# Patient Record
Sex: FEMALE | Race: WHITE | NOT HISPANIC OR LATINO | Employment: OTHER | URBAN - METROPOLITAN AREA
[De-identification: names, ages, dates, MRNs, and addresses within clinical notes are randomized per-mention and may not be internally consistent; named-entity substitution may affect disease eponyms.]

---

## 2017-01-04 ENCOUNTER — OFFICE VISIT (OUTPATIENT)
Dept: FAMILY MEDICINE | Facility: CLINIC | Age: 77
End: 2017-01-04
Payer: MEDICARE

## 2017-01-04 VITALS
BODY MASS INDEX: 36.71 KG/M2 | OXYGEN SATURATION: 97 % | DIASTOLIC BLOOD PRESSURE: 78 MMHG | HEART RATE: 87 BPM | SYSTOLIC BLOOD PRESSURE: 132 MMHG | TEMPERATURE: 98 F | WEIGHT: 199.5 LBS | HEIGHT: 62 IN

## 2017-01-04 DIAGNOSIS — Z00.00 WELLNESS EXAMINATION: Primary | ICD-10-CM

## 2017-01-04 DIAGNOSIS — E78.5 HYPERLIPIDEMIA, UNSPECIFIED HYPERLIPIDEMIA TYPE: ICD-10-CM

## 2017-01-04 DIAGNOSIS — M81.0 OSTEOPOROSIS: ICD-10-CM

## 2017-01-04 DIAGNOSIS — Z86.79 HISTORY OF HYPERTENSION: ICD-10-CM

## 2017-01-04 PROCEDURE — G0009 ADMIN PNEUMOCOCCAL VACCINE: HCPCS | Mod: S$GLB,,, | Performed by: FAMILY MEDICINE

## 2017-01-04 PROCEDURE — 99499 UNLISTED E&M SERVICE: CPT | Mod: S$GLB,,, | Performed by: FAMILY MEDICINE

## 2017-01-04 PROCEDURE — 99387 INIT PM E/M NEW PAT 65+ YRS: CPT | Mod: 25,S$GLB,, | Performed by: FAMILY MEDICINE

## 2017-01-04 PROCEDURE — 90670 PCV13 VACCINE IM: CPT | Mod: S$GLB,,, | Performed by: FAMILY MEDICINE

## 2017-01-04 RX ORDER — ROPINIROLE 2 MG/1
2 TABLET, FILM COATED ORAL NIGHTLY
COMMUNITY
End: 2017-01-04 | Stop reason: SDUPTHER

## 2017-01-04 RX ORDER — TIZANIDINE 4 MG/1
4 TABLET ORAL NIGHTLY
COMMUNITY
End: 2017-01-04

## 2017-01-04 RX ORDER — ROPINIROLE 2 MG/1
2 TABLET, FILM COATED ORAL NIGHTLY
Qty: 90 TABLET | Refills: 3 | Status: SHIPPED | OUTPATIENT
Start: 2017-01-04 | End: 2017-01-26 | Stop reason: SDUPTHER

## 2017-01-04 NOTE — MR AVS SNAPSHOT
"    [facility]  [address] [place] [address]  Phone: [phone]  Fax: [phone]                  Angelique Mckeonjolene   2017 8:40 AM   Office Visit    Description:  Female : 1940   Provider:  Marc Sands MD   Department:  [facility]           Reason for Visit     Establish Care           Diagnoses this Visit        Comments    Wellness examination    -  Primary     Hyperlipidemia, unspecified hyperlipidemia type         Osteoporosis         History of hypertension                To Do List           Goals (5 Years of Data)     None      [facility]     [facility] Care [facility] -  Assistance  Registered nurses in the [facility] On Call Center provide clinical advisement, health education, appointment booking, and other advisory services.  Call for this free service at [phone].             Medications           STOP taking these medications     hydrochlorothiazide (HYDRODIURIL) 25 MG tablet Take 25 mg by mouth once daily.     tizanidine (ZANAFLEX) 4 MG tablet Take 4 mg by mouth every evening.           Verify that the below list of medications is an accurate representation of the medications you are currently taking.  If none reported, the list may be blank. If incorrect, please contact your healthcare provider. Carry this list with you in case of emergency.           Current Medications     ropinirole (REQUIP) 2 MG tablet Take 2 mg by mouth nightly.    alendronate (FOSAMAX) 70 MG tablet Take 70 mg by mouth every 7 days.     atorvastatin (LIPITOR) 20 MG tablet Take 20 mg by mouth once daily.            Clinical Reference Information           Vital Signs - Last Recorded  Most recent update: 2017  9:01 AM by Reinaldo Gonsalez LPN    BP Pulse Temp Ht Wt SpO2    132/78 87 98.4 °F (36.9 °C) (Oral) 5' 2" (1.575 m) 90.5 kg (199 lb 8.3 oz) 97%    BMI                36.49 kg/m2          Blood Pressure          Most Recent Value    BP  132/78 "      Allergies as of 1/4/2017     No Known Allergies      Immunizations Administered on Date of Encounter - 1/4/2017     Name Date Dose VIS Date Route    Pneumococcal Conjugate - 13 Valent  Incomplete 0.5 mL 11/5/2015 Intramuscular      Orders Placed During Today's Visit      Normal Orders This Visit    Pneumococcal Conjugate Vaccine (13 Valent) (IM)     Future Labs/Procedures Expected by Expires    CBC auto differential  1/4/2017 3/5/2018    Comprehensive metabolic panel  1/4/2017 3/5/2018    Lipid panel  As directed 1/4/2018    Vitamin D  As directed 1/4/2018      [facility] Sign-Up     Activating your [facility] account is as easy as 1-2-3!     1) Visit [email or web address], select Sign Up Now, enter this activation code and your date of birth, then select Next.  [email or web address]  Expires: 2/18/2017  9:39 AM      2) Create a username and password to use when you visit [facility] in the future and select a security question in case you lose your password and select Next.    3) Enter your e-mail address and click Sign Up!    Additional Information  If you have questions, please e-mail [email or web address] or call [phone] to talk to our [facility] staff. Remember, [facility] is NOT to be used for urgent needs. For medical emergencies, dial 911.

## 2017-01-04 NOTE — PROGRESS NOTES
Subjective:      Patient ID: Shirley F Gilford is a 76 y.o. female.    Chief Complaint: Establish Care    HPI Comments: New pt; changing MD; treateed for chol and bp and meds increase dose made her nauseated and constipated; stopped everything except for restless leg rx ropinirole 2 mg works; off tizanidine, atorvastatin, hydrochlorothiazide and alendronate; had restless legs for 20 years; taking fosamax; not taking cholesterol Rx; used to work at [email or web address]; pt from [place]; moved to . At 1 years old;   aneurysm 20 years ago; he was NOPD.    Review of Systems   Constitutional: Negative.    HENT: Negative.    Respiratory: Negative.    Cardiovascular: Negative.    Gastrointestinal: Negative.    Endocrine: Negative.    Genitourinary: Negative.    Musculoskeletal: Negative.    Psychiatric/Behavioral: Negative.    All other systems reviewed and are negative.    Objective:     Physical Exam   Constitutional: She is oriented to person, place, and time. She appears well-developed and well-nourished.   HENT:   Head: Normocephalic.   Eyes: Conjunctivae and EOM are normal. Pupils are equal, round, and reactive to light.   Neck: Normal range of motion. Neck supple.   Cardiovascular: Normal rate, regular rhythm and normal heart sounds.    Pulmonary/Chest: Effort normal and breath sounds normal.   Musculoskeletal: Normal range of motion.   Neurological: She is alert and oriented to person, place, and time. She has normal reflexes.   Skin: Skin is warm and dry.   Psychiatric: She has a normal mood and affect. Her behavior is normal. Judgment and thought content normal.   Nursing note and vitals reviewed.    Assessment:     1. Wellness examination    2. Hyperlipidemia, unspecified hyperlipidemia type    3. Osteoporosis    4. History of hypertension      Plan:     New Prescriptions    No medications on file     Discontinued Medications    HYDROCHLOROTHIAZIDE (HYDRODIURIL) 25 MG TABLET    Take 25 mg by mouth  once daily.     TIZANIDINE (ZANAFLEX) 4 MG TABLET    Take 4 mg by mouth every evening.     Modified Medications    Modified Medication Previous Medication    ROPINIROLE (REQUIP) 2 MG TABLET ropinirole (REQUIP) 2 MG tablet       Take 1 tablet (2 mg total) by mouth nightly.    Take 2 mg by mouth nightly.       Wellness examination  -     Cancel: CBC auto differential; Future; Expected date: 1/4/17  -     Cancel: Comprehensive metabolic panel; Future; Expected date: 1/4/17  -     Cancel: Lipid panel; Future  -     Cancel: Vitamin D; Future  -     Vitamin D; Future; Expected date: 1/4/17  -     Lipid panel; Future; Expected date: 1/4/17  -     Comprehensive metabolic panel; Future; Expected date: 1/4/17  -     CBC auto differential; Future; Expected date: 1/4/17    Hyperlipidemia, unspecified hyperlipidemia type  -     Cancel: CBC auto differential; Future; Expected date: 1/4/17  -     Cancel: Comprehensive metabolic panel; Future; Expected date: 1/4/17  -     Cancel: Lipid panel; Future  -     Cancel: Vitamin D; Future  -     Vitamin D; Future; Expected date: 1/4/17  -     Lipid panel; Future; Expected date: 1/4/17  -     Comprehensive metabolic panel; Future; Expected date: 1/4/17  -     CBC auto differential; Future; Expected date: 1/4/17    Osteoporosis  -     Cancel: CBC auto differential; Future; Expected date: 1/4/17  -     Cancel: Comprehensive metabolic panel; Future; Expected date: 1/4/17  -     Cancel: Lipid panel; Future  -     Cancel: Vitamin D; Future  -     Vitamin D; Future; Expected date: 1/4/17  -     Lipid panel; Future; Expected date: 1/4/17  -     Comprehensive metabolic panel; Future; Expected date: 1/4/17  -     CBC auto differential; Future; Expected date: 1/4/17    History of hypertension  -     Vitamin D; Future; Expected date: 1/4/17  -     Lipid panel; Future; Expected date: 1/4/17  -     Comprehensive metabolic panel; Future; Expected date: 1/4/17  -     CBC auto differential; Future;  Expected date: 1/4/17    Other orders  -     Pneumococcal Conjugate Vaccine (13 Valent) (IM)  -     Cancel: Tdap Vaccine (Adult)  -     Cancel: Zoster Vaccine - Live

## 2017-01-07 LAB
1,25(OH)2D SERPL-MCNC: 62 PG/ML (ref 18–72)
1,25(OH)2D2 SERPL-MCNC: <8 PG/ML
1,25(OH)2D3 SERPL-MCNC: 62 PG/ML
ALBUMIN SERPL-MCNC: 3.9 G/DL (ref 3.6–5.1)
ALBUMIN/GLOB SERPL: 1.4 (CALC) (ref 1–2.5)
ALP SERPL-CCNC: 102 U/L (ref 33–130)
ALT SERPL-CCNC: 38 U/L (ref 6–29)
AST SERPL-CCNC: 52 U/L (ref 10–35)
BASOPHILS # BLD AUTO: 46 CELLS/UL (ref 0–200)
BASOPHILS NFR BLD AUTO: 0.5 %
BILIRUB SERPL-MCNC: 0.3 MG/DL (ref 0.2–1.2)
BUN SERPL-MCNC: 13 MG/DL (ref 7–25)
BUN/CREAT SERPL: 13 (CALC) (ref 6–22)
CALCIUM SERPL-MCNC: 9.5 MG/DL (ref 8.6–10.4)
CHLORIDE SERPL-SCNC: 104 MMOL/L (ref 98–110)
CHOLEST SERPL-MCNC: 201 MG/DL (ref 125–200)
CHOLEST/HDLC SERPL: 5.3 (CALC)
CO2 SERPL-SCNC: 27 MMOL/L (ref 20–31)
CREAT SERPL-MCNC: 0.98 MG/DL (ref 0.6–0.93)
EOSINOPHIL # BLD AUTO: 175 CELLS/UL (ref 15–500)
EOSINOPHIL NFR BLD AUTO: 1.9 %
ERYTHROCYTE [DISTWIDTH] IN BLOOD BY AUTOMATED COUNT: 15.8 % (ref 11–15)
GFR SERPL CREATININE-BSD FRML MDRD: 56 ML/MIN/1.73M2
GLOBULIN SER CALC-MCNC: 2.7 G/DL (CALC) (ref 1.9–3.7)
GLUCOSE SERPL-MCNC: 102 MG/DL (ref 65–99)
HCT VFR BLD AUTO: 35.5 % (ref 35–45)
HDLC SERPL-MCNC: 38 MG/DL
HGB BLD-MCNC: 11.3 G/DL (ref 11.7–15.5)
LDLC SERPL CALC-MCNC: 136 MG/DL (CALC)
LYMPHOCYTES # BLD AUTO: 1573 CELLS/UL (ref 850–3900)
LYMPHOCYTES NFR BLD AUTO: 17.1 %
MCH RBC QN AUTO: 25.9 PG (ref 27–33)
MCHC RBC AUTO-ENTMCNC: 31.9 G/DL (ref 32–36)
MCV RBC AUTO: 81.1 FL (ref 80–100)
MONOCYTES # BLD AUTO: 791 CELLS/UL (ref 200–950)
MONOCYTES NFR BLD AUTO: 8.6 %
NEUTROPHILS # BLD AUTO: 6615 CELLS/UL (ref 1500–7800)
NEUTROPHILS NFR BLD AUTO: 71.9 %
NONHDLC SERPL-MCNC: 163 MG/DL (CALC)
PLATELET # BLD AUTO: 337 THOUSAND/UL (ref 140–400)
PMV BLD REES-ECKER: 8.6 FL (ref 7.5–11.5)
POTASSIUM SERPL-SCNC: 4.4 MMOL/L (ref 3.5–5.3)
PROT SERPL-MCNC: 6.6 G/DL (ref 6.1–8.1)
RBC # BLD AUTO: 4.37 MILLION/UL (ref 3.8–5.1)
SODIUM SERPL-SCNC: 140 MMOL/L (ref 135–146)
TRIGL SERPL-MCNC: 135 MG/DL
WBC # BLD AUTO: 9.2 THOUSAND/UL (ref 3.8–10.8)

## 2017-01-09 ENCOUNTER — TELEPHONE (OUTPATIENT)
Dept: FAMILY MEDICINE | Facility: CLINIC | Age: 77
End: 2017-01-09

## 2017-01-09 DIAGNOSIS — R79.89 ELEVATED LFTS: Primary | ICD-10-CM

## 2017-01-10 ENCOUNTER — TELEPHONE (OUTPATIENT)
Dept: FAMILY MEDICINE | Facility: CLINIC | Age: 77
End: 2017-01-10

## 2017-01-10 NOTE — TELEPHONE ENCOUNTER
Pt notified, going to [facility] for labs and will self schedule ultrasound. Pt will call clinic to schedule f/u appt with Dr. Sands once completed.

## 2017-01-10 NOTE — TELEPHONE ENCOUNTER
----- Message from Marc Sands MD sent at 1/9/2017  8:46 AM CST -----  High liver tests; needs Hep C and abd ultrasound; ordered; RTC when completed

## 2017-01-12 ENCOUNTER — HOSPITAL ENCOUNTER (OUTPATIENT)
Dept: RADIOLOGY | Facility: HOSPITAL | Age: 77
Discharge: HOME | End: 2017-01-12
Attending: FAMILY MEDICINE
Payer: MEDICARE

## 2017-01-12 DIAGNOSIS — R79.89 ELEVATED LFTS: ICD-10-CM

## 2017-01-12 PROCEDURE — 76700 US EXAM ABDOM COMPLETE: CPT | Mod: TC,PO

## 2017-01-13 ENCOUNTER — TELEPHONE (OUTPATIENT)
Dept: FAMILY MEDICINE | Facility: CLINIC | Age: 77
End: 2017-01-13

## 2017-01-13 LAB
HCV AB S/CO SERPL IA: 0.02
HCV AB SERPL QL IA: NORMAL

## 2017-01-13 NOTE — TELEPHONE ENCOUNTER
----- Message from Marc Sands MD sent at 1/13/2017  6:26 AM CST -----  Fatty liver on ultrasound. Lose weight

## 2017-01-26 ENCOUNTER — OFFICE VISIT (OUTPATIENT)
Dept: FAMILY MEDICINE | Facility: CLINIC | Age: 77
End: 2017-01-26
Payer: MEDICARE

## 2017-01-26 ENCOUNTER — TELEPHONE (OUTPATIENT)
Dept: FAMILY MEDICINE | Facility: CLINIC | Age: 77
End: 2017-01-26

## 2017-01-26 VITALS
HEIGHT: 62 IN | SYSTOLIC BLOOD PRESSURE: 138 MMHG | BODY MASS INDEX: 37.16 KG/M2 | DIASTOLIC BLOOD PRESSURE: 82 MMHG | OXYGEN SATURATION: 95 % | WEIGHT: 201.94 LBS | HEART RATE: 88 BPM | TEMPERATURE: 98 F

## 2017-01-26 DIAGNOSIS — Z87.891 EX-SMOKER: ICD-10-CM

## 2017-01-26 DIAGNOSIS — Z86.79 HISTORY OF HYPERTENSION: ICD-10-CM

## 2017-01-26 DIAGNOSIS — R79.89 ELEVATED LFTS: Primary | ICD-10-CM

## 2017-01-26 DIAGNOSIS — R73.9 HYPERGLYCEMIA: ICD-10-CM

## 2017-01-26 DIAGNOSIS — Z86.39 HISTORY OF HYPERLIPIDEMIA: ICD-10-CM

## 2017-01-26 DIAGNOSIS — M81.0 OSTEOPOROSIS: ICD-10-CM

## 2017-01-26 DIAGNOSIS — G25.81 RESTLESS LEG SYNDROME: ICD-10-CM

## 2017-01-26 DIAGNOSIS — J44.9 CHRONIC OBSTRUCTIVE PULMONARY DISEASE, UNSPECIFIED COPD TYPE: ICD-10-CM

## 2017-01-26 PROCEDURE — 99213 OFFICE O/P EST LOW 20 MIN: CPT | Mod: S$GLB,,, | Performed by: FAMILY MEDICINE

## 2017-01-26 PROCEDURE — 99499 UNLISTED E&M SERVICE: CPT | Mod: S$GLB,,, | Performed by: FAMILY MEDICINE

## 2017-01-26 PROCEDURE — 1159F MED LIST DOCD IN RCRD: CPT | Mod: S$GLB,,, | Performed by: FAMILY MEDICINE

## 2017-01-26 PROCEDURE — 1160F RVW MEDS BY RX/DR IN RCRD: CPT | Mod: S$GLB,,, | Performed by: FAMILY MEDICINE

## 2017-01-26 PROCEDURE — 1157F ADVNC CARE PLAN IN RCRD: CPT | Mod: S$GLB,,, | Performed by: FAMILY MEDICINE

## 2017-01-26 PROCEDURE — 1126F AMNT PAIN NOTED NONE PRSNT: CPT | Mod: S$GLB,,, | Performed by: FAMILY MEDICINE

## 2017-01-26 RX ORDER — ROPINIROLE 3 MG/1
3 TABLET, FILM COATED ORAL NIGHTLY
Qty: 90 TABLET | Refills: 3 | Status: SHIPPED | OUTPATIENT
Start: 2017-01-26 | End: 2017-03-21 | Stop reason: SDUPTHER

## 2017-01-26 RX ORDER — ALENDRONATE SODIUM 70 MG/1
70 TABLET ORAL
Qty: 13 TABLET | Refills: 3 | Status: SHIPPED | OUTPATIENT
Start: 2017-01-26 | End: 2017-03-21 | Stop reason: SDUPTHER

## 2017-01-26 RX ORDER — ASPIRIN 81 MG/1
81 TABLET ORAL DAILY
Qty: 100 TABLET | Refills: 12 | COMMUNITY
Start: 2017-01-26 | End: 2017-05-30

## 2017-01-26 NOTE — MR AVS SNAPSHOT
[address] [address]  Phone: [phone]  Fax: [phone]                  Shirley F Gilford   2017 8:40 AM   Office Visit    Description:  Female : 1940   Provider:  Marc Sands MD   [facility]           Reason for Visit     Follow-up           Diagnoses this Visit        Comments    Elevated LFTs    -  Primary     Restless leg syndrome         BMI 36.0-36.9,adult         Hyperglycemia         Osteoporosis         History of hypertension         History of hyperlipidemia         Ex-smoker         Chronic obstructive pulmonary disease, unspecified COPD type                To Do List           Goals (5 Years of Data)     None      Follow-Up and Disposition     Return in about 1 month (around 2017).       These Medications        Disp Refills Start End    alendronate (FOSAMAX) 70 MG tablet 13 tablet 3 2017     Take 1 tablet (70 mg total) by mouth every 7 days. - Oral    Pharmacy: [id number] [address] Ph #: [phone]       ropinirole (REQUIP) 3 MG tablet 90 tablet 3 2017     Take 1 tablet (3 mg total) by mouth nightly. - Oral    Pharmacy: [facility] [address] Ph #: [phone]       umeclidinium-vilanterol 62.5-25 mcg/actuation DsDv 1 each 11 2017     Inhale 1 puff into the lungs once daily. For COPD - Inhalation    Pharmacy: [facility] [address] Ph #: [phone]         PURCHASE these Medications (No prescription required)        Start End    aspirin (ECOTRIN) 81 MG EC tablet 2017    Sig - Route: Take 1 tablet (81 mg total) by mouth once daily. - Oral    Class: OTC      [facility]     [facility] Care [facility] -  Assistance  Registered nurses in the [facility] On Call Center provide clinical advisement, health education, appointment booking, and  "other advisory services.  Call for this free service at [phone].             Medications           START taking these NEW medications        Refills    aspirin (ECOTRIN) 81 MG EC tablet 12    Sig: Take 1 tablet (81 mg total) by mouth once daily.    Class: OTC    Route: Oral    umeclidinium-vilanterol 62.5-25 mcg/actuation DsDv 11    Sig: Inhale 1 puff into the lungs once daily. For COPD    Class: Normal    Route: Inhalation      CHANGE how you are taking these medications     Start Taking Instead of    alendronate (FOSAMAX) 70 MG tablet alendronate (FOSAMAX) 70 MG tablet    Dosage:  Take 1 tablet (70 mg total) by mouth every 7 days. Dosage:  Take 70 mg by mouth every 7 days.     Reason for Change:  Reorder     ropinirole (REQUIP) 3 MG tablet ropinirole (REQUIP) 2 MG tablet    Dosage:  Take 1 tablet (3 mg total) by mouth nightly. Dosage:  Take 1 tablet (2 mg total) by mouth nightly.    Reason for Change:  Reorder       STOP taking these medications     atorvastatin (LIPITOR) 20 MG tablet Take 20 mg by mouth once daily.            Verify that the below list of medications is an accurate representation of the medications you are currently taking.  If none reported, the list may be blank. If incorrect, please contact your healthcare provider. Carry this list with you in case of emergency.           Current Medications     alendronate (FOSAMAX) 70 MG tablet Take 1 tablet (70 mg total) by mouth every 7 days.    aspirin (ECOTRIN) 81 MG EC tablet Take 1 tablet (81 mg total) by mouth once daily.    ropinirole (REQUIP) 3 MG tablet Take 1 tablet (3 mg total) by mouth nightly.    umeclidinium-vilanterol 62.5-25 mcg/actuation DsDv Inhale 1 puff into the lungs once daily. For COPD           Clinical Reference Information           Vital Signs - Last Recorded  Most recent update: 1/26/2017  9:19 AM by Reinaldo Gonsalez LPN    BP Pulse Temp Ht Wt SpO2    138/82 88 98.2 °F (36.8 °C) (Oral) 5' 2" (1.575 m) 91.6 kg (201 lb " 15.1 oz) 95%    BMI                36.94 kg/m2          Blood Pressure          Most Recent Value    BP  138/82      Allergies as of 1/26/2017     No Known Allergies      Immunizations Administered on Date of Encounter - 1/26/2017     None      Orders Placed During Today's Visit     Future Labs/Procedures Expected by Expires    Hepatic function panel  4/26/2017 3/27/2018    Lipid panel  4/26/2017 1/26/2018    Complete PFT without bronchodilator  As directed 1/27/2018    Lipid panel  As directed 1/26/2018      [facility] Sign-Up     Activating your [facility] account is as easy as 1-2-3!     1) Visit [email or web address], select Sign Up Now, enter this activation code and your date of birth, then select Next.  [email or web address]  Expires: 2/18/2017  9:39 AM      2) Create a username and password to use when you visit [facility] in the future and select a security question in case you lose your password and select Next.    3) Enter your e-mail address and click Sign Up!    Additional Information  If you have questions, please e-mail [email or web address] or call [phone] to talk to our [facility] staff. Remember, [facility] is NOT to be used for urgent needs. For medical emergencies, dial 911.

## 2017-01-26 NOTE — PROGRESS NOTES
Subjective:      Patient ID: Shirley F Gilford is a 76 y.o. female.    Chief Complaint: Follow-up    HPI Comments: Follow up agapito LFT, RLS, fatty liver on ultrasound, ex smoker; gets SOB    Review of Systems   Constitutional: Negative.    HENT: Negative.    Respiratory: Positive for shortness of breath.    Cardiovascular: Negative.    Gastrointestinal: Negative.    Endocrine: Negative.    Genitourinary: Negative.    Musculoskeletal: Negative.    Neurological:        Restless legs back   Psychiatric/Behavioral: Negative.    All other systems reviewed and are negative.    Objective:     Physical Exam   Constitutional: She is oriented to person, place, and time. She appears well-developed and well-nourished.   HENT:   Head: Normocephalic.   Eyes: Conjunctivae and EOM are normal. Pupils are equal, round, and reactive to light.   Neck: Normal range of motion. Neck supple.   Cardiovascular: Normal rate, regular rhythm and normal heart sounds.    Pulmonary/Chest: Effort normal and breath sounds normal.   Musculoskeletal: Normal range of motion.   Neurological: She is alert and oriented to person, place, and time. She has normal reflexes.   Skin: Skin is warm and dry.   Psychiatric: She has a normal mood and affect. Her behavior is normal. Judgment and thought content normal.   Nursing note and vitals reviewed.    Assessment:     1. Elevated LFTs    2. Restless leg syndrome    3. BMI 36.0-36.9,adult    4. Hyperglycemia    5. Osteoporosis    6. History of hypertension    7. History of hyperlipidemia    8. Ex-smoker    9. Chronic obstructive pulmonary disease, unspecified COPD type      Plan:     New Prescriptions    ASPIRIN (ECOTRIN) 81 MG EC TABLET    Take 1 tablet (81 mg total) by mouth once daily.    UMECLIDINIUM-VILANTEROL 62.5-25 MCG/ACTUATION DSDV    Inhale 1 puff into the lungs once daily. For COPD     Discontinued Medications    ATORVASTATIN (LIPITOR) 20 MG TABLET    Take 20 mg by mouth once daily.      Modified  Medications    Modified Medication Previous Medication    ALENDRONATE (FOSAMAX) 70 MG TABLET alendronate (FOSAMAX) 70 MG tablet       Take 1 tablet (70 mg total) by mouth every 7 days.    Take 70 mg by mouth every 7 days.     ROPINIROLE (REQUIP) 3 MG TABLET ropinirole (REQUIP) 2 MG tablet       Take 1 tablet (3 mg total) by mouth nightly.    Take 1 tablet (2 mg total) by mouth nightly.       Elevated LFTs  -     Hepatic function panel; Future; Expected date: 4/26/17    Restless leg syndrome    BMI 36.0-36.9,adult  -     Cancel: Hemoglobin A1c; Future; Expected date: 4/26/17  -     Hepatic function panel; Future; Expected date: 4/26/17    Hyperglycemia  -     Cancel: Hemoglobin A1c; Future; Expected date: 4/26/17    Osteoporosis    History of hypertension    History of hyperlipidemia  -     Lipid panel; Future  -     Lipid panel; Future; Expected date: 4/26/17    Ex-smoker  -     Complete PFT without bronchodilator; Future    Chronic obstructive pulmonary disease, unspecified COPD type    Other orders  -     alendronate (FOSAMAX) 70 MG tablet; Take 1 tablet (70 mg total) by mouth every 7 days.  Dispense: 13 tablet; Refill: 3  -     ropinirole (REQUIP) 3 MG tablet; Take 1 tablet (3 mg total) by mouth nightly.  Dispense: 90 tablet; Refill: 3  -     aspirin (ECOTRIN) 81 MG EC tablet; Take 1 tablet (81 mg total) by mouth once daily.  Dispense: 100 tablet; Refill: 12  -     umeclidinium-vilanterol 62.5-25 mcg/actuation DsDv; Inhale 1 puff into the lungs once daily. For COPD  Dispense: 1 each; Refill: 11

## 2017-01-31 ENCOUNTER — TELEPHONE (OUTPATIENT)
Dept: FAMILY MEDICINE | Facility: CLINIC | Age: 77
End: 2017-01-31

## 2017-01-31 RX ORDER — SULFAMETHOXAZOLE AND TRIMETHOPRIM 800; 160 MG/1; MG/1
1 TABLET ORAL 2 TIMES DAILY
Qty: 14 TABLET | Refills: 0 | Status: SHIPPED | OUTPATIENT
Start: 2017-01-31 | End: 2017-02-07

## 2017-01-31 NOTE — TELEPHONE ENCOUNTER
Spoke with patient states that's he has a boil in between her leg near her vagina states that it is getting better then it was before she just thought it would get better faster then this. States that it is draining. ABX sent to pharmacy continue warm compresses

## 2017-03-21 ENCOUNTER — OFFICE VISIT (OUTPATIENT)
Dept: FAMILY MEDICINE | Facility: CLINIC | Age: 77
End: 2017-03-21
Payer: MEDICARE

## 2017-03-21 VITALS
TEMPERATURE: 98 F | HEIGHT: 62 IN | DIASTOLIC BLOOD PRESSURE: 86 MMHG | RESPIRATION RATE: 18 BRPM | WEIGHT: 199.06 LBS | OXYGEN SATURATION: 95 % | SYSTOLIC BLOOD PRESSURE: 132 MMHG | BODY MASS INDEX: 36.63 KG/M2 | HEART RATE: 78 BPM

## 2017-03-21 DIAGNOSIS — K59.00 CONSTIPATION, UNSPECIFIED CONSTIPATION TYPE: ICD-10-CM

## 2017-03-21 DIAGNOSIS — Z86.79 HISTORY OF HYPERTENSION: ICD-10-CM

## 2017-03-21 DIAGNOSIS — Z87.891 EX-SMOKER: ICD-10-CM

## 2017-03-21 DIAGNOSIS — G25.81 RESTLESS LEG SYNDROME: ICD-10-CM

## 2017-03-21 DIAGNOSIS — M81.0 OSTEOPOROSIS: ICD-10-CM

## 2017-03-21 DIAGNOSIS — K76.0 FATTY LIVER: ICD-10-CM

## 2017-03-21 DIAGNOSIS — R73.9 HYPERGLYCEMIA: ICD-10-CM

## 2017-03-21 DIAGNOSIS — Z86.39 HISTORY OF HYPERLIPIDEMIA: ICD-10-CM

## 2017-03-21 DIAGNOSIS — R79.89 ELEVATED LFTS: Primary | ICD-10-CM

## 2017-03-21 PROCEDURE — 1159F MED LIST DOCD IN RCRD: CPT | Mod: S$GLB,,, | Performed by: FAMILY MEDICINE

## 2017-03-21 PROCEDURE — 1157F ADVNC CARE PLAN IN RCRD: CPT | Mod: S$GLB,,, | Performed by: FAMILY MEDICINE

## 2017-03-21 PROCEDURE — 99213 OFFICE O/P EST LOW 20 MIN: CPT | Mod: S$GLB,,, | Performed by: FAMILY MEDICINE

## 2017-03-21 PROCEDURE — 1160F RVW MEDS BY RX/DR IN RCRD: CPT | Mod: S$GLB,,, | Performed by: FAMILY MEDICINE

## 2017-03-21 PROCEDURE — 99499 UNLISTED E&M SERVICE: CPT | Mod: S$GLB,,, | Performed by: FAMILY MEDICINE

## 2017-03-21 PROCEDURE — 1126F AMNT PAIN NOTED NONE PRSNT: CPT | Mod: S$GLB,,, | Performed by: FAMILY MEDICINE

## 2017-03-21 RX ORDER — ALENDRONATE SODIUM 70 MG/1
70 TABLET ORAL
Qty: 13 TABLET | Refills: 3 | Status: SHIPPED | OUTPATIENT
Start: 2017-03-21 | End: 2017-11-02 | Stop reason: SDUPTHER

## 2017-03-21 RX ORDER — ROPINIROLE 3 MG/1
3 TABLET, FILM COATED ORAL NIGHTLY
Qty: 90 TABLET | Refills: 3 | Status: SHIPPED | OUTPATIENT
Start: 2017-03-21 | End: 2018-08-22

## 2017-03-21 NOTE — MR AVS SNAPSHOT
[facility]  [address] [place] [address]  Phone: [phone]  Fax: [phone]                  Shirley F Gilford   3/21/2017 3:20 PM   Office Visit    Description:  Female : 1940   Provider:  Marc Sands MD   Department:  [facility]           Reason for Visit     Medication Refill     Follow-up           Diagnoses this Visit        Comments    Elevated LFTs    -  Primary     Ex-smoker         Restless leg syndrome         History of hyperlipidemia         History of hypertension         Osteoporosis         Hyperglycemia         Fatty liver         Constipation, unspecified constipation type                To Do List           Goals (5 Years of Data)     None      Follow-Up and Disposition     Return in about 6 months (around 2017).       These Medications        Disp Refills Start End    alendronate (FOSAMAX) 70 MG tablet 13 tablet 3 3/21/2017     Take 1 tablet (70 mg total) by mouth every 7 days. - Oral    Pharmacy: [id number] [address] Ph #: [phone]       ropinirole (REQUIP) 3 MG tablet 90 tablet 3 3/21/2017     Take 1 tablet (3 mg total) by mouth nightly. - Oral    Pharmacy: [id number] Ph #: [phone]         [facility] On Call     [facility] On Call Nurse Care Line -  Assistance  Registered nurses in the [facility] Center provide clinical advisement, health education, appointment booking, and other advisory services.  Call for this free service at [phone].             Medications                Verify that the below list of medications is an accurate representation of the medications you are currently taking.  If none reported, the list may be blank. If incorrect, please contact your healthcare provider. Carry this list with you in case of emergency.           Current Medications     alendronate (FOSAMAX) 70 MG tablet Take 1 tablet  "(70 mg total) by mouth every 7 days.    aspirin (ECOTRIN) 81 MG EC tablet Take 1 tablet (81 mg total) by mouth once daily.    ropinirole (REQUIP) 3 MG tablet Take 1 tablet (3 mg total) by mouth nightly.    umeclidinium-vilanterol 62.5-25 mcg/actuation DsDv Inhale 1 puff into the lungs once daily. For COPD           Clinical Reference Information           Your Vitals Were     BP Pulse Temp Resp Height Weight    132/86 (BP Location: Left arm, Patient Position: Sitting, BP Method: Manual) 78 98.3 °F (36.8 °C) (Oral) 18 5' 2" (1.575 m) 90.3 kg (199 lb 1.2 oz)    SpO2 BMI             95% 36.41 kg/m2         Blood Pressure          Most Recent Value    BP  132/86      Allergies as of 3/21/2017     No Known Allergies      Immunizations Administered on Date of Encounter - 3/21/2017     None      Orders Placed During Today's Visit     Future Labs/Procedures Expected by Expires    Hemoglobin A1c  As directed 3/21/2018    Lipid panel  As directed 3/21/2018      [facility] Sign-Up     Activating your [facility] account is as easy as 1-2-3!     1) Visit [email or web address], select Sign Up Now, enter this activation code and your date of birth, then select Next.  [email or web address]  Expires: 5/5/2017  4:30 PM      2) Create a username and password to use when you visit [facility] in the future and select a security question in case you lose your password and select Next.    3) Enter your e-mail address and click Sign Up!    Additional Information  If you have questions, please e-mail [email or web address] or call [phone] to talk to our [facility] staff. Remember, [facility] is NOT to be used for urgent needs. For medical emergencies, dial 911.         Language Assistance Services     ATTENTION: Language assistance services are available, free of charge. Please call [phone].      ATENCIÓN: Si habla español, tiene a pinon disposición servicios gratuitos de asistencia lingüística. Llame al [phone].     BILLY Ý: N?u b?n nói Ti?ng " Vi?t, có các d?ch v? h? tr? ngôn ng? mi?n phí jolantah cho b?n. G?i s? [phone].         [facility] complies with applicable Federal civil rights laws and does not discriminate on the basis of race, color, national origin, age, disability, or sex.

## 2017-03-21 NOTE — PROGRESS NOTES
Subjective:      Patient ID: Shirley F Gilford is a 76 y.o. female.    Chief Complaint: Medication Refill and Follow-up    HPI Comments: Check up; has fatty liver, copd, RLS, and elevated liver tests    Medication Refill       Review of Systems   Constitutional: Negative.    HENT: Negative.    Respiratory: Negative.    Cardiovascular: Negative.    Gastrointestinal: Negative.    Endocrine: Negative.    Genitourinary: Negative.    Musculoskeletal: Negative.    Psychiatric/Behavioral: Negative.    All other systems reviewed and are negative.    Objective:     Physical Exam   Constitutional: She is oriented to person, place, and time. She appears well-developed and well-nourished.   HENT:   Head: Normocephalic.   Eyes: Conjunctivae and EOM are normal. Pupils are equal, round, and reactive to light.   Neck: Normal range of motion. Neck supple.   Cardiovascular: Normal rate, regular rhythm and normal heart sounds.    Pulmonary/Chest: Effort normal and breath sounds normal.   Musculoskeletal: Normal range of motion.   Neurological: She is alert and oriented to person, place, and time. She has normal reflexes.   Skin: Skin is warm and dry.   Psychiatric: She has a normal mood and affect. Her behavior is normal. Judgment and thought content normal.   Nursing note and vitals reviewed.    Assessment:     1. Elevated LFTs    2. Ex-smoker    3. Restless leg syndrome    4. History of hyperlipidemia    5. History of hypertension    6. Osteoporosis    7. Hyperglycemia    8. Fatty liver      Plan:     New Prescriptions    No medications on file     Discontinued Medications    No medications on file     Modified Medications    Modified Medication Previous Medication    ALENDRONATE (FOSAMAX) 70 MG TABLET alendronate (FOSAMAX) 70 MG tablet       Take 1 tablet (70 mg total) by mouth every 7 days.    Take 1 tablet (70 mg total) by mouth every 7 days.    ROPINIROLE (REQUIP) 3 MG TABLET ropinirole (REQUIP) 3 MG tablet       Take 1 tablet  (3 mg total) by mouth nightly.    Take 1 tablet (3 mg total) by mouth nightly.       Elevated LFTs  -     Lipid panel; Future  -     Hemoglobin A1c; Future    Ex-smoker  -     Lipid panel; Future  -     Hemoglobin A1c; Future    Restless leg syndrome  -     Lipid panel; Future  -     Hemoglobin A1c; Future    History of hyperlipidemia  -     Lipid panel; Future  -     Hemoglobin A1c; Future    History of hypertension  -     Lipid panel; Future  -     Hemoglobin A1c; Future    Osteoporosis  -     Lipid panel; Future  -     Hemoglobin A1c; Future    Hyperglycemia  -     Lipid panel; Future  -     Hemoglobin A1c; Future    Fatty liver  -     Lipid panel; Future  -     Hemoglobin A1c; Future    Other orders  -     alendronate (FOSAMAX) 70 MG tablet; Take 1 tablet (70 mg total) by mouth every 7 days.  Dispense: 13 tablet; Refill: 3  -     ropinirole (REQUIP) 3 MG tablet; Take 1 tablet (3 mg total) by mouth nightly.  Dispense: 90 tablet; Refill: 3

## 2017-03-28 ENCOUNTER — TELEPHONE (OUTPATIENT)
Dept: FAMILY MEDICINE | Facility: CLINIC | Age: 77
End: 2017-03-28

## 2017-03-28 LAB
CHOLEST SERPL-MCNC: 202 MG/DL (ref 125–200)
CHOLEST/HDLC SERPL: 6.1 (CALC)
HBA1C MFR BLD: 5.9 % OF TOTAL HGB
HDLC SERPL-MCNC: 33 MG/DL
LDLC SERPL CALC-MCNC: 140 MG/DL (CALC)
NONHDLC SERPL-MCNC: 169 MG/DL (CALC)
TRIGL SERPL-MCNC: 144 MG/DL

## 2017-03-28 NOTE — TELEPHONE ENCOUNTER
Left message that lab work results were normal and to call the office with any questions or concerns.

## 2017-05-29 ENCOUNTER — HOSPITAL ENCOUNTER (OUTPATIENT)
Dept: RADIOLOGY | Facility: HOSPITAL | Age: 77
Discharge: HOME | End: 2017-05-29
Attending: FAMILY MEDICINE
Payer: MEDICARE

## 2017-05-29 ENCOUNTER — OFFICE VISIT (OUTPATIENT)
Dept: FAMILY MEDICINE | Facility: CLINIC | Age: 77
End: 2017-05-29
Payer: MEDICARE

## 2017-05-29 VITALS
BODY MASS INDEX: 35.33 KG/M2 | OXYGEN SATURATION: 95 % | SYSTOLIC BLOOD PRESSURE: 134 MMHG | WEIGHT: 192 LBS | DIASTOLIC BLOOD PRESSURE: 78 MMHG | HEIGHT: 62 IN | TEMPERATURE: 98 F | HEART RATE: 93 BPM

## 2017-05-29 DIAGNOSIS — G25.81 RESTLESS LEG SYNDROME: ICD-10-CM

## 2017-05-29 DIAGNOSIS — Z86.39 HISTORY OF HYPERLIPIDEMIA: ICD-10-CM

## 2017-05-29 DIAGNOSIS — Z87.891 EX-SMOKER: ICD-10-CM

## 2017-05-29 DIAGNOSIS — Z86.79 HISTORY OF HYPERTENSION: ICD-10-CM

## 2017-05-29 DIAGNOSIS — R06.02 SOB (SHORTNESS OF BREATH): ICD-10-CM

## 2017-05-29 DIAGNOSIS — M81.0 OSTEOPOROSIS, UNSPECIFIED OSTEOPOROSIS TYPE, UNSPECIFIED PATHOLOGICAL FRACTURE PRESENCE: ICD-10-CM

## 2017-05-29 DIAGNOSIS — R79.89 ELEVATED LFTS: ICD-10-CM

## 2017-05-29 DIAGNOSIS — R73.9 HYPERGLYCEMIA: ICD-10-CM

## 2017-05-29 DIAGNOSIS — J44.9 CHRONIC OBSTRUCTIVE PULMONARY DISEASE, UNSPECIFIED COPD TYPE: ICD-10-CM

## 2017-05-29 DIAGNOSIS — R73.9 HYPERGLYCEMIA: Primary | ICD-10-CM

## 2017-05-29 PROCEDURE — 1126F AMNT PAIN NOTED NONE PRSNT: CPT | Mod: S$GLB,,, | Performed by: FAMILY MEDICINE

## 2017-05-29 PROCEDURE — 99499 UNLISTED E&M SERVICE: CPT | Mod: S$GLB,,, | Performed by: FAMILY MEDICINE

## 2017-05-29 PROCEDURE — 99213 OFFICE O/P EST LOW 20 MIN: CPT | Mod: S$GLB,,, | Performed by: FAMILY MEDICINE

## 2017-05-29 PROCEDURE — 1159F MED LIST DOCD IN RCRD: CPT | Mod: S$GLB,,, | Performed by: FAMILY MEDICINE

## 2017-05-29 PROCEDURE — 93010 ELECTROCARDIOGRAM REPORT: CPT | Mod: ,,, | Performed by: INTERNAL MEDICINE

## 2017-05-29 PROCEDURE — 93005 ELECTROCARDIOGRAM TRACING: CPT | Mod: S$GLB,,, | Performed by: FAMILY MEDICINE

## 2017-05-29 PROCEDURE — 71020 XR CHEST PA AND LATERAL: CPT | Mod: TC,PO

## 2017-05-29 RX ORDER — IPRATROPIUM BROMIDE AND ALBUTEROL SULFATE 2.5; .5 MG/3ML; MG/3ML
3 SOLUTION RESPIRATORY (INHALATION) EVERY 6 HOURS PRN
Status: DISCONTINUED | OUTPATIENT
Start: 2017-05-29 | End: 2017-09-18

## 2017-05-29 NOTE — PATIENT INSTRUCTIONS
What is COPD?  COPD stands for chronic obstructive pulmonary disease. It means the airways in your lungs are blocked (obstructed). Because of this, it is hard to breathe. You may have trouble with daily activities because of shortness of breath. Over time the shortness of breath usually worsens making it more and more difficult to take care of yourself and take part in activities. Chronic bronchitis and emphysema are two common types of COPD.  What happens in chronic bronchitis?    The cells in the airways make more mucus than normal. The mucus builds up, narrowing the airways. This means less air travels into and out of the lungs. The lining of the airways may also become inflamed (swollen) and causes the airways to narrow even more.        What happens in emphysema?    The small airways are damaged and lose their stretchiness. The airways collapse when you exhale, causing air to get trapped in the air sacs. This means that less oxygen enters the blood vessels and less oxygen is delivered to all of the cells of your body. This makes it hard to breathe.     Damage to cilia    Cilia are small hairs that line and protect the airways. Smoking damages the cilia. Damaged cilia cant sweep mucus and particles away. Some of the cilia are destroyed. This damage worsens COPD.  How did I get COPD?  Most people get COPD from smoking. Cigarette smoke damages lungs, which can develop into COPD over many years.  How COPD affects you  COPD makes you work harder to breathe. Air may get trapped in the lungs, which prevents your lungs from filling completely with fresh oxygen-filled air when you inhale (breathe in). It's harder to take deep breaths especially when you are active and start breathing faster. Over time, your lungs may become enlarged filling the lung with air that does not transfer oxygen into the blood. These problems cause you to have shortness of breath (also called dyspnea). Wheezing (hoarse, whistling breathing),  chronic cough, and fatigue (feeling tired and worn out) are also common.   Date Last Reviewed: 5/1/2016  © 8061-9018 [employer], [employer] â€“ [employer]. [address] [place] [address], [place] [address]. All rights reserved. This information is not intended as a substitute for professional medical care. Always follow your healthcare professional's instructions.

## 2017-05-29 NOTE — PROGRESS NOTES
Subjective:      Patient ID: Shirley F Gilford is a 76 y.o. female.    Chief Complaint: Shortness of Breath; Dizziness; and Fatigue    Sob suddenly last week helping person get on elevateor at place [employer]; heart pounding; no chest pain; using inhaler daily; picking sensations allover in addition to restless legs;       Shortness of Breath     Fatigue   Associated symptoms include fatigue.     Review of Systems   Constitutional: Positive for fatigue.   Respiratory: Positive for shortness of breath.    Neurological: Positive for dizziness.   All other systems reviewed and are negative.    Objective:     Physical Exam   Constitutional: She is oriented to person, place, and time. She appears well-developed and well-nourished.   HENT:   Head: Normocephalic.   Eyes: Conjunctivae and EOM are normal. Pupils are equal, round, and reactive to light.   Neck: Normal range of motion. Neck supple.   Cardiovascular: Normal rate and regular rhythm.    Murmur heard.  Pulmonary/Chest: Effort normal. She has wheezes.   Musculoskeletal: Normal range of motion.   Neurological: She is alert and oriented to person, place, and time. She has normal reflexes.   Skin: Skin is warm and dry.   Psychiatric: She has a normal mood and affect. Her behavior is normal. Judgment and thought content normal.   Nursing note and vitals reviewed.    Assessment:     1. Hyperglycemia    2. BMI 36.0-36.9,adult    3. Osteoporosis, unspecified osteoporosis type, unspecified pathological fracture presence    4. History of hypertension    5. History of hyperlipidemia    6. Restless leg syndrome    7. Elevated LFTs    8. Ex-smoker    9. Chronic obstructive pulmonary disease, unspecified COPD type    10. SOB (shortness of breath)      Plan:     New Prescriptions    No medications on file     Discontinued Medications    ASPIRIN (ECOTRIN) 81 MG EC TABLET    Take 1 tablet (81 mg total) by mouth once daily.     Modified Medications    Modified Medication Previous  Medication    UMECLIDINIUM-VILANTEROL 62.5-25 MCG/ACTUATION DSDV umeclidinium-vilanterol 62.5-25 mcg/actuation DsDv       Inhale 1 puff into the lungs once daily. For COPD    Inhale 1 puff into the lungs once daily. For COPD       Hyperglycemia  -     X-Ray Chest PA And Lateral; Future; Expected date: 05/29/2017  -     EKG 12-lead  -     Complete PFT without bronchodilator; Future  -     TSH; Future  -     Sedimentation rate, manual; Future  -     CBC auto differential; Future; Expected date: 05/29/2017  -     TSH; Future  -     Sedimentation rate, manual; Future  -     NEBULIZER FOR HOME USE    BMI 36.0-36.9,adult  -     X-Ray Chest PA And Lateral; Future; Expected date: 05/29/2017  -     EKG 12-lead  -     Complete PFT without bronchodilator; Future  -     TSH; Future  -     Sedimentation rate, manual; Future  -     CBC auto differential; Future; Expected date: 05/29/2017  -     TSH; Future  -     Sedimentation rate, manual; Future  -     NEBULIZER FOR HOME USE    Osteoporosis, unspecified osteoporosis type, unspecified pathological fracture presence  -     X-Ray Chest PA And Lateral; Future; Expected date: 05/29/2017  -     EKG 12-lead  -     Complete PFT without bronchodilator; Future  -     TSH; Future  -     Sedimentation rate, manual; Future  -     CBC auto differential; Future; Expected date: 05/29/2017 -     TSH; Future  -     Sedimentation rate, manual; Future  -     NEBULIZER FOR HOME USE    History of hypertension  -     X-Ray Chest PA And Lateral; Future; Expected date: 05/29/2017  -     EKG 12-lead  -     Complete PFT without bronchodilator; Future  -     TSH; Future  -     Sedimentation rate, manual; Future  -     CBC auto differential; Future; Expected date: 05/29/2017 -     TSH; Future  -     Sedimentation rate, manual; Future  -     NEBULIZER FOR HOME USE    History of hyperlipidemia  -     X-Ray Chest PA And Lateral; Future; Expected date: 05/29/2017  -     EKG 12-lead  -     Complete PFT  without bronchodilator; Future  -     TSH; Future  -     Sedimentation rate, manual; Future  -     CBC auto differential; Future; Expected date: 05/29/2017 -     TSH; Future  -     Sedimentation rate, manual; Future  -     NEBULIZER FOR HOME USE    Restless leg syndrome  -     X-Ray Chest PA And Lateral; Future; Expected date: 05/29/2017  -     EKG 12-lead  -     Complete PFT without bronchodilator; Future  -     TSH; Future  -     Sedimentation rate, manual; Future  -     CBC auto differential; Future; Expected date: 05/29/2017 -     TSH; Future  -     Sedimentation rate, manual; Future  -     NEBULIZER FOR HOME USE    Elevated LFTs  -     X-Ray Chest PA And Lateral; Future; Expected date: 05/29/2017  -     EKG 12-lead  -     Complete PFT without bronchodilator; Future  -     TSH; Future  -     Sedimentation rate, manual; Future  -     CBC auto differential; Future; Expected date: 05/29/2017 -     TSH; Future  -     Sedimentation rate, manual; Future  -     NEBULIZER FOR HOME USE    Ex-smoker  -     X-Ray Chest PA And Lateral; Future; Expected date: 05/29/2017  -     EKG 12-lead  -     Complete PFT without bronchodilator; Future  -     TSH; Future  -     Sedimentation rate, manual; Future  -     CBC auto differential; Future; Expected date: 05/29/2017 -     TSH; Future  -     Sedimentation rate, manual; Future  -     NEBULIZER FOR HOME USE    Chronic obstructive pulmonary disease, unspecified COPD type  -     X-Ray Chest PA And Lateral; Future; Expected date: 05/29/2017  -     EKG 12-lead  -     Complete PFT without bronchodilator; Future  -     TSH; Future  -     Sedimentation rate, manual; Future  -     CBC auto differential; Future; Expected date: 05/29/2017 -     TSH; Future  -     Sedimentation rate, manual; Future  -     NEBULIZER FOR HOME USE    SOB (shortness of breath)  -     X-Ray Chest PA And Lateral; Future; Expected date: 05/29/2017  -     EKG 12-lead  -     Complete PFT without bronchodilator;  Future  -     TSH; Future  -     Sedimentation rate, manual; Future  -     CBC auto differential; Future; Expected date: 05/29/2017  -     TSH; Future  -     Sedimentation rate, manual; Future  -     NEBULIZER FOR HOME USE    Other orders  -     umeclidinium-vilanterol 62.5-25 mcg/actuation DsDv; Inhale 1 puff into the lungs once daily. For COPD  Dispense: 1 each; Refill: 11  -     albuterol-ipratropium 2.5mg-0.5mg/3mL nebulizer solution 3 mL; Take 3 mLs by nebulization every 6 (six) hours as needed for Wheezing or Shortness of Breath (or cough).  -     Cancel: albuterol-ipratropium 2.5mg-0.5mg/3mL (DUO-NEB) 0.5 mg-3 mg(2.5 mg base)/3 mL nebulizer solution; Take 3 mLs by nebulization every 6 (six) hours as needed for Wheezing. Rescue  Dispense: 360 vial; Refill: 3

## 2017-05-30 DIAGNOSIS — D50.9 MICROCYTIC ANEMIA: Primary | ICD-10-CM

## 2017-05-30 RX ORDER — IPRATROPIUM BROMIDE AND ALBUTEROL SULFATE 2.5; .5 MG/3ML; MG/3ML
3 SOLUTION RESPIRATORY (INHALATION) EVERY 6 HOURS PRN
Qty: 360 VIAL | Refills: 3 | Status: CANCELLED | OUTPATIENT
Start: 2017-05-30 | End: 2018-05-30

## 2017-05-30 NOTE — TELEPHONE ENCOUNTER
----- Message from Veronica Davis sent at 5/30/2017  5:02 PM CDT -----  Contact: Pt [phone]  Please fax orders for nebulizer and albuterol solution to [facility].

## 2017-05-31 ENCOUNTER — TELEPHONE (OUTPATIENT)
Dept: FAMILY MEDICINE | Facility: CLINIC | Age: 77
End: 2017-05-31

## 2017-05-31 NOTE — TELEPHONE ENCOUNTER
----- Message from Marc Sands MD sent at 5/30/2017  6:45 AM CDT -----  Very anemic; iron, ferritin, hemoccult stool test ordered; stop aspirin; referral to GI placed for possible loss of blood in GI tract; call pt

## 2017-06-05 ENCOUNTER — LAB VISIT (OUTPATIENT)
Dept: LAB | Facility: HOSPITAL | Age: 77
End: 2017-06-05
Attending: FAMILY MEDICINE
Payer: MEDICARE

## 2017-06-05 DIAGNOSIS — R79.89 ELEVATED LFTS: ICD-10-CM

## 2017-06-05 DIAGNOSIS — D50.9 MICROCYTIC ANEMIA: ICD-10-CM

## 2017-06-05 DIAGNOSIS — Z86.39 HISTORY OF HYPERLIPIDEMIA: ICD-10-CM

## 2017-06-05 LAB
ALBUMIN SERPL BCP-MCNC: 4 G/DL
ALP SERPL-CCNC: 103 IU/L
ALT SERPL W/O P-5'-P-CCNC: 53 IU/L
AST SERPL-CCNC: 56 IU/L
BILIRUB SERPL-MCNC: 0.4 MG/DL
CHOLEST/HDLC SERPL: 5.4 {RATIO}
FERRITIN SERPL-MCNC: 14 NG/ML
HDL/CHOLESTEROL RATIO: 18.4 %
HDLC SERPL-MCNC: 190 MG/DL
HDLC SERPL-MCNC: 35 MG/DL
IRON SERPL-MCNC: 14 UG/DL
LDLC SERPL CALC-MCNC: 130.6 MG/DL
NONHDLC SERPL-MCNC: 155 MG/DL
PROT SERPL-MCNC: 7.1 G/DL
TRIGL SERPL-MCNC: 122 MG/DL

## 2017-06-05 PROCEDURE — 83540 ASSAY OF IRON: CPT | Mod: PO

## 2017-06-05 PROCEDURE — 82728 ASSAY OF FERRITIN: CPT

## 2017-06-05 PROCEDURE — 80076 HEPATIC FUNCTION PANEL: CPT | Mod: PO

## 2017-06-05 PROCEDURE — 36415 COLL VENOUS BLD VENIPUNCTURE: CPT | Mod: PO

## 2017-06-05 PROCEDURE — 80061 LIPID PANEL: CPT

## 2017-06-05 RX ORDER — ALBUTEROL SULFATE 0.83 MG/ML
2.5 SOLUTION RESPIRATORY (INHALATION) EVERY 6 HOURS PRN
Qty: 360 EACH | Refills: 1 | Status: SHIPPED | OUTPATIENT
Start: 2017-06-05 | End: 2017-09-18 | Stop reason: SDUPTHER

## 2017-06-08 ENCOUNTER — CLINICAL SUPPORT (OUTPATIENT)
Dept: FAMILY MEDICINE | Facility: CLINIC | Age: 77
End: 2017-06-08
Payer: MEDICARE

## 2017-06-08 DIAGNOSIS — Z12.11 SCREEN FOR COLON CANCER: Primary | ICD-10-CM

## 2017-06-08 PROCEDURE — 82272 OCCULT BLD FECES 1-3 TESTS: CPT | Mod: 91

## 2017-06-09 ENCOUNTER — TELEPHONE (OUTPATIENT)
Dept: FAMILY MEDICINE | Facility: CLINIC | Age: 77
End: 2017-06-09

## 2017-06-09 LAB
OB PNL STL: NEGATIVE

## 2017-06-29 ENCOUNTER — OFFICE VISIT (OUTPATIENT)
Dept: FAMILY MEDICINE | Facility: CLINIC | Age: 77
End: 2017-06-29
Payer: MEDICARE

## 2017-06-29 VITALS
BODY MASS INDEX: 36.23 KG/M2 | RESPIRATION RATE: 18 BRPM | DIASTOLIC BLOOD PRESSURE: 82 MMHG | TEMPERATURE: 98 F | OXYGEN SATURATION: 98 % | WEIGHT: 196.88 LBS | SYSTOLIC BLOOD PRESSURE: 134 MMHG | HEART RATE: 85 BPM | HEIGHT: 62 IN

## 2017-06-29 DIAGNOSIS — E61.1 IRON DEFICIENCY: ICD-10-CM

## 2017-06-29 DIAGNOSIS — G25.81 RESTLESS LEG SYNDROME: Primary | ICD-10-CM

## 2017-06-29 PROCEDURE — 1126F AMNT PAIN NOTED NONE PRSNT: CPT | Mod: S$GLB,,, | Performed by: FAMILY MEDICINE

## 2017-06-29 PROCEDURE — 99213 OFFICE O/P EST LOW 20 MIN: CPT | Mod: S$GLB,,, | Performed by: FAMILY MEDICINE

## 2017-06-29 PROCEDURE — 1159F MED LIST DOCD IN RCRD: CPT | Mod: S$GLB,,, | Performed by: FAMILY MEDICINE

## 2017-06-29 RX ORDER — FERROUS SULFATE 324(65)MG
325 TABLET, DELAYED RELEASE (ENTERIC COATED) ORAL DAILY
Refills: 0 | COMMUNITY
Start: 2017-06-29 | End: 2020-10-23 | Stop reason: SDUPTHER

## 2017-06-29 RX ORDER — OMEPRAZOLE 20 MG/1
20 TABLET, DELAYED RELEASE ORAL DAILY
Qty: 30 TABLET | Refills: 11 | COMMUNITY
Start: 2017-06-29 | End: 2017-09-18

## 2017-06-29 RX ORDER — GABAPENTIN 300 MG/1
300 CAPSULE ORAL 2 TIMES DAILY
Qty: 60 CAPSULE | Refills: 11 | Status: SHIPPED | OUTPATIENT
Start: 2017-06-29 | End: 2017-07-01 | Stop reason: SINTOL

## 2017-06-29 NOTE — PROGRESS NOTES
Subjective:      Patient ID: Shirley F Gilford is a 76 y.o. female.    Chief Complaint: Follow-up (1 month f/u)    Restless legs worse despite tx; severe iron def anemia; occult neg; has appt July 13 with JOVANNI summers; has been on advil and no iron; on asa      Review of Systems   Constitutional: Negative.    HENT: Negative.    Respiratory: Negative.    Cardiovascular: Negative.    Gastrointestinal: Negative.    Endocrine: Negative.    Genitourinary: Negative.    Musculoskeletal: Negative.    Psychiatric/Behavioral: Negative.    All other systems reviewed and are negative.    Objective:     Physical Exam   Constitutional: She is oriented to person, place, and time. She appears well-developed and well-nourished.   HENT:   Head: Normocephalic.   Eyes: Conjunctivae and EOM are normal. Pupils are equal, round, and reactive to light.   Neck: Normal range of motion. Neck supple.   Cardiovascular: Normal rate, regular rhythm and normal heart sounds.    Pulmonary/Chest: Effort normal and breath sounds normal.   Musculoskeletal: Normal range of motion.   Neurological: She is alert and oriented to person, place, and time. She has normal reflexes.   Skin: Skin is warm and dry.   Psychiatric: She has a normal mood and affect. Her behavior is normal. Judgment and thought content normal.   Nursing note and vitals reviewed.    Assessment:     1. Restless leg syndrome    2. Iron deficiency      Plan:     New Prescriptions    CLONAZEPAM (KLONOPIN) 0.5 MG TABLET    Take 1 tablet (0.5 mg total) by mouth every evening. For legs    FERROUS SULFATE 324 MG (65 MG IRON) TBEC    Take 1 tablet (325 mg total) by mouth once daily.    OMEPRAZOLE (PRILOSEC OTC) 20 MG TABLET    Take 1 tablet (20 mg total) by mouth once daily.     Discontinued Medications    GABAPENTIN (NEURONTIN) 300 MG CAPSULE    Take 1 capsule (300 mg total) by mouth 2 (two) times daily.     Modified Medications    No medications on file       Restless leg syndrome  -     CBC  auto differential; Future; Expected date: 06/29/2017  -     Comprehensive metabolic panel; Future; Expected date: 06/29/2017    Iron deficiency  -     CBC auto differential; Future; Expected date: 06/29/2017  -     Comprehensive metabolic panel; Future; Expected date: 06/29/2017    Other orders  -     Discontinue: gabapentin (NEURONTIN) 300 MG capsule; Take 1 capsule (300 mg total) by mouth 2 (two) times daily.  Dispense: 60 capsule; Refill: 11  -     ferrous sulfate 324 mg (65 mg iron) TbEC; Take 1 tablet (325 mg total) by mouth once daily.; Refill: 0  -     omeprazole (PRILOSEC OTC) 20 MG tablet; Take 1 tablet (20 mg total) by mouth once daily.  Dispense: 30 tablet; Refill: 11    stop advil and asa; get CBC, CMP, has appt with Gi for upper//lower scopes hopefully; starting irontoday bid and gabapentin bid

## 2017-06-30 ENCOUNTER — TELEPHONE (OUTPATIENT)
Dept: FAMILY MEDICINE | Facility: CLINIC | Age: 77
End: 2017-06-30

## 2017-06-30 NOTE — TELEPHONE ENCOUNTER
Patient called. States she is vomiting and experiencing nausea while taking Gabapentin. Helps with restless legs but doesn't like the side effects. Patient would like a call from you. What else could she do?    I advised her to not take the medication until she hears from you.

## 2017-07-01 LAB
ALBUMIN SERPL-MCNC: 4.2 G/DL (ref 3.6–5.1)
ALBUMIN/GLOB SERPL: 1.7 (CALC) (ref 1–2.5)
ALP SERPL-CCNC: 103 U/L (ref 33–130)
ALT SERPL-CCNC: 53 U/L (ref 6–29)
AST SERPL-CCNC: 65 U/L (ref 10–35)
BASOPHILS # BLD AUTO: 0 CELLS/UL (ref 0–200)
BASOPHILS NFR BLD AUTO: 0 %
BILIRUB SERPL-MCNC: 0.3 MG/DL (ref 0.2–1.2)
BUN SERPL-MCNC: 16 MG/DL (ref 7–25)
BUN/CREAT SERPL: ABNORMAL (CALC) (ref 6–22)
CALCIUM SERPL-MCNC: 9.1 MG/DL (ref 8.6–10.4)
CHLORIDE SERPL-SCNC: 108 MMOL/L (ref 98–110)
CO2 SERPL-SCNC: 23 MMOL/L (ref 20–31)
CREAT SERPL-MCNC: 0.87 MG/DL (ref 0.6–0.93)
EOSINOPHIL # BLD AUTO: 97 CELLS/UL (ref 15–500)
EOSINOPHIL NFR BLD AUTO: 0.7 %
ERYTHROCYTE [DISTWIDTH] IN BLOOD BY AUTOMATED COUNT: 18.5 % (ref 11–15)
GFR SERPL CREATININE-BSD FRML MDRD: 65 ML/MIN/1.73M2
GLOBULIN SER CALC-MCNC: 2.5 G/DL (CALC) (ref 1.9–3.7)
GLUCOSE SERPL-MCNC: 82 MG/DL (ref 65–99)
HCT VFR BLD AUTO: 30 % (ref 35–45)
HGB BLD-MCNC: 8.8 G/DL (ref 11.7–15.5)
LYMPHOCYTES # BLD AUTO: 2318 CELLS/UL (ref 850–3900)
LYMPHOCYTES NFR BLD AUTO: 16.8 %
MCH RBC QN AUTO: 19.6 PG (ref 27–33)
MCHC RBC AUTO-ENTMCNC: 29.3 G/DL (ref 32–36)
MCV RBC AUTO: 67 FL (ref 80–100)
MONOCYTES # BLD AUTO: 911 CELLS/UL (ref 200–950)
MONOCYTES NFR BLD AUTO: 6.6 %
NEUTROPHILS # BLD AUTO: ABNORMAL CELLS/UL (ref 1500–7800)
NEUTROPHILS NFR BLD AUTO: 75.9 %
PLATELET # BLD AUTO: 312 THOUSAND/UL (ref 140–400)
PMV BLD REES-ECKER: 8.6 FL (ref 7.5–12.5)
POTASSIUM SERPL-SCNC: 4.1 MMOL/L (ref 3.5–5.3)
PROT SERPL-MCNC: 6.7 G/DL (ref 6.1–8.1)
RBC # BLD AUTO: 4.47 MILLION/UL (ref 3.8–5.1)
SERVICE CMNT-IMP: ABNORMAL
SODIUM SERPL-SCNC: 143 MMOL/L (ref 135–146)
WBC # BLD AUTO: 13.8 THOUSAND/UL (ref 3.8–10.8)

## 2017-07-01 RX ORDER — CLONAZEPAM 0.5 MG/1
0.5 TABLET ORAL NIGHTLY
Qty: 30 TABLET | Refills: 0 | Status: SHIPPED | OUTPATIENT
Start: 2017-07-01 | End: 2017-09-18

## 2017-07-01 NOTE — TELEPHONE ENCOUNTER
Gabapentin made her sick; helped legs; wants somehting else when rls comes back; adding clonazepam 0.5 hs    I spoke to pt

## 2017-07-05 ENCOUNTER — TELEPHONE (OUTPATIENT)
Dept: FAMILY MEDICINE | Facility: CLINIC | Age: 77
End: 2017-07-05

## 2017-07-05 DIAGNOSIS — D64.9 ANEMIA, UNSPECIFIED TYPE: Primary | ICD-10-CM

## 2017-07-05 DIAGNOSIS — Z85.51 HISTORY OF BLADDER CANCER: ICD-10-CM

## 2017-07-05 NOTE — TELEPHONE ENCOUNTER
patient called jennifer with [facility] - pt has been seeing you for anemia recently  The pt is not sure if you are aware  That she had bladder cancer  She has not seen a GYN/urologist in 9-15 years  She wants to know if you feel she should see them again?  She used to see Dr bueno.  Dr David is on her plan.  She does have an appt with dr summers.  Please advise

## 2017-07-05 NOTE — TELEPHONE ENCOUNTER
I left message with details for the pt   I advised that Dr Sands suggested she see Dr Kidd.  I faxed the referral to Dr Kidd ( and recent office notes and lab work ) and advised that she   Return my call with any questions or if she needs assistance with scheduling an appt with him

## 2017-07-10 ENCOUNTER — APPOINTMENT (OUTPATIENT)
Dept: LAB | Facility: HOSPITAL | Age: 77
End: 2017-07-10
Attending: UROLOGY
Payer: MEDICARE

## 2017-07-10 PROCEDURE — 88112 CYTOPATH CELL ENHANCE TECH: CPT | Performed by: PATHOLOGY

## 2017-07-13 ENCOUNTER — OFFICE VISIT (OUTPATIENT)
Dept: GASTROENTEROLOGY | Facility: CLINIC | Age: 77
End: 2017-07-13
Payer: MEDICARE

## 2017-07-13 VITALS
SYSTOLIC BLOOD PRESSURE: 164 MMHG | BODY MASS INDEX: 36.62 KG/M2 | WEIGHT: 199 LBS | DIASTOLIC BLOOD PRESSURE: 74 MMHG | HEIGHT: 62 IN | HEART RATE: 85 BPM

## 2017-07-13 DIAGNOSIS — R19.4 CHANGE IN BOWEL HABITS: ICD-10-CM

## 2017-07-13 DIAGNOSIS — D50.9 IRON DEFICIENCY ANEMIA, UNSPECIFIED IRON DEFICIENCY ANEMIA TYPE: Primary | ICD-10-CM

## 2017-07-13 DIAGNOSIS — Z86.010 HISTORY OF ADENOMATOUS POLYP OF COLON: ICD-10-CM

## 2017-07-13 DIAGNOSIS — Z80.0 FAMILY HISTORY OF COLON CANCER: ICD-10-CM

## 2017-07-13 PROCEDURE — 1159F MED LIST DOCD IN RCRD: CPT | Mod: S$GLB,,, | Performed by: INTERNAL MEDICINE

## 2017-07-13 PROCEDURE — 99999 PR PBB SHADOW E&M-EST. PATIENT-LVL III: CPT | Mod: PBBFAC,,, | Performed by: INTERNAL MEDICINE

## 2017-07-13 PROCEDURE — 1126F AMNT PAIN NOTED NONE PRSNT: CPT | Mod: S$GLB,,, | Performed by: INTERNAL MEDICINE

## 2017-07-13 PROCEDURE — 99214 OFFICE O/P EST MOD 30 MIN: CPT | Mod: S$GLB,,, | Performed by: INTERNAL MEDICINE

## 2017-07-13 NOTE — PATIENT INSTRUCTIONS

## 2017-07-13 NOTE — PROGRESS NOTES
Subjective:      Patient ID: Shirley F Gilford is a 76 y.o. female.    Chief Complaint: Anemia    HPI:   Patient is a 76-year-old female presented for GI evaluation   In recent months she's had progressive iron deficiency anemia.  Hematocrit is down to 30 with an MCV of 67.  Ferritin is 14.  She gives no history of overt GI bleeding.  Stool tested negative for occult blood ×3  Was taking Advil on a regular basis.  Denies epigastric pain, indigestion, heartburn.  This was discontinued several weeks ago and she was placed on a PPI.  Also placed on oral iron 2 weeks ago.  Follow-up colonoscopy 2016 was negative for polyps.  Diverticulosis was noted.    Patient has a history of colon polyps 5 years ago.   her daughter  of colon cancer at age 46..  Patient's past medical history is relatively unremarkable.  On Fosamax for osteoporosis.  Former smoker.  Nondrinker.      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    Hyperlipidemia     Hypertension     Osteoporosis     RLS (restless legs syndrome)      Past Surgical History:   Procedure Laterality Date    BLADDER SURGERY      cancer Mahamed Kidd    BREAST SURGERY      left benign tumor     SECTION      COLONOSCOPY      EYE SURGERY      cataracts    TONSILLECTOMY       Family History   Problem Relation Age of Onset    Cancer Daughter 40     colon    No Known Problems Daughter     Diabetes Mother     Stroke Mother     Heart disease Father      Social History     Social History    Marital status:      Spouse name: N/A    Number of children: N/A    Years of education: N/A     Occupational History    Not on file.     Social History Main Topics    Smoking status: Former Smoker    Smokeless tobacco: Never Used    Alcohol use No    Drug use: Unknown    Sexual activity: Not on file     Other Topics  "Concern    Not on file     Social History Narrative    No narrative on file       Review of Systems:  Constitutional: Negative for appetite change.  Fatigue HENT: Negative for trouble swallowing.   Eyes: Negative for photophobia.   Respiratory: Negative for cough .  Some wheezing and shortness of breath.   Cardiovascular: Negative for palpitations.   Gastrointestinal: See HPI for details.  Genitourinary: Negative for frequency and hematuria.   Skin: Negative for rash.   Neurological: Negative for weakness and headaches.   Musculoskeletal: Joint pains  Hematological: Negative.   Psychiatric/Behavioral: Negative for suicidal ideas and behavioral problems.     Objective:     BP (!) 164/74 (BP Location: Right arm, Patient Position: Sitting)   Pulse 85   Ht 5' 2" (1.575 m)   Wt 90.3 kg (199 lb)   BMI 36.40 kg/m²     Physical Exam:  Eyes: Pupils are equal, round, and reactive to light.   Neck: Supple. No mass  Cardiovascular: Regular rhythm . No murmur   Pulmonary/Chest: Lungs clear   Abdominal: Soft. No mass palpated. Nontender, no guarding. Positive bowel sounds   Musculoskeletal: No deformity. No edema.   Psychiatric: Alert and oriented    Assessment:     1. Iron deficiency anemia, unspecified iron deficiency anemia type    2. History of adenomatous polyp of colon    3. Family history of colon cancer    4. Change in bowel habits      Plan:     Angelique was seen today for anemia.    Diagnoses and all orders for this visit:    Iron deficiency anemia, unspecified iron deficiency anemia type    History of adenomatous polyp of colon    Family history of colon cancer    Change in bowel habits      Plan:  Continue empiric PPI  Avoid NSAIDs  Gastroscopy.  Small bowel biopsy if no ulcers are seen      "

## 2017-07-17 ENCOUNTER — TELEPHONE (OUTPATIENT)
Dept: GASTROENTEROLOGY | Facility: CLINIC | Age: 77
End: 2017-07-17

## 2017-07-17 ENCOUNTER — OFFICE VISIT (OUTPATIENT)
Dept: OBSTETRICS AND GYNECOLOGY | Facility: CLINIC | Age: 77
End: 2017-07-17
Payer: MEDICARE

## 2017-07-17 VITALS
SYSTOLIC BLOOD PRESSURE: 124 MMHG | DIASTOLIC BLOOD PRESSURE: 72 MMHG | HEIGHT: 62 IN | WEIGHT: 201 LBS | BODY MASS INDEX: 36.99 KG/M2

## 2017-07-17 DIAGNOSIS — Z01.419 WELL WOMAN EXAM WITH ROUTINE GYNECOLOGICAL EXAM: ICD-10-CM

## 2017-07-17 DIAGNOSIS — N81.11 MIDLINE CYSTOCELE: Primary | ICD-10-CM

## 2017-07-17 PROCEDURE — G0101 CA SCREEN;PELVIC/BREAST EXAM: HCPCS | Mod: S$GLB,,, | Performed by: OBSTETRICS & GYNECOLOGY

## 2017-07-17 PROCEDURE — 99999 PR PBB SHADOW E&M-EST. PATIENT-LVL II: CPT | Mod: PBBFAC,,, | Performed by: OBSTETRICS & GYNECOLOGY

## 2017-07-17 NOTE — PROGRESS NOTES
CC: Annual check-up    SUBJECTIVE:   76 y.o. female No obstetric history on file.  for annual routine Pap and checkup. No LMP recorded. Patient is postmenopausal..  She has no unusual complaints. Last GYN was Cartina Humphrey who gave her a donut cerclage for prolapse. 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     Social History Main Topics    Smoking status: Former Smoker    Smokeless tobacco: Never Used    Alcohol use No    Drug use: Unknown    Sexual activity: Not on file     Other Topics Concern    Not on file     Social History Narrative    No narrative on file     Family History   Problem Relation Age of Onset    Cancer Daughter 40     colon    No Known Problems Daughter     Diabetes Mother     Stroke Mother     Heart disease Father      OB History   No data available         Current Outpatient Prescriptions   Medication Sig Dispense Refill    albuterol (PROVENTIL) 2.5 mg /3 mL (0.083 %) nebulizer solution Take 3 mLs (2.5 mg total) by nebulization every 6 (six) hours as needed for Wheezing. Rescue 360 each 1    alendronate (FOSAMAX) 70 MG tablet Take 1 tablet (70 mg total) by mouth every 7 days. 13 tablet 3    clonazePAM (KLONOPIN) 0.5 MG tablet Take 1 tablet (0.5 mg total) by mouth every evening. For legs 30 tablet 0    ferrous sulfate 324 mg (65 mg iron) TbEC Take 1 tablet (325 mg total) by mouth once  "daily.  0    omeprazole (PRILOSEC OTC) 20 MG tablet Take 1 tablet (20 mg total) by mouth once daily. 30 tablet 11    ropinirole (REQUIP) 3 MG tablet Take 1 tablet (3 mg total) by mouth nightly. 90 tablet 3    umeclidinium-vilanterol 62.5-25 mcg/actuation DsDv Inhale 1 puff into the lungs once daily. For COPD 1 each 11     Current Facility-Administered Medications   Medication Dose Route Frequency Provider Last Rate Last Dose    albuterol-ipratropium 2.5mg-0.5mg/3mL nebulizer solution 3 mL  3 mL Nebulization Q6H PRN Marc Sands MD         Allergies: Gabapentin     ROS:  Constitutional: no weight loss, weight gain, fever, fatigue  Eyes:  No vision changes, glasses/contacts  ENT/Mouth: No ulcers, sinus problems, ears ringing, headache  Cardiovascular: No inability to lie flat, chest pain, exercise intolerance, swelling, heart palpitations  Respiratory: No wheezing, coughing blood, shortness of breath, or cough  Gastrointestinal: No diarrhea, bloody stool, nausea/vomiting, constipation, gas, hemorrhoids  Genitourinary: No blood in urine, painful urination, urgency of urination, frequency of urination, incomplete emptying, incontinence, abnormal bleeding, painful periods, heavy periods, vaginal discharge, vaginal odor, painful intercourse, sexual problems, bleeding after intercourse.  Musculoskeletal: No muscle weakness  Skin/Breast: No painful breasts, nipple discharge, masses, rash, ulcers  Neurological: No passing out, seizures, numbness, headache  Endocrine: No diabetes, hypothyroid, hyperthyroid, hot flashes, hair loss, abnormal hair growth, ance  Psychiatric: No depression, crying  Hematologic: No bruises, bleeding, swollen lymph nodes, anemia.      OBJECTIVE:   The patient appears well, alert, oriented x 3, in no distress.  /72   Ht 5' 2" (1.575 m)   Wt 91.2 kg (201 lb)   BMI 36.76 kg/m²   NECK: no thyromegaly, trachea midline  SKIN: no acne, striae, hirsutism  BREAST EXAM: breasts appear normal, " no suspicious masses, no skin or nipple changes or axillary nodes  ABDOMEN: obese and no hernias, masses, or hepatosplenomegaly  GENITALIA: normal external genitalia, no erythema, no discharge  URETHRA: normal urethra, normal urethral meatus  VAGINA: cystocele  CERVIX: no lesions or cervical motion tenderness  UTERUS: normal  ADNEXA: no mass, fullness, tenderness      ASSESSMENT:   well woman  No diagnosis found.    PLAN:   mammogram  return annually or prn

## 2017-07-17 NOTE — TELEPHONE ENCOUNTER
Patient is scheduled for EGD at [facility] on 7/27/17, pt is aware of NPO status and verbalize understanding.

## 2017-07-20 ENCOUNTER — HOSPITAL ENCOUNTER (OUTPATIENT)
Dept: RADIOLOGY | Facility: HOSPITAL | Age: 77
Discharge: HOME | End: 2017-07-20
Attending: NURSE PRACTITIONER
Payer: MEDICARE

## 2017-07-20 ENCOUNTER — TELEPHONE (OUTPATIENT)
Dept: FAMILY MEDICINE | Facility: CLINIC | Age: 77
End: 2017-07-20

## 2017-07-20 ENCOUNTER — OFFICE VISIT (OUTPATIENT)
Dept: FAMILY MEDICINE | Facility: CLINIC | Age: 77
End: 2017-07-20
Payer: MEDICARE

## 2017-07-20 VITALS
WEIGHT: 200.19 LBS | HEART RATE: 86 BPM | OXYGEN SATURATION: 95 % | TEMPERATURE: 98 F | HEIGHT: 62 IN | BODY MASS INDEX: 36.84 KG/M2 | SYSTOLIC BLOOD PRESSURE: 152 MMHG | DIASTOLIC BLOOD PRESSURE: 82 MMHG

## 2017-07-20 DIAGNOSIS — M79.89 RIGHT LEG SWELLING: ICD-10-CM

## 2017-07-20 DIAGNOSIS — M79.89 LEFT LEG SWELLING: Primary | ICD-10-CM

## 2017-07-20 DIAGNOSIS — M79.89 LEFT LEG SWELLING: ICD-10-CM

## 2017-07-20 PROCEDURE — 1125F AMNT PAIN NOTED PAIN PRSNT: CPT | Mod: S$GLB,,, | Performed by: NURSE PRACTITIONER

## 2017-07-20 PROCEDURE — 93970 EXTREMITY STUDY: CPT | Mod: TC,PO

## 2017-07-20 PROCEDURE — 93925 LOWER EXTREMITY STUDY: CPT | Mod: TC,PO

## 2017-07-20 PROCEDURE — 99213 OFFICE O/P EST LOW 20 MIN: CPT | Mod: S$GLB,,, | Performed by: NURSE PRACTITIONER

## 2017-07-20 PROCEDURE — 1159F MED LIST DOCD IN RCRD: CPT | Mod: S$GLB,,, | Performed by: NURSE PRACTITIONER

## 2017-07-20 RX ORDER — HYDROCHLOROTHIAZIDE 25 MG/1
25 TABLET ORAL DAILY
Qty: 30 TABLET | Refills: 0 | Status: SHIPPED | OUTPATIENT
Start: 2017-07-20 | End: 2017-09-18

## 2017-07-20 NOTE — PROGRESS NOTES
Subjective:       Patient ID: Shirley F Gilford is a 76 y.o. female.    Chief Complaint: Leg Swelling (both legs)    Edema   This is a new problem. The current episode started yesterday. The problem occurs constantly. The problem has been unchanged. Associated symptoms include joint swelling. Pertinent negatives include no abdominal pain, anorexia, arthralgias, change in bowel habit, chest pain, chills, congestion, coughing, diaphoresis, fatigue, fever, headaches, myalgias, nausea, neck pain, numbness, rash, sore throat, swollen glands, urinary symptoms, vertigo, visual change, vomiting or weakness. Exacerbated by: walking, bending. Treatments tried: elevating. The treatment provided no relief.     Review of Systems   Constitutional: Negative for chills, diaphoresis, fatigue and fever.   HENT: Negative for congestion and sore throat.    Respiratory: Negative for cough.    Cardiovascular: Positive for leg swelling. Negative for chest pain and palpitations.        Bilateral leg swelling, tenderness and warmth     Gastrointestinal: Negative for abdominal pain, anorexia, change in bowel habit, nausea and vomiting.   Musculoskeletal: Positive for joint swelling. Negative for arthralgias, myalgias and neck pain.   Skin: Negative for color change, pallor, rash and wound.   Neurological: Negative for vertigo, weakness, numbness and headaches.       Objective:      Physical Exam   Constitutional: She is oriented to person, place, and time. She appears well-developed and well-nourished. No distress.   HENT:   Right Ear: External ear normal.   Left Ear: External ear normal.   Cardiovascular: Normal rate, regular rhythm and normal heart sounds.    Pulses:       Dorsalis pedis pulses are 1+ on the right side, and 1+ on the left side.   Pulmonary/Chest: Effort normal and breath sounds normal.   Musculoskeletal: She exhibits edema and tenderness. She exhibits no deformity.        Right ankle: She exhibits decreased range of motion  and swelling. Tenderness.        Left ankle: She exhibits decreased range of motion and swelling. Tenderness.        Right lower leg: She exhibits tenderness, swelling and edema.        Left lower leg: She exhibits tenderness, swelling and edema.        Right foot: There is swelling.        Left foot: There is swelling.   + Homans sign bilateral   Neurological: She is alert and oriented to person, place, and time.   Skin: Skin is warm and dry. No rash noted. She is not diaphoretic. There is erythema. No pallor.   Psychiatric: She has a normal mood and affect. Her behavior is normal. Judgment and thought content normal.   Vitals reviewed.      Assessment:       1. Left leg swelling    2. Right leg swelling        Plan:       Left leg swelling  -     US Lower Extremity Veins Bilateral; Future  -     US Lower Extremity Arteries Bilateral; Future    Right leg swelling  -     US Lower Extremity Veins Bilateral; Future  -     US Lower Extremity Arteries Bilateral; Future      collaborated care with Dr Sands agrees with plan of care

## 2017-07-20 NOTE — TELEPHONE ENCOUNTER
Called patient no answer left message. US negative for DVT I am going to send some fluid medicine for her to start also needs to elevate legs.

## 2017-07-27 DIAGNOSIS — D50.9 IRON DEFICIENCY ANEMIA, UNSPECIFIED IRON DEFICIENCY ANEMIA TYPE: Primary | ICD-10-CM

## 2017-07-28 ENCOUNTER — TELEPHONE (OUTPATIENT)
Dept: GASTROENTEROLOGY | Facility: CLINIC | Age: 77
End: 2017-07-28

## 2017-08-04 ENCOUNTER — LAB VISIT (OUTPATIENT)
Dept: LAB | Facility: HOSPITAL | Age: 77
End: 2017-08-04
Attending: INTERNAL MEDICINE
Payer: MEDICARE

## 2017-08-04 DIAGNOSIS — D50.9 IRON DEFICIENCY ANEMIA, UNSPECIFIED IRON DEFICIENCY ANEMIA TYPE: ICD-10-CM

## 2017-08-04 LAB
BASOPHILS # BLD AUTO: 0.06 K/UL
BASOPHILS NFR BLD: 0.5 %
DIFFERENTIAL METHOD: ABNORMAL
EOSINOPHIL # BLD AUTO: 0.2 K/UL
EOSINOPHIL NFR BLD: 1.9 %
ERYTHROCYTE [DISTWIDTH] IN BLOOD BY AUTOMATED COUNT: ABNORMAL %
HCT VFR BLD AUTO: 39.7 %
HGB BLD-MCNC: 11.1 G/DL
HYPOCHROMIA BLD QL SMEAR: ABNORMAL
LYMPHOCYTES # BLD AUTO: 1.9 K/UL
LYMPHOCYTES NFR BLD: 15.5 %
MCH RBC QN AUTO: 22.8 PG
MCHC RBC AUTO-ENTMCNC: 28 G/DL
MCV RBC AUTO: 82 FL
MONOCYTES # BLD AUTO: 0.9 K/UL
MONOCYTES NFR BLD: 7.2 %
NEUTROPHILS # BLD AUTO: 9.3 K/UL
NEUTROPHILS NFR BLD: 75.1 %
PLATELET # BLD AUTO: 318 K/UL
PMV BLD AUTO: 11.5 FL
RBC # BLD AUTO: 4.87 M/UL
WBC # BLD AUTO: 12.34 K/UL

## 2017-08-04 PROCEDURE — 85025 COMPLETE CBC W/AUTO DIFF WBC: CPT

## 2017-08-04 PROCEDURE — 36415 COLL VENOUS BLD VENIPUNCTURE: CPT | Mod: PO

## 2017-08-07 ENCOUNTER — TELEPHONE (OUTPATIENT)
Dept: GASTROENTEROLOGY | Facility: CLINIC | Age: 77
End: 2017-08-07

## 2017-08-07 NOTE — TELEPHONE ENCOUNTER
----- Message from Alessandro Titus Jr., MD sent at 8/6/2017 11:05 PM CDT -----  Lab work normal. Anemia has resolved on oral iron  Rec continue PPI and one iron pill daily  See me in 3 to 6 months   Notify patient.

## 2017-08-08 ENCOUNTER — TELEPHONE (OUTPATIENT)
Dept: GASTROENTEROLOGY | Facility: CLINIC | Age: 77
End: 2017-08-08

## 2017-08-08 NOTE — TELEPHONE ENCOUNTER
Spoke with patient in regards to lab results, patient is also scheduled for an OV on 08/29/2017 due to Helicobacter on her gastric biopsy.

## 2017-08-08 NOTE — TELEPHONE ENCOUNTER
----- Message from Alessandro Titus Jr., MD sent at 8/7/2017  7:11 PM CDT -----    Patient has Helicobacter on her gastric biopsy.  Please offer her an office visit to see me in [place] within the next 2 weeks.    Thanks  ( Giardia also).

## 2017-08-14 ENCOUNTER — TELEPHONE (OUTPATIENT)
Dept: GASTROENTEROLOGY | Facility: CLINIC | Age: 77
End: 2017-08-14

## 2017-09-18 ENCOUNTER — OFFICE VISIT (OUTPATIENT)
Dept: FAMILY MEDICINE | Facility: CLINIC | Age: 77
End: 2017-09-18
Payer: MEDICARE

## 2017-09-18 VITALS
WEIGHT: 198.63 LBS | HEIGHT: 61 IN | HEART RATE: 66 BPM | DIASTOLIC BLOOD PRESSURE: 88 MMHG | SYSTOLIC BLOOD PRESSURE: 144 MMHG | TEMPERATURE: 98 F | OXYGEN SATURATION: 96 % | BODY MASS INDEX: 37.5 KG/M2

## 2017-09-18 DIAGNOSIS — J44.9 CHRONIC OBSTRUCTIVE PULMONARY DISEASE, UNSPECIFIED COPD TYPE: ICD-10-CM

## 2017-09-18 DIAGNOSIS — R79.89 ELEVATED LFTS: ICD-10-CM

## 2017-09-18 DIAGNOSIS — R06.02 SOB (SHORTNESS OF BREATH): ICD-10-CM

## 2017-09-18 DIAGNOSIS — R73.9 HYPERGLYCEMIA: ICD-10-CM

## 2017-09-18 DIAGNOSIS — Z86.39 HISTORY OF HYPERLIPIDEMIA: ICD-10-CM

## 2017-09-18 DIAGNOSIS — G25.81 RESTLESS LEG SYNDROME: Primary | ICD-10-CM

## 2017-09-18 DIAGNOSIS — M81.0 OSTEOPOROSIS, UNSPECIFIED OSTEOPOROSIS TYPE, UNSPECIFIED PATHOLOGICAL FRACTURE PRESENCE: ICD-10-CM

## 2017-09-18 DIAGNOSIS — Z87.891 EX-SMOKER: ICD-10-CM

## 2017-09-18 DIAGNOSIS — R60.1 GENERALIZED EDEMA: ICD-10-CM

## 2017-09-18 DIAGNOSIS — K76.0 FATTY LIVER: ICD-10-CM

## 2017-09-18 DIAGNOSIS — Z86.79 HISTORY OF HYPERTENSION: ICD-10-CM

## 2017-09-18 DIAGNOSIS — E61.1 IRON DEFICIENCY: ICD-10-CM

## 2017-09-18 PROCEDURE — 99213 OFFICE O/P EST LOW 20 MIN: CPT | Mod: S$GLB,,, | Performed by: FAMILY MEDICINE

## 2017-09-18 PROCEDURE — 1126F AMNT PAIN NOTED NONE PRSNT: CPT | Mod: S$GLB,,, | Performed by: FAMILY MEDICINE

## 2017-09-18 PROCEDURE — 3008F BODY MASS INDEX DOCD: CPT | Mod: S$GLB,,, | Performed by: FAMILY MEDICINE

## 2017-09-18 PROCEDURE — 99499 UNLISTED E&M SERVICE: CPT | Mod: S$GLB,,, | Performed by: FAMILY MEDICINE

## 2017-09-18 PROCEDURE — 1159F MED LIST DOCD IN RCRD: CPT | Mod: S$GLB,,, | Performed by: FAMILY MEDICINE

## 2017-09-18 RX ORDER — UBIDECARENONE 75 MG
500 CAPSULE ORAL DAILY
COMMUNITY

## 2017-09-18 RX ORDER — ALBUTEROL SULFATE 0.83 MG/ML
2.5 SOLUTION RESPIRATORY (INHALATION) EVERY 6 HOURS PRN
Qty: 360 EACH | Refills: 1 | Status: SHIPPED | OUTPATIENT
Start: 2017-09-18 | End: 2018-11-05

## 2017-09-18 RX ORDER — FUROSEMIDE 40 MG/1
40 TABLET ORAL DAILY
Qty: 30 TABLET | Refills: 11 | Status: SHIPPED | OUTPATIENT
Start: 2017-09-18 | End: 2018-05-03

## 2017-09-18 RX ORDER — FERROUS SULFATE, DRIED 160(50) MG
1 TABLET, EXTENDED RELEASE ORAL DAILY
COMMUNITY

## 2017-09-18 NOTE — PROGRESS NOTES
Subjective:      Patient ID: Shirley F Gilford is a 77 y.o. female.    Chief Complaint: Follow-up and Edema (BLE x few months.  Was seen by Shayla a few months ago - took HCTZ for a a while but didn't help. )    Legs still swollen since last visit with NP in July; hctz no help; not restless anymore; needs albuterol refilled, also; hx fatty liver, restless legs, copd, agapito LFT,       Review of Systems   Respiratory: Positive for shortness of breath.    Cardiovascular: Positive for leg swelling.   All other systems reviewed and are negative.    Objective:     Physical Exam   Constitutional: She is oriented to person, place, and time. She appears well-developed and well-nourished.   HENT:   Head: Normocephalic.   Eyes: Conjunctivae and EOM are normal. Pupils are equal, round, and reactive to light.   Neck: Normal range of motion. Neck supple.   Cardiovascular: Normal rate, regular rhythm and normal heart sounds.    Pulmonary/Chest: Effort normal and breath sounds normal.   Musculoskeletal: Normal range of motion. She exhibits edema.   Neurological: She is alert and oriented to person, place, and time. She has normal reflexes.   Skin: Skin is warm and dry.   Psychiatric: She has a normal mood and affect. Her behavior is normal. Judgment and thought content normal.   Nursing note and vitals reviewed.    Assessment:     1. Restless leg syndrome    2. Chronic obstructive pulmonary disease, unspecified COPD type    3. History of hyperlipidemia    4. History of hypertension    5. Hyperglycemia    6. Iron deficiency    7. Elevated LFTs    8. Fatty liver    9. Osteoporosis, unspecified osteoporosis type, unspecified pathological fracture presence    10. Ex-smoker    11. SOB (shortness of breath)    12. Generalized edema      Plan:     New Prescriptions    FUROSEMIDE (LASIX) 40 MG TABLET    Take 1 tablet (40 mg total) by mouth once daily.     Discontinued Medications    CLONAZEPAM (KLONOPIN) 0.5 MG TABLET    Take 1 tablet (0.5 mg  total) by mouth every evening. For legs    HYDROCHLOROTHIAZIDE (HYDRODIURIL) 25 MG TABLET    Take 1 tablet (25 mg total) by mouth once daily.    OMEPRAZOLE (PRILOSEC OTC) 20 MG TABLET    Take 1 tablet (20 mg total) by mouth once daily.     Modified Medications    Modified Medication Previous Medication    ALBUTEROL (PROVENTIL) 2.5 MG /3 ML (0.083 %) NEBULIZER SOLUTION albuterol (PROVENTIL) 2.5 mg /3 mL (0.083 %) nebulizer solution       Take 3 mLs (2.5 mg total) by nebulization every 6 (six) hours as needed for Wheezing. Rescue    Take 3 mLs (2.5 mg total) by nebulization every 6 (six) hours as needed for Wheezing. Rescue       Restless leg syndrome  -     Brain natriuretic peptide; Future; Expected date: 09/18/2017    Chronic obstructive pulmonary disease, unspecified COPD type  -     Brain natriuretic peptide; Future; Expected date: 09/18/2017    History of hyperlipidemia  -     Brain natriuretic peptide; Future; Expected date: 09/18/2017    History of hypertension  -     Brain natriuretic peptide; Future; Expected date: 09/18/2017    Hyperglycemia  -     Brain natriuretic peptide; Future; Expected date: 09/18/2017    Iron deficiency  -     Brain natriuretic peptide; Future; Expected date: 09/18/2017    Elevated LFTs  -     Brain natriuretic peptide; Future; Expected date: 09/18/2017    Fatty liver  -     Brain natriuretic peptide; Future; Expected date: 09/18/2017    Osteoporosis, unspecified osteoporosis type, unspecified pathological fracture presence  -     Brain natriuretic peptide; Future; Expected date: 09/18/2017    Ex-smoker  -     Brain natriuretic peptide; Future; Expected date: 09/18/2017    SOB (shortness of breath)  -     Brain natriuretic peptide; Future; Expected date: 09/18/2017    Generalized edema  -     Brain natriuretic peptide; Future; Expected date: 09/18/2017    Other orders  -     albuterol (PROVENTIL) 2.5 mg /3 mL (0.083 %) nebulizer solution; Take 3 mLs (2.5 mg total) by nebulization  every 6 (six) hours as needed for Wheezing. Rescue  Dispense: 360 each; Refill: 1  -     furosemide (LASIX) 40 MG tablet; Take 1 tablet (40 mg total) by mouth once daily.  Dispense: 30 tablet; Refill: 11

## 2017-09-21 LAB
ERYTHROCYTE [SEDIMENTATION RATE] IN BLOOD BY WESTERGREN METHOD: 9 MM/H
FERRITIN SERPL-MCNC: 30 NG/ML (ref 20–288)
IRON SERPL-MCNC: 109 MCG/DL (ref 45–160)
TSH SERPL-ACNC: 3.44 MIU/L (ref 0.4–4.5)

## 2017-09-25 ENCOUNTER — TELEPHONE (OUTPATIENT)
Dept: FAMILY MEDICINE | Facility: CLINIC | Age: 77
End: 2017-09-25

## 2017-10-23 ENCOUNTER — TELEPHONE (OUTPATIENT)
Dept: FAMILY MEDICINE | Facility: CLINIC | Age: 77
End: 2017-10-23

## 2017-10-23 DIAGNOSIS — Z12.39 SCREENING BREAST EXAMINATION: Primary | ICD-10-CM

## 2017-10-23 DIAGNOSIS — Z12.31 ENCOUNTER FOR SCREENING MAMMOGRAM FOR BREAST CANCER: ICD-10-CM

## 2017-10-23 NOTE — TELEPHONE ENCOUNTER
----- Message from Mielna Collins sent at 10/23/2017 12:06 PM CDT -----  Patient requesting orders for yearly mammography.     Date schedule:  Will self schedule

## 2017-10-24 ENCOUNTER — TELEPHONE (OUTPATIENT)
Dept: FAMILY MEDICINE | Facility: CLINIC | Age: 77
End: 2017-10-24

## 2017-10-24 NOTE — TELEPHONE ENCOUNTER
----- Message from Alexsandra Chapman sent at 10/24/2017 12:37 PM CDT -----  Contact: Self/ [phone]  Patient called in returning your call.     Please call and advise.

## 2017-11-02 ENCOUNTER — TELEPHONE (OUTPATIENT)
Dept: FAMILY MEDICINE | Facility: CLINIC | Age: 77
End: 2017-11-02

## 2017-11-02 ENCOUNTER — OFFICE VISIT (OUTPATIENT)
Dept: FAMILY MEDICINE | Facility: CLINIC | Age: 77
End: 2017-11-02
Payer: MEDICARE

## 2017-11-02 VITALS
WEIGHT: 201.19 LBS | DIASTOLIC BLOOD PRESSURE: 88 MMHG | HEIGHT: 61 IN | SYSTOLIC BLOOD PRESSURE: 130 MMHG | TEMPERATURE: 98 F | HEART RATE: 83 BPM | BODY MASS INDEX: 37.99 KG/M2 | OXYGEN SATURATION: 96 %

## 2017-11-02 DIAGNOSIS — L03.116 CELLULITIS OF LEFT LOWER EXTREMITY: Primary | ICD-10-CM

## 2017-11-02 PROCEDURE — 99213 OFFICE O/P EST LOW 20 MIN: CPT | Mod: S$GLB,,, | Performed by: FAMILY MEDICINE

## 2017-11-02 RX ORDER — ALENDRONATE SODIUM 70 MG/1
70 TABLET ORAL
Qty: 13 TABLET | Refills: 3 | Status: SHIPPED | OUTPATIENT
Start: 2017-11-02 | End: 2018-08-22 | Stop reason: SDUPTHER

## 2017-11-02 RX ORDER — CEPHALEXIN 500 MG/1
500 CAPSULE ORAL 2 TIMES DAILY
Qty: 14 CAPSULE | Refills: 0 | Status: SHIPPED | OUTPATIENT
Start: 2017-11-02 | End: 2018-01-11

## 2017-11-02 NOTE — PROGRESS NOTES
Subjective:      Patient ID: Shirley F Gilford is a 77 y.o. female.    Chief Complaint: Leg Swelling    Left lower leg above ankle started swelling worse last night and discolored with a lump anterior ankle; right ok; no injuries; no fever, chills; 2 nights ago had sudden severe left groin pain down to left knee when tried to move it; couldn't get out of bed; went to sleep and woke uop and was resolved;       Review of Systems   Constitutional: Negative.    HENT: Negative.    Respiratory: Negative.    Cardiovascular: Negative.    Gastrointestinal: Negative.    Endocrine: Negative.    Genitourinary: Negative.    Musculoskeletal: Negative.    Skin: Positive for color change and rash.   Psychiatric/Behavioral: Negative.    All other systems reviewed and are negative.    Objective:     Physical Exam   Constitutional: She is oriented to person, place, and time. She appears well-developed and well-nourished.   HENT:   Head: Normocephalic.   Eyes: Conjunctivae and EOM are normal. Pupils are equal, round, and reactive to light.   Neck: Normal range of motion. Neck supple.   Cardiovascular: Normal rate, regular rhythm and normal heart sounds.    Pulmonary/Chest: Effort normal and breath sounds normal.   Musculoskeletal: Normal range of motion.   Neurological: She is alert and oriented to person, place, and time. She has normal reflexes.   Skin: Skin is warm and dry. There is erythema.   Mild early cellulitis left lower leg anteriorl; good pulses;    Psychiatric: She has a normal mood and affect. Her behavior is normal. Judgment and thought content normal.   Nursing note and vitals reviewed.    Assessment:     1. Cellulitis of left lower extremity      Plan:     New Prescriptions    CEPHALEXIN (KEFLEX) 500 MG CAPSULE    Take 1 capsule (500 mg total) by mouth 2 (two) times daily.     Discontinued Medications    No medications on file     Modified Medications    Modified Medication Previous Medication    ALENDRONATE (FOSAMAX)  70 MG TABLET alendronate (FOSAMAX) 70 MG tablet       Take 1 tablet (70 mg total) by mouth every 7 days.    Take 1 tablet (70 mg total) by mouth every 7 days.       Cellulitis of left lower extremity    Other orders  -     cephALEXin (KEFLEX) 500 MG capsule; Take 1 capsule (500 mg total) by mouth 2 (two) times daily.  Dispense: 14 capsule; Refill: 0  -     alendronate (FOSAMAX) 70 MG tablet; Take 1 tablet (70 mg total) by mouth every 7 days.  Dispense: 13 tablet; Refill: 3

## 2017-11-02 NOTE — TELEPHONE ENCOUNTER
Pt told to come in now; she called and left a message for swollen legs; I called back, no answer, left message

## 2017-11-05 LAB — BNP SERPL-MCNC: 85 PG/ML

## 2017-11-07 ENCOUNTER — HOSPITAL ENCOUNTER (OUTPATIENT)
Dept: RADIOLOGY | Facility: HOSPITAL | Age: 77
Discharge: HOME | End: 2017-11-07
Attending: FAMILY MEDICINE
Payer: MEDICARE

## 2017-11-07 VITALS — WEIGHT: 201 LBS | BODY MASS INDEX: 37.95 KG/M2 | HEIGHT: 61 IN

## 2017-11-07 DIAGNOSIS — Z12.31 ENCOUNTER FOR SCREENING MAMMOGRAM FOR BREAST CANCER: ICD-10-CM

## 2017-11-07 PROCEDURE — 77067 SCR MAMMO BI INCL CAD: CPT | Mod: TC

## 2018-01-11 ENCOUNTER — TELEPHONE (OUTPATIENT)
Dept: FAMILY MEDICINE | Facility: CLINIC | Age: 78
End: 2018-01-11

## 2018-01-11 ENCOUNTER — OFFICE VISIT (OUTPATIENT)
Dept: FAMILY MEDICINE | Facility: CLINIC | Age: 78
End: 2018-01-11
Payer: MEDICARE

## 2018-01-11 VITALS
WEIGHT: 206.38 LBS | TEMPERATURE: 99 F | OXYGEN SATURATION: 97 % | DIASTOLIC BLOOD PRESSURE: 70 MMHG | BODY MASS INDEX: 38.96 KG/M2 | SYSTOLIC BLOOD PRESSURE: 136 MMHG | HEART RATE: 95 BPM | HEIGHT: 61 IN

## 2018-01-11 DIAGNOSIS — R06.02 SOB (SHORTNESS OF BREATH): ICD-10-CM

## 2018-01-11 DIAGNOSIS — Z87.891 EX-SMOKER: ICD-10-CM

## 2018-01-11 DIAGNOSIS — R10.9 FLANK PAIN: Primary | ICD-10-CM

## 2018-01-11 DIAGNOSIS — J44.9 CHRONIC OBSTRUCTIVE PULMONARY DISEASE, UNSPECIFIED COPD TYPE: ICD-10-CM

## 2018-01-11 PROCEDURE — 96372 THER/PROPH/DIAG INJ SC/IM: CPT | Mod: S$GLB,,, | Performed by: FAMILY MEDICINE

## 2018-01-11 PROCEDURE — 99499 UNLISTED E&M SERVICE: CPT | Mod: S$GLB,,, | Performed by: FAMILY MEDICINE

## 2018-01-11 PROCEDURE — 99213 OFFICE O/P EST LOW 20 MIN: CPT | Mod: 25,S$GLB,, | Performed by: FAMILY MEDICINE

## 2018-01-11 RX ORDER — CODEINE PHOSPHATE AND GUAIFENESIN 10; 100 MG/5ML; MG/5ML
10 SOLUTION ORAL EVERY 4 HOURS PRN
Qty: 240 ML | Refills: 0 | Status: SHIPPED | OUTPATIENT
Start: 2018-01-11 | End: 2018-01-21

## 2018-01-11 RX ORDER — TRIAMCINOLONE ACETONIDE 40 MG/ML
80 INJECTION, SUSPENSION INTRA-ARTICULAR; INTRAMUSCULAR ONCE
Status: COMPLETED | OUTPATIENT
Start: 2018-01-11 | End: 2018-01-11

## 2018-01-11 RX ORDER — LEVOFLOXACIN 500 MG/1
500 TABLET, FILM COATED ORAL DAILY
Qty: 10 TABLET | Refills: 0 | Status: SHIPPED | OUTPATIENT
Start: 2018-01-11 | End: 2018-05-03 | Stop reason: ALTCHOICE

## 2018-01-11 RX ORDER — CEFTRIAXONE 1 G/1
1 INJECTION, POWDER, FOR SOLUTION INTRAMUSCULAR; INTRAVENOUS
Status: COMPLETED | OUTPATIENT
Start: 2018-01-11 | End: 2018-01-11

## 2018-01-11 RX ADMIN — CEFTRIAXONE 1 G: 1 INJECTION, POWDER, FOR SOLUTION INTRAMUSCULAR; INTRAVENOUS at 03:01

## 2018-01-11 RX ADMIN — TRIAMCINOLONE ACETONIDE 80 MG: 40 INJECTION, SUSPENSION INTRA-ARTICULAR; INTRAMUSCULAR at 03:01

## 2018-01-11 NOTE — PROGRESS NOTES
Subjective:      Patient ID: Shirley F Gilford is a 77 y.o. female.    Chief Complaint: Back Pain (lower back pain for two days now ); Fever; Sore Throat; and Cough    Left flank pain for the 3 rd time recently; moving, brandin decorating; no falls; took advil; no xrays; voids OK; BM OK; used heating pad; hurts to move; trouble getting up and down;    Also, cough lst night from upper chest to throat; cough hurts; no phlegm; more SOB, using anoro and nebulizer      Back Pain   Associated symptoms include a fever.   Fever    Associated symptoms include coughing and a sore throat.   Sore Throat    Associated symptoms include coughing.   Cough   Associated symptoms include a fever and a sore throat.     Review of Systems   Constitutional: Positive for fever.   HENT: Positive for sore throat.    Respiratory: Positive for cough.    Genitourinary: Positive for flank pain.        Left   Musculoskeletal: Positive for back pain.   All other systems reviewed and are negative.    Objective:     Physical Exam   Constitutional: She is oriented to person, place, and time. She appears well-developed and well-nourished.   HENT:   Head: Normocephalic.   Eyes: Conjunctivae and EOM are normal. Pupils are equal, round, and reactive to light.   Neck: Normal range of motion. Neck supple.   Cardiovascular: Normal rate, regular rhythm and normal heart sounds.    Pulmonary/Chest: Breath sounds normal. She is in respiratory distress.   Harsh, loud gurgling cough here in office, can hear the rattle   Musculoskeletal: Normal range of motion.   Neurological: She is alert and oriented to person, place, and time. She has normal reflexes.   Skin: Skin is warm and dry.   Psychiatric: She has a normal mood and affect. Her behavior is normal. Judgment and thought content normal.   Nursing note and vitals reviewed.    Assessment:     1. Flank pain    2. Ex-smoker    3. SOB (shortness of breath)    4. Chronic obstructive pulmonary disease, unspecified  COPD type      Plan:     New Prescriptions    GUAIFENESIN-CODEINE 100-10 MG/5 ML (TUSSI-ORGANIDIN NR)  MG/5 ML SYRUP    Take 10 mLs by mouth every 4 (four) hours as needed for Cough.    LEVOFLOXACIN (LEVAQUIN) 500 MG TABLET    Take 1 tablet (500 mg total) by mouth once daily.     Discontinued Medications    CEPHALEXIN (KEFLEX) 500 MG CAPSULE    Take 1 capsule (500 mg total) by mouth 2 (two) times daily.     Modified Medications    No medications on file       Flank pain  -     X-Ray Lumbar Spine Complete 5 View; Future; Expected date: 01/11/2018    Ex-smoker  -     X-Ray Lumbar Spine Complete 5 View; Future; Expected date: 01/11/2018    SOB (shortness of breath)  -     X-Ray Lumbar Spine Complete 5 View; Future; Expected date: 01/11/2018    Chronic obstructive pulmonary disease, unspecified COPD type  -     X-Ray Lumbar Spine Complete 5 View; Future; Expected date: 01/11/2018    Other orders  -     guaifenesin-codeine 100-10 mg/5 ml (TUSSI-ORGANIDIN NR)  mg/5 mL syrup; Take 10 mLs by mouth every 4 (four) hours as needed for Cough.  Dispense: 240 mL; Refill: 0  -     cefTRIAXone injection 1 g; Inject 1 g into the muscle one time.  -     triamcinolone acetonide injection 80 mg; Inject 2 mLs (80 mg total) into the muscle once.  -     levoFLOXacin (LEVAQUIN) 500 MG tablet; Take 1 tablet (500 mg total) by mouth once daily.  Dispense: 10 tablet; Refill: 0      Get back xray when able; rx sent;

## 2018-01-11 NOTE — TELEPHONE ENCOUNTER
----- Message from Dennise Nolan sent at 1/11/2018  8:06 AM CST -----  Contact: The pt called  Patient would like to be seen today.     Symptoms:  Back pain and trouble breathing      Please call her at [phone]

## 2018-01-23 ENCOUNTER — TELEPHONE (OUTPATIENT)
Dept: FAMILY MEDICINE | Facility: CLINIC | Age: 78
End: 2018-01-23

## 2018-01-23 ENCOUNTER — HOSPITAL ENCOUNTER (OUTPATIENT)
Dept: RADIOLOGY | Facility: HOSPITAL | Age: 78
Discharge: HOME | End: 2018-01-23
Attending: FAMILY MEDICINE
Payer: MEDICARE

## 2018-01-23 DIAGNOSIS — R10.9 FLANK PAIN: ICD-10-CM

## 2018-01-23 DIAGNOSIS — Z87.891 EX-SMOKER: ICD-10-CM

## 2018-01-23 DIAGNOSIS — J44.9 CHRONIC OBSTRUCTIVE PULMONARY DISEASE, UNSPECIFIED COPD TYPE: ICD-10-CM

## 2018-01-23 DIAGNOSIS — R06.02 SOB (SHORTNESS OF BREATH): ICD-10-CM

## 2018-01-23 PROCEDURE — 72110 X-RAY EXAM L-2 SPINE 4/>VWS: CPT | Mod: TC,FY,PO

## 2018-01-23 NOTE — TELEPHONE ENCOUNTER
----- Message from Marc Sands MD sent at 1/23/2018  9:38 AM CST -----  2 fractures and one sllippage of vertebrae in back

## 2018-03-07 RX ORDER — ROPINIROLE 2 MG/1
TABLET, FILM COATED ORAL
Qty: 90 TABLET | Refills: 3 | Status: SHIPPED | OUTPATIENT
Start: 2018-03-07 | End: 2018-11-05

## 2018-05-03 ENCOUNTER — OFFICE VISIT (OUTPATIENT)
Dept: FAMILY MEDICINE | Facility: CLINIC | Age: 78
End: 2018-05-03
Payer: MEDICARE

## 2018-05-03 VITALS
TEMPERATURE: 98 F | BODY MASS INDEX: 38.62 KG/M2 | DIASTOLIC BLOOD PRESSURE: 80 MMHG | OXYGEN SATURATION: 96 % | WEIGHT: 204.56 LBS | SYSTOLIC BLOOD PRESSURE: 160 MMHG | HEART RATE: 88 BPM | HEIGHT: 61 IN

## 2018-05-03 DIAGNOSIS — E61.1 IRON DEFICIENCY: ICD-10-CM

## 2018-05-03 DIAGNOSIS — R73.9 HYPERGLYCEMIA: ICD-10-CM

## 2018-05-03 DIAGNOSIS — R06.02 SOB (SHORTNESS OF BREATH): ICD-10-CM

## 2018-05-03 DIAGNOSIS — J44.9 CHRONIC OBSTRUCTIVE PULMONARY DISEASE, UNSPECIFIED COPD TYPE: ICD-10-CM

## 2018-05-03 DIAGNOSIS — R03.0 ELEVATED BLOOD PRESSURE READING: Primary | ICD-10-CM

## 2018-05-03 DIAGNOSIS — Z86.79 HISTORY OF HYPERTENSION: ICD-10-CM

## 2018-05-03 DIAGNOSIS — G25.81 RESTLESS LEG SYNDROME: ICD-10-CM

## 2018-05-03 DIAGNOSIS — Z86.39 HISTORY OF HYPERLIPIDEMIA: ICD-10-CM

## 2018-05-03 PROBLEM — L03.116 CELLULITIS OF LEFT LOWER EXTREMITY: Status: RESOLVED | Noted: 2017-11-02 | Resolved: 2018-05-03

## 2018-05-03 PROCEDURE — 99499 UNLISTED E&M SERVICE: CPT | Mod: S$GLB,,, | Performed by: FAMILY MEDICINE

## 2018-05-03 PROCEDURE — 99213 OFFICE O/P EST LOW 20 MIN: CPT | Mod: S$GLB,,, | Performed by: FAMILY MEDICINE

## 2018-05-03 NOTE — PROGRESS NOTES
Subjective:      Patient ID: Shirley F Gilford is a 77 y.o. female.    Chief Complaint: Mass (on the top of her head)      HPI   2 spots in scalp needs treatment with bobby Monday; HEATH, and wart, liq n2  Blood pressure up today; woried about friend in hospital that is now home;      Review of Systems   Constitutional: Negative.    HENT: Negative.    Respiratory: Negative.    Cardiovascular: Negative.    Gastrointestinal: Negative.    Endocrine: Negative.    Genitourinary: Negative.    Musculoskeletal: Negative.    Skin: Positive for rash.   Psychiatric/Behavioral: The patient is nervous/anxious.    All other systems reviewed and are negative.    Objective:     Physical Exam   Constitutional: She is oriented to person, place, and time. She appears well-developed and well-nourished.   HENT:   Head: Normocephalic.   Eyes: Conjunctivae and EOM are normal. Pupils are equal, round, and reactive to light.   Neck: Normal range of motion. Neck supple.   Cardiovascular: Normal rate, regular rhythm and normal heart sounds.    Pulmonary/Chest: Effort normal and breath sounds normal.   Musculoskeletal: Normal range of motion.   Neurological: She is alert and oriented to person, place, and time. She has normal reflexes.   Skin: Skin is warm and dry.   Psychiatric: She has a normal mood and affect. Her behavior is normal. Judgment and thought content normal.   Nursing note and vitals reviewed.    Assessment:     1. Elevated blood pressure reading    2. Restless leg syndrome    3. Chronic obstructive pulmonary disease, unspecified COPD type    4. History of hypertension    5. History of hyperlipidemia    6. Iron deficiency    7. Hyperglycemia    8. SOB (shortness of breath)      Plan:     New Prescriptions    No medications on file     Discontinued Medications    FUROSEMIDE (LASIX) 40 MG TABLET    Take 1 tablet (40 mg total) by mouth once daily.    LEVOFLOXACIN (LEVAQUIN) 500 MG TABLET    Take 1 tablet (500 mg total) by mouth once  daily.     Modified Medications    No medications on file       Elevated blood pressure reading    Restless leg syndrome    Chronic obstructive pulmonary disease, unspecified COPD type    History of hypertension    History of hyperlipidemia    Iron deficiency    Hyperglycemia    SOB (shortness of breath)    bp check 3 weeks  Benign scalp lesion, to avni Temple

## 2018-05-22 ENCOUNTER — TELEPHONE (OUTPATIENT)
Dept: FAMILY MEDICINE | Facility: CLINIC | Age: 78
End: 2018-05-22

## 2018-05-22 ENCOUNTER — CLINICAL SUPPORT (OUTPATIENT)
Dept: FAMILY MEDICINE | Facility: CLINIC | Age: 78
End: 2018-05-22
Payer: MEDICARE

## 2018-05-22 VITALS — DIASTOLIC BLOOD PRESSURE: 68 MMHG | SYSTOLIC BLOOD PRESSURE: 138 MMHG | HEART RATE: 68 BPM

## 2018-05-22 DIAGNOSIS — R03.0 ELEVATED BLOOD PRESSURE READING: Primary | ICD-10-CM

## 2018-05-22 NOTE — PROGRESS NOTES
Shirley F Gilford 77 y.o. female is here today for Blood Pressure check.   History of HTN no.    Review of patient's allergies indicates:   Allergen Reactions    Gabapentin Nausea And Vomiting     Creatinine   Date Value Ref Range Status   06/29/2017 0.87 0.60 - 0.93 mg/dL Final     Comment:     For patients >49 years of age, the reference limit  for Creatinine is approximately 13% higher for people  identified as -American.          Sodium   Date Value Ref Range Status   06/29/2017 143 135 - 146 mmol/L Final     Potassium   Date Value Ref Range Status   06/29/2017 4.1 3.5 - 5.3 mmol/L Final   ]  Patient does not take blood pressure medications.    Current Outpatient Prescriptions:     albuterol (PROVENTIL) 2.5 mg /3 mL (0.083 %) nebulizer solution, Take 3 mLs (2.5 mg total) by nebulization every 6 (six) hours as needed for Wheezing. Rescue, Disp: 360 each, Rfl: 1    alendronate (FOSAMAX) 70 MG tablet, Take 1 tablet (70 mg total) by mouth every 7 days., Disp: 13 tablet, Rfl: 3    calcium-vitamin D3 (CALCIUM 500 + D) 500 mg(1,250mg) -200 unit per tablet, Take 1 tablet by mouth once daily., Disp: , Rfl:     cyanocobalamin (VITAMIN B-12) 500 MCG tablet, Take 500 mcg by mouth once daily., Disp: , Rfl:     ferrous sulfate 324 mg (65 mg iron) TbEC, Take 1 tablet (325 mg total) by mouth once daily., Disp: , Rfl: 0    MULTIVIT-IRON-MIN-FOLIC ACID 3,500-18-0.4 UNIT-MG-MG ORAL CHEW, Take by mouth., Disp: , Rfl:     rOPINIRole (REQUIP) 2 MG tablet, TAKE 1 TABLET BY MOUTH AT BEDTIME, Disp: 90 tablet, Rfl: 3    ropinirole (REQUIP) 3 MG tablet, Take 1 tablet (3 mg total) by mouth nightly., Disp: 90 tablet, Rfl: 3    umeclidinium-vilanterol 62.5-25 mcg/actuation DsDv, Inhale 1 puff into the lungs once daily. For COPD, Disp: 1 each, Rfl: 11     Does patient have record of home blood pressure readings no.       Patient is asymptomatic.   Patient BP was elevated at her last visit due to stress and worry concerning  her friend.        Blood pressure reading after 15 minutes was 138/86, Pulse 68.  Dr. Sands notified.

## 2018-08-22 ENCOUNTER — OFFICE VISIT (OUTPATIENT)
Dept: FAMILY MEDICINE | Facility: CLINIC | Age: 78
End: 2018-08-22
Payer: MEDICARE

## 2018-08-22 VITALS
OXYGEN SATURATION: 95 % | HEART RATE: 79 BPM | TEMPERATURE: 98 F | SYSTOLIC BLOOD PRESSURE: 132 MMHG | WEIGHT: 215.63 LBS | BODY MASS INDEX: 39.68 KG/M2 | HEIGHT: 62 IN | DIASTOLIC BLOOD PRESSURE: 84 MMHG

## 2018-08-22 DIAGNOSIS — R73.9 HYPERGLYCEMIA: ICD-10-CM

## 2018-08-22 DIAGNOSIS — K76.0 FATTY LIVER: ICD-10-CM

## 2018-08-22 DIAGNOSIS — R06.02 SOB (SHORTNESS OF BREATH): ICD-10-CM

## 2018-08-22 DIAGNOSIS — E61.1 IRON DEFICIENCY: ICD-10-CM

## 2018-08-22 DIAGNOSIS — E66.9 OBESITY, UNSPECIFIED CLASSIFICATION, UNSPECIFIED OBESITY TYPE, UNSPECIFIED WHETHER SERIOUS COMORBIDITY PRESENT: ICD-10-CM

## 2018-08-22 DIAGNOSIS — Z87.891 EX-SMOKER: ICD-10-CM

## 2018-08-22 DIAGNOSIS — Z86.79 HISTORY OF HYPERTENSION: ICD-10-CM

## 2018-08-22 DIAGNOSIS — M67.432 GANGLION CYST OF DORSUM OF LEFT WRIST: Primary | ICD-10-CM

## 2018-08-22 DIAGNOSIS — Z86.39 HISTORY OF HYPERLIPIDEMIA: ICD-10-CM

## 2018-08-22 DIAGNOSIS — R79.89 ELEVATED LFTS: ICD-10-CM

## 2018-08-22 DIAGNOSIS — G25.81 RESTLESS LEG SYNDROME: ICD-10-CM

## 2018-08-22 DIAGNOSIS — G47.33 OSA (OBSTRUCTIVE SLEEP APNEA): ICD-10-CM

## 2018-08-22 DIAGNOSIS — M81.0 OSTEOPOROSIS, UNSPECIFIED OSTEOPOROSIS TYPE, UNSPECIFIED PATHOLOGICAL FRACTURE PRESENCE: ICD-10-CM

## 2018-08-22 DIAGNOSIS — J44.9 CHRONIC OBSTRUCTIVE PULMONARY DISEASE, UNSPECIFIED COPD TYPE: ICD-10-CM

## 2018-08-22 PROCEDURE — 99214 OFFICE O/P EST MOD 30 MIN: CPT | Mod: S$GLB,,, | Performed by: FAMILY MEDICINE

## 2018-08-22 PROCEDURE — 99499 UNLISTED E&M SERVICE: CPT | Mod: S$GLB,,, | Performed by: FAMILY MEDICINE

## 2018-08-22 RX ORDER — TRAZODONE HYDROCHLORIDE 50 MG/1
50 TABLET ORAL NIGHTLY
Qty: 30 TABLET | Refills: 5 | Status: SHIPPED | OUTPATIENT
Start: 2018-08-22 | End: 2019-04-30

## 2018-08-22 RX ORDER — ALENDRONATE SODIUM 70 MG/1
70 TABLET ORAL
Qty: 13 TABLET | Refills: 3 | Status: SHIPPED | OUTPATIENT
Start: 2018-08-22 | End: 2019-09-17 | Stop reason: SDUPTHER

## 2018-08-22 NOTE — PROGRESS NOTES
Subjective:      Patient ID: Shirley F Gilford is a 78 y.o. female.    Chief Complaint: Mass (left wrist); side pain that radiates; and Chest Pain      HPI   Sick last month and got no help; got better; now with bump on left wristradial aspect for one month;   Gaining weight, can't sleep due to legs restless; never had sleep test; has been anemic with low iron; transportation is a problem to go to [place]; thoracic neuralgia with twisting driving to check blind spots  Review of Systems   Constitutional: Positive for unexpected weight change.   HENT: Negative.    Respiratory: Negative.    Cardiovascular: Negative.    Gastrointestinal: Negative.    Endocrine: Negative.    Genitourinary: Negative.    Musculoskeletal: Negative.    Psychiatric/Behavioral: Positive for sleep disturbance.   All other systems reviewed and are negative.    Objective:     Physical Exam   Constitutional: She is oriented to person, place, and time. She appears well-developed and well-nourished.   obese   HENT:   Head: Normocephalic.   Eyes: Conjunctivae and EOM are normal. Pupils are equal, round, and reactive to light.   Neck: Normal range of motion. Neck supple.   Cardiovascular: Normal rate, regular rhythm and normal heart sounds.   Pulmonary/Chest: Effort normal and breath sounds normal.   Musculoskeletal: Normal range of motion.   Neurological: She is alert and oriented to person, place, and time. She has normal reflexes.   Skin: Skin is warm and dry.   Psychiatric: She has a normal mood and affect. Her behavior is normal. Judgment and thought content normal.   Nursing note and vitals reviewed.    Assessment:     1. Ganglion cyst of dorsum of left wrist    2. Restless leg syndrome    3. Ex-smoker    4. Obesity, unspecified classification, unspecified obesity type, unspecified whether serious comorbidity present    5. KWAME (obstructive sleep apnea)    6. Chronic obstructive pulmonary disease, unspecified COPD type    7. History of  hypertension    8. History of hyperlipidemia    9. Hyperglycemia    10. Iron deficiency    11. Elevated LFTs    12. Fatty liver    13. Osteoporosis, unspecified osteoporosis type, unspecified pathological fracture presence    14. SOB (shortness of breath)      Plan:     New Prescriptions    TRAZODONE (DESYREL) 50 MG TABLET    Take 1 tablet (50 mg total) by mouth every evening.     Discontinued Medications    ROPINIROLE (REQUIP) 3 MG TABLET    Take 1 tablet (3 mg total) by mouth nightly.     Modified Medications    Modified Medication Previous Medication    ALENDRONATE (FOSAMAX) 70 MG TABLET alendronate (FOSAMAX) 70 MG tablet       Take 1 tablet (70 mg total) by mouth every 7 days.    Take 1 tablet (70 mg total) by mouth every 7 days.       Ganglion cyst of dorsum of left wrist  Comments:  no treatment at this time;   Orders:  -     CBC auto differential; Future; Expected date: 08/22/2018  -     Comprehensive metabolic panel; Future; Expected date: 08/22/2018  -     TSH; Future  -     Lipid panel; Future  -     Hemoglobin A1c; Future  -     Vitamin D; Future  -     Urinalysis; Future  -     Iron; Future; Expected date: 08/22/2018  -     Ferritin; Future; Expected date: 08/22/2018    Restless leg syndrome  -     Cancel: Ambulatory consult to Sleep Disorders  -     CBC auto differential; Future; Expected date: 08/22/2018  -     Comprehensive metabolic panel; Future; Expected date: 08/22/2018  -     TSH; Future  -     Lipid panel; Future  -     Hemoglobin A1c; Future  -     Vitamin D; Future  -     Urinalysis; Future  -     Iron; Future; Expected date: 08/22/2018  -     Ferritin; Future; Expected date: 08/22/2018    Ex-smoker  -     CBC auto differential; Future; Expected date: 08/22/2018  -     Comprehensive metabolic panel; Future; Expected date: 08/22/2018  -     TSH; Future  -     Lipid panel; Future  -     Hemoglobin A1c; Future  -     Vitamin D; Future  -     Urinalysis; Future  -     Iron; Future; Expected date:  08/22/2018  -     Ferritin; Future; Expected date: 08/22/2018    Obesity, unspecified classification, unspecified obesity type, unspecified whether serious comorbidity present  -     CBC auto differential; Future; Expected date: 08/22/2018  -     Comprehensive metabolic panel; Future; Expected date: 08/22/2018  -     TSH; Future  -     Lipid panel; Future  -     Hemoglobin A1c; Future  -     Vitamin D; Future  -     Urinalysis; Future  -     Iron; Future; Expected date: 08/22/2018  -     Ferritin; Future; Expected date: 08/22/2018    KWAME (obstructive sleep apnea)  -     Cancel: Ambulatory consult to Sleep Disorders  -     CBC auto differential; Future; Expected date: 08/22/2018  -     Comprehensive metabolic panel; Future; Expected date: 08/22/2018  -     TSH; Future  -     Lipid panel; Future  -     Hemoglobin A1c; Future  -     Vitamin D; Future  -     Urinalysis; Future  -     Iron; Future; Expected date: 08/22/2018  -     Ferritin; Future; Expected date: 08/22/2018    Chronic obstructive pulmonary disease, unspecified COPD type  -     CBC auto differential; Future; Expected date: 08/22/2018  -     Comprehensive metabolic panel; Future; Expected date: 08/22/2018  -     TSH; Future  -     Lipid panel; Future  -     Hemoglobin A1c; Future  -     Vitamin D; Future  -     Urinalysis; Future  -     Iron; Future; Expected date: 08/22/2018  -     Ferritin; Future; Expected date: 08/22/2018    History of hypertension  -     CBC auto differential; Future; Expected date: 08/22/2018  -     Comprehensive metabolic panel; Future; Expected date: 08/22/2018  -     TSH; Future  -     Lipid panel; Future  -     Hemoglobin A1c; Future  -     Vitamin D; Future  -     Urinalysis; Future  -     Iron; Future; Expected date: 08/22/2018  -     Ferritin; Future; Expected date: 08/22/2018    History of hyperlipidemia  -     CBC auto differential; Future; Expected date: 08/22/2018  -     Comprehensive metabolic panel; Future; Expected  date: 08/22/2018  -     TSH; Future  -     Lipid panel; Future  -     Hemoglobin A1c; Future  -     Vitamin D; Future  -     Urinalysis; Future  -     Iron; Future; Expected date: 08/22/2018  -     Ferritin; Future; Expected date: 08/22/2018    Hyperglycemia  -     CBC auto differential; Future; Expected date: 08/22/2018  -     Comprehensive metabolic panel; Future; Expected date: 08/22/2018  -     TSH; Future  -     Lipid panel; Future  -     Hemoglobin A1c; Future  -     Vitamin D; Future  -     Urinalysis; Future  -     Iron; Future; Expected date: 08/22/2018  -     Ferritin; Future; Expected date: 08/22/2018    Iron deficiency  -     CBC auto differential; Future; Expected date: 08/22/2018  -     Comprehensive metabolic panel; Future; Expected date: 08/22/2018  -     TSH; Future  -     Lipid panel; Future  -     Hemoglobin A1c; Future  -     Vitamin D; Future  -     Urinalysis; Future  -     Iron; Future; Expected date: 08/22/2018  -     Ferritin; Future; Expected date: 08/22/2018    Elevated LFTs  -     CBC auto differential; Future; Expected date: 08/22/2018  -     Comprehensive metabolic panel; Future; Expected date: 08/22/2018  -     TSH; Future  -     Lipid panel; Future  -     Hemoglobin A1c; Future  -     Vitamin D; Future  -     Urinalysis; Future  -     Iron; Future; Expected date: 08/22/2018  -     Ferritin; Future; Expected date: 08/22/2018    Fatty liver  -     CBC auto differential; Future; Expected date: 08/22/2018  -     Comprehensive metabolic panel; Future; Expected date: 08/22/2018  -     TSH; Future  -     Lipid panel; Future  -     Hemoglobin A1c; Future  -     Vitamin D; Future  -     Urinalysis; Future  -     Iron; Future; Expected date: 08/22/2018  -     Ferritin; Future; Expected date: 08/22/2018    Osteoporosis, unspecified osteoporosis type, unspecified pathological fracture presence  -     CBC auto differential; Future; Expected date: 08/22/2018  -     Comprehensive metabolic panel;  Future; Expected date: 2018  -     TSH; Future  -     Lipid panel; Future  -     Hemoglobin A1c; Future  -     Vitamin D; Future  -     Urinalysis; Future  -     Iron; Future; Expected date: 2018  -     Ferritin; Future; Expected date: 2018    SOB (shortness of breath)  -     CBC auto differential; Future; Expected date: 2018  -     Comprehensive metabolic panel; Future; Expected date: 2018  -     TSH; Future  -     Lipid panel; Future  -     Hemoglobin A1c; Future  -     Vitamin D; Future  -     Urinalysis; Future  -     Iron; Future; Expected date: 2018  -     Ferritin; Future; Expected date: 2018    Other orders  -     traZODone (DESYREL) 50 MG tablet; Take 1 tablet (50 mg total) by mouth every evening.  Dispense: 30 tablet; Refill: 5  -     alendronate (FOSAMAX) 70 MG tablet; Take 1 tablet (70 mg total) by mouth every 7 days.  Dispense: 13 tablet; Refill: 3      One daughter ; other lives here and hasn't talked to her for 20 years.

## 2018-08-23 NOTE — PROGRESS NOTES
Pt is requesting to have her sleep study at [facility] in [place] due to transportation issues.

## 2018-08-28 NOTE — PROGRESS NOTES
Patient, Shirley F Gilford (MRN #[id number]), presented with a recorded BMI of 39.43 kg/m^2 and a documented comorbidity(s):  - Obstructive Sleep Apnea  to which the severe obesity is a contributing factor. This is consistent with the definition of severe obesity (BMI 35.0-35.9) with comorbidity (ICD-10 E66.01, Z68.35). The patient's severe obesity was monitored, evaluated, addressed and/or treated. This addendum to the medical record is made on 08/28/2018.

## 2018-10-18 RX ORDER — ROPINIROLE 3 MG/1
TABLET, FILM COATED ORAL
Qty: 90 TABLET | Refills: 1 | Status: SHIPPED | OUTPATIENT
Start: 2018-10-18 | End: 2018-11-05

## 2018-11-02 ENCOUNTER — TELEPHONE (OUTPATIENT)
Dept: FAMILY MEDICINE | Facility: CLINIC | Age: 78
End: 2018-11-02

## 2018-11-02 NOTE — TELEPHONE ENCOUNTER
Called pt in regards to below message. Pt stated that she has a mildly productive cough that she's been having x 1 week. Pt stated that she has congestion in her chest. Denies fever. Pt has been taking Delsym and Mucinex for relief. Do you have any additional suggestions?         Pharmacy- [facility]

## 2018-11-02 NOTE — TELEPHONE ENCOUNTER
----- Message from Jannet Inman sent at 11/2/2018 12:35 PM CDT -----  No. [phone]     Patient has been having a chronic cough for over a week.  Please call in a cough medicine.   Over the counter medicine is not helping.   [facility]

## 2018-11-04 RX ORDER — CODEINE PHOSPHATE AND GUAIFENESIN 10; 100 MG/5ML; MG/5ML
10 SOLUTION ORAL EVERY 4 HOURS PRN
Qty: 240 ML | Refills: 0 | Status: SHIPPED | OUTPATIENT
Start: 2018-11-04 | End: 2018-11-14

## 2018-11-04 NOTE — TELEPHONE ENCOUNTER
Use her nebulizer; if she is more SOB or fever, come to the office  Rx cough medicine sent to [facility]

## 2018-11-05 ENCOUNTER — HOSPITAL ENCOUNTER (OUTPATIENT)
Dept: RADIOLOGY | Facility: HOSPITAL | Age: 78
Discharge: HOME | End: 2018-11-05
Attending: FAMILY MEDICINE
Payer: MEDICARE

## 2018-11-05 ENCOUNTER — OFFICE VISIT (OUTPATIENT)
Dept: FAMILY MEDICINE | Facility: CLINIC | Age: 78
End: 2018-11-05
Payer: MEDICARE

## 2018-11-05 VITALS
DIASTOLIC BLOOD PRESSURE: 90 MMHG | HEART RATE: 86 BPM | TEMPERATURE: 98 F | BODY MASS INDEX: 38.52 KG/M2 | HEIGHT: 62 IN | OXYGEN SATURATION: 93 % | SYSTOLIC BLOOD PRESSURE: 194 MMHG | WEIGHT: 209.31 LBS

## 2018-11-05 DIAGNOSIS — R03.0 SINGLE EPISODE OF ELEVATED BLOOD PRESSURE: ICD-10-CM

## 2018-11-05 DIAGNOSIS — J44.9 CHRONIC OBSTRUCTIVE PULMONARY DISEASE, UNSPECIFIED COPD TYPE: Primary | ICD-10-CM

## 2018-11-05 DIAGNOSIS — J44.9 CHRONIC OBSTRUCTIVE PULMONARY DISEASE, UNSPECIFIED COPD TYPE: ICD-10-CM

## 2018-11-05 PROCEDURE — 99214 OFFICE O/P EST MOD 30 MIN: CPT | Mod: S$GLB,,, | Performed by: FAMILY MEDICINE

## 2018-11-05 PROCEDURE — 1101F PT FALLS ASSESS-DOCD LE1/YR: CPT | Mod: CPTII,S$GLB,, | Performed by: FAMILY MEDICINE

## 2018-11-05 PROCEDURE — 71046 X-RAY EXAM CHEST 2 VIEWS: CPT | Mod: TC,FY,PO

## 2018-11-05 RX ORDER — METHYLPREDNISOLONE 4 MG/1
TABLET ORAL
Qty: 1 PACKAGE | Refills: 0 | Status: SHIPPED | OUTPATIENT
Start: 2018-11-05 | End: 2018-12-04

## 2018-11-05 RX ORDER — AMOXICILLIN AND CLAVULANATE POTASSIUM 875; 125 MG/1; MG/1
1 TABLET, FILM COATED ORAL 2 TIMES DAILY
Qty: 20 TABLET | Refills: 0 | Status: SHIPPED | OUTPATIENT
Start: 2018-11-05 | End: 2018-11-15

## 2018-11-05 RX ORDER — ALBUTEROL SULFATE 0.83 MG/ML
SOLUTION RESPIRATORY (INHALATION)
COMMUNITY
End: 2019-04-30

## 2018-11-05 RX ORDER — PRAMIPEXOLE DIHYDROCHLORIDE 0.25 MG/1
0.25 TABLET ORAL NIGHTLY
Qty: 30 TABLET | Refills: 5 | Status: SHIPPED | OUTPATIENT
Start: 2018-11-05 | End: 2019-04-30

## 2018-11-05 RX ORDER — LOSARTAN POTASSIUM 50 MG/1
50 TABLET ORAL DAILY
Qty: 30 TABLET | Refills: 5 | Status: SHIPPED | OUTPATIENT
Start: 2018-11-05 | End: 2018-12-14 | Stop reason: SDUPTHER

## 2018-11-05 RX ORDER — CODEINE PHOSPHATE AND GUAIFENESIN 10; 100 MG/5ML; MG/5ML
10 SOLUTION ORAL EVERY 4 HOURS PRN
Qty: 240 ML | Refills: 0 | Status: SHIPPED | OUTPATIENT
Start: 2018-11-05 | End: 2018-11-15

## 2018-11-05 NOTE — PROGRESS NOTES
Subjective:      Patient ID: Shirley F Gilford is a 78 y.o. female.    Chief Complaint: Cough (sob x's 6 days)      HPI   Sick since Wednesday, coughing, fever, sob, weak; no N,V; voids; no phlegm; stuck in chest; cutting off breath; delsym,   Quit smoking 4 years ago; has a nebulizer and MDI at home; used her nebulizer, no help; thinks it made it worse  Feels better today; still weak; not as sick as Friday  Review of Systems   Constitutional: Positive for fever. Negative for chills.   Respiratory: Positive for cough, choking, chest tightness, shortness of breath, wheezing and stridor.    Cardiovascular: Negative.    Gastrointestinal: Negative.    Endocrine: Negative.    Genitourinary: Negative.    Musculoskeletal: Negative.    Psychiatric/Behavioral: Negative.    All other systems reviewed and are negative.    Objective:     Physical Exam   Constitutional: She is oriented to person, place, and time. She appears well-developed and well-nourished.   HENT:   Head: Normocephalic.   Eyes: Conjunctivae and EOM are normal. Pupils are equal, round, and reactive to light.   Neck: Normal range of motion. Neck supple.   Cardiovascular: Normal rate, regular rhythm and normal heart sounds.   Pulmonary/Chest: Breath sounds normal. She is in respiratory distress.   Musculoskeletal: Normal range of motion.   Neurological: She is alert and oriented to person, place, and time. She has normal reflexes.   Skin: Skin is warm and dry.   Psychiatric: She has a normal mood and affect. Her behavior is normal. Judgment and thought content normal.   Nursing note and vitals reviewed.    Assessment:     1. Chronic obstructive pulmonary disease, unspecified COPD type    2. Single episode of elevated blood pressure      Plan:        Medication List           Accurate as of 11/5/18 11:59 PM. If you have any questions, ask your nurse or doctor.               START taking these medications    amoxicillin-clavulanate 875-125mg 875-125 mg per  tablet  Commonly known as:  AUGMENTIN  Take 1 tablet by mouth 2 (two) times daily. for 10 days  Started by:  Marc Sands MD     losartan 50 MG tablet  Commonly known as:  COZAAR  Take 1 tablet (50 mg total) by mouth once daily. For bp  Started by:  Marc Sands MD     methylPREDNISolone 4 mg tablet  Commonly known as:  MEDROL DOSEPACK  use as directed  Started by:  Marc Sands MD     pramipexole 0.25 MG tablet  Commonly known as:  MIRAPEX  Take 1 tablet (0.25 mg total) by mouth every evening. For legs  Started by:  Marc Sands MD        CHANGE how you take these medications    albuterol 2.5 mg /3 mL (0.083 %) nebulizer solution  Commonly known as:  PROVENTIL  What changed:  Another medication with the same name was removed. Continue taking this medication, and follow the directions you see here.  Changed by:  Marc Sands MD     * guaifenesin-codeine 100-10 mg/5 ml  mg/5 mL syrup  Commonly known as:  TUSSI-ORGANIDIN NR  Take 10 mLs by mouth every 4 (four) hours as needed for Cough.  What changed:  Another medication with the same name was added. Make sure you understand how and when to take each.  Changed by:  Marc Sands MD     * guaifenesin-codeine 100-10 mg/5 ml  mg/5 mL syrup  Commonly known as:  TUSSI-ORGANIDIN NR  Take 10 mLs by mouth every 4 (four) hours as needed for Cough.  What changed:  You were already taking a medication with the same name, and this prescription was added. Make sure you understand how and when to take each.  Changed by:  Marc Sands MD         * This list has 2 medication(s) that are the same as other medications prescribed for you. Read the directions carefully, and ask your doctor or other care provider to review them with you.            CONTINUE taking these medications    alendronate 70 MG tablet  Commonly known as:  FOSAMAX  Take 1 tablet (70 mg total) by mouth every 7 days.     CALCIUM 500 + D 500 mg(1,250mg) -200 unit per tablet  Generic  drug:  calcium-vitamin D3     CENTRUM 3,500-18-0.4 unit-mg-mg Chew  Generic drug:  multivit-iron-min-folic acid     ferrous sulfate 324 mg (65 mg iron) Tbec  Take 1 tablet (325 mg total) by mouth once daily.     traZODone 50 MG tablet  Commonly known as:  DESYREL  Take 1 tablet (50 mg total) by mouth every evening.     umeclidinium-vilanterol 62.5-25 mcg/actuation Dsdv  Commonly known as:  ANORO ELLIPTA  Inhale 1 puff into the lungs once daily. For COPD     VITAMIN B-12 500 MCG tablet  Generic drug:  cyanocobalamin        STOP taking these medications    rOPINIRole 2 MG tablet  Commonly known as:  REQUIP  Stopped by:  Marc Sands MD     rOPINIRole 3 MG tablet  Commonly known as:  REQUIP  Stopped by:  Marc Sands MD           Where to Get Your Medications      These medications were sent to [id number] - [place], [place]  [address] [address]    Phone:  [phone]   · amoxicillin-clavulanate 875-125mg 875-125 mg per tablet  · guaifenesin-codeine 100-10 mg/5 ml  mg/5 mL syrup  · losartan 50 MG tablet  · methylPREDNISolone 4 mg tablet  · pramipexole 0.25 MG tablet       Chronic obstructive pulmonary disease, unspecified COPD type  -     X-Ray Chest PA And Lateral; Future; Expected date: 11/05/2018    Single episode of elevated blood pressure    Other orders  -     methylPREDNISolone (MEDROL DOSEPACK) 4 mg tablet; use as directed  Dispense: 1 Package; Refill: 0  -     amoxicillin-clavulanate 875-125mg (AUGMENTIN) 875-125 mg per tablet; Take 1 tablet by mouth 2 (two) times daily. for 10 days  Dispense: 20 tablet; Refill: 0  -     guaifenesin-codeine 100-10 mg/5 ml (TUSSI-ORGANIDIN NR)  mg/5 mL syrup; Take 10 mLs by mouth every 4 (four) hours as needed for Cough.  Dispense: 240 mL; Refill: 0  -     pramipexole (MIRAPEX) 0.25 MG tablet; Take 1 tablet (0.25 mg total) by mouth every evening. For legs  Dispense: 30 tablet; Refill: 5  -     losartan  (COZAAR) 50 MG tablet; Take 1 tablet (50 mg total) by mouth once daily. For bp  Dispense: 30 tablet; Refill: 5    requip no more help for restless legs; has been tiaking 3 mg;    Will swithc 2 mirapex 0.25 hs    Get labs done ordered in August;    Blood opressure up    Losartan 50 mg one adya

## 2018-11-06 NOTE — TELEPHONE ENCOUNTER
I left a detailed message on pt's VM to return our call if she did not improve so that we can get her an apt.

## 2018-11-07 ENCOUNTER — CLINICAL SUPPORT (OUTPATIENT)
Dept: FAMILY MEDICINE | Facility: CLINIC | Age: 78
End: 2018-11-07
Payer: MEDICARE

## 2018-11-07 ENCOUNTER — TELEPHONE (OUTPATIENT)
Dept: FAMILY MEDICINE | Facility: CLINIC | Age: 78
End: 2018-11-07

## 2018-11-07 VITALS — HEART RATE: 93 BPM | DIASTOLIC BLOOD PRESSURE: 60 MMHG | SYSTOLIC BLOOD PRESSURE: 136 MMHG

## 2018-11-07 DIAGNOSIS — Z86.79 HISTORY OF HYPERTENSION: Primary | ICD-10-CM

## 2018-11-07 NOTE — PROGRESS NOTES
Shirley F Gilford 78 y.o. female is here today for Blood Pressure check.   History of HTN yes.    Review of patient's allergies indicates:   Allergen Reactions    Gabapentin Nausea And Vomiting     Creatinine   Date Value Ref Range Status   11/05/2018 0.72 0.50 - 1.40 mg/dL Final     Sodium   Date Value Ref Range Status   11/05/2018 143 136 - 145 mmol/L Final     Potassium   Date Value Ref Range Status   11/05/2018 3.5 3.5 - 5.1 mmol/L Final   ]  Patient verifies taking blood pressure medications on a regular basis at the same time of the day.     Current Outpatient Medications:     albuterol (PROVENTIL) 2.5 mg /3 mL (0.083 %) nebulizer solution, albuterol sulfate 2.5 mg/3 mL (0.083 %) solution for nebulization, Disp: , Rfl:     alendronate (FOSAMAX) 70 MG tablet, Take 1 tablet (70 mg total) by mouth every 7 days., Disp: 13 tablet, Rfl: 3    amoxicillin-clavulanate 875-125mg (AUGMENTIN) 875-125 mg per tablet, Take 1 tablet by mouth 2 (two) times daily. for 10 days, Disp: 20 tablet, Rfl: 0    calcium-vitamin D3 (CALCIUM 500 + D) 500 mg(1,250mg) -200 unit per tablet, Take 1 tablet by mouth once daily., Disp: , Rfl:     cyanocobalamin (VITAMIN B-12) 500 MCG tablet, Take 500 mcg by mouth once daily., Disp: , Rfl:     ferrous sulfate 324 mg (65 mg iron) TbEC, Take 1 tablet (325 mg total) by mouth once daily., Disp: , Rfl: 0    guaifenesin-codeine 100-10 mg/5 ml (TUSSI-ORGANIDIN NR)  mg/5 mL syrup, Take 10 mLs by mouth every 4 (four) hours as needed for Cough., Disp: 240 mL, Rfl: 0    guaifenesin-codeine 100-10 mg/5 ml (TUSSI-ORGANIDIN NR)  mg/5 mL syrup, Take 10 mLs by mouth every 4 (four) hours as needed for Cough., Disp: 240 mL, Rfl: 0    losartan (COZAAR) 50 MG tablet, Take 1 tablet (50 mg total) by mouth once daily. For bp, Disp: 30 tablet, Rfl: 5    methylPREDNISolone (MEDROL DOSEPACK) 4 mg tablet, use as directed, Disp: 1 Package, Rfl: 0    MULTIVIT-IRON-MIN-FOLIC ACID 3,500-18-0.4  UNIT-MG-MG ORAL CHEW, Take by mouth., Disp: , Rfl:     pramipexole (MIRAPEX) 0.25 MG tablet, Take 1 tablet (0.25 mg total) by mouth every evening. For legs, Disp: 30 tablet, Rfl: 5    traZODone (DESYREL) 50 MG tablet, Take 1 tablet (50 mg total) by mouth every evening., Disp: 30 tablet, Rfl: 5    umeclidinium-vilanterol 62.5-25 mcg/actuation DsDv, Inhale 1 puff into the lungs once daily. For COPD, Disp: 1 each, Rfl: 11  Does patient have record of home blood pressure readings no.   Last dose of blood pressure medication was taken at 6am.  Patient is asymptomatic.   Complains of nothing at this time.    BP: 136/60 , Pulse: 93 .      Dr. Sands notified.

## 2018-11-07 NOTE — TELEPHONE ENCOUNTER
Pt came in for BP check pts BP on today was 136/60 P 93 last BP medication taken at 6am she would like her lab results and chest x-ray reviewed

## 2018-11-10 ENCOUNTER — TELEPHONE (OUTPATIENT)
Dept: FAMILY MEDICINE | Facility: CLINIC | Age: 78
End: 2018-11-10

## 2018-11-10 DIAGNOSIS — E61.1 IRON DEFICIENCY: Primary | ICD-10-CM

## 2018-11-10 DIAGNOSIS — R73.9 HYPERGLYCEMIA: ICD-10-CM

## 2018-11-10 DIAGNOSIS — E78.5 HYPERLIPIDEMIA, UNSPECIFIED HYPERLIPIDEMIA TYPE: ICD-10-CM

## 2018-11-10 DIAGNOSIS — E55.9 VITAMIN D DEFICIENCY: ICD-10-CM

## 2018-11-10 RX ORDER — ATORVASTATIN CALCIUM 10 MG/1
10 TABLET, FILM COATED ORAL DAILY
Qty: 90 TABLET | Refills: 1 | Status: SHIPPED | OUTPATIENT
Start: 2018-11-10 | End: 2018-11-26

## 2018-11-10 RX ORDER — METFORMIN HYDROCHLORIDE 500 MG/1
500 TABLET ORAL
Qty: 90 TABLET | Refills: 1 | Status: SHIPPED | OUTPATIENT
Start: 2018-11-10 | End: 2018-11-26

## 2018-11-10 RX ORDER — ACETAMINOPHEN 500 MG
5000 TABLET ORAL DAILY
Qty: 90 TABLET | Refills: 3 | Status: SHIPPED | OUTPATIENT
Start: 2018-11-10 | End: 2023-07-17

## 2018-11-10 RX ORDER — ACETAMINOPHEN 500 MG
5000 TABLET ORAL DAILY
Qty: 90 TABLET | Refills: 3 | COMMUNITY
Start: 2018-11-10 | End: 2018-11-10

## 2018-11-12 ENCOUNTER — TELEPHONE (OUTPATIENT)
Dept: FAMILY MEDICINE | Facility: CLINIC | Age: 78
End: 2018-11-12

## 2018-11-12 NOTE — TELEPHONE ENCOUNTER
Left message advising patient to contact office to discuss lab results and recommendations           ----- Message from Marc Sands MD sent at 11/10/2018  6:43 AM CST -----  Lots going on.  You have a mild case of diabetes.  Need to avoid sugar and start metformin 500 mg every AM.  Repeat A1C 3 months.  Vit D is low; start 5,000 unit Vit D daily; cholesterol is high, starting atorvastatin 10 mg nightly; iron is low again still; was low in the past, then normal, now low again.  Any evidence of bleeding?    Medicines have been sent; RTC next month.

## 2018-11-13 NOTE — TELEPHONE ENCOUNTER
Pt has been notified and verbalized understanding of lab results.     Pt would like to know if she will/should be able to control her diabetes with diet.    Pt stated that she has noticed very dark stools.     Follow up appt has been scheduled.    She would also like to know if it's normal that as the day progresses, she feels more SOB (per her visit on 11/5/18)

## 2018-11-16 ENCOUNTER — OFFICE VISIT (OUTPATIENT)
Dept: FAMILY MEDICINE | Facility: CLINIC | Age: 78
End: 2018-11-16
Payer: MEDICARE

## 2018-11-16 ENCOUNTER — LAB VISIT (OUTPATIENT)
Dept: LAB | Facility: HOSPITAL | Age: 78
End: 2018-11-16
Attending: FAMILY MEDICINE
Payer: MEDICARE

## 2018-11-16 VITALS
SYSTOLIC BLOOD PRESSURE: 130 MMHG | BODY MASS INDEX: 38.64 KG/M2 | WEIGHT: 210 LBS | HEIGHT: 62 IN | HEART RATE: 86 BPM | TEMPERATURE: 98 F | DIASTOLIC BLOOD PRESSURE: 72 MMHG | OXYGEN SATURATION: 93 %

## 2018-11-16 DIAGNOSIS — J44.9 CHRONIC OBSTRUCTIVE PULMONARY DISEASE, UNSPECIFIED COPD TYPE: ICD-10-CM

## 2018-11-16 DIAGNOSIS — R06.02 SOB (SHORTNESS OF BREATH): ICD-10-CM

## 2018-11-16 DIAGNOSIS — R06.02 SOB (SHORTNESS OF BREATH): Primary | ICD-10-CM

## 2018-11-16 DIAGNOSIS — Z87.891 EX-SMOKER: ICD-10-CM

## 2018-11-16 LAB
D DIMER PPP FEU-MCNC: 235 NG/ML
NT-PROBNP: 168 PG/ML

## 2018-11-16 PROCEDURE — 1101F PT FALLS ASSESS-DOCD LE1/YR: CPT | Mod: CPTII,S$GLB,, | Performed by: FAMILY MEDICINE

## 2018-11-16 PROCEDURE — 83880 ASSAY OF NATRIURETIC PEPTIDE: CPT | Mod: PO

## 2018-11-16 PROCEDURE — 99214 OFFICE O/P EST MOD 30 MIN: CPT | Mod: S$GLB,,, | Performed by: FAMILY MEDICINE

## 2018-11-16 PROCEDURE — 36415 COLL VENOUS BLD VENIPUNCTURE: CPT | Mod: PO

## 2018-11-16 PROCEDURE — 85379 FIBRIN DEGRADATION QUANT: CPT | Mod: PO

## 2018-11-16 RX ORDER — ASPIRIN 81 MG/1
81 TABLET ORAL DAILY
Qty: 100 TABLET | Refills: 12 | Status: SHIPPED | OUTPATIENT
Start: 2018-11-16 | End: 2020-10-23

## 2018-11-16 RX ORDER — ISOSORBIDE MONONITRATE 30 MG/1
30 TABLET, EXTENDED RELEASE ORAL DAILY
Qty: 30 TABLET | Refills: 11 | Status: SHIPPED | OUTPATIENT
Start: 2018-11-16 | End: 2018-11-26

## 2018-11-16 NOTE — PROGRESS NOTES
Subjective:      Patient ID: Shirley F Gilford is a 78 y.o. female.    Chief Complaint: Shortness of Breath and Fatigue      HPI   Progressive sob with minimal exertion for about one year; pt thinks it is not part of the sickness the other day, except made it worse.  Also, weak and listless. Heart pounded hard and fast last week; happened once; no more fever; still coughing; looser, more productive;     DAD had CHF and Mom with DM;    No feet swelling    Still can't lay on back to sleep; lays on sideor stomach;      Review of Systems   Constitutional: Negative.    HENT: Negative.    Respiratory: Positive for shortness of breath. Negative for chest tightness.    Cardiovascular: Positive for palpitations. Negative for chest pain and leg swelling.   Gastrointestinal: Negative.    Endocrine: Negative.    Genitourinary: Negative.    Musculoskeletal: Negative.    Psychiatric/Behavioral: Negative.    All other systems reviewed and are negative.    Objective:     Physical Exam   Constitutional: She is oriented to person, place, and time. She appears well-developed and well-nourished.   HENT:   Head: Normocephalic.   Eyes: Conjunctivae and EOM are normal. Pupils are equal, round, and reactive to light.   Neck: Normal range of motion. Neck supple.   Cardiovascular: Normal rate, regular rhythm and normal heart sounds.   Pulmonary/Chest: Effort normal and breath sounds normal.   Musculoskeletal: Normal range of motion.   Neurological: She is alert and oriented to person, place, and time. She has normal reflexes.   Skin: Skin is warm and dry.   Psychiatric: She has a normal mood and affect. Her behavior is normal. Judgment and thought content normal.   Nursing note and vitals reviewed.    Assessment:     1. SOB (shortness of breath)    2. Ex-smoker    3. Chronic obstructive pulmonary disease, unspecified COPD type      Plan:        Medication List           Accurate as of 11/16/18  3:46 PM. If you have any questions, ask your  nurse or doctor.               START taking these medications    aspirin 81 MG EC tablet  Commonly known as:  ECOTRIN  Take 1 tablet (81 mg total) by mouth once daily.  Started by:  Marc Sands MD     isosorbide mononitrate 30 MG 24 hr tablet  Commonly known as:  IMDUR  Take 1 tablet (30 mg total) by mouth once daily. For heart  Started by:  Marc Sands MD        CONTINUE taking these medications    albuterol 2.5 mg /3 mL (0.083 %) nebulizer solution  Commonly known as:  PROVENTIL     alendronate 70 MG tablet  Commonly known as:  FOSAMAX  Take 1 tablet (70 mg total) by mouth every 7 days.     atorvastatin 10 MG tablet  Commonly known as:  LIPITOR  Take 1 tablet (10 mg total) by mouth once daily.     CALCIUM 500 + D 500 mg(1,250mg) -200 unit per tablet  Generic drug:  calcium-vitamin D3     CENTRUM 3,500-18-0.4 unit-mg-mg Chew  Generic drug:  multivit-iron-min-folic acid     cholecalciferol (vitamin D3) 5,000 unit Tab  Commonly known as:  VITAMIN D3  Take 5,000 Units by mouth once daily.     ferrous sulfate 324 mg (65 mg iron) Tbec  Take 1 tablet (325 mg total) by mouth once daily.     losartan 50 MG tablet  Commonly known as:  COZAAR  Take 1 tablet (50 mg total) by mouth once daily. For bp     metFORMIN 500 MG tablet  Commonly known as:  GLUCOPHAGE  Take 1 tablet (500 mg total) by mouth daily with breakfast.     methylPREDNISolone 4 mg tablet  Commonly known as:  MEDROL DOSEPACK  use as directed     pramipexole 0.25 MG tablet  Commonly known as:  MIRAPEX  Take 1 tablet (0.25 mg total) by mouth every evening. For legs     traZODone 50 MG tablet  Commonly known as:  DESYREL  Take 1 tablet (50 mg total) by mouth every evening.     umeclidinium-vilanterol 62.5-25 mcg/actuation Dsdv  Commonly known as:  ANORO ELLIPTA  Inhale 1 puff into the lungs once daily. For COPD     VITAMIN B-12 500 MCG tablet  Generic drug:  cyanocobalamin           Where to Get Your Medications      These medications were sent to [id number] [place] - [address] [facility]  [address] [place], [place] [address] [address]    Phone:  [phone]   · aspirin 81 MG EC tablet  · isosorbide mononitrate 30 MG 24 hr tablet       SOB (shortness of breath)  -     Brain natriuretic peptide; Future; Expected date: 11/16/2018  -     Complete PFT without bronchodilator; Future  -     2D echo with color flow doppler; Future  -     Ambulatory referral to Cardiology  -     D dimer, quantitative; Future; Expected date: 11/16/2018    Ex-smoker  -     Brain natriuretic peptide; Future; Expected date: 11/16/2018  -     Complete PFT without bronchodilator; Future  -     2D echo with color flow doppler; Future  -     Ambulatory referral to Cardiology  -     D dimer, quantitative; Future; Expected date: 11/16/2018    Chronic obstructive pulmonary disease, unspecified COPD type  -     Brain natriuretic peptide; Future; Expected date: 11/16/2018  -     Complete PFT without bronchodilator; Future  -     2D echo with color flow doppler; Future  -     Ambulatory referral to Cardiology  -     D dimer, quantitative; Future; Expected date: 11/16/2018    Other orders  -     aspirin (ECOTRIN) 81 MG EC tablet; Take 1 tablet (81 mg total) by mouth once daily.  Dispense: 100 tablet; Refill: 12  -     isosorbide mononitrate (IMDUR) 30 MG 24 hr tablet; Take 1 tablet (30 mg total) by mouth once daily. For heart  Dispense: 30 tablet; Refill: 11    r/o PE, COPD is known, r/o chf, CAD

## 2018-11-16 NOTE — PATIENT INSTRUCTIONS
Dr. Sands's Diet Instructions:    NO!!!  Rice  Potatoes  Corn Grits  Bread   Pasta  Juice  Milk  Sugar   Sweets   Cold Drinks   alcohol      YES!!!  Meat-Beef, Pork  Seafood-Shrimp  Sausage, [place]lellan   Green/Yellow Vegetables  Nuts  Cheese  Eggs  beans

## 2018-11-18 ENCOUNTER — TELEPHONE (OUTPATIENT)
Dept: FAMILY MEDICINE | Facility: CLINIC | Age: 78
End: 2018-11-18

## 2018-11-18 DIAGNOSIS — R06.00 DYSPNEA, UNSPECIFIED TYPE: ICD-10-CM

## 2018-11-18 DIAGNOSIS — R06.02 SOB (SHORTNESS OF BREATH): Primary | ICD-10-CM

## 2018-11-20 ENCOUNTER — OFFICE VISIT (OUTPATIENT)
Dept: CARDIOLOGY | Facility: CLINIC | Age: 78
End: 2018-11-20
Payer: MEDICARE

## 2018-11-20 ENCOUNTER — TELEPHONE (OUTPATIENT)
Dept: FAMILY MEDICINE | Facility: CLINIC | Age: 78
End: 2018-11-20

## 2018-11-20 VITALS
OXYGEN SATURATION: 91 % | HEART RATE: 78 BPM | BODY MASS INDEX: 40.44 KG/M2 | SYSTOLIC BLOOD PRESSURE: 139 MMHG | HEIGHT: 60 IN | DIASTOLIC BLOOD PRESSURE: 79 MMHG | WEIGHT: 206 LBS

## 2018-11-20 DIAGNOSIS — J44.9 CHRONIC OBSTRUCTIVE PULMONARY DISEASE, UNSPECIFIED COPD TYPE: ICD-10-CM

## 2018-11-20 DIAGNOSIS — R06.02 SOB (SHORTNESS OF BREATH): Primary | ICD-10-CM

## 2018-11-20 DIAGNOSIS — Z86.79 HISTORY OF HYPERTENSION: ICD-10-CM

## 2018-11-20 DIAGNOSIS — Z87.891 EX-SMOKER: ICD-10-CM

## 2018-11-20 PROCEDURE — 99999 PR PBB SHADOW E&M-EST. PATIENT-LVL III: CPT | Mod: PBBFAC,,, | Performed by: STUDENT IN AN ORGANIZED HEALTH CARE EDUCATION/TRAINING PROGRAM

## 2018-11-20 PROCEDURE — 1101F PT FALLS ASSESS-DOCD LE1/YR: CPT | Mod: CPTII,S$GLB,, | Performed by: STUDENT IN AN ORGANIZED HEALTH CARE EDUCATION/TRAINING PROGRAM

## 2018-11-20 PROCEDURE — 99204 OFFICE O/P NEW MOD 45 MIN: CPT | Mod: S$GLB,,, | Performed by: STUDENT IN AN ORGANIZED HEALTH CARE EDUCATION/TRAINING PROGRAM

## 2018-11-20 NOTE — TELEPHONE ENCOUNTER
FYI: Pt has been notified and verbalized understanding of lab results. Pt stated that she's unable to drive to [place] or any other establishment to have CTA done (CTAs can not be done in [facility]). Pt stated that she's scheduled to see cardiology today, and will discuss lab with cardiologist.

## 2018-11-20 NOTE — TELEPHONE ENCOUNTER
----- Message from Marc Sands MD sent at 11/18/2018  8:35 PM CST -----  D dimer slightly high; needs CTA to rule out PE; ordered; call pt

## 2018-11-20 NOTE — LETTER
November 20, 2018      Marc Sands MD  [address] [facility] [address]           [facility] - Cardiology  [address], [address]  [facility] [address]  Phone: [phone]  Fax: [phone]          Patient: Shirley F Gilford   MR Number: [id number]   YOB: 1940   Date of Visit: 11/20/2018       Dear Dr. Marc Sands:    Thank you for referring Shirley Gilford to me for evaluation. Attached you will find relevant portions of my assessment and plan of care.    If you have questions, please do not hesitate to call me. I look forward to following Shirley Gilford along with you.    Sincerely,    Kenton Chester MD    Enclosure  CC:  No Recipients    If you would like to receive this communication electronically, please contact [email or web address] or [phone] to request more information on [employer] Link access.    For providers and/or their staff who would like to refer a patient to [facility], please contact us through our one-stop-shop provider referral line, [facility], at [phone].    If you feel you have received this communication in error or would no longer like to receive these types of communications, please e-mail [email or web address]

## 2018-11-20 NOTE — PROGRESS NOTES
Cardiology Clinic note    Subjective:   Patient ID:  Shirley F Gilford is a 78 y.o. female who presents for evaluation of shortness of breath.    HPI:   Shirley F Gilford  has a past medical history of Breast cyst, Eye disorder, Hyperlipidemia, Hypertension, Osteoporosis, and RLS (restless legs syndrome).  She also has COPD by history and notes she had PFTs done in the past.  She is referred by Dr. Sands for evaluation of shortness of breath at rest. Pt notes she has has progressive SOB over the past month that is worse from her baseline. She notes some ALMONTE with normal ADOL. She is compliant with home medications.  Pt was a 1-2PPD smoker from age [age]yo. Quit for the past 4 years. No prior cardiac hx. No past hx of stress testing.    Vitals  Vitals:    11/20/18 1336   BP: 139/79   Pulse: 78   SpO2: (!) 91%   Weight: 93.4 kg (206 lb)   Height: 5' (1.524 m)       Patient Active Problem List    Diagnosis Date Noted    Hyperlipidemia 11/10/2018    Vitamin D deficiency 11/10/2018    Iron deficiency 06/29/2017    SOB (shortness of breath) 05/29/2017    Fatty liver 03/21/2017    History of hyperlipidemia 01/26/2017    Hyperglycemia 01/26/2017    RLS (restless legs syndrome) 01/26/2017    Elevated LFTs 01/26/2017    Chronic obstructive pulmonary disease 01/26/2017    Ex-smoker 01/26/2017    Osteoporosis 01/04/2017    History of hypertension 01/04/2017          Medication List           Accurate as of 11/20/18  3:33 PM. If you have any questions, ask your nurse or doctor.               CONTINUE taking these medications    albuterol 2.5 mg /3 mL (0.083 %) nebulizer solution  Commonly known as:  PROVENTIL     alendronate 70 MG tablet  Commonly known as:  FOSAMAX  Take 1 tablet (70 mg total) by mouth every 7 days.     aspirin 81 MG EC tablet  Commonly known as:  ECOTRIN  Take 1 tablet (81 mg total) by mouth once daily.     atorvastatin 10 MG tablet  Commonly known as:  LIPITOR  Take 1 tablet (10 mg total) by  mouth once daily.     CALCIUM 500 + D 500 mg(1,250mg) -200 unit per tablet  Generic drug:  calcium-vitamin D3     CENTRUM 3,500-18-0.4 unit-mg-mg Chew  Generic drug:  multivit-iron-min-folic acid     cholecalciferol (vitamin D3) 5,000 unit Tab  Commonly known as:  VITAMIN D3  Take 5,000 Units by mouth once daily.     ferrous sulfate 324 mg (65 mg iron) Tbec  Take 1 tablet (325 mg total) by mouth once daily.     isosorbide mononitrate 30 MG 24 hr tablet  Commonly known as:  IMDUR  Take 1 tablet (30 mg total) by mouth once daily. For heart     losartan 50 MG tablet  Commonly known as:  COZAAR  Take 1 tablet (50 mg total) by mouth once daily. For bp     metFORMIN 500 MG tablet  Commonly known as:  GLUCOPHAGE  Take 1 tablet (500 mg total) by mouth daily with breakfast.     methylPREDNISolone 4 mg tablet  Commonly known as:  MEDROL DOSEPACK  use as directed     pramipexole 0.25 MG tablet  Commonly known as:  MIRAPEX  Take 1 tablet (0.25 mg total) by mouth every evening. For legs     traZODone 50 MG tablet  Commonly known as:  DESYREL  Take 1 tablet (50 mg total) by mouth every evening.     umeclidinium-vilanterol 62.5-25 mcg/actuation Dsdv  Commonly known as:  ANORO ELLIPTA  Inhale 1 puff into the lungs once daily. For COPD     VITAMIN B-12 500 MCG tablet  Generic drug:  cyanocobalamin              Review of Systems   Constitution: Positive for malaise/fatigue. Negative for chills, decreased appetite, weakness and weight gain.   HENT: Negative for congestion and ear discharge.    Eyes: Negative for blurred vision and double vision.   Cardiovascular: Positive for dyspnea on exertion. Negative for chest pain, cyanosis, irregular heartbeat, leg swelling, near-syncope, orthopnea, palpitations and syncope.   Respiratory: Positive for shortness of breath. Negative for cough and sleep disturbances due to breathing.    Skin: Negative for color change and dry skin.   Musculoskeletal: Negative for joint pain, joint swelling and  muscle cramps.   Gastrointestinal: Negative for bloating, heartburn, hematemesis and hematochezia.   Genitourinary: Negative for bladder incontinence and dysuria.   Neurological: Negative for aphonia, excessive daytime sleepiness, dizziness, focal weakness, headaches, light-headedness and loss of balance.   Psychiatric/Behavioral: Negative for altered mental status, depression and memory loss. The patient does not have insomnia and is not nervous/anxious.          Objective:   Physical Exam   Constitutional: She is oriented to person, place, and time. She appears well-developed and well-nourished.   HENT:   Head: Normocephalic and atraumatic.   Eyes: Conjunctivae and EOM are normal.   Neck: Normal range of motion. Neck supple. No JVD present.   Cardiovascular: Normal rate, regular rhythm and normal heart sounds.   No murmur heard.  Pulmonary/Chest: Effort normal and breath sounds normal. No respiratory distress. She has no wheezes. She has no rales.   Abdominal: Soft. Bowel sounds are normal. She exhibits no distension.   Musculoskeletal: Normal range of motion. She exhibits no edema.   Neurological: She is alert and oriented to person, place, and time.   Skin: Skin is warm and dry. No erythema.   Psychiatric: She has a normal mood and affect. Her behavior is normal. Judgment and thought content normal.   Nursing note and vitals reviewed.      Lab Results    Lab Results   Component Value Date     11/05/2018    K 3.5 11/05/2018     11/05/2018    CO2 27 11/05/2018    BUN 13 11/05/2018    CREATININE 0.72 11/05/2018     (H) 11/05/2018    HGBA1C 6.0 (H) 11/05/2018    AST 42 11/05/2018    ALT 37 11/05/2018    ALBUMIN 4.5 11/05/2018    PROT 7.8 11/05/2018    BILITOT 0.3 11/05/2018    WBC 13.11 (H) 11/05/2018    HGB 12.0 11/05/2018    HCT 41.1 11/05/2018    MCV 82 11/05/2018     11/05/2018    TSH 2.660 11/05/2018    CHOL 228 (H) 11/05/2018    HDL 44 11/05/2018    LDLCALC 148.4 11/05/2018    TRIG  178 (H) 11/05/2018    BNP 85 11/03/2017       Lipid panel  Lab Results   Component Value Date    CHOL 228 (H) 11/05/2018     Lab Results   Component Value Date    HDL 44 11/05/2018     Lab Results   Component Value Date    LDLCALC 148.4 11/05/2018     Lab Results   Component Value Date    TRIG 178 (H) 11/05/2018       Cardiac Studies  Significant Imaging: EKG: NSR. reviewed myself      Assessment:     1. SOB (shortness of breath)    2. Chronic obstructive pulmonary disease, unspecified COPD type    3. History of hypertension    4. Ex-smoker        Plan:     1, SOB  - multifactorial at this time. Hx of COPD, sub-acute bronchitis, low iron level and unknown cardiac function  - reviewed labs: not anemic, BNP is WNL  - based on risk factors of age and heavy smoking hx, SOB and sudden change in function capacity could be angina equivalent   - - will wait till echo is done this Friday to assess. If echo is WNL, will order cardiac  stress nuclear testing. If echo is abn for pulm HTN or LVEF depression with treat those process first before ischemic eval.    2. HTN  - controlled  - c/w home medications    3. Ex-smoker  - - spent 3-10 minutes discussing the risk and benefits of smoking cessation. Patient has quit at this time.  - Ready to quit: yes  Counseling given: Yes      Continue with current medical plan and lifestyle changes.  Return sooner for concerns or questions. If symptoms persist go to the ED    No orders of the defined types were placed in this encounter.      Follow up as scheduled. Return to clinic in 2 weeks to review echo and consider stress or not.   She expressed verbal understanding and agreed with the plan    Thank you for the opportunity to care for this patient. Will be available for questions if needed.     Kenton Chester MD FAC  Interventional Cardiology  [facility]  Pager: [phone]

## 2018-11-23 ENCOUNTER — HOSPITAL ENCOUNTER (OUTPATIENT)
Dept: CARDIOLOGY | Facility: HOSPITAL | Age: 78
Discharge: HOME | End: 2018-11-23
Attending: FAMILY MEDICINE
Payer: MEDICARE

## 2018-11-23 ENCOUNTER — HOSPITAL ENCOUNTER (OUTPATIENT)
Dept: RADIOLOGY | Facility: HOSPITAL | Age: 78
Discharge: HOME | End: 2018-11-23
Attending: FAMILY MEDICINE
Payer: MEDICARE

## 2018-11-23 DIAGNOSIS — R06.02 SOB (SHORTNESS OF BREATH): ICD-10-CM

## 2018-11-23 DIAGNOSIS — Z87.891 EX-SMOKER: ICD-10-CM

## 2018-11-23 DIAGNOSIS — J44.9 CHRONIC OBSTRUCTIVE PULMONARY DISEASE, UNSPECIFIED COPD TYPE: ICD-10-CM

## 2018-11-23 DIAGNOSIS — R06.00 DYSPNEA, UNSPECIFIED TYPE: ICD-10-CM

## 2018-11-23 LAB
AORTIC ROOT ANNULUS: 2.91 CM
AORTIC VALVE CUSP SEPERATION: 1.77 CM
AV INDEX (PROSTH): 0.82
AV MEAN GRADIENT: 7.54 MMHG
AV PEAK GRADIENT: 13.25 MMHG
AV VALVE AREA: 3.18 CM2
CV ECHO LV RWT: 0.48 CM
DOP CALC AO PEAK VEL: 1.82 M/S
DOP CALC AO VTI: 34.55 CM
DOP CALC LVOT AREA: 3.87 CM2
DOP CALC LVOT DIAMETER: 2.22 CM
DOP CALC LVOT STROKE VOLUME: 109.84 CM3
DOP CALCLVOT PEAK VEL VTI: 28.39 CM
E WAVE DECELERATION TIME: 209.59 MSEC
E/A RATIO: 0.93
ECHO LV POSTERIOR WALL: 0.95 CM (ref 0.6–1.1)
FRACTIONAL SHORTENING: 47 % (ref 28–44)
INTERVENTRICULAR SEPTUM: 1.19 CM (ref 0.6–1.1)
IVRT: 0.07 MSEC
LA MAJOR: 6.06 CM
LA MINOR: 5.64 CM
LEFT ATRIUM SIZE: 4.54 CM
LEFT INTERNAL DIMENSION IN SYSTOLE: 2.11 CM (ref 2.1–4)
LEFT VENTRICLE DIASTOLIC VOLUME: 68.88 ML
LEFT VENTRICLE SYSTOLIC VOLUME: 14.55 ML
LEFT VENTRICULAR INTERNAL DIMENSION IN DIASTOLE: 3.97 CM (ref 3.5–6)
LEFT VENTRICULAR MASS: 138.32 G
LV LATERAL E/E' RATIO: 20.14
MV PEAK A VEL: 1.51 M/S
MV PEAK E VEL: 1.41 M/S
PISA TR MAX VEL: 3.52 M/S
PULM VEIN S/D RATIO: 1.98
PV PEAK D VEL: 0.46 M/S
PV PEAK S VEL: 0.91 M/S
PV PEAK VELOCITY: 1.36 CM/S
RA MAJOR: 5.09 CM
RA PRESSURE: 3 MMHG
RIGHT VENTRICULAR END-DIASTOLIC DIMENSION: 3.33 CM
TDI LATERAL: 0.07
TR MAX PG: 49.56 MMHG
TV REST PULMONARY ARTERY PRESSURE: 52.56 MMHG

## 2018-11-23 PROCEDURE — 25500020 PHARM REV CODE 255: Mod: PO | Performed by: FAMILY MEDICINE

## 2018-11-23 PROCEDURE — 93306 TTE W/DOPPLER COMPLETE: CPT | Mod: PO

## 2018-11-23 PROCEDURE — 93306 TTE W/DOPPLER COMPLETE: CPT | Mod: 26,,, | Performed by: INTERNAL MEDICINE

## 2018-11-23 PROCEDURE — 71275 CT ANGIOGRAPHY CHEST: CPT | Mod: TC,PO

## 2018-11-23 RX ADMIN — IOHEXOL 100 ML: 350 INJECTION, SOLUTION INTRAVENOUS at 08:11

## 2018-11-25 ENCOUNTER — TELEPHONE (OUTPATIENT)
Dept: FAMILY MEDICINE | Facility: CLINIC | Age: 78
End: 2018-11-25

## 2018-11-25 DIAGNOSIS — R91.8 OTHER NONSPECIFIC ABNORMAL FINDING OF LUNG FIELD: ICD-10-CM

## 2018-11-25 DIAGNOSIS — I27.20 PULMONARY HYPERTENSION: Primary | ICD-10-CM

## 2018-11-25 DIAGNOSIS — R93.89 ABNORMAL CT SCAN, CHEST: Primary | ICD-10-CM

## 2018-11-26 ENCOUNTER — TELEPHONE (OUTPATIENT)
Dept: FAMILY MEDICINE | Facility: CLINIC | Age: 78
End: 2018-11-26

## 2018-11-26 NOTE — PROGRESS NOTES
Patient, Shirley F Gilford (MRN #[id number]), presented with a recorded BMI of 40.23 kg/m^2 consistent with the definition of morbid obesity (ICD-10 E66.01). The patient's morbid obesity was monitored, evaluated, addressed and/or treated. This addendum to the medical record is made on 11/26/2018.

## 2018-11-26 NOTE — TELEPHONE ENCOUNTER
Allergic to something; stopped everything; better;  Atorvastatin, metformin and imdur    Will try metfomrin when swelling resolved and then has appt wioth me    I spoke to pt

## 2018-11-26 NOTE — TELEPHONE ENCOUNTER
Patient states she had to cancel appointment with eye doctor today because her eyes were so swollen. She thinks that it's a allergic reaction from the dye from the CT scan or the new medications Metformin or Atorvastatin           ----- Message from Dennise Nolan sent at 11/26/2018  8:28 AM CST -----  Contact: Self / [phone]  Pt is having a problem with her eyes swollen.  Feels it is allergic reaction to the new medication that she started recently.  She stopped taking the meds.  Eyes swollen, no pain, feels gritty, itchy.  She has an appt this morning at  [facility]'s office but still wanted to speak with someone here in this office.  Please call her at [phone]

## 2018-11-27 ENCOUNTER — TELEPHONE (OUTPATIENT)
Dept: FAMILY MEDICINE | Facility: CLINIC | Age: 78
End: 2018-11-27

## 2018-11-27 NOTE — TELEPHONE ENCOUNTER
----- Message from Juana Hoffman sent at 11/27/2018 10:55 AM CST -----  Contact: [phone]  Patient called in returning your call. Please advise

## 2018-11-27 NOTE — TELEPHONE ENCOUNTER
----- Message from Marc Sands MD sent at 11/25/2018  4:29 PM CST -----  Pulmonary hypertension, or pressure in lungs high; needs to cardiologist; referral placed; call pt

## 2018-11-27 NOTE — TELEPHONE ENCOUNTER
Pt is currently under the care of Dr. Chester. She is scheduled for a follow up visit on 12/4/18. Does pt need to be seen sooner than that?

## 2018-11-27 NOTE — TELEPHONE ENCOUNTER
----- Message from Genesis Johnson sent at 11/27/2018  3:20 PM CST -----  Contact: self [phone]  Patient is returning Taylor's call. Please call and advise.

## 2018-11-29 ENCOUNTER — TELEPHONE (OUTPATIENT)
Dept: FAMILY MEDICINE | Facility: CLINIC | Age: 78
End: 2018-11-29

## 2018-11-29 DIAGNOSIS — Z12.39 BREAST CANCER SCREENING: Primary | ICD-10-CM

## 2018-11-29 NOTE — TELEPHONE ENCOUNTER
Left a message for pt to return our call.     ----- Message from Marc Sands MD sent at 11/25/2018  4:25 PM CST -----  Right middle lung lobe not inflated; needs a CT PET scan; ordered; call pt

## 2018-11-29 NOTE — TELEPHONE ENCOUNTER
----- Message from Dennise Nolan sent at 11/29/2018  4:36 PM CST -----  Contact: Self / [phone]  The pt is requesting orders for a mammo.  She would like this done at the [facility] here in [place].  Please call her when done at [phone]

## 2018-12-04 ENCOUNTER — OFFICE VISIT (OUTPATIENT)
Dept: CARDIOLOGY | Facility: CLINIC | Age: 78
End: 2018-12-04
Payer: MEDICARE

## 2018-12-04 VITALS
HEART RATE: 82 BPM | WEIGHT: 204 LBS | SYSTOLIC BLOOD PRESSURE: 164 MMHG | BODY MASS INDEX: 40.05 KG/M2 | DIASTOLIC BLOOD PRESSURE: 83 MMHG | HEIGHT: 60 IN | OXYGEN SATURATION: 96 %

## 2018-12-04 DIAGNOSIS — Z87.891 EX-SMOKER: ICD-10-CM

## 2018-12-04 DIAGNOSIS — Z86.79 HISTORY OF HYPERTENSION: ICD-10-CM

## 2018-12-04 DIAGNOSIS — J44.9 CHRONIC OBSTRUCTIVE PULMONARY DISEASE, UNSPECIFIED COPD TYPE: Primary | ICD-10-CM

## 2018-12-04 DIAGNOSIS — R06.02 SOB (SHORTNESS OF BREATH): ICD-10-CM

## 2018-12-04 PROCEDURE — 1101F PT FALLS ASSESS-DOCD LE1/YR: CPT | Mod: CPTII,S$GLB,, | Performed by: STUDENT IN AN ORGANIZED HEALTH CARE EDUCATION/TRAINING PROGRAM

## 2018-12-04 PROCEDURE — 99999 PR PBB SHADOW E&M-EST. PATIENT-LVL III: CPT | Mod: PBBFAC,,, | Performed by: STUDENT IN AN ORGANIZED HEALTH CARE EDUCATION/TRAINING PROGRAM

## 2018-12-04 PROCEDURE — 99214 OFFICE O/P EST MOD 30 MIN: CPT | Mod: S$GLB,,, | Performed by: STUDENT IN AN ORGANIZED HEALTH CARE EDUCATION/TRAINING PROGRAM

## 2018-12-04 NOTE — LETTER
December 4, 2018      Marc Sands MD  [address] [facility] [address]           [facility] - Cardiology  [address], [address]  [facility] [address]  Phone: [phone]  Fax: [phone]          Patient: Shirley F Gilford   MR Number: [id number]   YOB: 1940   Date of Visit: 12/4/2018       Dear Dr. Marc Sands:    Thank you for referring Shirley Gilford to me for evaluation. Attached you will find relevant portions of my assessment and plan of care.    If you have questions, please do not hesitate to call me. I look forward to following Shirley Gilford along with you.    Sincerely,    Kenton Chester MD    Enclosure  CC:  No Recipients    If you would like to receive this communication electronically, please contact [email or web address] or [phone] to request more information on [employer] Link access.    For providers and/or their staff who would like to refer a patient to [facility], please contact us through our one-stop-shop provider referral line, [facility], at [phone].    If you feel you have received this communication in error or would no longer like to receive these types of communications, please e-mail [email or web address]

## 2018-12-04 NOTE — PROGRESS NOTES
Cardiology Clinic note    Subjective:   Patient ID:  Shirley F Gilford is a 78 y.o. female who presents for evaluation of shortness of breath.    HPI:   Shirley F Gilford  has a past medical history of Breast cyst, Eye disorder, Hyperlipidemia, Hypertension, Osteoporosis, and RLS (restless legs syndrome).  She also has COPD by history and notes she had PFTs done in the past.  She is referred by Dr. Sands for evaluation of shortness of breath at rest. Pt notes she has has progressive SOB over the past month that is worse from her baseline. She notes some ALMONTE with normal ADOL. She is compliant with home medications.   Pt was a 1-2PPD smoker from age [age]yo. Quit for the past 4 years. No prior cardiac hx. No past hx of stress testing.    At last visit Nov 2018, TTE was ordered. Reviewed the results with the patient. Normal LVEF, mild-mod Pulm Htn PA ~50's  After the CTA on 11/18, pt notes increase SOB, and angioedema of her lips. She then decided to stop all home medications thinking that they were contributing to her symptoms. Pt has been 'feeling great since off medications' More energy. She is willing to resume her home medications one by one.    Vitals  Vitals:    12/04/18 1348   BP: (!) 164/83   Pulse: 82   SpO2: 96%   Weight: 92.5 kg (204 lb)   Height: 5' (1.524 m)       Patient Active Problem List    Diagnosis Date Noted    Hyperlipidemia 11/10/2018    Vitamin D deficiency 11/10/2018    Iron deficiency 06/29/2017    SOB (shortness of breath) 05/29/2017    Fatty liver 03/21/2017    History of hyperlipidemia 01/26/2017    Hyperglycemia 01/26/2017    RLS (restless legs syndrome) 01/26/2017    Elevated LFTs 01/26/2017    Chronic obstructive pulmonary disease 01/26/2017    Ex-smoker 01/26/2017    Osteoporosis 01/04/2017    History of hypertension 01/04/2017          Medication List           Accurate as of 12/4/18  2:45 PM. If you have any questions, ask your nurse or doctor.               CONTINUE  taking these medications    albuterol 2.5 mg /3 mL (0.083 %) nebulizer solution  Commonly known as:  PROVENTIL     alendronate 70 MG tablet  Commonly known as:  FOSAMAX  Take 1 tablet (70 mg total) by mouth every 7 days.     aspirin 81 MG EC tablet  Commonly known as:  ECOTRIN  Take 1 tablet (81 mg total) by mouth once daily.     CALCIUM 500 + D 500 mg(1,250mg) -200 unit per tablet  Generic drug:  calcium-vitamin D3     CENTRUM 3,500-18-0.4 unit-mg-mg Chew  Generic drug:  multivit-iron-min-folic acid     cholecalciferol (vitamin D3) 5,000 unit Tab  Commonly known as:  VITAMIN D3  Take 5,000 Units by mouth once daily.     ferrous sulfate 324 mg (65 mg iron) Tbec  Take 1 tablet (325 mg total) by mouth once daily.     losartan 50 MG tablet  Commonly known as:  COZAAR  Take 1 tablet (50 mg total) by mouth once daily. For bp     pramipexole 0.25 MG tablet  Commonly known as:  MIRAPEX  Take 1 tablet (0.25 mg total) by mouth every evening. For legs     traZODone 50 MG tablet  Commonly known as:  DESYREL  Take 1 tablet (50 mg total) by mouth every evening.     umeclidinium-vilanterol 62.5-25 mcg/actuation Dsdv  Commonly known as:  ANORO ELLIPTA  Inhale 1 puff into the lungs once daily. For COPD     VITAMIN B-12 500 MCG tablet  Generic drug:  cyanocobalamin        STOP taking these medications    methylPREDNISolone 4 mg tablet  Commonly known as:  MEDROL DOSEPACK  Stopped by:  Kenton Chester MD              Review of Systems   Constitution: Positive for malaise/fatigue. Negative for chills, decreased appetite, weakness and weight gain.   HENT: Negative for congestion and ear discharge.    Eyes: Negative for blurred vision and double vision.   Cardiovascular: Positive for dyspnea on exertion. Negative for chest pain, cyanosis, irregular heartbeat, leg swelling, near-syncope, orthopnea, palpitations and syncope.   Respiratory: Positive for shortness of breath. Negative for cough and sleep disturbances due to breathing.     Skin: Negative for color change and dry skin.   Musculoskeletal: Negative for joint pain, joint swelling and muscle cramps.   Gastrointestinal: Negative for bloating, heartburn, hematemesis and hematochezia.   Genitourinary: Negative for bladder incontinence and dysuria.   Neurological: Negative for aphonia, excessive daytime sleepiness, dizziness, focal weakness, headaches, light-headedness and loss of balance.   Psychiatric/Behavioral: Negative for altered mental status, depression and memory loss. The patient does not have insomnia and is not nervous/anxious.          Objective:   Physical Exam   Constitutional: She is oriented to person, place, and time. She appears well-developed and well-nourished.   HENT:   Head: Normocephalic and atraumatic.   Eyes: Conjunctivae and EOM are normal.   Neck: Normal range of motion. Neck supple. No JVD present.   Cardiovascular: Normal rate, regular rhythm and normal heart sounds.   No murmur heard.  Pulmonary/Chest: Effort normal and breath sounds normal. No respiratory distress. She has no wheezes. She has no rales.   Prolong expiration   Abdominal: Soft. Bowel sounds are normal. She exhibits no distension.   Musculoskeletal: Normal range of motion. She exhibits no edema.   Neurological: She is alert and oriented to person, place, and time.   Skin: Skin is warm and dry. No erythema.   Psychiatric: She has a normal mood and affect. Her behavior is normal. Judgment and thought content normal.   Nursing note and vitals reviewed.    Lab Results    Lab Results   Component Value Date     11/05/2018    K 3.5 11/05/2018     11/05/2018    CO2 27 11/05/2018    BUN 13 11/05/2018    CREATININE 0.72 11/05/2018     (H) 11/05/2018    HGBA1C 6.0 (H) 11/05/2018    AST 42 11/05/2018    ALT 37 11/05/2018    ALBUMIN 4.5 11/05/2018    PROT 7.8 11/05/2018    BILITOT 0.3 11/05/2018    WBC 13.11 (H) 11/05/2018    HGB 12.0 11/05/2018    HCT 41.1 11/05/2018    MCV 82 11/05/2018      11/05/2018    TSH 2.660 11/05/2018    CHOL 228 (H) 11/05/2018    HDL 44 11/05/2018    LDLCALC 148.4 11/05/2018    TRIG 178 (H) 11/05/2018    BNP 85 11/03/2017       Lipid panel  Lab Results   Component Value Date    CHOL 228 (H) 11/05/2018     Lab Results   Component Value Date    HDL 44 11/05/2018     Lab Results   Component Value Date    LDLCALC 148.4 11/05/2018     Lab Results   Component Value Date    TRIG 178 (H) 11/05/2018       Cardiac Studies  Significant Imaging: EKG: NSR. reviewed myself    CTA:  1. There is no pulmonary embolus.  2. There is partial collapse of the right middle lobe. There is an ill-defined area of increased density in the inferior medial aspect of the lingula.     Assessment:     1. Chronic obstructive pulmonary disease, unspecified COPD type    2. History of hypertension    3. SOB (shortness of breath)    4. Ex-smoker        Plan:     1, SOB: -illdefine density and pt collapse of middle lobe  - defer to pulm work up  - multifactorial at this time. Hx of COPD, sub-acute bronchitis, low iron level and ? Ill-defined lung density  -  Echo is notable for normal LVEF, nearly normal diastology and elevated PA pressures  - after reviewing the CTA results, would look into primary lung pathology first  - no critical CAD is likely due to normal LVEF, if needed will order lexiscan nuc study but will defer to pulmonology if needed.    2. HTN  - previously controlled  - advised to resume ARB home medications. Pt is in agreement    3. Ex-smoker  - - spent 3-10 minutes discussing the risk and benefits of smoking cessation. Patient has quit at this time.  - Ready to quit: yes  Counseling given: Yes    Continue with current medical plan and lifestyle changes.  Return sooner for concerns or questions. If symptoms persist go to the ED    No orders of the defined types were placed in this encounter.      Follow up as scheduled. Return to clinic in 2 weeks to review echo and consider stress or not.    She expressed verbal understanding and agreed with the plan    Thank you for the opportunity to care for this patient. Will be available for questions if needed.     Kenton Chester MD [facility]  Interventional Cardiology  [facility]  Pager: [phone]

## 2018-12-07 ENCOUNTER — HOSPITAL ENCOUNTER (OUTPATIENT)
Dept: RADIOLOGY | Facility: HOSPITAL | Age: 78
Discharge: HOME | End: 2018-12-07
Attending: FAMILY MEDICINE
Payer: MEDICARE

## 2018-12-07 DIAGNOSIS — R93.89 ABNORMAL CT SCAN, CHEST: ICD-10-CM

## 2018-12-07 DIAGNOSIS — R91.8 OTHER NONSPECIFIC ABNORMAL FINDING OF LUNG FIELD: ICD-10-CM

## 2018-12-07 PROCEDURE — A9552 F18 FDG: HCPCS

## 2018-12-07 PROCEDURE — 78815 PET IMAGE W/CT SKULL-THIGH: CPT | Mod: 26,PI,, | Performed by: RADIOLOGY

## 2018-12-10 ENCOUNTER — HOSPITAL ENCOUNTER (OUTPATIENT)
Dept: RADIOLOGY | Facility: HOSPITAL | Age: 78
Discharge: HOME | End: 2018-12-10
Attending: FAMILY MEDICINE
Payer: MEDICARE

## 2018-12-10 DIAGNOSIS — Z12.39 BREAST CANCER SCREENING: ICD-10-CM

## 2018-12-10 PROCEDURE — 77067 SCR MAMMO BI INCL CAD: CPT | Mod: TC,PO

## 2018-12-10 PROCEDURE — 77063 BREAST TOMOSYNTHESIS BI: CPT | Mod: TC,PO

## 2018-12-11 ENCOUNTER — TELEPHONE (OUTPATIENT)
Dept: FAMILY MEDICINE | Facility: CLINIC | Age: 78
End: 2018-12-11

## 2018-12-11 NOTE — TELEPHONE ENCOUNTER
----- Message from Marc Sands MD sent at 12/9/2018  8:16 AM CST -----  CALL PT TESTS ARE NORMAL; no cancer found

## 2018-12-11 NOTE — TELEPHONE ENCOUNTER
Pt has been notified and verbalized understanding that PET scan was normal, and that no cancer was found.

## 2018-12-14 ENCOUNTER — OFFICE VISIT (OUTPATIENT)
Dept: FAMILY MEDICINE | Facility: CLINIC | Age: 78
End: 2018-12-14
Payer: MEDICARE

## 2018-12-14 VITALS
HEIGHT: 60 IN | SYSTOLIC BLOOD PRESSURE: 136 MMHG | OXYGEN SATURATION: 96 % | DIASTOLIC BLOOD PRESSURE: 84 MMHG | HEART RATE: 78 BPM | TEMPERATURE: 98 F | BODY MASS INDEX: 40.78 KG/M2 | WEIGHT: 207.69 LBS

## 2018-12-14 DIAGNOSIS — Z87.891 EX-SMOKER: ICD-10-CM

## 2018-12-14 DIAGNOSIS — J44.9 CHRONIC OBSTRUCTIVE PULMONARY DISEASE, UNSPECIFIED COPD TYPE: Primary | ICD-10-CM

## 2018-12-14 DIAGNOSIS — Z86.79 HISTORY OF HYPERTENSION: ICD-10-CM

## 2018-12-14 DIAGNOSIS — R06.02 SOB (SHORTNESS OF BREATH): ICD-10-CM

## 2018-12-14 PROCEDURE — 99213 OFFICE O/P EST LOW 20 MIN: CPT | Mod: S$GLB,,, | Performed by: FAMILY MEDICINE

## 2018-12-14 PROCEDURE — 1101F PT FALLS ASSESS-DOCD LE1/YR: CPT | Mod: CPTII,S$GLB,, | Performed by: FAMILY MEDICINE

## 2018-12-14 RX ORDER — ISOSORBIDE MONONITRATE 30 MG/1
30 TABLET, EXTENDED RELEASE ORAL DAILY
Qty: 90 TABLET | Refills: 3 | Status: SHIPPED | OUTPATIENT
Start: 2018-12-14 | End: 2019-05-28 | Stop reason: SDUPTHER

## 2018-12-14 RX ORDER — LOSARTAN POTASSIUM 50 MG/1
50 TABLET ORAL DAILY
Qty: 90 TABLET | Refills: 3 | Status: SHIPPED | OUTPATIENT
Start: 2018-12-14 | End: 2020-01-21

## 2018-12-14 NOTE — PROGRESS NOTES
Subjective:      Patient ID: Shirley F Gilford is a 78 y.o. female.    Chief Complaint: Follow-up      HPI   Follow up; needs rx; feels much better; CT right lung parital collapse, CT  PET normal; saw cardiologist; bp good today; on losartan and imdur      Review of Systems   Constitutional: Negative.    HENT: Negative.    Respiratory: Negative.    Cardiovascular: Negative.    Gastrointestinal: Negative.    Endocrine: Negative.    Genitourinary: Negative.    Musculoskeletal: Negative.    Psychiatric/Behavioral: Negative.    All other systems reviewed and are negative.    Objective:     Physical Exam   Constitutional: She is oriented to person, place, and time. She appears well-developed and well-nourished.   HENT:   Head: Normocephalic.   Eyes: Conjunctivae and EOM are normal. Pupils are equal, round, and reactive to light.   Neck: Normal range of motion. Neck supple.   Cardiovascular: Normal rate, regular rhythm and normal heart sounds.   Pulmonary/Chest: Effort normal and breath sounds normal.   Musculoskeletal: Normal range of motion.   Neurological: She is alert and oriented to person, place, and time. She has normal reflexes.   Skin: Skin is warm and dry.   Psychiatric: She has a normal mood and affect. Her behavior is normal. Judgment and thought content normal.   Nursing note and vitals reviewed.    Assessment:     1. Chronic obstructive pulmonary disease, unspecified COPD type    2. History of hypertension    3. Ex-smoker    4. SOB (shortness of breath)      Plan:        Medication List           Accurate as of 12/14/18 11:59 PM. If you have any questions, ask your nurse or doctor.               START taking these medications    isosorbide mononitrate 30 MG 24 hr tablet  Commonly known as:  IMDUR  Take 1 tablet (30 mg total) by mouth once daily. For heart and blood pressure  Started by:  Marc Sands MD        CONTINUE taking these medications    albuterol 2.5 mg /3 mL (0.083 %) nebulizer  solution  Commonly known as:  PROVENTIL     alendronate 70 MG tablet  Commonly known as:  FOSAMAX  Take 1 tablet (70 mg total) by mouth every 7 days.     aspirin 81 MG EC tablet  Commonly known as:  ECOTRIN  Take 1 tablet (81 mg total) by mouth once daily.     CALCIUM 500 + D 500 mg(1,250mg) -200 unit per tablet  Generic drug:  calcium-vitamin D3     CENTRUM 3,500-18-0.4 unit-mg-mg Chew  Generic drug:  multivit-iron-min-folic acid     cholecalciferol (vitamin D3) 5,000 unit Tab  Commonly known as:  VITAMIN D3  Take 5,000 Units by mouth once daily.     ferrous sulfate 324 mg (65 mg iron) Tbec  Take 1 tablet (325 mg total) by mouth once daily.     losartan 50 MG tablet  Commonly known as:  COZAAR  Take 1 tablet (50 mg total) by mouth once daily. For bp     pramipexole 0.25 MG tablet  Commonly known as:  MIRAPEX  Take 1 tablet (0.25 mg total) by mouth every evening. For legs     traZODone 50 MG tablet  Commonly known as:  DESYREL  Take 1 tablet (50 mg total) by mouth every evening.     umeclidinium-vilanterol 62.5-25 mcg/actuation Dsdv  Commonly known as:  ANORO ELLIPTA  Inhale 1 puff into the lungs once daily. For COPD     VITAMIN B-12 500 MCG tablet  Generic drug:  cyanocobalamin           Where to Get Your Medications      These medications were sent to [id number] - [place], [place]  [address] [address]    Phone:  [phone]   · isosorbide mononitrate 30 MG 24 hr tablet  · losartan 50 MG tablet  · umeclidinium-vilanterol 62.5-25 mcg/actuation Dsdv       Chronic obstructive pulmonary disease, unspecified COPD type    History of hypertension    Ex-smoker    SOB (shortness of breath)    Other orders  -     isosorbide mononitrate (IMDUR) 30 MG 24 hr tablet; Take 1 tablet (30 mg total) by mouth once daily. For heart and blood pressure  Dispense: 90 tablet; Refill: 3  -     losartan (COZAAR) 50 MG tablet; Take 1 tablet (50 mg total) by mouth once daily. For bp   Dispense: 90 tablet; Refill: 3  -     umeclidinium-vilanterol (ANORO ELLIPTA) 62.5-25 mcg/actuation DsDv; Inhale 1 puff into the lungs once daily. For COPD  Dispense: 1 each; Refill: 11    RTC 2 months

## 2019-02-13 ENCOUNTER — LAB VISIT (OUTPATIENT)
Dept: LAB | Facility: HOSPITAL | Age: 79
End: 2019-02-13
Attending: FAMILY MEDICINE
Payer: MEDICARE

## 2019-02-13 DIAGNOSIS — E78.5 HYPERLIPIDEMIA, UNSPECIFIED HYPERLIPIDEMIA TYPE: ICD-10-CM

## 2019-02-13 DIAGNOSIS — E61.1 IRON DEFICIENCY: ICD-10-CM

## 2019-02-13 DIAGNOSIS — E55.9 VITAMIN D DEFICIENCY: ICD-10-CM

## 2019-02-13 DIAGNOSIS — R73.9 HYPERGLYCEMIA: ICD-10-CM

## 2019-02-13 LAB
ESTIMATED AVG GLUCOSE: 111 MG/DL
FERRITIN SERPL-MCNC: 23 NG/ML
HBA1C MFR BLD HPLC: 5.5 %
IRON SERPL-MCNC: 28 UG/DL

## 2019-02-13 PROCEDURE — 83540 ASSAY OF IRON: CPT | Mod: PO

## 2019-02-13 PROCEDURE — 36415 COLL VENOUS BLD VENIPUNCTURE: CPT | Mod: PO

## 2019-02-13 PROCEDURE — 83036 HEMOGLOBIN GLYCOSYLATED A1C: CPT

## 2019-02-13 PROCEDURE — 82728 ASSAY OF FERRITIN: CPT

## 2019-02-14 ENCOUNTER — OFFICE VISIT (OUTPATIENT)
Dept: FAMILY MEDICINE | Facility: CLINIC | Age: 79
End: 2019-02-14
Payer: MEDICARE

## 2019-02-14 ENCOUNTER — TELEPHONE (OUTPATIENT)
Dept: FAMILY MEDICINE | Facility: CLINIC | Age: 79
End: 2019-02-14

## 2019-02-14 VITALS
HEIGHT: 60 IN | TEMPERATURE: 98 F | OXYGEN SATURATION: 95 % | HEART RATE: 79 BPM | WEIGHT: 206.69 LBS | DIASTOLIC BLOOD PRESSURE: 78 MMHG | SYSTOLIC BLOOD PRESSURE: 122 MMHG | BODY MASS INDEX: 40.58 KG/M2

## 2019-02-14 DIAGNOSIS — R73.9 HYPERGLYCEMIA: ICD-10-CM

## 2019-02-14 DIAGNOSIS — Z87.891 EX-SMOKER: ICD-10-CM

## 2019-02-14 DIAGNOSIS — E55.9 VITAMIN D DEFICIENCY: ICD-10-CM

## 2019-02-14 DIAGNOSIS — Z86.79 HISTORY OF HYPERTENSION: ICD-10-CM

## 2019-02-14 DIAGNOSIS — E61.1 IRON DEFICIENCY: ICD-10-CM

## 2019-02-14 DIAGNOSIS — Z86.39 HISTORY OF HYPERLIPIDEMIA: ICD-10-CM

## 2019-02-14 DIAGNOSIS — Z00.00 WELLNESS EXAMINATION: Primary | ICD-10-CM

## 2019-02-14 DIAGNOSIS — R06.02 SOB (SHORTNESS OF BREATH): ICD-10-CM

## 2019-02-14 DIAGNOSIS — J44.9 CHRONIC OBSTRUCTIVE PULMONARY DISEASE, UNSPECIFIED COPD TYPE: ICD-10-CM

## 2019-02-14 PROCEDURE — 99499 UNLISTED E&M SERVICE: CPT | Mod: S$GLB,,, | Performed by: FAMILY MEDICINE

## 2019-02-14 PROCEDURE — 99397 PER PM REEVAL EST PAT 65+ YR: CPT | Mod: S$GLB,,, | Performed by: FAMILY MEDICINE

## 2019-02-14 PROCEDURE — 99499 RISK ADDL DX/OHS AUDIT: ICD-10-PCS | Mod: S$GLB,,, | Performed by: FAMILY MEDICINE

## 2019-02-14 PROCEDURE — 99397 PR PREVENTIVE VISIT,EST,65 & OVER: ICD-10-PCS | Mod: S$GLB,,, | Performed by: FAMILY MEDICINE

## 2019-02-14 RX ORDER — ROPINIROLE 2 MG/1
TABLET, FILM COATED ORAL
COMMUNITY
End: 2019-04-30

## 2019-02-14 RX ORDER — ROPINIROLE 3 MG/1
TABLET, FILM COATED ORAL
COMMUNITY
End: 2019-12-04

## 2019-02-14 NOTE — TELEPHONE ENCOUNTER
Spoke to pt and she was notified of to continue her iron pills and her diabetes test was normal. Pt understood and was pleased.

## 2019-02-14 NOTE — TELEPHONE ENCOUNTER
----- Message from Marc Sands MD sent at 2/14/2019  6:21 AM CST -----  Iron just a little low; continue one iron tab a day; idabetes test normal

## 2019-02-14 NOTE — TELEPHONE ENCOUNTER
----- Message from Uyen Humphrey sent at 2/14/2019  4:34 PM CST -----  Contact: [phone]/ self   Patient called in returning your call. Please advise.

## 2019-02-14 NOTE — PROGRESS NOTES
Subjective:      Patient ID: Shirley F Gilford is a 78 y.o. female.    Chief Complaint: No chief complaint on file.      HPI   Wellness iron sl low; normal a1c; has been on one metformin a dya; stop it and will check 6 months    Review of Systems   Constitutional: Negative.    HENT: Negative.    Respiratory: Negative.    Cardiovascular: Negative.    Gastrointestinal: Negative.    Endocrine: Negative.    Genitourinary: Negative.    Musculoskeletal: Negative.    Psychiatric/Behavioral: Negative.    All other systems reviewed and are negative.    Objective:     Physical Exam   Constitutional: She is oriented to person, place, and time. She appears well-developed and well-nourished.   HENT:   Head: Normocephalic.   Eyes: Conjunctivae and EOM are normal. Pupils are equal, round, and reactive to light.   Neck: Normal range of motion. Neck supple.   Cardiovascular: Normal rate, regular rhythm and normal heart sounds.   Pulmonary/Chest: Effort normal and breath sounds normal.   Musculoskeletal: Normal range of motion.   Neurological: She is alert and oriented to person, place, and time. She has normal reflexes.   Skin: Skin is warm and dry.   Psychiatric: She has a normal mood and affect. Her behavior is normal. Judgment and thought content normal.   Nursing note and vitals reviewed.    Assessment:     1. Wellness examination    2. Chronic obstructive pulmonary disease, unspecified COPD type    3. History of hypertension    4. History of hyperlipidemia    5. Hyperglycemia    6. Iron deficiency    7. Vitamin D deficiency    8. Ex-smoker    9. SOB (shortness of breath)      Plan:        Medication List           Accurate as of 2/14/19 11:59 PM. If you have any questions, ask your nurse or doctor.               CONTINUE taking these medications    albuterol 2.5 mg /3 mL (0.083 %) nebulizer solution  Commonly known as:  PROVENTIL     alendronate 70 MG tablet  Commonly known as:  FOSAMAX  Take 1 tablet (70 mg total) by mouth  every 7 days.     aspirin 81 MG EC tablet  Commonly known as:  ECOTRIN  Take 1 tablet (81 mg total) by mouth once daily.     CALCIUM 500 + D 500 mg(1,250mg) -200 unit per tablet  Generic drug:  calcium-vitamin D3     CENTRUM 3,500-18-0.4 unit-mg-mg Chew  Generic drug:  multivit-iron-min-folic acid     cholecalciferol (vitamin D3) 5,000 unit Tab  Commonly known as:  VITAMIN D3  Take 5,000 Units by mouth once daily.     ferrous sulfate 324 mg (65 mg iron) Tbec  Take 1 tablet (325 mg total) by mouth once daily.     isosorbide mononitrate 30 MG 24 hr tablet  Commonly known as:  IMDUR  Take 1 tablet (30 mg total) by mouth once daily. For heart and blood pressure     losartan 50 MG tablet  Commonly known as:  COZAAR  Take 1 tablet (50 mg total) by mouth once daily. For bp     pramipexole 0.25 MG tablet  Commonly known as:  MIRAPEX  Take 1 tablet (0.25 mg total) by mouth every evening. For legs     * rOPINIRole 3 MG tablet  Commonly known as:  REQUIP     * rOPINIRole 2 MG tablet  Commonly known as:  REQUIP     traZODone 50 MG tablet  Commonly known as:  DESYREL  Take 1 tablet (50 mg total) by mouth every evening.     umeclidinium-vilanterol 62.5-25 mcg/actuation Dsdv  Commonly known as:  ANORO ELLIPTA  Inhale 1 puff into the lungs once daily. For COPD     VITAMIN B-12 500 MCG tablet  Generic drug:  cyanocobalamin         * This list has 2 medication(s) that are the same as other medications prescribed for you. Read the directions carefully, and ask your doctor or other care provider to review them with you.              Wellness examination    Chronic obstructive pulmonary disease, unspecified COPD type  -     CBC auto differential; Future; Expected date: 08/12/2019  -     Comprehensive metabolic panel; Future; Expected date: 08/12/2019  -     Hemoglobin A1c; Future; Expected date: 08/12/2019  -     Iron; Future; Expected date: 08/12/2019    History of hypertension  -     CBC auto differential; Future; Expected date:  08/12/2019  -     Comprehensive metabolic panel; Future; Expected date: 08/12/2019  -     Hemoglobin A1c; Future; Expected date: 08/12/2019  -     Iron; Future; Expected date: 08/12/2019    History of hyperlipidemia  -     CBC auto differential; Future; Expected date: 08/12/2019  -     Comprehensive metabolic panel; Future; Expected date: 08/12/2019  -     Hemoglobin A1c; Future; Expected date: 08/12/2019  -     Iron; Future; Expected date: 08/12/2019    Hyperglycemia  -     CBC auto differential; Future; Expected date: 08/12/2019  -     Comprehensive metabolic panel; Future; Expected date: 08/12/2019  -     Hemoglobin A1c; Future; Expected date: 08/12/2019  -     Iron; Future; Expected date: 08/12/2019    Iron deficiency  -     CBC auto differential; Future; Expected date: 08/12/2019  -     Comprehensive metabolic panel; Future; Expected date: 08/12/2019  -     Hemoglobin A1c; Future; Expected date: 08/12/2019  -     Iron; Future; Expected date: 08/12/2019    Vitamin D deficiency  -     CBC auto differential; Future; Expected date: 08/12/2019  -     Comprehensive metabolic panel; Future; Expected date: 08/12/2019  -     Hemoglobin A1c; Future; Expected date: 08/12/2019  -     Iron; Future; Expected date: 08/12/2019    Ex-smoker  -     CBC auto differential; Future; Expected date: 08/12/2019  -     Comprehensive metabolic panel; Future; Expected date: 08/12/2019  -     Hemoglobin A1c; Future; Expected date: 08/12/2019  -     Iron; Future; Expected date: 08/12/2019    SOB (shortness of breath)  -     CBC auto differential; Future; Expected date: 08/12/2019  -     Comprehensive metabolic panel; Future; Expected date: 08/12/2019  -     Hemoglobin A1c; Future; Expected date: 08/12/2019  -     Iron; Future; Expected date: 08/12/2019

## 2019-02-18 NOTE — PROGRESS NOTES
Patient, Shirley F Gilford (MRN #[id number]), presented with a recorded BMI of 40.36 kg/m^2 consistent with the definition of morbid obesity (ICD-10 E66.01). The patient's morbid obesity was monitored, evaluated, addressed and/or treated. This addendum to the medical record is made on 02/17/2019.

## 2019-03-11 RX ORDER — ROPINIROLE 2 MG/1
TABLET, FILM COATED ORAL
Qty: 90 TABLET | Refills: 3 | Status: SHIPPED | OUTPATIENT
Start: 2019-03-11 | End: 2019-12-04

## 2019-04-29 ENCOUNTER — TELEPHONE (OUTPATIENT)
Dept: FAMILY MEDICINE | Facility: CLINIC | Age: 79
End: 2019-04-29

## 2019-04-29 NOTE — TELEPHONE ENCOUNTER
Apt made.      ----- Message from Katja Moore sent at 4/29/2019  8:56 AM CDT -----  Contact: [phone]  Type:  Sooner Apoointment Request    Caller is requesting a sooner appointment.  Caller declined first available appointment listed below.  Caller will not accept being placed on the waitlist and is requesting a message be sent to doctor.  Name of Caller: pt  When is the first available appointment? 5/20/19  Symptoms: not feeling well, can't be specific, fatigue  Would the patient rather a call back or a response via [email or web address]? Call back  Best Call Back Number: [phone]  Additional Information:

## 2019-04-30 ENCOUNTER — OFFICE VISIT (OUTPATIENT)
Dept: FAMILY MEDICINE | Facility: CLINIC | Age: 79
End: 2019-04-30
Payer: MEDICARE

## 2019-04-30 VITALS
WEIGHT: 207.69 LBS | SYSTOLIC BLOOD PRESSURE: 118 MMHG | BODY MASS INDEX: 38.22 KG/M2 | HEART RATE: 83 BPM | OXYGEN SATURATION: 91 % | DIASTOLIC BLOOD PRESSURE: 82 MMHG | TEMPERATURE: 98 F | HEIGHT: 62 IN

## 2019-04-30 DIAGNOSIS — E61.1 IRON DEFICIENCY: ICD-10-CM

## 2019-04-30 DIAGNOSIS — M81.0 OSTEOPOROSIS, UNSPECIFIED OSTEOPOROSIS TYPE, UNSPECIFIED PATHOLOGICAL FRACTURE PRESENCE: ICD-10-CM

## 2019-04-30 DIAGNOSIS — Z86.39 HISTORY OF HYPERLIPIDEMIA: ICD-10-CM

## 2019-04-30 DIAGNOSIS — R73.9 HYPERGLYCEMIA: ICD-10-CM

## 2019-04-30 DIAGNOSIS — G25.81 RESTLESS LEG SYNDROME: ICD-10-CM

## 2019-04-30 DIAGNOSIS — E78.5 HYPERLIPIDEMIA, UNSPECIFIED HYPERLIPIDEMIA TYPE: ICD-10-CM

## 2019-04-30 DIAGNOSIS — Z86.79 HISTORY OF HYPERTENSION: ICD-10-CM

## 2019-04-30 DIAGNOSIS — K76.0 FATTY LIVER: ICD-10-CM

## 2019-04-30 DIAGNOSIS — I27.20 PULMONARY HYPERTENSION: ICD-10-CM

## 2019-04-30 DIAGNOSIS — J44.9 CHRONIC OBSTRUCTIVE PULMONARY DISEASE, UNSPECIFIED COPD TYPE: ICD-10-CM

## 2019-04-30 DIAGNOSIS — R06.02 SOB (SHORTNESS OF BREATH): ICD-10-CM

## 2019-04-30 DIAGNOSIS — R53.83 OTHER FATIGUE: Primary | ICD-10-CM

## 2019-04-30 PROCEDURE — 99213 OFFICE O/P EST LOW 20 MIN: CPT | Mod: 25,S$GLB,, | Performed by: FAMILY MEDICINE

## 2019-04-30 PROCEDURE — 96372 PR INJECTION,THERAP/PROPH/DIAG2ST, IM OR SUBCUT: ICD-10-PCS | Mod: S$GLB,,, | Performed by: FAMILY MEDICINE

## 2019-04-30 PROCEDURE — 1101F PT FALLS ASSESS-DOCD LE1/YR: CPT | Mod: CPTII,S$GLB,, | Performed by: FAMILY MEDICINE

## 2019-04-30 PROCEDURE — 96372 THER/PROPH/DIAG INJ SC/IM: CPT | Mod: S$GLB,,, | Performed by: FAMILY MEDICINE

## 2019-04-30 PROCEDURE — 1101F PR PT FALLS ASSESS DOC 0-1 FALLS W/OUT INJ PAST YR: ICD-10-PCS | Mod: CPTII,S$GLB,, | Performed by: FAMILY MEDICINE

## 2019-04-30 PROCEDURE — 99213 PR OFFICE/OUTPT VISIT, EST, LEVL III, 20-29 MIN: ICD-10-PCS | Mod: 25,S$GLB,, | Performed by: FAMILY MEDICINE

## 2019-04-30 RX ORDER — TRIAMCINOLONE ACETONIDE 40 MG/ML
80 INJECTION, SUSPENSION INTRA-ARTICULAR; INTRAMUSCULAR ONCE
Status: COMPLETED | OUTPATIENT
Start: 2019-04-30 | End: 2019-04-30

## 2019-04-30 RX ORDER — MONTELUKAST SODIUM 10 MG/1
10 TABLET ORAL NIGHTLY
Qty: 30 TABLET | Refills: 0 | Status: SHIPPED | OUTPATIENT
Start: 2019-04-30 | End: 2019-05-29 | Stop reason: SDUPTHER

## 2019-04-30 RX ORDER — FLUTICASONE PROPIONATE 50 MCG
2 SPRAY, SUSPENSION (ML) NASAL DAILY
Qty: 1 BOTTLE | Refills: 12 | Status: SHIPPED | OUTPATIENT
Start: 2019-04-30

## 2019-04-30 RX ORDER — ATORVASTATIN CALCIUM 10 MG/1
TABLET, FILM COATED ORAL
COMMUNITY
Start: 2019-03-06 | End: 2019-12-04

## 2019-04-30 RX ORDER — LORATADINE 10 MG/1
10 TABLET ORAL DAILY
Qty: 90 TABLET | Refills: 3 | COMMUNITY
Start: 2019-04-30

## 2019-04-30 RX ADMIN — TRIAMCINOLONE ACETONIDE 80 MG: 40 INJECTION, SUSPENSION INTRA-ARTICULAR; INTRAMUSCULAR at 05:04

## 2019-04-30 NOTE — PROGRESS NOTES
Subjective:      Patient ID: Shirley F Gilford is a 78 y.o. female.    Chief Complaint: Shortness of Breath; Follow-up; and Fatigue      HPI   Tired and feels sick since bronchitis near [place], never recovered; doesn't feel like doing anything and can't quit coughing; overweight and not losing weight, very inactive    Review of Systems   Constitutional: Positive for appetite change, fatigue and unexpected weight change. Negative for fever.   HENT: Positive for congestion, rhinorrhea, sinus pressure, sneezing and voice change.    Eyes: Negative.    Respiratory: Positive for cough, shortness of breath and wheezing. Negative for chest tightness.    Cardiovascular: Positive for palpitations. Negative for chest pain and leg swelling.   Gastrointestinal: Negative.  Negative for abdominal distention, abdominal pain, anal bleeding, blood in stool, constipation, diarrhea, rectal pain and vomiting.   Endocrine: Positive for heat intolerance.   Genitourinary: Negative.    Musculoskeletal: Negative.         Stiff muscles and joints   Skin: Negative.    Allergic/Immunologic: Negative.    Neurological: Negative.    Hematological: Negative.  Negative for adenopathy. Does not bruise/bleed easily.   Psychiatric/Behavioral: Negative.  Negative for agitation, hallucinations and suicidal ideas.   All other systems reviewed and are negative.    Objective:     Physical Exam   Constitutional: She is oriented to person, place, and time. She appears well-developed and well-nourished.   obese   HENT:   Head: Normocephalic.   Eyes: Pupils are equal, round, and reactive to light. Conjunctivae and EOM are normal.   Neck: Normal range of motion. Neck supple.   Cardiovascular: Normal rate, regular rhythm and normal heart sounds.   Pulmonary/Chest: Effort normal and breath sounds normal.   Musculoskeletal: Normal range of motion.   Neurological: She is alert and oriented to person, place, and time. She has normal reflexes.   Skin: Skin is warm  and dry.   Psychiatric: She has a normal mood and affect. Her behavior is normal. Judgment and thought content normal.   Nursing note and vitals reviewed.    Assessment:     1. Other fatigue    2. SOB (shortness of breath)    3. Osteoporosis, unspecified osteoporosis type, unspecified pathological fracture presence    4. Fatty liver    5. Hyperglycemia    6. Iron deficiency    7. Chronic obstructive pulmonary disease, unspecified COPD type    8. Hyperlipidemia, unspecified hyperlipidemia type    9. History of hypertension    10. History of hyperlipidemia    11. Restless leg syndrome    12. Pulmonary hypertension      Plan:        Medication List           Accurate as of 4/30/19 11:59 PM. If you have any questions, ask your nurse or doctor.               START taking these medications    fluticasone propionate 50 mcg/actuation nasal spray  Commonly known as:  FLONASE  2 sprays (100 mcg total) by Each Nare route once daily.  Started by:  Marc Sands MD     loratadine 10 mg tablet  Commonly known as:  CLARITIN  Take 1 tablet (10 mg total) by mouth once daily.  Started by:  Marc Sands MD     montelukast 10 mg tablet  Commonly known as:  SINGULAIR  Take 1 tablet (10 mg total) by mouth every evening.  Started by:  Marc Sands MD        CHANGE how you take these medications    * rOPINIRole 3 MG tablet  Commonly known as:  REQUIP  What changed:  Another medication with the same name was removed. Continue taking this medication, and follow the directions you see here.  Changed by:  Marc Sands MD     * rOPINIRole 2 MG tablet  Commonly known as:  REQUIP  TAKE 1 TABLET BY MOUTH AT BEDTIME  What changed:  Another medication with the same name was removed. Continue taking this medication, and follow the directions you see here.  Changed by:  Marc Sands MD         * This list has 2 medication(s) that are the same as other medications prescribed for you. Read the directions carefully, and ask your doctor or  other care provider to review them with you.            CONTINUE taking these medications    alendronate 70 MG tablet  Commonly known as:  FOSAMAX  Take 1 tablet (70 mg total) by mouth every 7 days.     aspirin 81 MG EC tablet  Commonly known as:  ECOTRIN  Take 1 tablet (81 mg total) by mouth once daily.     atorvastatin 10 MG tablet  Commonly known as:  LIPITOR     CALCIUM 500 + D 500 mg(1,250mg) -200 unit per tablet  Generic drug:  calcium-vitamin D3     CENTRUM 3,500-18-0.4 unit-mg-mg Chew  Generic drug:  multivit-iron-min-folic acid     cholecalciferol (vitamin D3) 5,000 unit Tab  Commonly known as:  VITAMIN D3  Take 5,000 Units by mouth once daily.     ferrous sulfate 324 mg (65 mg iron) Tbec  Take 1 tablet (325 mg total) by mouth once daily.     isosorbide mononitrate 30 MG 24 hr tablet  Commonly known as:  IMDUR  Take 1 tablet (30 mg total) by mouth once daily. For heart and blood pressure     losartan 50 MG tablet  Commonly known as:  COZAAR  Take 1 tablet (50 mg total) by mouth once daily. For bp     umeclidinium-vilanterol 62.5-25 mcg/actuation Dsdv  Commonly known as:  ANORO ELLIPTA  Inhale 1 puff into the lungs once daily. For COPD     VITAMIN B-12 500 MCG tablet  Generic drug:  cyanocobalamin        STOP taking these medications    albuterol 2.5 mg /3 mL (0.083 %) nebulizer solution  Commonly known as:  PROVENTIL  Stopped by:  Marc Sands MD     pramipexole 0.25 MG tablet  Commonly known as:  MIRAPEX  Stopped by:  Marc Sands MD     traZODone 50 MG tablet  Commonly known as:  DESYREL  Stopped by:  Marc Sands MD           Where to Get Your Medications      [id number] [place]  [address] [address]    Phone:  [phone]   · fluticasone propionate 50 mcg/actuation nasal spray  · montelukast 10 mg tablet     You can get these medications from any pharmacy    You don't need a prescription for these  medications  · loratadine 10 mg tablet       Other fatigue  -     CBC auto differential; Future; Expected date: 04/30/2019  -     Cancel: Brain natriuretic peptide; Future; Expected date: 04/30/2019  -     Comprehensive metabolic panel; Future; Expected date: 04/30/2019  -     Hemoglobin A1c; Future  -     Sedimentation rate; Future  -     TSH; Future  -     Urinalysis; Future  -     Vitamin D; Future    SOB (shortness of breath)  -     CBC auto differential; Future; Expected date: 04/30/2019  -     Cancel: Brain natriuretic peptide; Future; Expected date: 04/30/2019  -     Comprehensive metabolic panel; Future; Expected date: 04/30/2019  -     Hemoglobin A1c; Future  -     Sedimentation rate; Future  -     TSH; Future  -     Urinalysis; Future  -     Vitamin D; Future  -     Ambulatory referral to Pulmonology  -     Ambulatory referral to Cardiology    Osteoporosis, unspecified osteoporosis type, unspecified pathological fracture presence  -     CBC auto differential; Future; Expected date: 04/30/2019  -     Cancel: Brain natriuretic peptide; Future; Expected date: 04/30/2019  -     Comprehensive metabolic panel; Future; Expected date: 04/30/2019  -     Hemoglobin A1c; Future  -     Sedimentation rate; Future  -     TSH; Future  -     Urinalysis; Future  -     Vitamin D; Future    Fatty liver  -     CBC auto differential; Future; Expected date: 04/30/2019  -     Cancel: Brain natriuretic peptide; Future; Expected date: 04/30/2019  -     Comprehensive metabolic panel; Future; Expected date: 04/30/2019  -     Hemoglobin A1c; Future  -     Sedimentation rate; Future  -     TSH; Future  -     Urinalysis; Future  -     Vitamin D; Future    Hyperglycemia  -     CBC auto differential; Future; Expected date: 04/30/2019  -     Cancel: Brain natriuretic peptide; Future; Expected date: 04/30/2019  -     Comprehensive metabolic panel; Future; Expected date: 04/30/2019  -     Hemoglobin A1c; Future  -     Sedimentation rate;  Future  -     TSH; Future  -     Urinalysis; Future  -     Vitamin D; Future    Iron deficiency  -     CBC auto differential; Future; Expected date: 04/30/2019  -     Cancel: Brain natriuretic peptide; Future; Expected date: 04/30/2019  -     Comprehensive metabolic panel; Future; Expected date: 04/30/2019  -     Hemoglobin A1c; Future  -     Sedimentation rate; Future  -     TSH; Future  -     Urinalysis; Future  -     Vitamin D; Future  -     Iron; Future; Expected date: 04/30/2019  -     Ferritin; Future; Expected date: 04/30/2019    Chronic obstructive pulmonary disease, unspecified COPD type  -     CBC auto differential; Future; Expected date: 04/30/2019  -     Cancel: Brain natriuretic peptide; Future; Expected date: 04/30/2019  -     Comprehensive metabolic panel; Future; Expected date: 04/30/2019  -     Hemoglobin A1c; Future  -     Sedimentation rate; Future  -     TSH; Future  -     Urinalysis; Future  -     Vitamin D; Future  -     Ambulatory referral to Pulmonology    Hyperlipidemia, unspecified hyperlipidemia type  -     CBC auto differential; Future; Expected date: 04/30/2019  -     Cancel: Brain natriuretic peptide; Future; Expected date: 04/30/2019  -     Comprehensive metabolic panel; Future; Expected date: 04/30/2019  -     Hemoglobin A1c; Future  -     Sedimentation rate; Future  -     TSH; Future  -     Urinalysis; Future  -     Vitamin D; Future    History of hypertension  -     CBC auto differential; Future; Expected date: 04/30/2019  -     Cancel: Brain natriuretic peptide; Future; Expected date: 04/30/2019  -     Comprehensive metabolic panel; Future; Expected date: 04/30/2019  -     Hemoglobin A1c; Future  -     Sedimentation rate; Future  -     TSH; Future  -     Urinalysis; Future  -     Vitamin D; Future    History of hyperlipidemia  -     CBC auto differential; Future; Expected date: 04/30/2019  -     Cancel: Brain natriuretic peptide; Future; Expected date: 04/30/2019  -      Comprehensive metabolic panel; Future; Expected date: 04/30/2019  -     Hemoglobin A1c; Future  -     Sedimentation rate; Future  -     TSH; Future  -     Urinalysis; Future  -     Vitamin D; Future    Restless leg syndrome  -     CBC auto differential; Future; Expected date: 04/30/2019  -     Cancel: Brain natriuretic peptide; Future; Expected date: 04/30/2019  -     Comprehensive metabolic panel; Future; Expected date: 04/30/2019  -     Hemoglobin A1c; Future  -     Sedimentation rate; Future  -     TSH; Future  -     Urinalysis; Future  -     Vitamin D; Future    Pulmonary hypertension    Other orders  -     triamcinolone acetonide injection 80 mg  -     loratadine (CLARITIN) 10 mg tablet; Take 1 tablet (10 mg total) by mouth once daily.  Dispense: 90 tablet; Refill: 3  -     fluticasone propionate (FLONASE) 50 mcg/actuation nasal spray; 2 sprays (100 mcg total) by Each Nare route once daily.  Dispense: 1 Bottle; Refill: 12  -     montelukast (SINGULAIR) 10 mg tablet; Take 1 tablet (10 mg total) by mouth every evening.  Dispense: 30 tablet; Refill: 0

## 2019-05-01 ENCOUNTER — LAB VISIT (OUTPATIENT)
Dept: LAB | Facility: HOSPITAL | Age: 79
End: 2019-05-01
Attending: FAMILY MEDICINE
Payer: MEDICARE

## 2019-05-01 ENCOUNTER — TELEPHONE (OUTPATIENT)
Dept: FAMILY MEDICINE | Facility: CLINIC | Age: 79
End: 2019-05-01

## 2019-05-01 DIAGNOSIS — K76.0 FATTY LIVER: ICD-10-CM

## 2019-05-01 DIAGNOSIS — J44.9 CHRONIC OBSTRUCTIVE PULMONARY DISEASE, UNSPECIFIED COPD TYPE: ICD-10-CM

## 2019-05-01 DIAGNOSIS — R73.9 HYPERGLYCEMIA: ICD-10-CM

## 2019-05-01 DIAGNOSIS — E61.1 IRON DEFICIENCY: ICD-10-CM

## 2019-05-01 DIAGNOSIS — Z86.79 HISTORY OF HYPERTENSION: ICD-10-CM

## 2019-05-01 DIAGNOSIS — R53.83 OTHER FATIGUE: ICD-10-CM

## 2019-05-01 DIAGNOSIS — E78.5 HYPERLIPIDEMIA, UNSPECIFIED HYPERLIPIDEMIA TYPE: ICD-10-CM

## 2019-05-01 DIAGNOSIS — R06.02 SOB (SHORTNESS OF BREATH): ICD-10-CM

## 2019-05-01 DIAGNOSIS — M81.0 OSTEOPOROSIS, UNSPECIFIED OSTEOPOROSIS TYPE, UNSPECIFIED PATHOLOGICAL FRACTURE PRESENCE: ICD-10-CM

## 2019-05-01 DIAGNOSIS — Z86.39 HISTORY OF HYPERLIPIDEMIA: ICD-10-CM

## 2019-05-01 DIAGNOSIS — G25.81 RESTLESS LEG SYNDROME: ICD-10-CM

## 2019-05-01 LAB
BILIRUB UR QL STRIP: NEGATIVE
CLARITY UR REFRACT.AUTO: ABNORMAL
COLOR UR AUTO: YELLOW
GLUCOSE UR QL STRIP: NEGATIVE
HGB UR QL STRIP: ABNORMAL
KETONES UR QL STRIP: NEGATIVE
LEUKOCYTE ESTERASE UR QL STRIP: ABNORMAL
MICROSCOPIC COMMENT: ABNORMAL
NITRITE UR QL STRIP: NEGATIVE
PH UR STRIP: 6 [PH] (ref 5–8)
PROT UR QL STRIP: ABNORMAL
RBC #/AREA URNS AUTO: 6 /HPF (ref 0–4)
SP GR UR STRIP: 1.01 (ref 1–1.03)
URN SPEC COLLECT METH UR: ABNORMAL
UROBILINOGEN UR STRIP-ACNC: NEGATIVE EU/DL
WBC #/AREA URNS AUTO: 12 /HPF (ref 0–5)

## 2019-05-01 PROCEDURE — 81000 URINALYSIS NONAUTO W/SCOPE: CPT | Mod: PO

## 2019-05-01 NOTE — TELEPHONE ENCOUNTER
----- Message from Arcenio Bryant sent at 5/1/2019  1:32 PM CDT -----  Contact: self/[phone]  She needs medical records sent to Dr. Isabel Griffin; the phone number is [phone].

## 2019-05-01 NOTE — TELEPHONE ENCOUNTER
Spoke with Ms. Gilford in regards to message. She informed me that she is scheduled to see Dr. Isabel Griffin on 5/13/19. I have faxed her labs and scans to Dr. Griffin's office per pt's request.

## 2019-05-02 ENCOUNTER — TELEPHONE (OUTPATIENT)
Dept: FAMILY MEDICINE | Facility: CLINIC | Age: 79
End: 2019-05-02

## 2019-05-02 DIAGNOSIS — R82.81 PYURIA: Primary | ICD-10-CM

## 2019-05-02 NOTE — TELEPHONE ENCOUNTER
----- Message from Marc Sands MD sent at 5/2/2019  6:28 AM CDT -----  All your blood tests are great; no cause of fatigue seen on blood tests.  Proceed with cardiology appt to rule out heart disease as cause of fatigue

## 2019-05-02 NOTE — TELEPHONE ENCOUNTER
----- Message from Marc Sands MD sent at 5/2/2019  6:29 AM CDT -----  Urine shows infection; need collect urine for culture

## 2019-05-02 NOTE — TELEPHONE ENCOUNTER
Pt has been notified and verbalized understanding of lab results. Pt will have culture done on 5/3/19

## 2019-05-03 ENCOUNTER — LAB VISIT (OUTPATIENT)
Dept: LAB | Facility: HOSPITAL | Age: 79
End: 2019-05-03
Attending: FAMILY MEDICINE
Payer: MEDICARE

## 2019-05-03 DIAGNOSIS — R82.81 PYURIA: ICD-10-CM

## 2019-05-03 PROCEDURE — 87086 URINE CULTURE/COLONY COUNT: CPT | Mod: PO

## 2019-05-05 LAB
BACTERIA UR CULT: NORMAL
BACTERIA UR CULT: NORMAL

## 2019-05-06 ENCOUNTER — TELEPHONE (OUTPATIENT)
Dept: FAMILY MEDICINE | Facility: CLINIC | Age: 79
End: 2019-05-06

## 2019-05-06 NOTE — TELEPHONE ENCOUNTER
L/M advising pt of her urine culture results.     ----- Message from Marc Sands MD sent at 5/5/2019  8:15 PM CDT -----  CALL PT TESTS ARE NORMAL

## 2019-05-07 ENCOUNTER — OFFICE VISIT (OUTPATIENT)
Dept: CARDIOLOGY | Facility: CLINIC | Age: 79
End: 2019-05-07
Payer: MEDICARE

## 2019-05-07 VITALS
HEART RATE: 82 BPM | DIASTOLIC BLOOD PRESSURE: 78 MMHG | WEIGHT: 205.13 LBS | BODY MASS INDEX: 37.75 KG/M2 | SYSTOLIC BLOOD PRESSURE: 151 MMHG | OXYGEN SATURATION: 91 % | HEIGHT: 62 IN

## 2019-05-07 DIAGNOSIS — Z91.89 AT RISK FOR CORONARY ARTERY DISEASE: ICD-10-CM

## 2019-05-07 DIAGNOSIS — I27.20 PULMONARY HYPERTENSION: ICD-10-CM

## 2019-05-07 DIAGNOSIS — Z86.79 HISTORY OF HYPERTENSION: Primary | ICD-10-CM

## 2019-05-07 DIAGNOSIS — R06.02 SHORTNESS OF BREATH: ICD-10-CM

## 2019-05-07 DIAGNOSIS — R06.02 SOB (SHORTNESS OF BREATH): ICD-10-CM

## 2019-05-07 DIAGNOSIS — Z87.891 EX-SMOKER: ICD-10-CM

## 2019-05-07 PROCEDURE — 99499 RISK ADDL DX/OHS AUDIT: ICD-10-PCS | Mod: S$GLB,,, | Performed by: STUDENT IN AN ORGANIZED HEALTH CARE EDUCATION/TRAINING PROGRAM

## 2019-05-07 PROCEDURE — 99499 UNLISTED E&M SERVICE: CPT | Mod: S$GLB,,, | Performed by: STUDENT IN AN ORGANIZED HEALTH CARE EDUCATION/TRAINING PROGRAM

## 2019-05-07 PROCEDURE — 99214 OFFICE O/P EST MOD 30 MIN: CPT | Mod: S$GLB,,, | Performed by: STUDENT IN AN ORGANIZED HEALTH CARE EDUCATION/TRAINING PROGRAM

## 2019-05-07 PROCEDURE — 1101F PT FALLS ASSESS-DOCD LE1/YR: CPT | Mod: CPTII,S$GLB,, | Performed by: STUDENT IN AN ORGANIZED HEALTH CARE EDUCATION/TRAINING PROGRAM

## 2019-05-07 PROCEDURE — 99999 PR PBB SHADOW E&M-EST. PATIENT-LVL III: ICD-10-PCS | Mod: PBBFAC,,, | Performed by: STUDENT IN AN ORGANIZED HEALTH CARE EDUCATION/TRAINING PROGRAM

## 2019-05-07 PROCEDURE — 99999 PR PBB SHADOW E&M-EST. PATIENT-LVL III: CPT | Mod: PBBFAC,,, | Performed by: STUDENT IN AN ORGANIZED HEALTH CARE EDUCATION/TRAINING PROGRAM

## 2019-05-07 PROCEDURE — 99214 PR OFFICE/OUTPT VISIT, EST, LEVL IV, 30-39 MIN: ICD-10-PCS | Mod: S$GLB,,, | Performed by: STUDENT IN AN ORGANIZED HEALTH CARE EDUCATION/TRAINING PROGRAM

## 2019-05-07 PROCEDURE — 1101F PR PT FALLS ASSESS DOC 0-1 FALLS W/OUT INJ PAST YR: ICD-10-PCS | Mod: CPTII,S$GLB,, | Performed by: STUDENT IN AN ORGANIZED HEALTH CARE EDUCATION/TRAINING PROGRAM

## 2019-05-07 NOTE — PROGRESS NOTES
"   Cardiology Clinic note    Subjective:   Patient ID:  Shirley F Gilford is a 78 y.o. female who presents for evaluation of shortness of breath.    HPI:   Shirley F Gilford  has a past medical history of Breast cyst, Eye disorder, Hyperlipidemia, Hypertension, Osteoporosis, and RLS (restless legs syndrome).  She also has COPD by history and notes she had PFTs done in the past.  She is referred by Dr. Sands for evaluation of shortness of breath at rest. Pt notes she has has progressive SOB over the past month that is worse from her baseline. She notes some ALMONTE with normal ADOL. She is compliant with home medications.   Pt was a 1-2PPD smoker from age [age]yo. Quit for the past 4 years. No prior cardiac hx. No past hx of stress testing.    At last visit Nov 2018, TTE was ordered. Reviewed the results with the patient. Normal LVEF, mild-mod Pulm Htn PA ~50's  After the CTA on 11/18, pt notes increase SOB, and angioedema of her lips. She then decided to stop all home medications thinking that they were contributing to her symptoms. Pt has been 'feeling great since off medications' More energy. She is willing to resume her home medications one by one.    5/7/19. Pt was seen in Dec, slowly adding back home medications. Now taking losartan and imdur. No medications ADR. PET stress testing did not show malignancy. She has a follow up appt with pulmonary next week.  She still notes vague ALMONTE. Stopped smoking. SBP at home is usually well controlled. No medications ADR. Did not take am mediations today.    Vitals  Vitals:    05/07/19 1351   BP: (!) 151/78   Pulse: 82   SpO2: (!) 91%   Weight: 93 kg (205 lb 1.6 oz)   Height: 5' 2" (1.575 m)       Patient Active Problem List    Diagnosis Date Noted    Pulmonary hypertension 04/30/2019    Hyperlipidemia 11/10/2018    Vitamin D deficiency 11/10/2018    Iron deficiency 06/29/2017    SOB (shortness of breath) 05/29/2017    Fatty liver 03/21/2017    History of hyperlipidemia " 01/26/2017    Hyperglycemia 01/26/2017    Restless leg syndrome 01/26/2017    Elevated LFTs 01/26/2017    Chronic obstructive pulmonary disease 01/26/2017    Ex-smoker 01/26/2017    Osteoporosis 01/04/2017    History of hypertension 01/04/2017       Patient's Medications   New Prescriptions    No medications on file   Previous Medications    ALENDRONATE (FOSAMAX) 70 MG TABLET    Take 1 tablet (70 mg total) by mouth every 7 days.    ASPIRIN (ECOTRIN) 81 MG EC TABLET    Take 1 tablet (81 mg total) by mouth once daily.    ATORVASTATIN (LIPITOR) 10 MG TABLET        CALCIUM-VITAMIN D3 (CALCIUM 500 + D) 500 MG(1,250MG) -200 UNIT PER TABLET    Take 1 tablet by mouth once daily.    CHOLECALCIFEROL, VITAMIN D3, (VITAMIN D3) 5,000 UNIT TAB    Take 5,000 Units by mouth once daily.    CYANOCOBALAMIN (VITAMIN B-12) 500 MCG TABLET    Take 500 mcg by mouth once daily.    FERROUS SULFATE 324 MG (65 MG IRON) TBEC    Take 1 tablet (325 mg total) by mouth once daily.    FLUTICASONE PROPIONATE (FLONASE) 50 MCG/ACTUATION NASAL SPRAY    2 sprays (100 mcg total) by Each Nare route once daily.    ISOSORBIDE MONONITRATE (IMDUR) 30 MG 24 HR TABLET    Take 1 tablet (30 mg total) by mouth once daily. For heart and blood pressure    LORATADINE (CLARITIN) 10 MG TABLET    Take 1 tablet (10 mg total) by mouth once daily.    LOSARTAN (COZAAR) 50 MG TABLET    Take 1 tablet (50 mg total) by mouth once daily. For bp    MONTELUKAST (SINGULAIR) 10 MG TABLET    Take 1 tablet (10 mg total) by mouth every evening.    MULTIVIT-IRON-MIN-FOLIC ACID 3,500-18-0.4 UNIT-MG-MG ORAL CHEW    Take by mouth.    ROPINIROLE (REQUIP) 2 MG TABLET    TAKE 1 TABLET BY MOUTH AT BEDTIME    ROPINIROLE (REQUIP) 3 MG TABLET    ropinirole 3 mg tablet    UMECLIDINIUM-VILANTEROL (ANORO ELLIPTA) 62.5-25 MCG/ACTUATION DSDV    Inhale 1 puff into the lungs once daily. For COPD   Modified Medications    No medications on file   Discontinued Medications    No medications on file          Review of Systems   Constitution: Negative for chills, decreased appetite, malaise/fatigue and weight gain.   HENT: Negative for congestion and ear discharge.    Eyes: Negative for blurred vision and double vision.   Cardiovascular: Positive for dyspnea on exertion. Negative for chest pain, cyanosis, irregular heartbeat, leg swelling, near-syncope, orthopnea, palpitations and syncope.   Respiratory: Positive for shortness of breath. Negative for cough and sleep disturbances due to breathing.    Skin: Negative for color change and dry skin.   Musculoskeletal: Negative for joint pain, joint swelling and muscle cramps.   Gastrointestinal: Negative for bloating, heartburn, hematemesis and hematochezia.   Genitourinary: Negative for bladder incontinence and dysuria.   Neurological: Negative for aphonia, excessive daytime sleepiness, dizziness, focal weakness, headaches, light-headedness, loss of balance and weakness.   Psychiatric/Behavioral: Negative for altered mental status, depression and memory loss. The patient does not have insomnia and is not nervous/anxious.        Objective:   Physical Exam   Constitutional: She is oriented to person, place, and time. She appears well-developed and well-nourished.   HENT:   Head: Normocephalic and atraumatic.   Eyes: Conjunctivae and EOM are normal.   Neck: Normal range of motion. Neck supple. No JVD present.   Cardiovascular: Normal rate, regular rhythm and normal heart sounds.   No murmur heard.  Pulmonary/Chest: Effort normal and breath sounds normal. No respiratory distress. She has no wheezes. She has no rales.   Prolong expiration   Abdominal: Soft. Bowel sounds are normal. She exhibits no distension.   Musculoskeletal: Normal range of motion. She exhibits no edema.   Neurological: She is alert and oriented to person, place, and time.   Skin: Skin is warm and dry. No erythema.   Psychiatric: She has a normal mood and affect. Her behavior is normal. Judgment and  thought content normal.   Nursing note and vitals reviewed.    Lab Results    Lab Results   Component Value Date     05/01/2019    K 4.4 05/01/2019     05/01/2019    CO2 31 (H) 05/01/2019    BUN 15 05/01/2019    CREATININE 0.74 05/01/2019     (H) 05/01/2019    HGBA1C 6.1 (H) 05/01/2019    AST 38 05/01/2019    ALT 29 05/01/2019    ALBUMIN 4.2 05/01/2019    PROT 7.3 05/01/2019    BILITOT 0.5 05/01/2019    WBC 10.81 05/01/2019    HGB 13.4 05/01/2019    HCT 45.0 05/01/2019    MCV 91 05/01/2019     05/01/2019    TSH 3.030 05/01/2019    CHOL 228 (H) 11/05/2018    HDL 44 11/05/2018    LDLCALC 148.4 11/05/2018    TRIG 178 (H) 11/05/2018    BNP 85 11/03/2017       Lipid panel  Lab Results   Component Value Date    CHOL 228 (H) 11/05/2018     Lab Results   Component Value Date    HDL 44 11/05/2018     Lab Results   Component Value Date    LDLCALC 148.4 11/05/2018     Lab Results   Component Value Date    TRIG 178 (H) 11/05/2018       Cardiac Studies  Significant Imaging: EKG: NSR. reviewed myself    Echo Nov 2018  · Normal left ventricular systolic function. The estimated ejection fraction is 65%  · Grade I (mild) left ventricular diastolic dysfunction consistent with impaired relaxation.  · Severe left atrial enlargement.  · The estimated PA systolic pressure is 52.56 mm Hg  · Pulmonary hypertension present.  ·   CTA:  1. There is no pulmonary embolus.  2. There is partial collapse of the right middle lobe. There is an ill-defined area of increased density in the inferior medial aspect of the lingula.     Assessment:     1. History of hypertension    2. At risk for coronary artery disease    3. Shortness of breath     4. Pulmonary hypertension    5. Ex-smoker    6. SOB (shortness of breath)        Plan:     1, SOB with ALMONTE  - PET scan: negative for mal in the setting of ill defined lung nodule  -  Echo is notable for normal LVEF, nearly normal diastology and elevated PA pressures  - pt still has  ALMONTE,   - will order stress nuclear testing to r/o obstructive CAD.  - no s/s of chest pain. ALMONTE is progressive. Normal ecg, normal LVEf, mild DD  - ok to cancel appt in 6 weeks if nuclear scan is normal.    2. HTN  - on losartan 50mg daily and imdur 30mg     3. Ex-smoker  - - spent 3-10 minutes discussing the risk and benefits of smoking cessation. Patient has quit at this time.  - Ready to quit: yes  Counseling given: Yes    Continue with current medical plan and lifestyle changes.  Return sooner for concerns or questions. If symptoms persist go to the ED    Orders Placed This Encounter   Procedures    NM Myocardial Perfusion Spect Multi Pharmacologic     Standing Status:   Future     Standing Expiration Date:   5/7/2020     Order Specific Question:   May the Radiologist modify the order per protocol to meet the clinical needs of the patient?     Answer:   Yes     Order Specific Question:   Stress Medication to use:     Answer:   Regadenoson     Order Specific Question:   Diabetes?     Answer:   No     Order Specific Question:   Will a Cardiologist read this study?     Answer:   No    Treadmill Stress Test (CUPID ONLY)     Standing Status:   Future     Standing Expiration Date:   5/7/2020     Order Specific Question:   Which stress agent will be used     Answer:   Pharm     Order Specific Question:   Which medicaton for the stress procedure?     Answer:   Regadenoson       Follow up as scheduled. Return to clinic in 4-6 weeks. Ok to cancel appt if stress testing is WNL.  She expressed verbal understanding and agreed with the plan    Thank you for the opportunity to care for this patient. Will be available for questions if needed.     Kenton Chester MD [facility]  Interventional Cardiology  [facility]  Pager: [phone]

## 2019-05-07 NOTE — LETTER
May 7, 2019      Marc Sands MD  [address] [facility] [address]           [facility] - Cardiology  [address], [address]  [facility] [address]  Phone: [phone]  Fax: [phone]          Patient: Shirley F Gilford   MR Number: [id number]   YOB: 1940   Date of Visit: 5/7/2019       Dear Dr. Marc Sands:    Thank you for referring Shirley Gilford to me for evaluation. Attached you will find relevant portions of my assessment and plan of care.    If you have questions, please do not hesitate to call me. I look forward to following Shirley Gilford along with you.    Sincerely,    Kenton Chester MD    Enclosure  CC:  No Recipients    If you would like to receive this communication electronically, please contact [email or web address] or [phone] to request more information on [employer] Link access.    For providers and/or their staff who would like to refer a patient to [facility], please contact us through our one-stop-shop provider referral line, [facility], at [phone].    If you feel you have received this communication in error or would no longer like to receive these types of communications, please e-mail [email or web address]

## 2019-05-14 DIAGNOSIS — R06.02 SHORTNESS OF BREATH ON EXERTION: ICD-10-CM

## 2019-05-14 DIAGNOSIS — R06.00 ACUTE DYSPNEA: Primary | ICD-10-CM

## 2019-05-15 RX ORDER — ROPINIROLE 3 MG/1
TABLET, FILM COATED ORAL
Qty: 90 TABLET | Refills: 1 | Status: SHIPPED | OUTPATIENT
Start: 2019-05-15 | End: 2019-11-18 | Stop reason: SDUPTHER

## 2019-05-20 ENCOUNTER — HOSPITAL ENCOUNTER (OUTPATIENT)
Dept: RADIOLOGY | Facility: HOSPITAL | Age: 79
Discharge: HOME | End: 2019-05-20
Attending: STUDENT IN AN ORGANIZED HEALTH CARE EDUCATION/TRAINING PROGRAM
Payer: MEDICARE

## 2019-05-20 ENCOUNTER — HOSPITAL ENCOUNTER (OUTPATIENT)
Dept: CARDIOLOGY | Facility: HOSPITAL | Age: 79
Discharge: HOME | End: 2019-05-20
Attending: STUDENT IN AN ORGANIZED HEALTH CARE EDUCATION/TRAINING PROGRAM
Payer: MEDICARE

## 2019-05-20 DIAGNOSIS — Z91.89 AT RISK FOR CORONARY ARTERY DISEASE: ICD-10-CM

## 2019-05-20 DIAGNOSIS — R06.02 SHORTNESS OF BREATH: ICD-10-CM

## 2019-05-20 LAB
CV PHARM DOSE: 0.4 MG
CV STRESS BASE HR: 74 BPM
DIASTOLIC BLOOD PRESSURE: 73 MMHG
OHS CV CPX 1 MINUTE RECOVERY HEART RATE: 80 BPM
OHS CV CPX 85 PERCENT MAX PREDICTED HEART RATE MALE: 117
OHS CV CPX ESTIMATED METS: 1
OHS CV CPX MAX PREDICTED HEART RATE: 137
OHS CV CPX PATIENT IS FEMALE: 1
OHS CV CPX PATIENT IS MALE: 0
OHS CV CPX PEAK DIASTOLIC BLOOD PRESSURE: 68 MMHG
OHS CV CPX PEAK HEAR RATE: 86 BPM
OHS CV CPX PEAK RATE PRESSURE PRODUCT: NORMAL
OHS CV CPX PEAK SYSTOLIC BLOOD PRESSURE: 171 MMHG
OHS CV CPX PERCENT MAX PREDICTED HEART RATE ACHIEVED: 63
OHS CV CPX PERCENT TARGET HEART RATE ACHIEVED: 60.56
OHS CV CPX RATE PRESSURE PRODUCT PRESENTING: NORMAL
OHS CV CPX TARGET HEART RATE: 142
STRESS ECHO POST ESTIMATED WORKLOAD: 1 METS
STRESS ECHO POST EXERCISE DUR MIN: 2 MIN
STRESS ECHO POST EXERCISE DUR SEC: 46
STRESS ECHO TARGET HR: 120.7 BPM
SYSTOLIC BLOOD PRESSURE: 161 MMHG

## 2019-05-20 PROCEDURE — 93017 CV STRESS TEST TRACING ONLY: CPT | Mod: PO

## 2019-05-20 PROCEDURE — 93016 TREADMILL STRESS TEST (CUPID ONLY): ICD-10-PCS | Mod: ,,, | Performed by: INTERNAL MEDICINE

## 2019-05-20 PROCEDURE — 93016 CV STRESS TEST SUPVJ ONLY: CPT | Mod: ,,, | Performed by: INTERNAL MEDICINE

## 2019-05-20 PROCEDURE — 93018 CV STRESS TEST I&R ONLY: CPT | Mod: ,,, | Performed by: INTERNAL MEDICINE

## 2019-05-20 PROCEDURE — 63600175 PHARM REV CODE 636 W HCPCS: Mod: PO | Performed by: STUDENT IN AN ORGANIZED HEALTH CARE EDUCATION/TRAINING PROGRAM

## 2019-05-20 PROCEDURE — A9502 TC99M TETROFOSMIN: HCPCS | Mod: PO

## 2019-05-20 PROCEDURE — 93018 TREADMILL STRESS TEST (CUPID ONLY): ICD-10-PCS | Mod: ,,, | Performed by: INTERNAL MEDICINE

## 2019-05-20 RX ORDER — REGADENOSON 0.08 MG/ML
0.4 INJECTION, SOLUTION INTRAVENOUS ONCE
Status: COMPLETED | OUTPATIENT
Start: 2019-05-20 | End: 2019-05-20

## 2019-05-20 RX ADMIN — REGADENOSON 0.4 MG: 0.08 INJECTION, SOLUTION INTRAVENOUS at 12:05

## 2019-05-21 ENCOUNTER — TELEPHONE (OUTPATIENT)
Dept: CARDIOLOGY | Facility: CLINIC | Age: 79
End: 2019-05-21

## 2019-05-21 NOTE — TELEPHONE ENCOUNTER
----- Message from Kenton Chester MD sent at 5/21/2019 11:52 AM CDT -----  Attempted to call pt and inform of abn test results.   - appt already set for 6/18, will try to get pt scheduled earlier.

## 2019-05-21 NOTE — TELEPHONE ENCOUNTER
----- Message from Jannet Inman sent at 5/21/2019  3:09 PM CDT -----  No. [phone]    Patient is returning Dr. Chester's call.

## 2019-05-21 NOTE — TELEPHONE ENCOUNTER
Spoke with pt and rescheduled her appt on 05-28-19. Pt is also calling wanting her echo results. Please advise.

## 2019-05-22 NOTE — TELEPHONE ENCOUNTER
Spoke with pt and informed that Dr. Chester will discuss echo results at her appt on 05-28-19. Pt stated that she understood and will see him then

## 2019-05-22 NOTE — TELEPHONE ENCOUNTER
----- Message from Sherrill Mclaughlin sent at 5/22/2019  1:16 PM CDT -----  Contact: Self [phone]  Patient Returning Your Phone Call

## 2019-05-28 ENCOUNTER — OFFICE VISIT (OUTPATIENT)
Dept: CARDIOLOGY | Facility: CLINIC | Age: 79
End: 2019-05-28
Payer: MEDICARE

## 2019-05-28 VITALS
HEART RATE: 75 BPM | BODY MASS INDEX: 36.8 KG/M2 | OXYGEN SATURATION: 96 % | DIASTOLIC BLOOD PRESSURE: 75 MMHG | SYSTOLIC BLOOD PRESSURE: 151 MMHG | HEIGHT: 62 IN | WEIGHT: 200 LBS

## 2019-05-28 DIAGNOSIS — R06.02 SOB (SHORTNESS OF BREATH): ICD-10-CM

## 2019-05-28 DIAGNOSIS — Z86.79 HISTORY OF HYPERTENSION: Primary | ICD-10-CM

## 2019-05-28 DIAGNOSIS — Z87.891 EX-SMOKER: ICD-10-CM

## 2019-05-28 PROCEDURE — 99999 PR PBB SHADOW E&M-EST. PATIENT-LVL III: CPT | Mod: PBBFAC,,, | Performed by: STUDENT IN AN ORGANIZED HEALTH CARE EDUCATION/TRAINING PROGRAM

## 2019-05-28 PROCEDURE — 99214 PR OFFICE/OUTPT VISIT, EST, LEVL IV, 30-39 MIN: ICD-10-PCS | Mod: S$GLB,,, | Performed by: STUDENT IN AN ORGANIZED HEALTH CARE EDUCATION/TRAINING PROGRAM

## 2019-05-28 PROCEDURE — 1101F PR PT FALLS ASSESS DOC 0-1 FALLS W/OUT INJ PAST YR: ICD-10-PCS | Mod: CPTII,S$GLB,, | Performed by: STUDENT IN AN ORGANIZED HEALTH CARE EDUCATION/TRAINING PROGRAM

## 2019-05-28 PROCEDURE — 99999 PR PBB SHADOW E&M-EST. PATIENT-LVL III: ICD-10-PCS | Mod: PBBFAC,,, | Performed by: STUDENT IN AN ORGANIZED HEALTH CARE EDUCATION/TRAINING PROGRAM

## 2019-05-28 PROCEDURE — 1101F PT FALLS ASSESS-DOCD LE1/YR: CPT | Mod: CPTII,S$GLB,, | Performed by: STUDENT IN AN ORGANIZED HEALTH CARE EDUCATION/TRAINING PROGRAM

## 2019-05-28 PROCEDURE — 99214 OFFICE O/P EST MOD 30 MIN: CPT | Mod: S$GLB,,, | Performed by: STUDENT IN AN ORGANIZED HEALTH CARE EDUCATION/TRAINING PROGRAM

## 2019-05-28 RX ORDER — ISOSORBIDE MONONITRATE 60 MG/1
60 TABLET, EXTENDED RELEASE ORAL DAILY
Qty: 90 TABLET | Refills: 3 | Status: SHIPPED | OUTPATIENT
Start: 2019-05-28 | End: 2020-06-09 | Stop reason: SDUPTHER

## 2019-05-28 NOTE — PROGRESS NOTES
"   Cardiology Clinic note    Subjective:   Patient ID:  Shirley F Gilford is a 78 y.o. female who presents for evaluation of shortness of breath.    HPI:   Shirley F Gilford  has a past medical history of Breast cyst, Eye disorder, Hyperlipidemia, Hypertension, Osteoporosis, and RLS (restless legs syndrome).  She also has COPD by history and notes she had PFTs done in the past.  She is referred by Dr. Sands for evaluation of shortness of breath at rest. Pt notes she has has progressive SOB over the past month that is worse from her baseline. She notes some ALMONTE with normal ADOL. She is compliant with home medications.   Pt was a 1-2PPD smoker from age [age]yo. Quit for the past 4 years. No prior cardiac hx. No past hx of stress testing.    At last visit Nov 2018, TTE was ordered. Reviewed the results with the patient. Normal LVEF, mild-mod Pulm Htn PA ~50's  After the CTA on 11/18, pt notes increase SOB, and angioedema of her lips. She then decided to stop all home medications thinking that they were contributing to her symptoms. Pt has been 'feeling great since off medications' More energy. She is willing to resume her home medications one by one.    5/7/19. Pt was seen in Dec, slowly adding back home medications. Now taking losartan and imdur. No medications ADR. PET stress testing did not show malignancy. She has a follow up appt with pulmonary next week.  She still notes vague ALMONTE. Stopped smoking. SBP at home is usually well controlled. No medications ADR. Did not take am mediations today.    5/28/19: Pt seen and examined. Now notes that she is feeling fine. No more ALMONTE. Asymptomatic. Here today to discuss stress test results that were postitive.  Compliant with home medications.    Vitals  Vitals:    05/28/19 1127   BP: (!) 151/75   Pulse: 75   SpO2: 96%   Weight: 90.7 kg (200 lb)   Height: 5' 2" (1.575 m)       Patient Active Problem List    Diagnosis Date Noted    Pulmonary hypertension 04/30/2019    " Hyperlipidemia 11/10/2018    Vitamin D deficiency 11/10/2018    Iron deficiency 06/29/2017    SOB (shortness of breath) 05/29/2017    Fatty liver 03/21/2017    History of hyperlipidemia 01/26/2017    Hyperglycemia 01/26/2017    Restless leg syndrome 01/26/2017    Elevated LFTs 01/26/2017    Chronic obstructive pulmonary disease 01/26/2017    Ex-smoker 01/26/2017    Osteoporosis 01/04/2017    History of hypertension 01/04/2017       Patient's Medications   New Prescriptions    No medications on file   Previous Medications    ALENDRONATE (FOSAMAX) 70 MG TABLET    Take 1 tablet (70 mg total) by mouth every 7 days.    ASPIRIN (ECOTRIN) 81 MG EC TABLET    Take 1 tablet (81 mg total) by mouth once daily.    ATORVASTATIN (LIPITOR) 10 MG TABLET        CALCIUM-VITAMIN D3 (CALCIUM 500 + D) 500 MG(1,250MG) -200 UNIT PER TABLET    Take 1 tablet by mouth once daily.    CHOLECALCIFEROL, VITAMIN D3, (VITAMIN D3) 5,000 UNIT TAB    Take 5,000 Units by mouth once daily.    CYANOCOBALAMIN (VITAMIN B-12) 500 MCG TABLET    Take 500 mcg by mouth once daily.    FERROUS SULFATE 324 MG (65 MG IRON) TBEC    Take 1 tablet (325 mg total) by mouth once daily.    FLUTICASONE PROPIONATE (FLONASE) 50 MCG/ACTUATION NASAL SPRAY    2 sprays (100 mcg total) by Each Nare route once daily.    LORATADINE (CLARITIN) 10 MG TABLET    Take 1 tablet (10 mg total) by mouth once daily.    LOSARTAN (COZAAR) 50 MG TABLET    Take 1 tablet (50 mg total) by mouth once daily. For bp    MONTELUKAST (SINGULAIR) 10 MG TABLET    Take 1 tablet (10 mg total) by mouth every evening.    MULTIVIT-IRON-MIN-FOLIC ACID 3,500-18-0.4 UNIT-MG-MG ORAL CHEW    Take by mouth.    ROPINIROLE (REQUIP) 2 MG TABLET    TAKE 1 TABLET BY MOUTH AT BEDTIME    ROPINIROLE (REQUIP) 3 MG TABLET    ropinirole 3 mg tablet    ROPINIROLE (REQUIP) 3 MG TABLET    TAKE 1 TABLET BY MOUTH NIGHTLY    UMECLIDINIUM-VILANTEROL (ANORO ELLIPTA) 62.5-25 MCG/ACTUATION DSDV    Inhale 1 puff into the  lungs once daily. For COPD   Modified Medications    Modified Medication Previous Medication    ISOSORBIDE MONONITRATE (IMDUR) 60 MG 24 HR TABLET isosorbide mononitrate (IMDUR) 30 MG 24 hr tablet       Take 1 tablet (60 mg total) by mouth once daily. For heart and blood pressure    Take 1 tablet (30 mg total) by mouth once daily. For heart and blood pressure   Discontinued Medications    No medications on file         Review of Systems   Constitution: Negative for chills, decreased appetite, malaise/fatigue and weight gain.   HENT: Negative for congestion and ear discharge.    Eyes: Negative for blurred vision and double vision.   Cardiovascular: Negative for chest pain, cyanosis, dyspnea on exertion, irregular heartbeat, leg swelling, near-syncope, orthopnea, palpitations and syncope.   Respiratory: Negative for cough, shortness of breath and sleep disturbances due to breathing.    Skin: Negative for color change and dry skin.   Musculoskeletal: Negative for joint pain, joint swelling and muscle cramps.   Gastrointestinal: Negative for bloating, heartburn, hematemesis and hematochezia.   Genitourinary: Negative for bladder incontinence and dysuria.   Neurological: Negative for aphonia, excessive daytime sleepiness, dizziness, focal weakness, headaches, light-headedness, loss of balance and weakness.   Psychiatric/Behavioral: Negative for altered mental status, depression and memory loss. The patient does not have insomnia and is not nervous/anxious.        Objective:   Physical Exam   Constitutional: She is oriented to person, place, and time. She appears well-developed and well-nourished.   HENT:   Head: Normocephalic and atraumatic.   Eyes: Conjunctivae and EOM are normal.   Neck: Normal range of motion. Neck supple. No JVD present.   Cardiovascular: Normal rate, regular rhythm and normal heart sounds.   No murmur heard.  Pulmonary/Chest: Effort normal and breath sounds normal. No respiratory distress. She has no  wheezes. She has no rales.   Prolong expiration   Abdominal: Soft. Bowel sounds are normal. She exhibits no distension.   Musculoskeletal: Normal range of motion. She exhibits no edema.   Neurological: She is alert and oriented to person, place, and time.   Skin: Skin is warm and dry. No erythema.   Psychiatric: She has a normal mood and affect. Her behavior is normal. Judgment and thought content normal.   Nursing note and vitals reviewed.    Lab Results    Lab Results   Component Value Date     05/01/2019    K 4.4 05/01/2019     05/01/2019    CO2 31 (H) 05/01/2019    BUN 15 05/01/2019    CREATININE 0.74 05/01/2019     (H) 05/01/2019    HGBA1C 6.1 (H) 05/01/2019    AST 38 05/01/2019    ALT 29 05/01/2019    ALBUMIN 4.2 05/01/2019    PROT 7.3 05/01/2019    BILITOT 0.5 05/01/2019    WBC 10.81 05/01/2019    HGB 13.4 05/01/2019    HCT 45.0 05/01/2019    MCV 91 05/01/2019     05/01/2019    TSH 3.030 05/01/2019    CHOL 228 (H) 11/05/2018    HDL 44 11/05/2018    LDLCALC 148.4 11/05/2018    TRIG 178 (H) 11/05/2018    BNP 85 11/03/2017       Lipid panel  Lab Results   Component Value Date    CHOL 228 (H) 11/05/2018     Lab Results   Component Value Date    HDL 44 11/05/2018     Lab Results   Component Value Date    LDLCALC 148.4 11/05/2018     Lab Results   Component Value Date    TRIG 178 (H) 11/05/2018       Cardiac Studies  Significant Imaging: EKG: NSR. reviewed myself    Echo Nov 2018  · Normal left ventricular systolic function. The estimated ejection fraction is 65%  · Grade I (mild) left ventricular diastolic dysfunction consistent with impaired relaxation.  · Severe left atrial enlargement.  · The estimated PA systolic pressure is 52.56 mm Hg  · Pulmonary hypertension present.  ·   CTA:  1. There is no pulmonary embolus.  2. There is partial collapse of the right middle lobe. There is an ill-defined area of increased density in the inferior medial aspect of the lingula.     5/2019 1.   There is a small reversible perfusion defect in the apicolateral wall of the left ventricle.  This is characteristic of ischemia.    2.  Normal motion study    3.  The left ventricular ejection fraction was calculated to be 81%.  The normal is greater than 54%.    Assessment:     1. History of hypertension    2. Ex-smoker    3. SOB (shortness of breath)        Plan:     1, SOB with ALMONTE  -  Echo is notable for normal LVEF, nearly normal diastology and elevated PA pressures  - pt still had ALMONTE, and with risk factors a stress nuclear test was done.  - small reversible perfusion defect in apical-lateral wall (Diag art), low level stress  - pt is now feeling totally fine with no ALMONTE>  - will work on medical mx, and if fail medical mx, then will consider angiogram  - will increase imdur to 60mg now, (elevated SBP ~ 150;'s)   (may consider BB if ALMONTE progresses. Pt is uneasy about BB ADR.   - if fail medications and symptoms progress, will then consider cath  Normal ecg, normal LVEf, mild DD    2. HTN  - mildly controlled  - on losartan 50mg daily and imdur 60mg     3. Ex-smoker  - - spent 3-10 minutes discussing the risk and benefits of smoking cessation. Patient has quit at this time.  - Ready to quit: yes  Counseling given: Yes    Continue with current medical plan and lifestyle changes.  Return sooner for concerns or questions. If symptoms persist go to the ED    No orders of the defined types were placed in this encounter.      Follow up as scheduled. Return to clinic in 3 months - routine check  She expressed verbal understanding and agreed with the plan    Thank you for the opportunity to care for this patient. Will be available for questions if needed.     Kenton Chester MD [facility]  Interventional Cardiology  [facility]  Pager: [phone]

## 2019-05-29 ENCOUNTER — OFFICE VISIT (OUTPATIENT)
Dept: FAMILY MEDICINE | Facility: CLINIC | Age: 79
End: 2019-05-29
Payer: MEDICARE

## 2019-05-29 VITALS
WEIGHT: 204.81 LBS | SYSTOLIC BLOOD PRESSURE: 136 MMHG | TEMPERATURE: 98 F | OXYGEN SATURATION: 94 % | DIASTOLIC BLOOD PRESSURE: 70 MMHG | HEIGHT: 62 IN | BODY MASS INDEX: 37.69 KG/M2 | HEART RATE: 69 BPM

## 2019-05-29 DIAGNOSIS — M81.0 OSTEOPOROSIS, UNSPECIFIED OSTEOPOROSIS TYPE, UNSPECIFIED PATHOLOGICAL FRACTURE PRESENCE: ICD-10-CM

## 2019-05-29 DIAGNOSIS — M94.9 DISORDER OF BONE AND ARTICULAR CARTILAGE: ICD-10-CM

## 2019-05-29 DIAGNOSIS — R06.02 SOB (SHORTNESS OF BREATH): Primary | ICD-10-CM

## 2019-05-29 DIAGNOSIS — M89.9 DISORDER OF BONE AND ARTICULAR CARTILAGE: ICD-10-CM

## 2019-05-29 DIAGNOSIS — Z86.79 HISTORY OF HYPERTENSION: ICD-10-CM

## 2019-05-29 PROCEDURE — 1101F PT FALLS ASSESS-DOCD LE1/YR: CPT | Mod: CPTII,S$GLB,, | Performed by: FAMILY MEDICINE

## 2019-05-29 PROCEDURE — 1101F PR PT FALLS ASSESS DOC 0-1 FALLS W/OUT INJ PAST YR: ICD-10-PCS | Mod: CPTII,S$GLB,, | Performed by: FAMILY MEDICINE

## 2019-05-29 PROCEDURE — 99213 OFFICE O/P EST LOW 20 MIN: CPT | Mod: S$GLB,,, | Performed by: FAMILY MEDICINE

## 2019-05-29 PROCEDURE — 99213 PR OFFICE/OUTPT VISIT, EST, LEVL III, 20-29 MIN: ICD-10-PCS | Mod: S$GLB,,, | Performed by: FAMILY MEDICINE

## 2019-05-29 NOTE — PROGRESS NOTES
Subjective:      Patient ID: Shirley F Gilford is a 78 y.o. female.    Chief Complaint: Follow-up (1 month f/up )      HPI   Here for follow up; saw cardiology, note read; imdur increased to 60 mg    Review of Systems   Constitutional: Negative.    HENT: Negative.    Respiratory: Negative.    Cardiovascular: Negative.    Gastrointestinal: Negative.    Endocrine: Negative.    Genitourinary: Negative.    Musculoskeletal: Negative.    Psychiatric/Behavioral: Negative.    All other systems reviewed and are negative.    Objective:     Physical Exam   Constitutional: She is oriented to person, place, and time. She appears well-developed and well-nourished.   HENT:   Head: Normocephalic.   Eyes: Pupils are equal, round, and reactive to light. Conjunctivae and EOM are normal.   Neck: Normal range of motion. Neck supple.   Cardiovascular: Normal rate, regular rhythm and normal heart sounds.   Pulmonary/Chest: Effort normal and breath sounds normal.   Musculoskeletal: Normal range of motion.   Neurological: She is alert and oriented to person, place, and time. She has normal reflexes.   Skin: Skin is warm and dry.   Psychiatric: She has a normal mood and affect. Her behavior is normal. Judgment and thought content normal.   Nursing note and vitals reviewed.    Assessment:     1. SOB (shortness of breath)    2. Disorder of bone and articular cartilage    3. History of hypertension    4. Osteoporosis, unspecified osteoporosis type, unspecified pathological fracture presence      Plan:        Medication List           Accurate as of 5/29/19 11:59 PM. If you have any questions, ask your nurse or doctor.               CONTINUE taking these medications    alendronate 70 MG tablet  Commonly known as:  FOSAMAX  Take 1 tablet (70 mg total) by mouth every 7 days.     aspirin 81 MG EC tablet  Commonly known as:  ECOTRIN  Take 1 tablet (81 mg total) by mouth once daily.     atorvastatin 10 MG tablet  Commonly known as:  LIPITOR      CALCIUM 500 + D 500 mg(1,250mg) -200 unit per tablet  Generic drug:  calcium-vitamin D3     CENTRUM 3,500-18-0.4 unit-mg-mg Chew  Generic drug:  multivit-iron-min-folic acid     cholecalciferol (vitamin D3) 5,000 unit Tab  Commonly known as:  VITAMIN D3  Take 5,000 Units by mouth once daily.     ferrous sulfate 324 mg (65 mg iron) Tbec  Take 1 tablet (325 mg total) by mouth once daily.     fluticasone propionate 50 mcg/actuation nasal spray  Commonly known as:  FLONASE  2 sprays (100 mcg total) by Each Nare route once daily.     isosorbide mononitrate 60 MG 24 hr tablet  Commonly known as:  IMDUR  Take 1 tablet (60 mg total) by mouth once daily. For heart and blood pressure     loratadine 10 mg tablet  Commonly known as:  CLARITIN  Take 1 tablet (10 mg total) by mouth once daily.     losartan 50 MG tablet  Commonly known as:  COZAAR  Take 1 tablet (50 mg total) by mouth once daily. For bp     montelukast 10 mg tablet  Commonly known as:  SINGULAIR  TAKE 1 TABLET BY MOUTH EVERY EVENING     * rOPINIRole 3 MG tablet  Commonly known as:  REQUIP     * rOPINIRole 2 MG tablet  Commonly known as:  REQUIP  TAKE 1 TABLET BY MOUTH AT BEDTIME     * rOPINIRole 3 MG tablet  Commonly known as:  REQUIP  TAKE 1 TABLET BY MOUTH NIGHTLY     umeclidinium-vilanterol 62.5-25 mcg/actuation Dsdv  Commonly known as:  ANORO ELLIPTA  Inhale 1 puff into the lungs once daily. For COPD     VITAMIN B-12 500 MCG tablet  Generic drug:  cyanocobalamin         * This list has 3 medication(s) that are the same as other medications prescribed for you. Read the directions carefully, and ask your doctor or other care provider to review them with you.               Where to Get Your Medications      These medications were sent to [id number] [place] [address] [address]    Phone:  [phone]   · montelukast 10 mg tablet       SOB (shortness of breath)    Disorder of bone and articular  cartilage  -     DXA Bone Density Spine And Hip; Future; Expected date: 05/29/2019    History of hypertension    Osteoporosis, unspecified osteoporosis type, unspecified pathological fracture presence

## 2019-06-01 RX ORDER — MONTELUKAST SODIUM 10 MG/1
TABLET ORAL
Qty: 90 TABLET | Refills: 1 | Status: SHIPPED | OUTPATIENT
Start: 2019-06-01 | End: 2019-12-02 | Stop reason: SDUPTHER

## 2019-06-04 ENCOUNTER — HOSPITAL ENCOUNTER (OUTPATIENT)
Dept: PULMONOLOGY | Facility: HOSPITAL | Age: 79
Discharge: HOME | End: 2019-06-04
Attending: FAMILY MEDICINE
Payer: MEDICARE

## 2019-06-04 DIAGNOSIS — R06.02 SOB (SHORTNESS OF BREATH): Primary | ICD-10-CM

## 2019-06-04 DIAGNOSIS — R06.00 ACUTE DYSPNEA: ICD-10-CM

## 2019-06-04 DIAGNOSIS — Z87.891 EX-SMOKER: ICD-10-CM

## 2019-06-04 DIAGNOSIS — R06.02 SHORTNESS OF BREATH ON EXERTION: ICD-10-CM

## 2019-06-04 DIAGNOSIS — J44.9 CHRONIC OBSTRUCTIVE PULMONARY DISEASE, UNSPECIFIED COPD TYPE: ICD-10-CM

## 2019-06-04 PROCEDURE — 94618 PULMONARY STRESS TESTING: CPT

## 2019-06-11 DIAGNOSIS — E61.1 IRON DEFICIENCY: ICD-10-CM

## 2019-06-11 DIAGNOSIS — E55.9 VITAMIN D DEFICIENCY: ICD-10-CM

## 2019-06-11 DIAGNOSIS — E78.5 HYPERLIPIDEMIA, UNSPECIFIED HYPERLIPIDEMIA TYPE: ICD-10-CM

## 2019-06-11 DIAGNOSIS — R73.9 HYPERGLYCEMIA: ICD-10-CM

## 2019-06-12 RX ORDER — ATORVASTATIN CALCIUM 10 MG/1
TABLET, FILM COATED ORAL
Qty: 90 TABLET | Refills: 1 | Status: SHIPPED | OUTPATIENT
Start: 2019-06-12 | End: 2019-12-05 | Stop reason: SDUPTHER

## 2019-07-23 ENCOUNTER — TELEPHONE (OUTPATIENT)
Dept: CARDIOLOGY | Facility: CLINIC | Age: 79
End: 2019-07-23

## 2019-09-18 RX ORDER — ALENDRONATE SODIUM 70 MG/1
TABLET ORAL
Qty: 12 TABLET | Refills: 3 | Status: SHIPPED | OUTPATIENT
Start: 2019-09-18 | End: 2020-09-07

## 2019-09-24 ENCOUNTER — TELEPHONE (OUTPATIENT)
Dept: FAMILY MEDICINE | Facility: CLINIC | Age: 79
End: 2019-09-24

## 2019-09-24 DIAGNOSIS — R73.9 HYPERGLYCEMIA: Primary | ICD-10-CM

## 2019-09-24 NOTE — TELEPHONE ENCOUNTER
Patient advised A1c orders sent to [facility]     ----- Message from My Mercado sent at 9/24/2019 11:37 AM CDT -----  Contact: Pt    Pt is requesting a callback from office at [phone] says she need to get blood work done before she come in    Pt is scheduled on 10/30/2019 and wants her lab done

## 2019-10-03 ENCOUNTER — LAB VISIT (OUTPATIENT)
Dept: LAB | Facility: HOSPITAL | Age: 79
End: 2019-10-03
Attending: FAMILY MEDICINE
Payer: MEDICARE

## 2019-10-03 DIAGNOSIS — R73.9 HYPERGLYCEMIA: ICD-10-CM

## 2019-10-03 LAB
ESTIMATED AVG GLUCOSE: 126 MG/DL (ref 68–131)
HBA1C MFR BLD HPLC: 6 % (ref 4–5.6)

## 2019-10-03 PROCEDURE — 36415 COLL VENOUS BLD VENIPUNCTURE: CPT | Mod: PO

## 2019-10-03 PROCEDURE — 83036 HEMOGLOBIN GLYCOSYLATED A1C: CPT

## 2019-11-19 RX ORDER — ROPINIROLE 3 MG/1
TABLET, FILM COATED ORAL
Qty: 90 TABLET | Refills: 3 | Status: SHIPPED | OUTPATIENT
Start: 2019-11-19 | End: 2020-11-22

## 2019-12-02 RX ORDER — MONTELUKAST SODIUM 10 MG/1
TABLET ORAL
Qty: 90 TABLET | Refills: 0 | Status: SHIPPED | OUTPATIENT
Start: 2019-12-02 | End: 2020-03-04

## 2019-12-03 ENCOUNTER — TELEPHONE (OUTPATIENT)
Dept: FAMILY MEDICINE | Facility: CLINIC | Age: 79
End: 2019-12-03

## 2019-12-03 NOTE — TELEPHONE ENCOUNTER
----- Message from Sushila Martínez sent at 12/3/2019  3:55 PM CST -----  Contact: self  [phone]  Pt is calling to schedule an appt for muscle back pain. Pt is sorry she missed appt oversight. Pt is seeking an appt tomorrow please      Thank you!

## 2019-12-04 ENCOUNTER — OFFICE VISIT (OUTPATIENT)
Dept: FAMILY MEDICINE | Facility: CLINIC | Age: 79
End: 2019-12-04
Payer: MEDICARE

## 2019-12-04 VITALS
HEART RATE: 74 BPM | HEIGHT: 62 IN | TEMPERATURE: 98 F | BODY MASS INDEX: 39.88 KG/M2 | SYSTOLIC BLOOD PRESSURE: 132 MMHG | DIASTOLIC BLOOD PRESSURE: 74 MMHG | OXYGEN SATURATION: 95 % | WEIGHT: 216.69 LBS

## 2019-12-04 DIAGNOSIS — M54.9 MUSCULOSKELETAL BACK PAIN: Primary | ICD-10-CM

## 2019-12-04 PROCEDURE — 1101F PT FALLS ASSESS-DOCD LE1/YR: CPT | Mod: CPTII,S$GLB,, | Performed by: FAMILY MEDICINE

## 2019-12-04 PROCEDURE — 1125F AMNT PAIN NOTED PAIN PRSNT: CPT | Mod: S$GLB,,, | Performed by: FAMILY MEDICINE

## 2019-12-04 PROCEDURE — 1101F PR PT FALLS ASSESS DOC 0-1 FALLS W/OUT INJ PAST YR: ICD-10-PCS | Mod: CPTII,S$GLB,, | Performed by: FAMILY MEDICINE

## 2019-12-04 PROCEDURE — 1125F PR PAIN SEVERITY QUANTIFIED, PAIN PRESENT: ICD-10-PCS | Mod: S$GLB,,, | Performed by: FAMILY MEDICINE

## 2019-12-04 PROCEDURE — 1159F MED LIST DOCD IN RCRD: CPT | Mod: S$GLB,,, | Performed by: FAMILY MEDICINE

## 2019-12-04 PROCEDURE — 1159F PR MEDICATION LIST DOCUMENTED IN MEDICAL RECORD: ICD-10-PCS | Mod: S$GLB,,, | Performed by: FAMILY MEDICINE

## 2019-12-04 PROCEDURE — 99213 PR OFFICE/OUTPT VISIT, EST, LEVL III, 20-29 MIN: ICD-10-PCS | Mod: S$GLB,,, | Performed by: FAMILY MEDICINE

## 2019-12-04 PROCEDURE — 99213 OFFICE O/P EST LOW 20 MIN: CPT | Mod: S$GLB,,, | Performed by: FAMILY MEDICINE

## 2019-12-04 RX ORDER — ACETAMINOPHEN AND CODEINE PHOSPHATE 300; 30 MG/1; MG/1
TABLET ORAL
Qty: 20 TABLET | Refills: 0 | Status: SHIPPED | OUTPATIENT
Start: 2019-12-04 | End: 2019-12-12

## 2019-12-04 RX ORDER — NAPROXEN 500 MG/1
500 TABLET ORAL 2 TIMES DAILY WITH MEALS
Qty: 30 TABLET | Refills: 2 | Status: SHIPPED | OUTPATIENT
Start: 2019-12-04 | End: 2020-10-23

## 2019-12-04 RX ORDER — ALBUTEROL SULFATE 0.83 MG/ML
SOLUTION RESPIRATORY (INHALATION)
COMMUNITY
End: 2020-05-13 | Stop reason: SDUPTHER

## 2019-12-04 NOTE — PATIENT INSTRUCTIONS
Reducing Risk of Musculoskeletal Disorders (MSDs): Posture  Standing, sitting, and moving incorrectly all increase your risk of musculoskeletal disorders (MSDs). Why? Because posture problems overwork your body. They strain your muscles and tendons and stress your joints. With a little adjustment, however, you can correct most posture problems. Whatever you do, try to stay in a near neutral position and to work within easy reach. Tasks take less force when you work from a stable base. Take your knowledge of ergonomic principles home with you.    Stay near neutral  Whether you're standing or sitting, neutral posture places the least amount of stress on your body. To find neutral, line up your ears, shoulders, and hips. Keep your head upright and relax while you do this. If you're holding your breath or your shoulders are creeping toward your ears, try again. Your shoulders should be level, with your arms relaxed at your sides. You can rest your body by returning to neutral as often as possible. Other helpful positions include:  · Keep your hands, wrists, and forearms straight and parallel to the floor.  · Keep your head level, facing forward, and in line with your torso.  · Your feet should be supported by the floor, and your thighs and hips supported by a padded seat.  · If you are sitting, it is important to have your lower back supported.  Work within reach  Keep your work within 14 to 18 inches of your body, depending on your size. Reaching too far can be awkward. It also reduces your muscle power, so you need to use more force. Never lock a joint by extending it until it can't go any farther. Also, avoid reaching overhead or behind your back, if you can. If you can't, return to neutral as soon as possible.  Support your body off the job  Have you thought about your posture while you clean house or watch TV? Anytime you're not using a neutral posture, you might be straining muscles or joints. Just sitting on a  sagging sofa every evening may be enough to strain your back. Remember these tips:  · When relaxing, support your body so you're comfortable and not twisted. On a couch or chair, put a rolled-up pillow behind your back to support it.  · No matter what you're doing (cooking, cleaning, carpentry), work within reach.  · An old, sagging, lumpy mattress can be doing your body harm. A comfortable mattress that's firm but has enough cushion to support your body's natural curves provides better rest and opportunity for your body to recover the demands of the day.  Date Last Reviewed: 12/31/2015  © 7831-9136 [employer]. [address] [place] [address], [place] [address]. All rights reserved. This information is not intended as a substitute for professional medical care. Always follow your healthcare professional's instructions.

## 2019-12-04 NOTE — PROGRESS NOTES
Subjective:       Patient ID: Shirley F Gilford is a 79 y.o. female.    Chief Complaint: Back Pain    78 y/o female with hx of COPD with c/o right sided back pain down to waist after doing some decoration at home a week ago, pain is no paresthesias, some relief with OTCS  No previous hx of back pain  No rash    Review of Systems    Objective:      Physical Exam   Constitutional: She is oriented to person, place, and time. She appears well-developed and well-nourished. She is cooperative.  Non-toxic appearance. She does not appear ill. No distress.   HENT:   Head: Normocephalic and atraumatic.   Right Ear: Hearing, tympanic membrane, external ear and ear canal normal.   Left Ear: Hearing, tympanic membrane, external ear and ear canal normal.   Nose: Nose normal. No mucosal edema, rhinorrhea or nasal deformity. No epistaxis. Right sinus exhibits no maxillary sinus tenderness and no frontal sinus tenderness. Left sinus exhibits no maxillary sinus tenderness and no frontal sinus tenderness.   Mouth/Throat: Uvula is midline, oropharynx is clear and moist and mucous membranes are normal. No trismus in the jaw. Normal dentition. No uvula swelling. No posterior oropharyngeal erythema.   Eyes: Conjunctivae and lids are normal. Right eye exhibits no discharge. Left eye exhibits no discharge. No scleral icterus.   Sclera clear bilat   Neck: Trachea normal, normal range of motion, full passive range of motion without pain and phonation normal. Neck supple.   Cardiovascular: Normal rate, regular rhythm, normal heart sounds, intact distal pulses and normal pulses.   Pulmonary/Chest: Effort normal and breath sounds normal. No respiratory distress.   Abdominal: Soft. Normal appearance and bowel sounds are normal. She exhibits no distension, no pulsatile midline mass and no mass. There is no tenderness.   Musculoskeletal: Normal range of motion. She exhibits no edema or deformity.   UE: FROM  BACK  Midl TTP at right infra scapular  area  Flexion and right lateral lumbar movement with increase tenderness  SLRE negative     Neurological: She is alert and oriented to person, place, and time. She exhibits normal muscle tone. Coordination normal.   Skin: Skin is warm, dry and intact. She is not diaphoretic. No pallor.   Psychiatric: She has a normal mood and affect. Her speech is normal and behavior is normal. Judgment and thought content normal. Cognition and memory are normal.   Nursing note and vitals reviewed.      Assessment:       1. Musculoskeletal back pain        Plan:         Angelique was seen today for back pain.    Diagnoses and all orders for this visit:    Musculoskeletal back pain    Other orders  -     naproxen (NAPROSYN) 500 MG tablet; Take 1 tablet (500 mg total) by mouth 2 (two) times daily with meals.  -     acetaminophen-codeine 300-30mg (TYLENOL #3) 300-30 mg Tab; Take one po q 8 hrs prn severe pain.

## 2019-12-05 DIAGNOSIS — E78.5 HYPERLIPIDEMIA, UNSPECIFIED HYPERLIPIDEMIA TYPE: ICD-10-CM

## 2019-12-05 DIAGNOSIS — R73.9 HYPERGLYCEMIA: ICD-10-CM

## 2019-12-05 DIAGNOSIS — E61.1 IRON DEFICIENCY: ICD-10-CM

## 2019-12-05 DIAGNOSIS — E55.9 VITAMIN D DEFICIENCY: ICD-10-CM

## 2019-12-06 RX ORDER — ATORVASTATIN CALCIUM 10 MG/1
TABLET, FILM COATED ORAL
Qty: 90 TABLET | Refills: 3 | Status: SHIPPED | OUTPATIENT
Start: 2019-12-06 | End: 2020-12-06

## 2019-12-12 ENCOUNTER — OFFICE VISIT (OUTPATIENT)
Dept: FAMILY MEDICINE | Facility: CLINIC | Age: 79
End: 2019-12-12
Payer: MEDICARE

## 2019-12-12 ENCOUNTER — TELEPHONE (OUTPATIENT)
Dept: FAMILY MEDICINE | Facility: CLINIC | Age: 79
End: 2019-12-12

## 2019-12-12 ENCOUNTER — HOSPITAL ENCOUNTER (OUTPATIENT)
Dept: RADIOLOGY | Facility: HOSPITAL | Age: 79
Discharge: HOME | End: 2019-12-12
Attending: FAMILY MEDICINE
Payer: MEDICARE

## 2019-12-12 VITALS
WEIGHT: 215.63 LBS | HEIGHT: 62 IN | OXYGEN SATURATION: 95 % | SYSTOLIC BLOOD PRESSURE: 134 MMHG | TEMPERATURE: 98 F | BODY MASS INDEX: 39.68 KG/M2 | DIASTOLIC BLOOD PRESSURE: 78 MMHG | HEART RATE: 70 BPM

## 2019-12-12 DIAGNOSIS — R07.89 CHEST WALL PAIN: ICD-10-CM

## 2019-12-12 DIAGNOSIS — R06.02 SOB (SHORTNESS OF BREATH): ICD-10-CM

## 2019-12-12 DIAGNOSIS — R09.1 PLEURISY: ICD-10-CM

## 2019-12-12 DIAGNOSIS — Z87.891 EX-SMOKER: ICD-10-CM

## 2019-12-12 DIAGNOSIS — Z87.891 EX-SMOKER: Primary | ICD-10-CM

## 2019-12-12 DIAGNOSIS — J40 BRONCHITIS: ICD-10-CM

## 2019-12-12 DIAGNOSIS — J44.9 CHRONIC OBSTRUCTIVE PULMONARY DISEASE, UNSPECIFIED COPD TYPE: ICD-10-CM

## 2019-12-12 PROCEDURE — 71046 X-RAY EXAM CHEST 2 VIEWS: CPT | Mod: TC,FY,PO

## 2019-12-12 PROCEDURE — 99213 PR OFFICE/OUTPT VISIT, EST, LEVL III, 20-29 MIN: ICD-10-PCS | Mod: S$GLB,,, | Performed by: FAMILY MEDICINE

## 2019-12-12 PROCEDURE — 1101F PT FALLS ASSESS-DOCD LE1/YR: CPT | Mod: CPTII,S$GLB,, | Performed by: FAMILY MEDICINE

## 2019-12-12 PROCEDURE — 1159F MED LIST DOCD IN RCRD: CPT | Mod: S$GLB,,, | Performed by: FAMILY MEDICINE

## 2019-12-12 PROCEDURE — 99213 OFFICE O/P EST LOW 20 MIN: CPT | Mod: S$GLB,,, | Performed by: FAMILY MEDICINE

## 2019-12-12 PROCEDURE — 1125F PR PAIN SEVERITY QUANTIFIED, PAIN PRESENT: ICD-10-PCS | Mod: S$GLB,,, | Performed by: FAMILY MEDICINE

## 2019-12-12 PROCEDURE — 1101F PR PT FALLS ASSESS DOC 0-1 FALLS W/OUT INJ PAST YR: ICD-10-PCS | Mod: CPTII,S$GLB,, | Performed by: FAMILY MEDICINE

## 2019-12-12 PROCEDURE — 99499 RISK ADDL DX/OHS AUDIT: ICD-10-PCS | Mod: S$GLB,,, | Performed by: FAMILY MEDICINE

## 2019-12-12 PROCEDURE — 1125F AMNT PAIN NOTED PAIN PRSNT: CPT | Mod: S$GLB,,, | Performed by: FAMILY MEDICINE

## 2019-12-12 PROCEDURE — 99499 UNLISTED E&M SERVICE: CPT | Mod: S$GLB,,, | Performed by: FAMILY MEDICINE

## 2019-12-12 PROCEDURE — 1159F PR MEDICATION LIST DOCUMENTED IN MEDICAL RECORD: ICD-10-PCS | Mod: S$GLB,,, | Performed by: FAMILY MEDICINE

## 2019-12-12 RX ORDER — METHYLPREDNISOLONE 4 MG/1
TABLET ORAL
Qty: 1 PACKAGE | Refills: 0 | Status: SHIPPED | OUTPATIENT
Start: 2019-12-12 | End: 2020-05-13

## 2019-12-12 RX ORDER — AMOXICILLIN AND CLAVULANATE POTASSIUM 875; 125 MG/1; MG/1
1 TABLET, FILM COATED ORAL 2 TIMES DAILY
Qty: 20 TABLET | Refills: 0 | Status: SHIPPED | OUTPATIENT
Start: 2019-12-12 | End: 2019-12-22

## 2019-12-12 NOTE — TELEPHONE ENCOUNTER
----- Message from Lala Bustamante sent at 12/12/2019  9:55 AM CST -----  Contact: [phone]/self  Type:  Same Day Appointment Request    Caller is requesting a same day appointment.  Caller declined first available appointment listed below.    Name of Caller: self  When is the first available appointment? Still feeling the same as last Wednesday  Symptoms:   Best Call Back Number:   Additional Information:  Only Dr. Sands please

## 2019-12-12 NOTE — PROGRESS NOTES
"22Subjective:      Patient ID: Shirley F Gilford is a 79 y.o. female.    Chief Complaint: Back Pain      Vitals:    12/12/19 1202   BP: 134/78   Pulse: 70   Temp: 97.8 °F (36.6 °C)   TempSrc: Oral   SpO2: 95%   Weight: 97.8 kg (215 lb 9.8 oz)   Height: 5' 2" (1.575 m)        HPI   Pain and feels awful for 2 weeks; in bed for 2 weeks due to feeling awful  Points to right scapula area as center of pain travels up neckk and around side; no trauma; coughing again like bronchitis; hurt to cough, no phlegm, felt like feverish; more sob  Took ibuprofen it helps; saw Dr Porter; his rx knocked her out cold;     Review of Systems   Constitutional: Negative.    HENT: Negative.    Respiratory: Positive for cough and shortness of breath.    Cardiovascular: Positive for chest pain.   Gastrointestinal: Negative.    Endocrine: Negative.    Genitourinary: Negative.    Musculoskeletal: Negative.    Psychiatric/Behavioral: Negative.    All other systems reviewed and are negative.    Objective:     Physical Exam   Constitutional: She is oriented to person, place, and time. She appears well-developed and well-nourished.   HENT:   Head: Normocephalic.   Eyes: Pupils are equal, round, and reactive to light. Conjunctivae and EOM are normal.   Neck: Normal range of motion. Neck supple.   Cardiovascular: Normal rate and normal heart sounds.   Pulmonary/Chest: Effort normal and breath sounds normal. No stridor. No respiratory distress. She has no wheezes. She has no rales. She exhibits tenderness.   Musculoskeletal: Normal range of motion.   Neurological: She is alert and oriented to person, place, and time. She has normal reflexes.   Skin: Skin is warm and dry.   Psychiatric: She has a normal mood and affect. Her behavior is normal. Judgment and thought content normal.   Nursing note and vitals reviewed.    Assessment:     1. Ex-smoker    2. SOB (shortness of breath)    3. Chronic obstructive pulmonary disease, unspecified COPD type    4. " Pleurisy    5. Bronchitis    6. Chest wall pain      Plan:        Medication List           Accurate as of December 12, 2019 11:59 PM. If you have any questions, ask your nurse or doctor.               START taking these medications    amoxicillin-clavulanate 875-125mg 875-125 mg per tablet  Commonly known as:  Augmentin  Take 1 tablet by mouth 2 (two) times daily. for 10 days  Started by:  Marc Sands MD     methylPREDNISolone 4 mg tablet  Commonly known as:  MEDROL DOSEPACK  use as directed  Started by:  Marc Sands MD        CONTINUE taking these medications    albuterol 2.5 mg /3 mL (0.083 %) nebulizer solution  Commonly known as:  PROVENTIL     alendronate 70 MG tablet  Commonly known as:  FOSAMAX  TAKE 1 TABLET BY MOUTH EVERY 7 DAYS     aspirin 81 MG EC tablet  Commonly known as:  ECOTRIN  Take 1 tablet (81 mg total) by mouth once daily.     atorvastatin 10 MG tablet  Commonly known as:  LIPITOR  TAKE 1 TABLET BY MOUTH ONCE A DAY     Calcium 500 + D 500 mg(1,250mg) -200 unit per tablet  Generic drug:  calcium-vitamin D3     Centrum 3,500-18-0.4 unit-mg-mg Chew  Generic drug:  multivit-iron-min-folic acid     cholecalciferol (vitamin D3) 125 mcg (5,000 unit) Tab  Commonly known as:  Vitamin D3  Take 5,000 Units by mouth once daily.     ferrous sulfate 324 mg (65 mg iron) Tbec  Take 1 tablet (325 mg total) by mouth once daily.     fluticasone propionate 50 mcg/actuation nasal spray  Commonly known as:  FLONASE  2 sprays (100 mcg total) by Each Nare route once daily.     isosorbide mononitrate 60 MG 24 hr tablet  Commonly known as:  IMDUR  Take 1 tablet (60 mg total) by mouth once daily. For heart and blood pressure     loratadine 10 mg tablet  Commonly known as:  CLARITIN  Take 1 tablet (10 mg total) by mouth once daily.     losartan 50 MG tablet  Commonly known as:  COZAAR  Take 1 tablet (50 mg total) by mouth once daily. For bp     montelukast 10 mg tablet  Commonly known as:  SINGULAIR  TAKE 1 TABLET  BY MOUTH EVERY EVENING     naproxen 500 MG tablet  Commonly known as:  Naprosyn  Take 1 tablet (500 mg total) by mouth 2 (two) times daily with meals.     rOPINIRole 3 MG tablet  Commonly known as:  REQUIP  TAKE 1 TABLET BY MOUTH NIGHTLY     Vitamin B-12 500 MCG tablet  Generic drug:  cyanocobalamin        STOP taking these medications    acetaminophen-codeine 300-30mg 300-30 mg Tab  Commonly known as:  TYLENOL #3  Stopped by:  Marc Sands MD     umeclidinium-vilanterol 62.5-25 mcg/actuation Dsdv  Commonly known as:  ANORO ELLIPTA  Stopped by:  Marc Sands MD           Where to Get Your Medications      These medications were sent to [id number] [place], [place]  [address] [place] [id number]    Phone:  [phone]   · amoxicillin-clavulanate 875-125mg 875-125 mg per tablet  · methylPREDNISolone 4 mg tablet       Ex-smoker  -     X-Ray Chest PA And Lateral; Future; Expected date: 12/12/2019    SOB (shortness of breath)  -     X-Ray Chest PA And Lateral; Future; Expected date: 12/12/2019    Chronic obstructive pulmonary disease, unspecified COPD type  -     X-Ray Chest PA And Lateral; Future; Expected date: 12/12/2019    Pleurisy  -     X-Ray Chest PA And Lateral; Future; Expected date: 12/12/2019    Bronchitis  -     X-Ray Chest PA And Lateral; Future; Expected date: 12/12/2019    Chest wall pain  -     X-Ray Chest PA And Lateral; Future; Expected date: 12/12/2019    Other orders  -     amoxicillin-clavulanate 875-125mg (AUGMENTIN) 875-125 mg per tablet; Take 1 tablet by mouth 2 (two) times daily. for 10 days  Dispense: 20 tablet; Refill: 0  -     methylPREDNISolone (MEDROL DOSEPACK) 4 mg tablet; use as directed  Dispense: 1 Package; Refill: 0

## 2020-01-21 RX ORDER — LOSARTAN POTASSIUM 50 MG/1
TABLET ORAL
Qty: 90 TABLET | Refills: 3 | Status: SHIPPED | OUTPATIENT
Start: 2020-01-21 | End: 2020-05-05 | Stop reason: SDUPTHER

## 2020-03-04 RX ORDER — MONTELUKAST SODIUM 10 MG/1
TABLET ORAL
Qty: 90 TABLET | Refills: 3 | Status: SHIPPED | OUTPATIENT
Start: 2020-03-04 | End: 2021-03-07

## 2020-05-05 RX ORDER — LOSARTAN POTASSIUM 50 MG/1
50 TABLET ORAL DAILY
Qty: 90 TABLET | Refills: 3 | Status: SHIPPED | OUTPATIENT
Start: 2020-05-05 | End: 2020-05-13

## 2020-05-05 NOTE — TELEPHONE ENCOUNTER
PATIENT NOTIFIED      ----- Message from Ivone Liu sent at 5/5/2020 12:44 PM CDT -----  Contact: [phone]-self  Patient is requesting a call back concerning pt needs a prescription for losartan (COZAAR) 50 MG tablet, due to the pt never received her medication that was deliver to her, pt's complex told her they never received her Package. Pt states her insurance will cover the medication to be refilled. Pt has been with out her medication for over a week. Please call

## 2020-05-11 ENCOUNTER — TELEPHONE (OUTPATIENT)
Dept: FAMILY MEDICINE | Facility: CLINIC | Age: 80
End: 2020-05-11

## 2020-05-11 NOTE — TELEPHONE ENCOUNTER
Spoke to pt and she was given an appointment with Dr. Sands on Wednesday for insomnia. Pt understood an was pleased.

## 2020-05-11 NOTE — TELEPHONE ENCOUNTER
----- Message from Lala Bustamante sent at 5/11/2020 12:40 PM CDT -----  Contact: [phone]/self  Type:  Sooner Apoointment Request    Caller is requesting a sooner appointment.  When is the first available appointment? 05/22  Symptoms: Not sleeping well, hallucinating and not feeling well  Would the patient rather a call back or a response via [facility]?  call  Best Call Back Number: [phone]/self  Additional Information:  Only Dr. Sands

## 2020-05-13 ENCOUNTER — LAB VISIT (OUTPATIENT)
Dept: LAB | Facility: HOSPITAL | Age: 80
End: 2020-05-13
Attending: FAMILY MEDICINE
Payer: MEDICARE

## 2020-05-13 ENCOUNTER — OFFICE VISIT (OUTPATIENT)
Dept: FAMILY MEDICINE | Facility: CLINIC | Age: 80
End: 2020-05-13
Payer: MEDICARE

## 2020-05-13 VITALS
HEART RATE: 77 BPM | TEMPERATURE: 98 F | OXYGEN SATURATION: 93 % | SYSTOLIC BLOOD PRESSURE: 108 MMHG | BODY MASS INDEX: 39.15 KG/M2 | HEIGHT: 62 IN | DIASTOLIC BLOOD PRESSURE: 64 MMHG | WEIGHT: 212.75 LBS

## 2020-05-13 DIAGNOSIS — Z86.79 HISTORY OF HYPERTENSION: ICD-10-CM

## 2020-05-13 DIAGNOSIS — R53.83 FATIGUE, UNSPECIFIED TYPE: Primary | ICD-10-CM

## 2020-05-13 DIAGNOSIS — G25.81 RESTLESS LEG SYNDROME: ICD-10-CM

## 2020-05-13 DIAGNOSIS — J44.9 CHRONIC OBSTRUCTIVE PULMONARY DISEASE, UNSPECIFIED COPD TYPE: ICD-10-CM

## 2020-05-13 DIAGNOSIS — R79.89 ELEVATED LFTS: ICD-10-CM

## 2020-05-13 DIAGNOSIS — R63.0 LOSS OF APPETITE: ICD-10-CM

## 2020-05-13 DIAGNOSIS — R44.3 HALLUCINATIONS: ICD-10-CM

## 2020-05-13 DIAGNOSIS — R73.9 HYPERGLYCEMIA: ICD-10-CM

## 2020-05-13 DIAGNOSIS — M81.0 OSTEOPOROSIS, UNSPECIFIED OSTEOPOROSIS TYPE, UNSPECIFIED PATHOLOGICAL FRACTURE PRESENCE: ICD-10-CM

## 2020-05-13 DIAGNOSIS — E61.1 IRON DEFICIENCY: ICD-10-CM

## 2020-05-13 DIAGNOSIS — Z86.39 HISTORY OF HYPERLIPIDEMIA: ICD-10-CM

## 2020-05-13 DIAGNOSIS — E55.9 VITAMIN D DEFICIENCY: ICD-10-CM

## 2020-05-13 DIAGNOSIS — Z87.891 EX-SMOKER: ICD-10-CM

## 2020-05-13 DIAGNOSIS — K76.0 FATTY LIVER: ICD-10-CM

## 2020-05-13 DIAGNOSIS — R53.83 FATIGUE, UNSPECIFIED TYPE: ICD-10-CM

## 2020-05-13 LAB
ALBUMIN SERPL BCP-MCNC: 4.6 G/DL (ref 3.5–5.2)
ALP SERPL-CCNC: 89 U/L (ref 38–126)
ALT SERPL W/O P-5'-P-CCNC: 38 U/L (ref 10–44)
ANION GAP SERPL CALC-SCNC: 9 MMOL/L (ref 8–16)
AST SERPL-CCNC: 45 U/L (ref 15–46)
BASOPHILS # BLD AUTO: 0.07 K/UL (ref 0–0.2)
BASOPHILS NFR BLD: 0.6 % (ref 0–1.9)
BILIRUB SERPL-MCNC: 0.4 MG/DL (ref 0.1–1)
BUN SERPL-MCNC: 18 MG/DL (ref 7–17)
CALCIUM SERPL-MCNC: 10.1 MG/DL (ref 8.7–10.5)
CHLORIDE SERPL-SCNC: 103 MMOL/L (ref 95–110)
CHOLEST SERPL-MCNC: 172 MG/DL (ref 120–199)
CHOLEST/HDLC SERPL: 3.2 {RATIO} (ref 2–5)
CO2 SERPL-SCNC: 30 MMOL/L (ref 23–29)
CREAT SERPL-MCNC: 0.82 MG/DL (ref 0.5–1.4)
DIFFERENTIAL METHOD: ABNORMAL
EOSINOPHIL # BLD AUTO: 0.3 K/UL (ref 0–0.5)
EOSINOPHIL NFR BLD: 2.5 % (ref 0–8)
ERYTHROCYTE [DISTWIDTH] IN BLOOD BY AUTOMATED COUNT: 14.4 % (ref 11.5–14.5)
ERYTHROCYTE [SEDIMENTATION RATE] IN BLOOD BY WESTERGREN METHOD: 22 MM/HR (ref 0–20)
EST. GFR  (AFRICAN AMERICAN): >60 ML/MIN/1.73 M^2
EST. GFR  (NON AFRICAN AMERICAN): >60 ML/MIN/1.73 M^2
ESTIMATED AVG GLUCOSE: 126 MG/DL (ref 68–131)
GLUCOSE SERPL-MCNC: 108 MG/DL (ref 70–110)
HBA1C MFR BLD HPLC: 6 % (ref 4–5.6)
HCT VFR BLD AUTO: 48.3 % (ref 37–48.5)
HDLC SERPL-MCNC: 53 MG/DL (ref 40–75)
HDLC SERPL: 30.8 % (ref 20–50)
HGB BLD-MCNC: 14.3 G/DL (ref 12–16)
IMM GRANULOCYTES # BLD AUTO: 0.03 K/UL (ref 0–0.04)
IMM GRANULOCYTES NFR BLD AUTO: 0.3 % (ref 0–0.5)
LDLC SERPL CALC-MCNC: 94 MG/DL (ref 63–159)
LYMPHOCYTES # BLD AUTO: 1.7 K/UL (ref 1–4.8)
LYMPHOCYTES NFR BLD: 15.5 % (ref 18–48)
MCH RBC QN AUTO: 28 PG (ref 27–31)
MCHC RBC AUTO-ENTMCNC: 29.6 G/DL (ref 32–36)
MCV RBC AUTO: 95 FL (ref 82–98)
MONOCYTES # BLD AUTO: 0.8 K/UL (ref 0.3–1)
MONOCYTES NFR BLD: 7.2 % (ref 4–15)
NEUTROPHILS # BLD AUTO: 8.1 K/UL (ref 1.8–7.7)
NEUTROPHILS NFR BLD: 73.9 % (ref 38–73)
NONHDLC SERPL-MCNC: 119 MG/DL
NRBC BLD-RTO: 0 /100 WBC
PLATELET # BLD AUTO: 235 K/UL (ref 150–350)
PMV BLD AUTO: 10.3 FL (ref 9.2–12.9)
POTASSIUM SERPL-SCNC: 4.5 MMOL/L (ref 3.5–5.1)
PROT SERPL-MCNC: 7.7 G/DL (ref 6–8.4)
RBC # BLD AUTO: 5.11 M/UL (ref 4–5.4)
SODIUM SERPL-SCNC: 142 MMOL/L (ref 136–145)
TRIGL SERPL-MCNC: 125 MG/DL (ref 30–150)
TSH SERPL DL<=0.005 MIU/L-ACNC: 3.78 UIU/ML (ref 0.4–4)
WBC # BLD AUTO: 11.01 K/UL (ref 3.9–12.7)

## 2020-05-13 PROCEDURE — 1159F PR MEDICATION LIST DOCUMENTED IN MEDICAL RECORD: ICD-10-PCS | Mod: S$GLB,,, | Performed by: FAMILY MEDICINE

## 2020-05-13 PROCEDURE — 85652 RBC SED RATE AUTOMATED: CPT

## 2020-05-13 PROCEDURE — 1159F MED LIST DOCD IN RCRD: CPT | Mod: S$GLB,,, | Performed by: FAMILY MEDICINE

## 2020-05-13 PROCEDURE — 83036 HEMOGLOBIN GLYCOSYLATED A1C: CPT

## 2020-05-13 PROCEDURE — 99499 RISK ADDL DX/OHS AUDIT: ICD-10-PCS | Mod: S$GLB,,, | Performed by: FAMILY MEDICINE

## 2020-05-13 PROCEDURE — 99213 PR OFFICE/OUTPT VISIT, EST, LEVL III, 20-29 MIN: ICD-10-PCS | Mod: S$GLB,,, | Performed by: FAMILY MEDICINE

## 2020-05-13 PROCEDURE — 1101F PT FALLS ASSESS-DOCD LE1/YR: CPT | Mod: CPTII,S$GLB,, | Performed by: FAMILY MEDICINE

## 2020-05-13 PROCEDURE — 99213 OFFICE O/P EST LOW 20 MIN: CPT | Mod: S$GLB,,, | Performed by: FAMILY MEDICINE

## 2020-05-13 PROCEDURE — 84443 ASSAY THYROID STIM HORMONE: CPT | Mod: PO

## 2020-05-13 PROCEDURE — 1126F AMNT PAIN NOTED NONE PRSNT: CPT | Mod: S$GLB,,, | Performed by: FAMILY MEDICINE

## 2020-05-13 PROCEDURE — 82306 VITAMIN D 25 HYDROXY: CPT | Mod: PO

## 2020-05-13 PROCEDURE — 36415 COLL VENOUS BLD VENIPUNCTURE: CPT | Mod: PO

## 2020-05-13 PROCEDURE — 85025 COMPLETE CBC W/AUTO DIFF WBC: CPT | Mod: PO

## 2020-05-13 PROCEDURE — 80053 COMPREHEN METABOLIC PANEL: CPT | Mod: PO

## 2020-05-13 PROCEDURE — 1126F PR PAIN SEVERITY QUANTIFIED, NO PAIN PRESENT: ICD-10-PCS | Mod: S$GLB,,, | Performed by: FAMILY MEDICINE

## 2020-05-13 PROCEDURE — 99499 UNLISTED E&M SERVICE: CPT | Mod: S$GLB,,, | Performed by: FAMILY MEDICINE

## 2020-05-13 PROCEDURE — 1101F PR PT FALLS ASSESS DOC 0-1 FALLS W/OUT INJ PAST YR: ICD-10-PCS | Mod: CPTII,S$GLB,, | Performed by: FAMILY MEDICINE

## 2020-05-13 PROCEDURE — 80061 LIPID PANEL: CPT

## 2020-05-13 RX ORDER — ALBUTEROL SULFATE 0.83 MG/ML
SOLUTION RESPIRATORY (INHALATION)
Qty: 100 EACH | Refills: 11 | Status: SHIPPED | OUTPATIENT
Start: 2020-05-13 | End: 2022-10-03

## 2020-05-13 NOTE — PROGRESS NOTES
"Subjective:      Patient ID: Shirley F Gilford is a 79 y.o. female.    Chief Complaint: Fatigue      Vitals:    05/13/20 1102   BP: 108/64   Pulse: 77   Temp: 98.4 °F (36.9 °C)   TempSrc: Oral   SpO2: (!) 93%   Weight: 96.5 kg (212 lb 11.9 oz)   Height: 5' 2" (1.575 m)        HPI   Tired and no appetite; for a while, for months, no energy, no interest, doesn't care, not sucidaol  Tired of the covid and isolation  notg smoking or drinkig  Needs alb refill  bp low today, too low for 80 yearold  Hallucinated and is aware now; not at the time until she asked her friend  Men at door, who weren't there and wman with a baby holding baby, in white    Problem List  Patient Active Problem List   Diagnosis    Osteoporosis    History of hypertension    History of hyperlipidemia    Hyperglycemia    Restless leg syndrome    Elevated LFTs    Chronic obstructive pulmonary disease    Ex-smoker    Fatty liver    SOB (shortness of breath)    Iron deficiency    Hyperlipidemia    Vitamin D deficiency    Pulmonary hypertension        ALLERGIES:   Review of patient's allergies indicates:   Allergen Reactions    Contrast media Swelling    Gabapentin Nausea And Vomiting       MEDS:   Current Outpatient Medications:     albuterol (PROVENTIL) 2.5 mg /3 mL (0.083 %) nebulizer solution, albuterol sulfate 2.5 mg/3 mL (0.083 %) solution for nebulization, Disp: , Rfl:     alendronate (FOSAMAX) 70 MG tablet, TAKE 1 TABLET BY MOUTH EVERY 7 DAYS, Disp: 12 tablet, Rfl: 3    aspirin (ECOTRIN) 81 MG EC tablet, Take 1 tablet (81 mg total) by mouth once daily., Disp: 100 tablet, Rfl: 12    atorvastatin (LIPITOR) 10 MG tablet, TAKE 1 TABLET BY MOUTH ONCE A DAY, Disp: 90 tablet, Rfl: 3    calcium-vitamin D3 (CALCIUM 500 + D) 500 mg(1,250mg) -200 unit per tablet, Take 1 tablet by mouth once daily., Disp: , Rfl:     cholecalciferol, vitamin D3, (VITAMIN D3) 5,000 unit Tab, Take 5,000 Units by mouth once daily., Disp: 90 tablet, Rfl: 3   "  cyanocobalamin (VITAMIN B-12) 500 MCG tablet, Take 500 mcg by mouth once daily., Disp: , Rfl:     ferrous sulfate 324 mg (65 mg iron) TbEC, Take 1 tablet (325 mg total) by mouth once daily., Disp: , Rfl: 0    fluticasone propionate (FLONASE) 50 mcg/actuation nasal spray, 2 sprays (100 mcg total) by Each Nare route once daily., Disp: 1 Bottle, Rfl: 12    isosorbide mononitrate (IMDUR) 60 MG 24 hr tablet, Take 1 tablet (60 mg total) by mouth once daily. For heart and blood pressure, Disp: 90 tablet, Rfl: 3    montelukast (SINGULAIR) 10 mg tablet, TAKE 1 TABLET BY MOUTH EVERY EVENING, Disp: 90 tablet, Rfl: 3    MULTIVIT-IRON-MIN-FOLIC ACID 3,500-18-0.4 UNIT-MG-MG ORAL CHEW, Take by mouth., Disp: , Rfl:     rOPINIRole (REQUIP) 3 MG tablet, TAKE 1 TABLET BY MOUTH NIGHTLY, Disp: 90 tablet, Rfl: 3    loratadine (CLARITIN) 10 mg tablet, Take 1 tablet (10 mg total) by mouth once daily., Disp: 90 tablet, Rfl: 3    naproxen (NAPROSYN) 500 MG tablet, Take 1 tablet (500 mg total) by mouth 2 (two) times daily with meals. (Patient not taking: Reported on 12/12/2019), Disp: 30 tablet, Rfl: 2      History:  Current Providers as of 5/13/2020  PCP: Marc Sands MD  Care Team Provider: Mahamed Kidd MD  Care Team Provider: Alessandro Titus Jr., MD  Care Team Provider: Pavel Murillo LPN  Care Team Provider: Pavel Murillo LPN  Care Team Provider: Isabel Griffin MD  Encounter Provider: Marc Sands MD, starting on Wed May 13, 2020 12:00 AM  Referring Provider: not found, starting on Wed May 13, 2020 12:00 AM  Consulting Physician: Marc Sands MD, starting on Wed May 13, 2020 10:59 AM (Active)   Past Medical History:   Diagnosis Date    Breast cyst     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   HENT: Negative.    Respiratory: Negative.    Cardiovascular: Negative.    Gastrointestinal: Negative.    Endocrine: Negative.    Genitourinary: Negative.    Musculoskeletal: Negative.    Psychiatric/Behavioral: Positive for hallucinations.   All other systems reviewed and are negative.    Objective:     Physical Exam   Constitutional: She is oriented to person, place, and time. She appears well-developed and well-nourished. No distress.   obese   HENT:   Head: Normocephalic.   Eyes: Pupils are equal, round, and reactive to light. Conjunctivae and EOM are normal.   Neck: Normal range of motion. Neck supple.   Cardiovascular: Normal rate, regular rhythm and normal heart sounds.   Pulmonary/Chest: Effort normal and breath sounds normal.   Musculoskeletal: Normal range of motion.   Neurological: She is alert and oriented to person, place, and time. She has normal reflexes.   Skin: Skin is warm and dry. She is not diaphoretic.   Psychiatric: She has a normal mood and affect. Her behavior is normal. Judgment and thought content normal.   Nursing note and vitals reviewed.          Assessment:     1. Fatigue, unspecified type    2. Loss of appetite    3. Ex-smoker    4. History of hypertension    5. Hallucinations    6. Restless leg syndrome    7. Chronic obstructive pulmonary disease, unspecified COPD type    8. History of hyperlipidemia    9. Hyperglycemia    10. Iron deficiency    11. Vitamin D deficiency    12. Elevated LFTs    13. Fatty liver    14. Osteoporosis, unspecified osteoporosis type, unspecified pathological fracture presence      Plan:         Medication List           Accurate as of May 13, 2020 11:36 AM. If you have any questions, ask your nurse or doctor.               CONTINUE taking these medications    albuterol 2.5 mg /3 mL (0.083 %) nebulizer solution  Commonly known as:  PROVENTIL     alendronate 70 MG tablet  Commonly known as:  FOSAMAX  TAKE 1 TABLET BY MOUTH EVERY 7 DAYS     aspirin 81 MG EC tablet  Commonly known as:  ECOTRIN  Take 1 tablet (81 mg total) by mouth once daily.     atorvastatin 10 MG tablet  Commonly known as:  LIPITOR  TAKE 1 TABLET BY MOUTH ONCE A DAY     CALCIUM 500 + D 500 mg(1,250mg) -200 unit per tablet  Generic drug:  calcium-vitamin D3     CENTRUM 3,500-18-0.4 unit-mg-mg Chew  Generic drug:  multivit-iron-min-folic acid     cholecalciferol (vitamin D3) 125 mcg (5,000 unit) Tab  Commonly known as:  VITAMIN D3  Take 5,000 Units by mouth once daily.     ferrous sulfate 324 mg (65 mg iron) Tbec  Take 1 tablet (325 mg total) by mouth once daily.     fluticasone propionate 50 mcg/actuation nasal spray  Commonly known as:  FLONASE  2 sprays (100 mcg total) by Each Nare route once daily.     isosorbide mononitrate 60 MG 24 hr tablet  Commonly known as:  IMDUR  Take 1 tablet (60 mg total) by mouth once daily. For heart and blood pressure     loratadine 10 mg tablet  Commonly known as:  CLARITIN  Take 1 tablet (10 mg total) by mouth once daily.     montelukast 10 mg tablet  Commonly known as:  SINGULAIR  TAKE 1 TABLET BY MOUTH EVERY EVENING     naproxen 500 MG tablet  Commonly known as:  NAPROSYN  Take 1 tablet (500 mg total) by mouth 2 (two) times daily with meals.     rOPINIRole 3 MG tablet  Commonly known as:  REQUIP  TAKE 1 TABLET BY MOUTH NIGHTLY     VITAMIN B-12 500 MCG tablet  Generic drug:  cyanocobalamin        STOP taking these medications    losartan 50 MG tablet  Commonly known as:  COZAAR  Stopped by:  Marc Sands MD     methylPREDNISolone 4 mg tablet  Commonly known as:  MEDROL DOSEPACK  Stopped by:  Marc KAUFMAN  MD hSavon          Fatigue, unspecified type    Loss of appetite    Ex-smoker    History of hypertension    Hallucinations    Restless leg syndrome    Chronic obstructive pulmonary disease, unspecified COPD type    History of hyperlipidemia    Hyperglycemia    Iron deficiency    Vitamin D deficiency    Elevated LFTs    Fatty liver    Osteoporosis, unspecified osteoporosis type, unspecified pathological fracture presence    stop losartan and monitor bp at home  RTc 3 weeks bp check  Pt declines decreasing ro changing requip  Pt has had hallucinations lateley, though has been on requip on long while

## 2020-05-14 LAB — 25(OH)D3+25(OH)D2 SERPL-MCNC: 61 NG/ML (ref 30–96)

## 2020-06-10 RX ORDER — ISOSORBIDE MONONITRATE 60 MG/1
60 TABLET, EXTENDED RELEASE ORAL DAILY
Qty: 90 TABLET | Refills: 3 | Status: SHIPPED | OUTPATIENT
Start: 2020-06-10 | End: 2021-06-27

## 2020-07-13 ENCOUNTER — TELEPHONE (OUTPATIENT)
Dept: FAMILY MEDICINE | Facility: CLINIC | Age: 80
End: 2020-07-13

## 2020-07-13 NOTE — TELEPHONE ENCOUNTER
Patient has been scheduled.     ----- Message from Svetlana Becerril sent at 7/13/2020  2:02 PM CDT -----  Type:  Needs Medical Advice    Who Called: self  Reason:Patient whole right arm is hurting and would like an appointment. Stated I didn't see anything until September  Would the patient rather a call back or a response via [facility]? callback  Best Call Back Number: [phone]  Additional Information: none

## 2020-08-03 ENCOUNTER — HOSPITAL ENCOUNTER (INPATIENT)
Facility: HOSPITAL | Age: 80
LOS: 3 days | Discharge: HOME | DRG: 179 | End: 2020-08-06
Attending: EMERGENCY MEDICINE | Admitting: INTERNAL MEDICINE
Payer: MEDICARE

## 2020-08-03 ENCOUNTER — TELEPHONE (OUTPATIENT)
Dept: FAMILY MEDICINE | Facility: CLINIC | Age: 80
End: 2020-08-03

## 2020-08-03 DIAGNOSIS — R06.02 SOB (SHORTNESS OF BREATH): ICD-10-CM

## 2020-08-03 DIAGNOSIS — R09.02 HYPOXIA: ICD-10-CM

## 2020-08-03 DIAGNOSIS — R53.1 WEAK: ICD-10-CM

## 2020-08-03 DIAGNOSIS — J44.9 CHRONIC OBSTRUCTIVE PULMONARY DISEASE, UNSPECIFIED COPD TYPE: ICD-10-CM

## 2020-08-03 DIAGNOSIS — U07.1 COVID-19 VIRUS INFECTION: Primary | ICD-10-CM

## 2020-08-03 LAB
ALBUMIN SERPL BCP-MCNC: 3.9 G/DL (ref 3.5–5.2)
ALP SERPL-CCNC: 87 U/L (ref 38–126)
ALT SERPL W/O P-5'-P-CCNC: 38 U/L (ref 10–44)
ANION GAP SERPL CALC-SCNC: 7 MMOL/L (ref 8–16)
AST SERPL-CCNC: 59 U/L (ref 15–46)
BACTERIA #/AREA URNS AUTO: ABNORMAL /HPF
BASOPHILS # BLD AUTO: 0.01 K/UL (ref 0–0.2)
BASOPHILS NFR BLD: 0.2 % (ref 0–1.9)
BILIRUB SERPL-MCNC: 0.4 MG/DL (ref 0.1–1)
BILIRUB UR QL STRIP: NEGATIVE
BUN SERPL-MCNC: 16 MG/DL (ref 7–17)
CALCIUM SERPL-MCNC: 9 MG/DL (ref 8.7–10.5)
CHLORIDE SERPL-SCNC: 104 MMOL/L (ref 95–110)
CK SERPL-CCNC: 52 U/L (ref 55–170)
CLARITY UR REFRACT.AUTO: ABNORMAL
CO2 SERPL-SCNC: 28 MMOL/L (ref 23–29)
COLOR UR AUTO: YELLOW
CREAT SERPL-MCNC: 0.78 MG/DL (ref 0.5–1.4)
CRP SERPL-MCNC: 2.28 MG/DL (ref 0–1)
D DIMER PPP IA.FEU-MCNC: 0.56 MG/L FEU
DIFFERENTIAL METHOD: ABNORMAL
EOSINOPHIL # BLD AUTO: 0 K/UL (ref 0–0.5)
EOSINOPHIL NFR BLD: 0.2 % (ref 0–8)
ERYTHROCYTE [DISTWIDTH] IN BLOOD BY AUTOMATED COUNT: 13.5 % (ref 11.5–14.5)
EST. GFR  (AFRICAN AMERICAN): >60 ML/MIN/1.73 M^2
EST. GFR  (NON AFRICAN AMERICAN): >60 ML/MIN/1.73 M^2
FERRITIN SERPL-MCNC: 114 NG/ML (ref 20–300)
GLUCOSE SERPL-MCNC: 101 MG/DL (ref 70–110)
GLUCOSE UR QL STRIP: NEGATIVE
HCT VFR BLD AUTO: 46 % (ref 37–48.5)
HGB BLD-MCNC: 14.2 G/DL (ref 12–16)
HGB UR QL STRIP: ABNORMAL
HYALINE CASTS UR QL AUTO: 1 /LPF
IMM GRANULOCYTES # BLD AUTO: 0.03 K/UL (ref 0–0.04)
IMM GRANULOCYTES NFR BLD AUTO: 0.5 % (ref 0–0.5)
KETONES UR QL STRIP: NEGATIVE
LACTATE SERPL-SCNC: 1 MMOL/L (ref 0.5–2.2)
LEUKOCYTE ESTERASE UR QL STRIP: ABNORMAL
LYMPHOCYTES # BLD AUTO: 1 K/UL (ref 1–4.8)
LYMPHOCYTES NFR BLD: 18.4 % (ref 18–48)
MCH RBC QN AUTO: 28.7 PG (ref 27–31)
MCHC RBC AUTO-ENTMCNC: 30.9 G/DL (ref 32–36)
MCV RBC AUTO: 93 FL (ref 82–98)
MICROSCOPIC COMMENT: ABNORMAL
MONOCYTES # BLD AUTO: 0.6 K/UL (ref 0.3–1)
MONOCYTES NFR BLD: 11.4 % (ref 4–15)
NEUTROPHILS # BLD AUTO: 3.9 K/UL (ref 1.8–7.7)
NEUTROPHILS NFR BLD: 69.3 % (ref 38–73)
NITRITE UR QL STRIP: NEGATIVE
NRBC BLD-RTO: 0 /100 WBC
NT-PROBNP: 186 PG/ML (ref 5–1800)
PH UR STRIP: 5 [PH] (ref 5–8)
PLATELET # BLD AUTO: 174 K/UL (ref 150–350)
PMV BLD AUTO: 10.8 FL (ref 9.2–12.9)
POTASSIUM SERPL-SCNC: 3.8 MMOL/L (ref 3.5–5.1)
PROT SERPL-MCNC: 7 G/DL (ref 6–8.4)
PROT UR QL STRIP: ABNORMAL
RBC # BLD AUTO: 4.94 M/UL (ref 4–5.4)
RBC #/AREA URNS AUTO: 1 /HPF (ref 0–4)
SARS-COV-2 RDRP RESP QL NAA+PROBE: POSITIVE
SODIUM SERPL-SCNC: 139 MMOL/L (ref 136–145)
SP GR UR STRIP: 1.02 (ref 1–1.03)
TROPONIN I SERPL DL<=0.01 NG/ML-MCNC: <0.012 NG/ML (ref 0.01–0.03)
URN SPEC COLLECT METH UR: ABNORMAL
UROBILINOGEN UR STRIP-ACNC: NEGATIVE EU/DL
WBC # BLD AUTO: 5.61 K/UL (ref 3.9–12.7)
WBC #/AREA URNS AUTO: 5 /HPF (ref 0–5)

## 2020-08-03 PROCEDURE — 85025 COMPLETE CBC W/AUTO DIFF WBC: CPT | Mod: ER

## 2020-08-03 PROCEDURE — 82728 ASSAY OF FERRITIN: CPT

## 2020-08-03 PROCEDURE — 96374 THER/PROPH/DIAG INJ IV PUSH: CPT | Mod: ER

## 2020-08-03 PROCEDURE — 63600175 PHARM REV CODE 636 W HCPCS: Mod: ER | Performed by: EMERGENCY MEDICINE

## 2020-08-03 PROCEDURE — U0002 COVID-19 LAB TEST NON-CDC: HCPCS | Mod: ER

## 2020-08-03 PROCEDURE — 11000001 HC ACUTE MED/SURG PRIVATE ROOM

## 2020-08-03 PROCEDURE — 86140 C-REACTIVE PROTEIN: CPT | Mod: ER

## 2020-08-03 PROCEDURE — 25000003 PHARM REV CODE 250: Performed by: STUDENT IN AN ORGANIZED HEALTH CARE EDUCATION/TRAINING PROGRAM

## 2020-08-03 PROCEDURE — 93010 ELECTROCARDIOGRAM REPORT: CPT | Mod: ,,, | Performed by: INTERNAL MEDICINE

## 2020-08-03 PROCEDURE — 93005 ELECTROCARDIOGRAM TRACING: CPT | Mod: ER

## 2020-08-03 PROCEDURE — 83605 ASSAY OF LACTIC ACID: CPT | Mod: ER

## 2020-08-03 PROCEDURE — 83880 ASSAY OF NATRIURETIC PEPTIDE: CPT | Mod: ER

## 2020-08-03 PROCEDURE — 87040 BLOOD CULTURE FOR BACTERIA: CPT | Mod: ER

## 2020-08-03 PROCEDURE — 82550 ASSAY OF CK (CPK): CPT | Mod: ER

## 2020-08-03 PROCEDURE — 84484 ASSAY OF TROPONIN QUANT: CPT | Mod: ER

## 2020-08-03 PROCEDURE — 93010 EKG 12-LEAD: ICD-10-PCS | Mod: ,,, | Performed by: INTERNAL MEDICINE

## 2020-08-03 PROCEDURE — 85379 FIBRIN DEGRADATION QUANT: CPT | Mod: ER

## 2020-08-03 PROCEDURE — 63600175 PHARM REV CODE 636 W HCPCS: Performed by: STUDENT IN AN ORGANIZED HEALTH CARE EDUCATION/TRAINING PROGRAM

## 2020-08-03 PROCEDURE — 99285 EMERGENCY DEPT VISIT HI MDM: CPT | Mod: 25,ER

## 2020-08-03 PROCEDURE — 80053 COMPREHEN METABOLIC PANEL: CPT | Mod: ER

## 2020-08-03 PROCEDURE — 81000 URINALYSIS NONAUTO W/SCOPE: CPT | Mod: ER

## 2020-08-03 RX ORDER — ISOSORBIDE MONONITRATE 30 MG/1
60 TABLET, EXTENDED RELEASE ORAL DAILY
Status: DISCONTINUED | OUTPATIENT
Start: 2020-08-04 | End: 2020-08-06 | Stop reason: HOSPADM

## 2020-08-03 RX ORDER — ENOXAPARIN SODIUM 100 MG/ML
1 INJECTION SUBCUTANEOUS
Status: DISCONTINUED | OUTPATIENT
Start: 2020-08-03 | End: 2020-08-06 | Stop reason: HOSPADM

## 2020-08-03 RX ORDER — ROPINIROLE 1 MG/1
3 TABLET, FILM COATED ORAL NIGHTLY
Status: DISCONTINUED | OUTPATIENT
Start: 2020-08-03 | End: 2020-08-06 | Stop reason: HOSPADM

## 2020-08-03 RX ORDER — DEXAMETHASONE SODIUM PHOSPHATE 4 MG/ML
6 INJECTION, SOLUTION INTRA-ARTICULAR; INTRALESIONAL; INTRAMUSCULAR; INTRAVENOUS; SOFT TISSUE DAILY
Status: DISCONTINUED | OUTPATIENT
Start: 2020-08-04 | End: 2020-08-06 | Stop reason: HOSPADM

## 2020-08-03 RX ORDER — SODIUM CHLORIDE 0.9 % (FLUSH) 0.9 %
10 SYRINGE (ML) INJECTION
Status: DISCONTINUED | OUTPATIENT
Start: 2020-08-03 | End: 2020-08-06 | Stop reason: HOSPADM

## 2020-08-03 RX ORDER — DEXAMETHASONE SODIUM PHOSPHATE 4 MG/ML
6 INJECTION, SOLUTION INTRA-ARTICULAR; INTRALESIONAL; INTRAMUSCULAR; INTRAVENOUS; SOFT TISSUE
Status: COMPLETED | OUTPATIENT
Start: 2020-08-03 | End: 2020-08-03

## 2020-08-03 RX ADMIN — DEXAMETHASONE SODIUM PHOSPHATE 6 MG: 4 INJECTION, SOLUTION INTRAMUSCULAR; INTRAVENOUS at 02:08

## 2020-08-03 RX ADMIN — ROPINIROLE HYDROCHLORIDE 3 MG: 1 TABLET, FILM COATED ORAL at 11:08

## 2020-08-03 RX ADMIN — ENOXAPARIN SODIUM 90 MG: 100 INJECTION SUBCUTANEOUS at 11:08

## 2020-08-03 NOTE — ED NOTES
Pt awaiting lab results. tv turned on. Be low locked position. Call light within reach and instructed on use. Pt denies wanting a blanket at this time

## 2020-08-03 NOTE — ED NOTES
Faxed medical necessity form for [facility] at this time [phone]

## 2020-08-03 NOTE — TELEPHONE ENCOUNTER
Attempted to call pt.  No answer.  I left a message for pt to return our call.     ----- Message from Ivone Liu sent at 8/3/2020 11:47 AM CDT -----  Contact: [phone]  Type:  Needs Medical Advice    Who Called: PT  Symptoms (please be specific): pt has been sick since Tuesday, pt can not eat or smell and pt is very weak and would like to know what to do . Pt is going to the ER, pt has tried reaching out the Office and has not hear anything back since 9 this morning  How long has patient had these symptoms: Week  Pharmacy name and phone #:  N/a  Would the patient rather a call back or a response via [facility]? Call back  Best Call Back Number: [phone]  Additional Information:  pt spoke with the  Yesterday and was told to call the office at 8

## 2020-08-03 NOTE — ED PROVIDER NOTES
Chief Complaint  Chief Complaint   Patient presents with    Fever     Pt c/o fever, diarrhea, loss of taste/smell, chronic cough and weakness since Tuesday. Denies chest pain or SOB.        HPI  Shirley F Gilford is a 79 y.o. female who presents with loss of taste and smell.  Patient also reports some cough and some worsening weakness.  She reports fever.  Also some diarrhea.  She did have some nausea and vomiting earlier.  She denies any significant pain at this time but she does feel globally weak.  She reports this is been ongoing for about 1 week.  No asymmetric leg pain or swelling.  No sudden change in symptoms.    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    Hyperlipidemia     Hypertension     Osteoporosis     RLS (restless legs syndrome)        Current Medications    Current Facility-Administered Medications:     dexamethasone injection 6 mg, 6 mg, Intravenous, ED 1 Time, Jarrod Sandhu MD    Current Outpatient Medications:     albuterol (PROVENTIL) 2.5 mg /3 mL (0.083 %) nebulizer solution, albuterol sulfate 2.5 mg/3 mL (0.083 %) solution for nebulization, Disp: 100 each, Rfl: 11    alendronate (FOSAMAX) 70 MG tablet, TAKE 1 TABLET BY MOUTH EVERY 7 DAYS, Disp: 12 tablet, Rfl: 3    aspirin (ECOTRIN) 81 MG EC tablet, Take 1 tablet (81 mg total) by mouth once daily., Disp: 100 tablet, Rfl: 12    atorvastatin (LIPITOR) 10 MG tablet, TAKE 1 TABLET BY MOUTH ONCE A DAY, Disp: 90 tablet, Rfl: 3    calcium-vitamin D3 (CALCIUM 500 + D) 500 mg(1,250mg) -200 unit per tablet, Take 1 tablet by mouth once daily., Disp: , Rfl:     cholecalciferol, vitamin D3, (VITAMIN D3) 5,000 unit Tab, Take 5,000 Units by mouth once daily., Disp: 90 tablet, Rfl: 3    cyanocobalamin (VITAMIN B-12) 500 MCG tablet, Take 500 mcg by mouth once daily., Disp: , Rfl:     ferrous sulfate 324 mg (65 mg iron) TbEC, Take 1 tablet (325 mg total) by mouth  once daily., Disp: , Rfl: 0    fluticasone propionate (FLONASE) 50 mcg/actuation nasal spray, 2 sprays (100 mcg total) by Each Nare route once daily., Disp: 1 Bottle, Rfl: 12    isosorbide mononitrate (IMDUR) 60 MG 24 hr tablet, Take 1 tablet (60 mg total) by mouth once daily. For heart and blood pressure, Disp: 90 tablet, Rfl: 3    loratadine (CLARITIN) 10 mg tablet, Take 1 tablet (10 mg total) by mouth once daily., Disp: 90 tablet, Rfl: 3    montelukast (SINGULAIR) 10 mg tablet, TAKE 1 TABLET BY MOUTH EVERY EVENING, Disp: 90 tablet, Rfl: 3    MULTIVIT-IRON-MIN-FOLIC ACID 3,500-18-0.4 UNIT-MG-MG ORAL CHEW, Take by mouth., Disp: , Rfl:     naproxen (NAPROSYN) 500 MG tablet, Take 1 tablet (500 mg total) by mouth 2 (two) times daily with meals. (Patient not taking: Reported on 2019), Disp: 30 tablet, Rfl: 2    rOPINIRole (REQUIP) 3 MG tablet, TAKE 1 TABLET BY MOUTH NIGHTLY, Disp: 90 tablet, Rfl: 3    Allergies  Review of patient's allergies indicates:   Allergen Reactions    Contrast media Swelling    Gabapentin Nausea And Vomiting       Surgical history  Past Surgical History:   Procedure Laterality Date    BLADDER SURGERY      cancer Mahamed Kidd    BREAST SURGERY      left benign tumor     SECTION      COLONOSCOPY      EYE SURGERY      cataracts    TONSILLECTOMY         Social history  Social History     Socioeconomic History    Marital status:      Spouse name: Not on file    Number of children: Not on file    Years of education: Not on file    Highest education level: Not on file   Occupational History    Not on file   Social Needs    Financial resource strain: Not on file    Food insecurity     Worry: Not on file     Inability: Not on file    Transportation needs     Medical: Not on file     Non-medical: Not on file   Tobacco Use    Smoking status: Former Smoker    Smokeless tobacco: Never Used   Substance and Sexual Activity    Alcohol use: No    Drug use: Not on  "file    Sexual activity: Not on file   Lifestyle    Physical activity     Days per week: Not on file     Minutes per session: Not on file    Stress: Not on file   Relationships    Social connections     Talks on phone: Not on file     Gets together: Not on file     Attends Baptism service: Not on file     Active member of club or organization: Not on file     Attends meetings of clubs or organizations: Not on file     Relationship status: Not on file   Other Topics Concern    Not on file   Social History Narrative    Not on file       Family History  Family History   Problem Relation Age of Onset    Cancer Daughter 40        colon    Colon cancer Daughter     No Known Problems Daughter     Diabetes Mother     Stroke Mother     Heart disease Father        Review of systems  Musculoskeletal: No injury; full range of motion.  Skin: No rash, abscess, or laceration.  Neurologic: No new focal weakness or sensory changes.  All systems otherwise negative except as noted in ROS and HPI    Physical Exam  Vital signs: BP (!) 135/90   Pulse 70   Temp 99 °F (37.2 °C) (Oral)   Resp (!) 22   Ht 5' 2" (1.575 m)   Wt 90.7 kg (200 lb)   SpO2 (!) 93%   BMI 36.58 kg/m²   Constitutional: No acute distress.  Well developed, alert, oriented and appropriate.  HENT: Normocephalic, atraumatic. Normal ear, nose, and throat.  Eyes: PERRL, EOMI, normal conjunctiva.  Neck: Normal range of motion, no tenderness; supple.  Respiratory:  Slight tachypnea  Cardiovascular: RRR with no pulse deficit.  GI: Soft, nontender, no rebound or guarding.  Musculoskeletal: Normal ROM, no tenderness, injury, or edema.  Skin: Warm, dry skin without infection or injury.  Neurologic: Normal motor, sensation with no new focal deficit.  Psychiatric: Affect normal, judgement normal, mood normal.  No SI, HI, and not gravely disabled.    Labs  Pertinent labs reviewed (see chart for details)  Labs Reviewed   SARS-COV-2 RNA AMPLIFICATION, QUAL - " Abnormal; Notable for the following components:       Result Value    SARS-CoV-2 RNA, Amplification, Qual Positive (*)     All other components within normal limits   CBC W/ AUTO DIFFERENTIAL - Abnormal; Notable for the following components:    Mean Corpuscular Hemoglobin Conc 30.9 (*)     All other components within normal limits   COMPREHENSIVE METABOLIC PANEL - Abnormal; Notable for the following components:    AST 59 (*)     Anion Gap 7 (*)     All other components within normal limits   C-REACTIVE PROTEIN - Abnormal; Notable for the following components:    CRP 2.28 (*)     All other components within normal limits   CK - Abnormal; Notable for the following components:    CPK 52 (*)     All other components within normal limits   D DIMER, QUANTITATIVE - Abnormal; Notable for the following components:    D-Dimer 0.56 (*)     All other components within normal limits   CULTURE, BLOOD   CULTURE, BLOOD   LACTIC ACID, PLASMA   TROPONIN I   NT-PRO NATRIURETIC PEPTIDE   FERRITIN   LACTATE DEHYDROGENASE   URINALYSIS, REFLEX TO URINE CULTURE   URINALYSIS MICROSCOPIC       ECG  Results for orders placed or performed during the hospital encounter of 08/03/20   EKG 12-lead    Collection Time: 08/03/20  1:06 PM    Narrative    Test Reason : R53.1,    Vent. Rate : 075 BPM     Atrial Rate : 075 BPM     P-R Int : 148 ms          QRS Dur : 070 ms      QT Int : 384 ms       P-R-T Axes : 081 058 046 degrees     QTc Int : 428 ms    Normal sinus rhythm  Normal ECG  When compared with ECG of 29-MAY-2017 10:01,  No significant change was found  Confirmed by Luis COOK MD, Cleve DEL ANGEL ([id number]) on 8/3/2020 2:03:13 PM    Referred By: System System           Confirmed By:Cleve Smith III, MD     ECG interpreted by ED MD    Radiology  X-Ray Chest AP Portable   Final Result      1. This is a limited examination secondary to the patient's body habitus.   2. There is no focal pulmonary infiltrate visualized.   3. There are findings  characteristic of a small hiatal hernia.   4. There is a mild amount of dextroconvex curvature of the thoracic spine.   5. The size of the heart is prominent.  This may be secondary to magnification.   .         Electronically signed by: Ciro Johnson MD   Date:    08/03/2020   Time:    13:00          Procedures  Procedures    Medications   dexamethasone injection 6 mg (has no administration in time range)       ED course and medical decision making    ED Course as of Aug 03 1446   Mon Aug 03, 2020   1311 EKGShows normal sinus rhythm rate of 75 beats per minute with no ST elevation MI.  Normal QTC    [MB]      ED Course User Index  [MB] Jarrod Sandhu MD       Patient has chest x-ray findings consistent with COVID.  She has a positive COVID test.  Clinically, she has symptoms consistent with COVID.  With very brief walk around the ER, her pulse ox dropped to 86%.  She will benefit from dexamethasone further evaluation by Hospital Medicine.    Disposition    Patient admitted in stable condition      Final impression  1. COVID-19 virus infection    2. Weak        Critical care time spent with this patient was 30 minutes excluding the procedure time.          Jarrod Sandhu MD  08/03/20 [id number]

## 2020-08-03 NOTE — ED NOTES
No change from previous assessment- pt sitting up in bed. Pt instructed to place gown on. Pt stated she would rather wait. Pt talking in full clear sentences. Pt denies SOB

## 2020-08-03 NOTE — ED NOTES
Called [facility] to check on status of transfer- stated they are at your facility and arrived two minutes ago.Stated they are outside but havent not come in yet.

## 2020-08-03 NOTE — ED NOTES
Informed pt we need to walk down the [place] and she would have to apply n95 because we need to see what her oxygen saturation is when walking. Pt okayed and applied n95 with direction. Pt ambulated slow and steady with out difficulty. Pt oxygen saturation drops to 86% at times and then remains between 86% and 90 %. Pt reports I do have copd.  Pt refused to be admitted and said I am not staying I will walk home. Pt stated absolutely not.

## 2020-08-03 NOTE — ED NOTES
Pt given all food choices we have here (jellow pudding lean cuisine crackers cecilia crackers popsicle soup) pt stated oh no id rather just starve. Informed pt she can let me know if she changes her mind.

## 2020-08-03 NOTE — ED NOTES
Tried giving report to [phone]- stated the room wasn't assigned until night shift when calling [phone].

## 2020-08-03 NOTE — ED NOTES
Pt resting in bed sitting on side of bed. Pt remains aao times 4. Denies sob. Pt reports she is just ready to go. Pt denies wanting any oxygen. Pt remains on cardiac monitor and oxygen saturation. Pt resp even non labored. Skin warm and dry

## 2020-08-03 NOTE — ED NOTES
Pt has no c/o anything at this time. Pt resp even non labored skin warm and dry at this time. Pt pleasant watching TV.

## 2020-08-04 LAB
ALBUMIN SERPL BCP-MCNC: 3.3 G/DL (ref 3.5–5.2)
ALP SERPL-CCNC: 81 U/L (ref 55–135)
ALT SERPL W/O P-5'-P-CCNC: 32 U/L (ref 10–44)
ANION GAP SERPL CALC-SCNC: 7 MMOL/L (ref 8–16)
AST SERPL-CCNC: 33 U/L (ref 10–40)
BASOPHILS # BLD AUTO: 0.01 K/UL (ref 0–0.2)
BASOPHILS NFR BLD: 0.2 % (ref 0–1.9)
BILIRUB SERPL-MCNC: 0.3 MG/DL (ref 0.1–1)
BUN SERPL-MCNC: 19 MG/DL (ref 8–23)
CALCIUM SERPL-MCNC: 9 MG/DL (ref 8.7–10.5)
CHLORIDE SERPL-SCNC: 105 MMOL/L (ref 95–110)
CO2 SERPL-SCNC: 27 MMOL/L (ref 23–29)
CREAT SERPL-MCNC: 0.8 MG/DL (ref 0.5–1.4)
DIFFERENTIAL METHOD: ABNORMAL
EOSINOPHIL # BLD AUTO: 0 K/UL (ref 0–0.5)
EOSINOPHIL NFR BLD: 0 % (ref 0–8)
ERYTHROCYTE [DISTWIDTH] IN BLOOD BY AUTOMATED COUNT: 13.2 % (ref 11.5–14.5)
EST. GFR  (AFRICAN AMERICAN): >60 ML/MIN/1.73 M^2
EST. GFR  (NON AFRICAN AMERICAN): >60 ML/MIN/1.73 M^2
GLUCOSE SERPL-MCNC: 115 MG/DL (ref 70–110)
HCT VFR BLD AUTO: 43.1 % (ref 37–48.5)
HGB BLD-MCNC: 13.4 G/DL (ref 12–16)
IMM GRANULOCYTES # BLD AUTO: 0.01 K/UL (ref 0–0.04)
IMM GRANULOCYTES NFR BLD AUTO: 0.2 % (ref 0–0.5)
LDH SERPL L TO P-CCNC: 217 U/L (ref 110–260)
LYMPHOCYTES # BLD AUTO: 0.9 K/UL (ref 1–4.8)
LYMPHOCYTES NFR BLD: 19.4 % (ref 18–48)
MAGNESIUM SERPL-MCNC: 1.9 MG/DL (ref 1.6–2.6)
MCH RBC QN AUTO: 28.6 PG (ref 27–31)
MCHC RBC AUTO-ENTMCNC: 31.1 G/DL (ref 32–36)
MCV RBC AUTO: 92 FL (ref 82–98)
MONOCYTES # BLD AUTO: 0.6 K/UL (ref 0.3–1)
MONOCYTES NFR BLD: 12.5 % (ref 4–15)
NEUTROPHILS # BLD AUTO: 3 K/UL (ref 1.8–7.7)
NEUTROPHILS NFR BLD: 67.7 % (ref 38–73)
NRBC BLD-RTO: 0 /100 WBC
PHOSPHATE SERPL-MCNC: 2.8 MG/DL (ref 2.7–4.5)
PLATELET # BLD AUTO: 188 K/UL (ref 150–350)
PMV BLD AUTO: 11.1 FL (ref 9.2–12.9)
POTASSIUM SERPL-SCNC: 3.9 MMOL/L (ref 3.5–5.1)
PROCALCITONIN SERPL IA-MCNC: 0.04 NG/ML
PROT SERPL-MCNC: 6.6 G/DL (ref 6–8.4)
RBC # BLD AUTO: 4.68 M/UL (ref 4–5.4)
SODIUM SERPL-SCNC: 139 MMOL/L (ref 136–145)
WBC # BLD AUTO: 4.49 K/UL (ref 3.9–12.7)

## 2020-08-04 PROCEDURE — 11000001 HC ACUTE MED/SURG PRIVATE ROOM

## 2020-08-04 PROCEDURE — 83735 ASSAY OF MAGNESIUM: CPT

## 2020-08-04 PROCEDURE — 25000003 PHARM REV CODE 250: Performed by: STUDENT IN AN ORGANIZED HEALTH CARE EDUCATION/TRAINING PROGRAM

## 2020-08-04 PROCEDURE — 36415 COLL VENOUS BLD VENIPUNCTURE: CPT

## 2020-08-04 PROCEDURE — 85025 COMPLETE CBC W/AUTO DIFF WBC: CPT

## 2020-08-04 PROCEDURE — 94761 N-INVAS EAR/PLS OXIMETRY MLT: CPT

## 2020-08-04 PROCEDURE — 84100 ASSAY OF PHOSPHORUS: CPT

## 2020-08-04 PROCEDURE — 63600175 PHARM REV CODE 636 W HCPCS: Performed by: STUDENT IN AN ORGANIZED HEALTH CARE EDUCATION/TRAINING PROGRAM

## 2020-08-04 PROCEDURE — 84145 PROCALCITONIN (PCT): CPT

## 2020-08-04 PROCEDURE — 80053 COMPREHEN METABOLIC PANEL: CPT

## 2020-08-04 PROCEDURE — 83615 LACTATE (LD) (LDH) ENZYME: CPT

## 2020-08-04 PROCEDURE — 27000221 HC OXYGEN, UP TO 24 HOURS

## 2020-08-04 RX ORDER — ATORVASTATIN CALCIUM 10 MG/1
10 TABLET, FILM COATED ORAL DAILY
Status: DISCONTINUED | OUTPATIENT
Start: 2020-08-04 | End: 2020-08-06 | Stop reason: HOSPADM

## 2020-08-04 RX ADMIN — ENOXAPARIN SODIUM 90 MG: 100 INJECTION SUBCUTANEOUS at 08:08

## 2020-08-04 RX ADMIN — ISOSORBIDE MONONITRATE 60 MG: 30 TABLET, EXTENDED RELEASE ORAL at 08:08

## 2020-08-04 RX ADMIN — ROPINIROLE HYDROCHLORIDE 3 MG: 1 TABLET, FILM COATED ORAL at 08:08

## 2020-08-04 RX ADMIN — REMDESIVIR 200 MG: 100 INJECTION, POWDER, LYOPHILIZED, FOR SOLUTION INTRAVENOUS at 04:08

## 2020-08-04 RX ADMIN — DEXAMETHASONE SODIUM PHOSPHATE 6 MG: 4 INJECTION, SOLUTION INTRAMUSCULAR; INTRAVENOUS at 08:08

## 2020-08-04 RX ADMIN — ATORVASTATIN CALCIUM 10 MG: 10 TABLET, FILM COATED ORAL at 08:08

## 2020-08-04 NOTE — PLAN OF CARE
Plan of care reviewed with patient, understanding verbalized.  SR on tele. Currently 95% on 1.5L NC. Bed alarm on, call light in reach, fall precautions in place.

## 2020-08-04 NOTE — PLAN OF CARE
Remdesivir FDA EUA Verbal Consent  The patient or parent/caregiver has been provided with the remdesivir Fact Sheet for Patients and Parents/Caregivers and has been counseled that the FDA has authorized the emergency use of remdesivir, which is not an FDA approved drug. The significant known and potential risks and benefits are unknown. The patient or parent/caregiver has been given the option to accept or refuse and has verbally agreed to receive remdesivir. Daily labs will be ordered and monitoring for Serious Adverse Events will be performed.    Abimael Mariano  [facility] Medical Student    Duglas Joseph MD  [facility] Internal Medicine [facility]  Cell [phone]

## 2020-08-04 NOTE — PLAN OF CARE
Rounded on patient via camera due to covid positive    Patient AAOx3  Live at home alone at [facility]  Lives on 2nd floor and has elevator access    No dme or home health  Independent with ADL's, drives    PCP is Dr. Sands       08/04/20 [id number]   Discharge Assessment   Assessment Type Discharge Planning Assessment   Confirmed/corrected address and phone number on facesheet? Yes   Assessment information obtained from? Patient   Communicated expected length of stay with patient/caregiver yes   Prior to hospitilization cognitive status: Alert/Oriented   Prior to hospitalization functional status: Independent   Current cognitive status: Alert/Oriented   Current Functional Status: Independent   Lives With alone   Able to Return to Prior Arrangements yes   Is patient able to care for self after discharge? Yes   Readmission Within the Last 30 Days no previous admission in last 30 days   Patient currently being followed by outpatient case management? No   Patient currently receives any other outside agency services? No   Equipment Currently Used at Home none   Do you have any problems affording any of your prescribed medications? No   Is the patient taking medications as prescribed? yes   Does the patient have transportation home? Yes   Transportation Anticipated family or friend will provide   Does the patient receive services at the [facility]? No   Discharge Plan A Home   DME Needed Upon Discharge  none   Patient/Family in Agreement with Plan yes     Jennifer Johnson, RN, CCM, CMSRN  RN Transition Navigator  [phone]

## 2020-08-04 NOTE — PLAN OF CARE
VN Q2 hour round: VN cued into patients room. Patient is requesting to go to bathroom. VN notified bedside nurse. VN instructed pt to call for assistance or worsening of symptoms. Call light within reach. Will cont to monitor.

## 2020-08-04 NOTE — H&P
[facility] Internal Medicine H&P    Admitting Team: Internal Medicine B  Attending Physician: Geovanni  Resident: Roger  Intern: Jake    Date of Admit: 8/3/2020    Chief Complaint     Weakness x 1 week    Subjective:      History of Present Illness:  Shirley F Gilford is a 79 y.o. female who  has a past medical history of Breast cyst, Eye disorder, Hyperlipidemia, Hypertension, Osteoporosis, and RLS (restless legs syndrome). and COPD. The patient presented to [facility] on 8/3/2020 with a primary complaint of weakness for the last week    The patient was in her usual state of health until about 1 week ago, when she noticed increasing weakness accompanied by diarrhea, nausea, NBNB vomiting x1, poor PO intake, fever, loss of taste and smell for the last week. Also describes general upper airway congestion she attributes to environmental allergies and intermittent SOB at baseline associated w/COPD. She lives alone and has no COVID + contacts.     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 Authorizing Provider   albuterol (PROVENTIL) 2.5 mg /3 mL (0.083 %) nebulizer solution albuterol sulfate 2.5 mg/3 mL (0.083 %) solution for nebulization 5/13/20   Marc Sands MD   alendronate (FOSAMAX) 70 MG tablet TAKE 1 TABLET BY MOUTH EVERY 7 DAYS 9/18/19   Marc Sands MD   aspirin  (ECOTRIN) 81 MG EC tablet Take 1 tablet (81 mg total) by mouth once daily. 11/16/18   Marc Sands MD   atorvastatin (LIPITOR) 10 MG tablet TAKE 1 TABLET BY MOUTH ONCE A DAY 12/6/19   Marc Sands MD   calcium-vitamin D3 (CALCIUM 500 + D) 500 mg(1,250mg) -200 unit per tablet Take 1 tablet by mouth once daily.    Historical Provider, MD   cholecalciferol, vitamin D3, (VITAMIN D3) 5,000 unit Tab Take 5,000 Units by mouth once daily. 11/10/18   Marc Sands MD   cyanocobalamin (VITAMIN B-12) 500 MCG tablet Take 500 mcg by mouth once daily.    Historical Provider, MD   ferrous sulfate 324 mg (65 mg iron) TbEC Take 1 tablet (325 mg total) by mouth once daily. 6/29/17   Marc Sands MD   fluticasone propionate (FLONASE) 50 mcg/actuation nasal spray 2 sprays (100 mcg total) by Each Nare route once daily. 4/30/19   Marc Sands MD   isosorbide mononitrate (IMDUR) 60 MG 24 hr tablet Take 1 tablet (60 mg total) by mouth once daily. For heart and blood pressure 6/10/20 6/10/21  Marc Sands MD   loratadine (CLARITIN) 10 mg tablet Take 1 tablet (10 mg total) by mouth once daily. 4/30/19 4/29/20  Marc Sands MD   montelukast (SINGULAIR) 10 mg tablet TAKE 1 TABLET BY MOUTH EVERY EVENING 3/4/20   Marc Sands MD   MULTIVIT-IRON-MIN-FOLIC ACID 3,500-18-0.4 UNIT-MG-MG ORAL CHEW Take by mouth.    Historical Provider, MD   naproxen (NAPROSYN) 500 MG tablet Take 1 tablet (500 mg total) by mouth 2 (two) times daily with meals.  Patient not taking: Reported on 12/12/2019 12/4/19 12/3/20  Maday Vázquez MD   rOPINIRole (REQUIP) 3 MG tablet TAKE 1 TABLET BY MOUTH NIGHTLY 11/19/19   Marc Sands MD     *Took Imdur this AM and ropinirole nightly for the last week but has not been taking any of there other medications 2/2 nausea.     Family History:  Family History   Problem Relation Age of Onset    Cancer Daughter 40        colon    Colon cancer Daughter     No Known Problems Daughter     Diabetes Mother   "   Stroke Mother     Heart disease Father      Social History:  No ETOH use. 54 pack/year smoking history, quit 6 years ago. No other drug or tobacco use.     Review of Systems:  Constitutional: + fevers, weakness  Eyes: - eye discharge, vision changes  Ears, nose, mouth, throat, and face: - changes in hearing, + rhinorrhea, upper airway congestion, loss of smell/taste  Respiratory: - cough, wheeze. + SOB with exertion at baseline.   Cardiovascular: - chest pain, palpitations, LE swelling  Gastrointestinal: - hematochezia, melena. + constipation   Hematologic/lymphatic: - easy bruising  Musculoskeletal: - myalgia, arthralgias  Neurological: -headaches, numbness, seizures  Derm: - rash, lesions    Health Maintaince :   Primary Care Physician: Shavon    Cancer Screening:  MMG: reports that she is due for mammogram this year  Colonoscopy: 4 yrs ago, normal      Objective:   Last 24 Hour Vital Signs:  BP  Min: 120/57  Max: 146/67  Temp  Av.9 °F (37.2 °C)  Min: 97.2 °F (36.2 °C)  Max: 100.2 °F (37.9 °C)  Pulse  Av.5  Min: 66  Max: 80  Resp  Av.3  Min: 16  Max: 26  SpO2  Av.4 %  Min: 86 %  Max: 95 %  Height  Av' 2" (157.5 cm)  Min: 5' 2" (157.5 cm)  Max: 5' 2" (157.5 cm)  Weight  Av.7 kg (199 lb 15.7 oz)  Min: 90.7 kg (199 lb 15.3 oz)  Max: 90.7 kg (200 lb)  Body mass index is 36.57 kg/m².  No intake/output data recorded.    Physical Examination:    BP (!) 120/57 (BP Location: Left arm, Patient Position: Lying)   Pulse 66   Temp 97.2 °F (36.2 °C) (Oral)   Resp 16   Ht 5' 2" (1.575 m)   Wt 90.7 kg (199 lb 15.3 oz)   SpO2 (!) 93%   Breastfeeding No   BMI 36.57 kg/m²     *exam conducted via video   General Appearance:    Alert, awake, in no distress, sitting up in bed.    Head:    Normocephalic, without obvious abnormality   Lungs:     Speaking in full sentences without signs of dyspnea, no cough    Extremities:   Extremities without obvious trauma, cyanosis or edema   Neurologic:   " Moving all extremities, answering questions appropriately, alert and oriented x3     Laboratory:  Most Recent Data:  CBC:   Lab Results   Component Value Date    WBC 5.61 08/03/2020    HGB 14.2 08/03/2020    HCT 46.0 08/03/2020     08/03/2020    MCV 93 08/03/2020    RDW 13.5 08/03/2020     BMP:   Lab Results   Component Value Date     08/03/2020    K 3.8 08/03/2020     08/03/2020    CO2 28 08/03/2020    BUN 16 08/03/2020    CREATININE 0.78 08/03/2020     08/03/2020    CALCIUM 9.0 08/03/2020     LFTs:   Lab Results   Component Value Date    PROT 7.0 08/03/2020    ALBUMIN 3.9 08/03/2020    BILITOT 0.4 08/03/2020    AST 59 (H) 08/03/2020    ALKPHOS 87 08/03/2020    ALT 38 08/03/2020     Coags: No results found for: INR, PROTIME, PTT  FLP:   Lab Results   Component Value Date    CHOL 172 05/13/2020    HDL 53 05/13/2020    LDLCALC 94.0 05/13/2020    TRIG 125 05/13/2020    CHOLHDL 30.8 05/13/2020     DM:   Lab Results   Component Value Date    HGBA1C 6.0 (H) 05/13/2020    HGBA1C 6.0 (H) 10/03/2019    HGBA1C 6.1 (H) 05/01/2019    LDLCALC 94.0 05/13/2020    CREATININE 0.78 08/03/2020     Thyroid:   Lab Results   Component Value Date    TSH 3.780 05/13/2020     Anemia:   Lab Results   Component Value Date    IRON 41 05/01/2019    FERRITIN 114 08/03/2020     Cardiac:   Lab Results   Component Value Date    TROPONINI <0.012 08/03/2020    BNP 85 11/03/2017     Urinalysis:   Lab Results   Component Value Date    LABURIN  05/03/2019     Multiple organisms isolated. None in predominance.  Repeat if    LABURIN clinically necessary. 05/03/2019    COLORU Yellow 08/03/2020    SPECGRAV 1.020 08/03/2020    NITRITE Negative 08/03/2020    KETONESU Negative 08/03/2020    UROBILINOGEN Negative 08/03/2020    WBCUA 5 08/03/2020     Trended Lab Data:  Recent Labs   Lab 08/03/20  1334   WBC 5.61   HGB 14.2   HCT 46.0      MCV 93   RDW 13.5      K 3.8      CO2 28   BUN 16   CREATININE 0.78       PROT 7.0   ALBUMIN 3.9   BILITOT 0.4   AST 59*   ALKPHOS 87   ALT 38       Trended Cardiac Data:  Recent Labs   Lab 08/03/20  1334   TROPONINI <0.012     Other Results:  EKG 8/3/20 (my interpretation): regular rate, normal rhythm, without signs of ischemia.    Radiology:  Imaging Results          X-Ray Chest AP Portable (Final result)  Result time 08/03/20 13:00:18    Final result by NISSA Johnson Sr., MD (08/03/20 13:00:18)                 Impression:      1. This is a limited examination secondary to the patient's body habitus.  2. There is no focal pulmonary infiltrate visualized.  3. There are findings characteristic of a small hiatal hernia.  4. There is a mild amount of dextroconvex curvature of the thoracic spine.  5. The size of the heart is prominent.  This may be secondary to magnification.  .      Electronically signed by: Ciro Johnson MD  Date:    08/03/2020  Time:    13:00             Narrative:    EXAMINATION:  XR CHEST AP PORTABLE    CLINICAL HISTORY:  weak;    COMPARISON:  12/12/2019    FINDINGS:  The size of the heart is prominent.  There is no focal pulmonary infiltrate visualized.  This is a limited examination secondary to the patient's body habitus.  There are findings characteristic of a small hiatal hernia.  There is no pneumothorax.  The costophrenic angles are sharp.  There is a mild amount of dextroconvex curvature of the thoracic spine.                               Assessment:     Shirley F Gilford is a 79 y.o. female with:  Patient Active Problem List    Diagnosis Date Noted    COVID-19 virus infection 08/03/2020    Pulmonary hypertension 04/30/2019    Hyperlipidemia 11/10/2018    Vitamin D deficiency 11/10/2018    Iron deficiency 06/29/2017    SOB (shortness of breath) 05/29/2017    Fatty liver 03/21/2017    History of hyperlipidemia 01/26/2017    Hyperglycemia 01/26/2017    Restless leg syndrome 01/26/2017    Elevated LFTs 01/26/2017    Chronic obstructive pulmonary  disease 01/26/2017    Ex-smoker 01/26/2017    Osteoporosis 01/04/2017    History of hypertension 01/04/2017     Plan:     #COVID +   - received dexamethasone x1 at outside hospital, will continue in AM  - consult placed for remdesivir  - initiated full dose lovenox  - Contact/droplet precautions   - no O2 requirement at rest at this time; will continue to monitor    #Hx HTN   - home Imdur 60mg   - pressures 120s-140s systolic on admission, will restart     #Hx of Hyperlipidemia   - plan to restart atorvastatin in AM    #Osteoporosis   - takes fosomax weekly on Sundays; missed 8/2 dose secondary to nausea   - takes daily calcium, vitamin D supplements/ tolerance   - will hold at this time and restart with clinical improvement      #Restless leg syndrome  - continue home ropinirole 3mg nightly     #COPD   - pt reports hx of COPD; uses albuteorl nebulizer PRN and no other medications   - has not required albuterol in last week; will continue to monitor     #Environmental allergies   - uses loratadine PRN, montelukast daily, flonase PRN   - no symptoms at this time, continue to monitor    Dispo: pending clinical improvement/stability without oxygen requirement   Diet: Cardiac  DVT PPX: on full dose lovenox    Code Status:     Full Code    Duglas Joseph MD  [facility] Internal Medicine HO2    [facility] Medicine Hospitalist Pager numbers:   [facility] Hospitalist Medicine Team A (Sara/Heather): [phone]  [facility] Hospitalist Medicine Team B (Geovanni/Johann):  [phone]

## 2020-08-04 NOTE — PLAN OF CARE
VN cued into patient's room with pt's permission.  Call light in reach. Patient sitting up in bed noted to be on oxygen and denies distress or any needs at this time. Informed patient that VN will round every 2 hours but to use call light for any other needs. Patient verbalized understanding.  Will continue to monitor closely.    Patient's progress notes, chart, and care plan reviewed.

## 2020-08-04 NOTE — PLAN OF CARE
VN Q2 hour round: VN cued into patients room. Patient has several questions regarding remdesevir medication and is very concerned about starting it. VN notified Geovanni's team and MD will talk to pt and address her concerns. Bedside nurse aware.     VN instructed pt to call for assistance or worsening of symptoms. NAD noted. Call light within reach. Will cont to monitor.

## 2020-08-04 NOTE — PLAN OF CARE
VN Q2 hour round: VN cued into patients room. Patient remains on oxygen and denies any needs at this time. VN instructed pt to call for assistance or worsening of symptoms. Call light within reach. Will cont to monitor.

## 2020-08-04 NOTE — PLAN OF CARE
VN Q2 hour round: VN cued into patients room. Patient on O2, denies any needs at this time. Pt aware that new medication remdesevir will be started this afternoon. Allowed time for questions. VN instructed pt to call for assistance or worsening of symptoms. Call light within reach. Will cont to monitor.

## 2020-08-04 NOTE — PROGRESS NOTES
"[facility] Internal Medicine Resident HO-2 Progress Note    Subjective:      Shirley F Gilford is a 79 y.o. female w/HTN, HLD, osteoporosis, RLS, COPD who is being followed by the [facility] Internal Med service at [facility] for hypoxia 2/2 COVID infection.    Feeling well this morning. No chest pain or SOB at rest or with ambulation in the room. Describes occasional cough which she has at baseline 2/2 COPD. No increased O2 requirements.      Objective:   Last 24 Hour Vital Signs:  BP  Min: 120/57  Max: 146/67  Temp  Av.7 °F (37.1 °C)  Min: 97 °F (36.1 °C)  Max: 100.2 °F (37.9 °C)  Pulse  Av.3  Min: 64  Max: 80  Resp  Av.7  Min: 16  Max: 26  SpO2  Av.5 %  Min: 86 %  Max: 95 %  Height  Av' 2" (157.5 cm)  Min: 5' 2" (157.5 cm)  Max: 5' 2" (157.5 cm)  Weight  Av.7 kg (199 lb 15.7 oz)  Min: 90.7 kg (199 lb 15.3 oz)  Max: 90.7 kg (200 lb)  No intake/output data recorded.    Physical Examination:  General Appearance:    Alert, awake, in no distress, sitting up in bed.    Head:    Normocephalic, without obvious abnormality   Lungs:     Speaking in full sentences without signs of dyspnea, no cough    Extremities:   Extremities without obvious trauma, cyanosis or edema   Neurologic:   Moving all extremities, answering questions appropriately, alert and oriented x3     Laboratory:  Laboratory Data Reviewed: yes   Pertinent Findings:  Recent Results (from the past 24 hour(s))   Blood culture    Collection Time: 20  1:17 PM    Specimen: Peripheral, Forearm, Left; Blood   Result Value Ref Range    Blood Culture, Routine No Growth to date    CBC auto differential    Collection Time: 20  1:34 PM   Result Value Ref Range    WBC 5.61 3.90 - 12.70 K/uL    RBC 4.94 4.00 - 5.40 M/uL    Hemoglobin 14.2 12.0 - 16.0 g/dL    Hematocrit 46.0 37.0 - 48.5 %    Mean Corpuscular Volume 93 82 - 98 fL    Mean Corpuscular Hemoglobin 28.7 27.0 - 31.0 pg    Mean Corpuscular Hemoglobin Conc 30.9 (L) 32.0 " - 36.0 g/dL    RDW 13.5 11.5 - 14.5 %    Platelets 174 150 - 350 K/uL    MPV 10.8 9.2 - 12.9 fL    Immature Granulocytes 0.5 0.0 - 0.5 %    Gran # (ANC) 3.9 1.8 - 7.7 K/uL    Immature Grans (Abs) 0.03 0.00 - 0.04 K/uL    Lymph # 1.0 1.0 - 4.8 K/uL    Mono # 0.6 0.3 - 1.0 K/uL    Eos # 0.0 0.0 - 0.5 K/uL    Baso # 0.01 0.00 - 0.20 K/uL    nRBC 0 0 /100 WBC    Gran% 69.3 38.0 - 73.0 %    Lymph% 18.4 18.0 - 48.0 %    Mono% 11.4 4.0 - 15.0 %    Eosinophil% 0.2 0.0 - 8.0 %    Basophil% 0.2 0.0 - 1.9 %    Differential Method Automated    Comprehensive metabolic panel    Collection Time: 08/03/20  1:34 PM   Result Value Ref Range    Sodium 139 136 - 145 mmol/L    Potassium 3.8 3.5 - 5.1 mmol/L    Chloride 104 95 - 110 mmol/L    CO2 28 23 - 29 mmol/L    Glucose 101 70 - 110 mg/dL    BUN, Bld 16 7 - 17 mg/dL    Creatinine 0.78 0.50 - 1.40 mg/dL    Calcium 9.0 8.7 - 10.5 mg/dL    Total Protein 7.0 6.0 - 8.4 g/dL    Albumin 3.9 3.5 - 5.2 g/dL    Total Bilirubin 0.4 0.1 - 1.0 mg/dL    Alkaline Phosphatase 87 38 - 126 U/L    AST 59 (H) 15 - 46 U/L    ALT 38 10 - 44 U/L    Anion Gap 7 (L) 8 - 16 mmol/L    eGFR if African American >60.0 >60 mL/min/1.73 m^2    eGFR if non African American >60.0 >60 mL/min/1.73 m^2   C-Reactive Protein    Collection Time: 08/03/20  1:34 PM   Result Value Ref Range    CRP 2.28 (H) 0.00 - 1.00 mg/dL   Ferritin    Collection Time: 08/03/20  1:34 PM   Result Value Ref Range    Ferritin 114 20.0 - 300.0 ng/mL   CK    Collection Time: 08/03/20  1:34 PM   Result Value Ref Range    CPK 52 (L) 55 - 170 U/L   Lactic Acid, Plasma    Collection Time: 08/03/20  1:34 PM   Result Value Ref Range    Lactate (Lactic Acid) 1.0 0.5 - 2.2 mmol/L   Troponin I    Collection Time: 08/03/20  1:34 PM   Result Value Ref Range    Troponin I <0.012 0.012 - 0.034 ng/mL   D dimer, quantitative    Collection Time: 08/03/20  1:34 PM   Result Value Ref Range    D-Dimer 0.56 (H) <0.50 mg/L FEU   NT-Pro Natriuretic Peptide     Collection Time: 08/03/20  1:34 PM   Result Value Ref Range    NT-proBNP 186 5 - 1800 pg/mL   Blood culture    Collection Time: 08/03/20  1:34 PM    Specimen: Peripheral, Hand, Right; Blood   Result Value Ref Range    Blood Culture, Routine No Growth to date    COVID-19 Rapid Screening    Collection Time: 08/03/20  1:38 PM   Result Value Ref Range    SARS-CoV-2 RNA, Amplification, Qual Positive (A) Negative   Urinalysis, Reflex to Urine Culture Urine, Clean Catch    Collection Time: 08/03/20  2:34 PM    Specimen: Urine   Result Value Ref Range    Specimen UA Urine, Clean Catch     Color, UA Yellow Yellow, Straw, [place]    Appearance, UA Hazy (A) Clear    pH, UA 5.0 5.0 - 8.0    Specific Gravity, UA 1.020 1.005 - 1.030    Protein, UA 1+ (A) Negative    Glucose, UA Negative Negative    Ketones, UA Negative Negative    Bilirubin (UA) Negative Negative    Occult Blood UA Trace (A) Negative    Nitrite, UA Negative Negative    Urobilinogen, UA Negative <2.0 EU/dL    Leukocytes, UA 2+ (A) Negative   Urinalysis Microscopic    Collection Time: 08/03/20  2:34 PM   Result Value Ref Range    RBC, UA 1 0 - 4 /hpf    WBC, UA 5 0 - 5 /hpf    Bacteria Moderate (A) None-Occ /hpf    Hyaline Casts, UA 1 0-1/lpf /lpf    Microscopic Comment SEE COMMENT    CBC auto differential    Collection Time: 08/04/20  7:02 AM   Result Value Ref Range    WBC 4.49 3.90 - 12.70 K/uL    RBC 4.68 4.00 - 5.40 M/uL    Hemoglobin 13.4 12.0 - 16.0 g/dL    Hematocrit 43.1 37.0 - 48.5 %    Mean Corpuscular Volume 92 82 - 98 fL    Mean Corpuscular Hemoglobin 28.6 27.0 - 31.0 pg    Mean Corpuscular Hemoglobin Conc 31.1 (L) 32.0 - 36.0 g/dL    RDW 13.2 11.5 - 14.5 %    Platelets 188 150 - 350 K/uL    MPV 11.1 9.2 - 12.9 fL    Immature Granulocytes 0.2 0.0 - 0.5 %    Gran # (ANC) 3.0 1.8 - 7.7 K/uL    Immature Grans (Abs) 0.01 0.00 - 0.04 K/uL    Lymph # 0.9 (L) 1.0 - 4.8 K/uL    Mono # 0.6 0.3 - 1.0 K/uL    Eos # 0.0 0.0 - 0.5 K/uL    Baso # 0.01 0.00 - 0.20 K/uL     nRBC 0 0 /100 WBC    Gran% 67.7 38.0 - 73.0 %    Lymph% 19.4 18.0 - 48.0 %    Mono% 12.5 4.0 - 15.0 %    Eosinophil% 0.0 0.0 - 8.0 %    Basophil% 0.2 0.0 - 1.9 %    Differential Method Automated      Microbiology Data Reviewed: yes  Pertinent Findings: none     Other Results:  EKG (my interpretation): no new since admit    Radiology Data Reviewed: yes  Imaging Results          X-Ray Chest AP Portable (Final result)  Result time 08/03/20 13:00:18    Final result by NISSA Johnson Sr., MD (08/03/20 13:00:18)                 Impression:      1. This is a limited examination secondary to the patient's body habitus.  2. There is no focal pulmonary infiltrate visualized.  3. There are findings characteristic of a small hiatal hernia.  4. There is a mild amount of dextroconvex curvature of the thoracic spine.  5. The size of the heart is prominent.  This may be secondary to magnification.  .      Electronically signed by: Ciro Johnson MD  Date:    08/03/2020  Time:    13:00             Narrative:    EXAMINATION:  XR CHEST AP PORTABLE    CLINICAL HISTORY:  weak;    COMPARISON:  12/12/2019    FINDINGS:  The size of the heart is prominent.  There is no focal pulmonary infiltrate visualized.  This is a limited examination secondary to the patient's body habitus.  There are findings characteristic of a small hiatal hernia.  There is no pneumothorax.  The costophrenic angles are sharp.  There is a mild amount of dextroconvex curvature of the thoracic spine.                              Current Medications:     Scheduled:   dexamethasone  6 mg Intravenous Daily    enoxaparin  1 mg/kg Subcutaneous Q12H    isosorbide mononitrate  60 mg Oral Daily    rOPINIRole  3 mg Oral Nightly        PRN:  sodium chloride 0.9%    Antibiotics and Day Number of Therapy: none    Lines and Day Number of Therapy: L forearm PIV    Assessment:     Shirley F Gilford is a 79 y.o.female with  Patient Active Problem List    Diagnosis Date Noted     COVID-19 virus infection 08/03/2020    Pulmonary hypertension 04/30/2019    Hyperlipidemia 11/10/2018    Vitamin D deficiency 11/10/2018    Iron deficiency 06/29/2017    SOB (shortness of breath) 05/29/2017    Fatty liver 03/21/2017    History of hyperlipidemia 01/26/2017    Hyperglycemia 01/26/2017    Restless leg syndrome 01/26/2017    Elevated LFTs 01/26/2017    Chronic obstructive pulmonary disease 01/26/2017    Ex-smoker 01/26/2017    Osteoporosis 01/04/2017    History of hypertension 01/04/2017      Plan:     #COVID +   - continue dexamethasone   - consult placed for remdesivir  - continue full dose lovenox  - Contact/droplet precautions   - no O2 requirement at rest at this time; will continue to monitor     #Hx HTN   - continue home Imdur 60mg   - pressures improved to 120s/50s this AM     #Hx of Hyperlipidemia   - restart home atorvastatin 10mg this AM     #Osteoporosis   - takes fosomax weekly on Sundays; missed 8/2 dose secondary to nausea   - takes daily calcium, vitamin D supplements/ tolerance   - will hold at this time and restart with clinical improvement       #Restless leg syndrome  - continue home ropinirole 3mg nightly      #COPD   - pt reports hx of COPD; uses albuteorl nebulizer PRN and no other medications   - has not required albuterol in last week; will continue to monitor      #Environmental allergies   - uses loratadine PRN, montelukast daily, flonase PRN   - no symptoms at this time, continue to monitor     Dispo: pending clinical improvement/stability without oxygen requirement   Diet: Cardiac  DVT PPX: on full dose lovenox     Duglas Joseph  [facility] Internal Medicine HO-2  [facility] Medicine Service Team B    [facility] Medicine Hospitalist Pager numbers:   [facility] Hospitalist Medicine Team A (Sara/Heather): [phone]  [facility] Hospitalist Medicine Team B (Geovanni/Johann):  [phone]

## 2020-08-05 LAB
ALBUMIN SERPL BCP-MCNC: 3.3 G/DL (ref 3.5–5.2)
ALP SERPL-CCNC: 82 U/L (ref 55–135)
ALT SERPL W/O P-5'-P-CCNC: 32 U/L (ref 10–44)
ANION GAP SERPL CALC-SCNC: 8 MMOL/L (ref 8–16)
AST SERPL-CCNC: 35 U/L (ref 10–40)
BASOPHILS # BLD AUTO: 0.02 K/UL (ref 0–0.2)
BASOPHILS NFR BLD: 0.2 % (ref 0–1.9)
BILIRUB SERPL-MCNC: 0.3 MG/DL (ref 0.1–1)
BUN SERPL-MCNC: 26 MG/DL (ref 8–23)
CALCIUM SERPL-MCNC: 8.8 MG/DL (ref 8.7–10.5)
CHLORIDE SERPL-SCNC: 108 MMOL/L (ref 95–110)
CO2 SERPL-SCNC: 25 MMOL/L (ref 23–29)
CREAT SERPL-MCNC: 1 MG/DL (ref 0.5–1.4)
DIFFERENTIAL METHOD: ABNORMAL
EOSINOPHIL # BLD AUTO: 0 K/UL (ref 0–0.5)
EOSINOPHIL NFR BLD: 0 % (ref 0–8)
ERYTHROCYTE [DISTWIDTH] IN BLOOD BY AUTOMATED COUNT: 13.4 % (ref 11.5–14.5)
EST. GFR  (AFRICAN AMERICAN): >60 ML/MIN/1.73 M^2
EST. GFR  (NON AFRICAN AMERICAN): 54 ML/MIN/1.73 M^2
GLUCOSE SERPL-MCNC: 114 MG/DL (ref 70–110)
HCT VFR BLD AUTO: 42.4 % (ref 37–48.5)
HGB BLD-MCNC: 13.4 G/DL (ref 12–16)
IMM GRANULOCYTES # BLD AUTO: 0.05 K/UL (ref 0–0.04)
IMM GRANULOCYTES NFR BLD AUTO: 0.4 % (ref 0–0.5)
LYMPHOCYTES # BLD AUTO: 0.7 K/UL (ref 1–4.8)
LYMPHOCYTES NFR BLD: 5.2 % (ref 18–48)
MAGNESIUM SERPL-MCNC: 1.8 MG/DL (ref 1.6–2.6)
MCH RBC QN AUTO: 28.8 PG (ref 27–31)
MCHC RBC AUTO-ENTMCNC: 31.6 G/DL (ref 32–36)
MCV RBC AUTO: 91 FL (ref 82–98)
MONOCYTES # BLD AUTO: 0.8 K/UL (ref 0.3–1)
MONOCYTES NFR BLD: 6.4 % (ref 4–15)
NEUTROPHILS # BLD AUTO: 11.1 K/UL (ref 1.8–7.7)
NEUTROPHILS NFR BLD: 87.8 % (ref 38–73)
NRBC BLD-RTO: 0 /100 WBC
PHOSPHATE SERPL-MCNC: 2.3 MG/DL (ref 2.7–4.5)
PLATELET # BLD AUTO: 228 K/UL (ref 150–350)
PMV BLD AUTO: 11 FL (ref 9.2–12.9)
POTASSIUM SERPL-SCNC: 4.1 MMOL/L (ref 3.5–5.1)
PROT SERPL-MCNC: 6.5 G/DL (ref 6–8.4)
RBC # BLD AUTO: 4.66 M/UL (ref 4–5.4)
SODIUM SERPL-SCNC: 141 MMOL/L (ref 136–145)
WBC # BLD AUTO: 12.58 K/UL (ref 3.9–12.7)

## 2020-08-05 PROCEDURE — 11000001 HC ACUTE MED/SURG PRIVATE ROOM

## 2020-08-05 PROCEDURE — 27000221 HC OXYGEN, UP TO 24 HOURS

## 2020-08-05 PROCEDURE — 36415 COLL VENOUS BLD VENIPUNCTURE: CPT

## 2020-08-05 PROCEDURE — 25000003 PHARM REV CODE 250: Performed by: STUDENT IN AN ORGANIZED HEALTH CARE EDUCATION/TRAINING PROGRAM

## 2020-08-05 PROCEDURE — 80053 COMPREHEN METABOLIC PANEL: CPT

## 2020-08-05 PROCEDURE — 94761 N-INVAS EAR/PLS OXIMETRY MLT: CPT

## 2020-08-05 PROCEDURE — 83735 ASSAY OF MAGNESIUM: CPT

## 2020-08-05 PROCEDURE — 97165 OT EVAL LOW COMPLEX 30 MIN: CPT

## 2020-08-05 PROCEDURE — 97530 THERAPEUTIC ACTIVITIES: CPT

## 2020-08-05 PROCEDURE — 84100 ASSAY OF PHOSPHORUS: CPT

## 2020-08-05 PROCEDURE — 97535 SELF CARE MNGMENT TRAINING: CPT

## 2020-08-05 PROCEDURE — 85025 COMPLETE CBC W/AUTO DIFF WBC: CPT

## 2020-08-05 PROCEDURE — 97161 PT EVAL LOW COMPLEX 20 MIN: CPT

## 2020-08-05 PROCEDURE — 99900035 HC TECH TIME PER 15 MIN (STAT)

## 2020-08-05 PROCEDURE — 63600175 PHARM REV CODE 636 W HCPCS: Performed by: STUDENT IN AN ORGANIZED HEALTH CARE EDUCATION/TRAINING PROGRAM

## 2020-08-05 RX ORDER — ONDANSETRON 8 MG/1
8 TABLET, ORALLY DISINTEGRATING ORAL EVERY 6 HOURS PRN
Status: DISCONTINUED | OUTPATIENT
Start: 2020-08-05 | End: 2020-08-06 | Stop reason: HOSPADM

## 2020-08-05 RX ORDER — ACETAMINOPHEN 325 MG/1
650 TABLET ORAL EVERY 6 HOURS PRN
Status: DISCONTINUED | OUTPATIENT
Start: 2020-08-05 | End: 2020-08-06 | Stop reason: HOSPADM

## 2020-08-05 RX ORDER — BENZONATATE 100 MG/1
100 CAPSULE ORAL 3 TIMES DAILY PRN
Status: DISCONTINUED | OUTPATIENT
Start: 2020-08-05 | End: 2020-08-06 | Stop reason: HOSPADM

## 2020-08-05 RX ORDER — IBUPROFEN 400 MG/1
400 TABLET ORAL ONCE
Status: COMPLETED | OUTPATIENT
Start: 2020-08-05 | End: 2020-08-05

## 2020-08-05 RX ADMIN — ISOSORBIDE MONONITRATE 60 MG: 30 TABLET, EXTENDED RELEASE ORAL at 09:08

## 2020-08-05 RX ADMIN — ENOXAPARIN SODIUM 90 MG: 100 INJECTION SUBCUTANEOUS at 09:08

## 2020-08-05 RX ADMIN — IBUPROFEN 400 MG: 400 TABLET ORAL at 09:08

## 2020-08-05 RX ADMIN — ENOXAPARIN SODIUM 90 MG: 100 INJECTION SUBCUTANEOUS at 10:08

## 2020-08-05 RX ADMIN — ATORVASTATIN CALCIUM 10 MG: 10 TABLET, FILM COATED ORAL at 09:08

## 2020-08-05 RX ADMIN — DEXAMETHASONE SODIUM PHOSPHATE 6 MG: 4 INJECTION, SOLUTION INTRAMUSCULAR; INTRAVENOUS at 09:08

## 2020-08-05 RX ADMIN — ROPINIROLE HYDROCHLORIDE 3 MG: 1 TABLET, FILM COATED ORAL at 10:08

## 2020-08-05 RX ADMIN — REMDESIVIR 100 MG: 100 INJECTION, POWDER, LYOPHILIZED, FOR SOLUTION INTRAVENOUS at 04:08

## 2020-08-05 RX ADMIN — ONDANSETRON 8 MG: 8 TABLET, ORALLY DISINTEGRATING ORAL at 09:08

## 2020-08-05 NOTE — PLAN OF CARE
Problem: Adult Inpatient Plan of Care  Goal: Chart Reviewed  Outcome: Ongoing, Progressing   VN cued into patients room for rounding.  Patient is in no acute distress at this time.  Call light within reach. Floor nurse, Eri, at bedside administering medications.  Patient instructed to call immediately for SOB. Continuous pulse oximetry in use.

## 2020-08-05 NOTE — PT/OT/SLP EVAL
Physical Therapy Evaluation    Patient Name:  Shirley F Gilford   MRN:  [id number]    Recommendations:     Discharge Recommendations:  assisted living facility   Discharge Equipment Recommendations: none   Barriers to discharge: Decreased caregiver support    Assessment:     Shirley F Gilford is a 79 y.o. female admitted with a medical diagnosis of COVID-19 virus infection.  She presents with the following impairments/functional limitations:  impaired endurance, weakness, impaired cardiopulmonary response to activity. Eval completed with OT. Patient pleasant and willing to participate with PT. Patient is near baseline but demonstrates global deconditioning and decreased endurance. SpO2 remained 93% and higher throughout session. Skilled physical therapy recommended to address deficits and return patient to PLOF. Patient is safe to return home once medically ready.    Rehab Prognosis: Good; patient would benefit from acute skilled PT services to address these deficits and reach maximum level of function.    Recent Surgery: * No surgery found *      Plan:     During this hospitalization, patient to be seen 2 x/week to address the identified rehab impairments via gait training, therapeutic activities, therapeutic exercises, neuromuscular re-education and progress toward the following goals:    · Plan of Care Expires:  09/04/20    Subjective     Chief Complaint: AC broken  Patient/Family Comments/goals: to go home so that she can get better and run errands for her neighbors  Pain/Comfort:  · Pain Rating 1: 0/10    Patients cultural, spiritual, [employer]eterian conflicts given the current situation: no    Living Environment:  Lives alone in a senior living facility.  Prior to admission, patients level of function was independent with mobility, no AD. Patient reports driving, grocery shopping for herself and her neighbors.  Equipment used at home: none.  DME owned (not currently used): none.  Upon discharge, patient will have  assistance from [facility].    Objective:     Communicated with nurse prior to session.  Patient found HOB elevated with oxygen, pulse ox (continuous)  upon PT entry to room.    General Precautions: Standard, fall   Orthopedic Precautions:N/A   Braces: N/A     Exams:  · RLE ROM: WFL  · RLE Strength: WFL  · LLE ROM: WFL  · LLE Strength: WFL    Functional Mobility:  · Bed Mobility:     · Supine to Sit: modified independence, increased time to complete  · Transfers:     · Sit to Stand:  supervision with no AD, increased time to complete, use of bedrail  · Bed to Chair: stand by assistance with  no AD  using  Step Transfer, use of bed rail and arm rest  · Gait: ~35' within room, no AD, SBA for line management. Mild gait instabilities. Occassional scissoring step due to patient walks with narrow JANI. Increased hip ER, decreased step length, decreased bess, increased ML sway. No LOB. No SOB. No reported dizziness.     Therapeutic Activities and Exercises:   Patient educated on role of PT in the hospital. Pt educated on plan and goals with physical therapy. Pt educated on importance of OOB activity to decrease the risks associated with bed rest.   Instruction provided for safety and technique for gait and transfers.   Pt educated on activity pacing. Patient educated on importance of sitting in chair > lying in bed for lung functional and postural muscle endurance to improve recovery.   Increased time spent discussing discharge planning with patient due to patient will have to quarantine and will be unable to perform regular activities.   Patient instructed on seated marching to improve strength, endurance and blood flow.   All questions and concerns addressed within PT scope of practice.     AM-PAC 6 CLICK MOBILITY  Total Score:23     Patient left up in chair with all lines intact and call button in reach.    GOALS:   Multidisciplinary Problems     Physical Therapy Goals        Problem: Physical Therapy Goal    Goal  Priority Disciplines Outcome Goal Variances Interventions   Physical Therapy Goal     PT, PT/OT Ongoing, Progressing     Description: Goals to be met by: 20     Patient will increase functional independence with mobility by performin. Bed to chair transfer with [place] using no AD.  2. Gait  x 200 feet with [place] using no AD.   3. Lower extremity exercise program x30 reps per handout, with independence.                     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

## 2020-08-05 NOTE — PLAN OF CARE
Q2 hour frequent checks completed.  Patient noted to be resting comfortably. Respirations even and unlabored. No signs of distress noted. All safety measures maintained. 92% O2 sat displayed on continuous pulse ox.

## 2020-08-05 NOTE — PLAN OF CARE
VN rounds: VN cued into pt's room with pt's permission. Pt resting in bed noted to be on oxygen. Pt states she has been awake since 4am and does not feel well. Pt c/o bilateral rib/back pain and headache. No prn pain meds in MAR. VN notified bedside nurse. O2 sats 94-97%, HR 70's, /78, MEWS 3.     VN informed Geovanni team intern Darion and he will look into it. VN instructed pt to call for assistance. Pt aware and agreeable. Allowed time for questions.     Patient's progress notes, labs, and care plan reviewed. Will cont to be available as needed.

## 2020-08-05 NOTE — PLAN OF CARE
Unable to complete rounds due to room being dark with lights out. TRELL Rutherford notified and will go put a little light on and check on patient.

## 2020-08-05 NOTE — PLAN OF CARE
Care plan explained & understood by pt. SHY on Tele. On 02 @ 1.5L, Sats  93-95%. Meds given as per MAR. Safety maintained at all times. Call bell within hand. Bed alarm on. Contact, Droplet, Respiratory precautions maintained. Will continue to monitor.

## 2020-08-05 NOTE — PLAN OF CARE
Problem: Occupational Therapy Goal  Goal: Occupational Therapy Goal  Outcome: Met   Pt found in SF & agreeable to OT/PT co-eval/tx this PM.  Pt perf the following: sup-->EOB w/ Sup & tolerated x ~10 min w/ S-MI; standing & amb w/o DME w/in room w/ S-MI; t/f w/o DME-->b/s chair w/ Sup-MI; doff/don socks via figure 4 tech w/ MI. Edu/tx re: general safety techs, HEP, deep/PLBing techs, rec shower chair, endurance tx, energy conservation techs & compensatory techs for carryover at home. Pt verbalized understanding. Pt left UIC w/ alarm & nsg notified.  **Pt on 3LNC & w/o desat or SOB throughout.    Per OT eval, no further svcs indicated at this time. Upon medical stability, pt appropriate for d/c home w/ rec shower chair.

## 2020-08-05 NOTE — PLAN OF CARE
Frequent check and q2 hourly rounding completed. Patient noted to be resting.  Respirations even in unlabored.  No signs of distress noted at this time.

## 2020-08-05 NOTE — PLAN OF CARE
Plan of care reviewed with patient. Pt AAO and able to call staff. Pt started on 3L this morning due to 02 dropped. Pt now down to 1.5 L with 02 sats at 95%. Evaluated for PT and OT today.Remdesivir given today with 0 ase noted. 0 episodes of SOB upon exertion. Ambulated to the BR with SBA.Verbalizes to staff understanding. NSR on monitor with 0 red alarms noted.  No acute distress observed at this time. Side rails x2, bed in low position, call bell within reach. Bed alarm in place for patient safety. Will relay to oncoming nurse to monitor for changes in condition.

## 2020-08-05 NOTE — PROGRESS NOTES
U Internal Medicine Resident HO-2 Progress Note    Subjective:      Shirley F Gilford is a 79 y.o. female w/HTN, HLD, osteoporosis, RLS, COPD who is being followed by the [facility] Internal Med service at [facility] for hypoxia 2/2 COVID infection.     Felt well last night with improved appetite. Slept relatively well overnight. Continues to be free of chest pain or SOB at rest or with ambulation in the room. However, headache and nausea this morning with chills.  She is afebrile; will give NSAID per pt request as well as Zofran for nausea.     Objective:   Last 24 Hour Vital Signs:  BP  Min: 122/76  Max: 145/64  Temp  Av.3 °F (36.3 °C)  Min: 96.5 °F (35.8 °C)  Max: 97.6 °F (36.4 °C)  Pulse  Av.2  Min: 55  Max: 106  Resp  Av.5  Min: 18  Max: 20  SpO2  Av %  Min: 93 %  Max: 96 %  I/O last 3 completed shifts:  In: -   Out: 850 [Urine:850]    Physical Examination:  General Appearance:    Alert, awake, in no distress.    Head:    Normocephalic, without obvious abnormality. NC in pace.    Lungs:     Speaking in full sentences without signs of dyspnea, no cough    Extremities:   Extremities without obvious trauma, cyanosis or edema   Neurologic:   Moving all extremities, answering questions appropriately, alert and oriented x3     Laboratory:  Laboratory Data Reviewed: yes   Pertinent Findings: Labs from this AM are pending.     Microbiology Data Reviewed: yes  Pertinent Findings: none     Other Results:  EKG (my interpretation): no new since admit  Radiology Data Reviewed: yes   No new imaging at this time.     Current Medications:     Scheduled:   atorvastatin  10 mg Oral Daily    dexamethasone  6 mg Intravenous Daily    enoxaparin  1 mg/kg Subcutaneous Q12H    isosorbide mononitrate  60 mg Oral Daily    EUA remdesivir infusion  100 mg Intravenous Q24H    rOPINIRole  3 mg Oral Nightly        PRN:  acetaminophen, ondansetron, sodium chloride 0.9%    Antibiotics and Day Number of  Therapy: none    Lines and Day Number of Therapy: L forearm PIV    Assessment:     Shirley F Gilford is a 79 y.o.female with  Patient Active Problem List    Diagnosis Date Noted    Hypoxia     Weak     COVID-19 virus infection 08/03/2020    Pulmonary hypertension 04/30/2019    Hyperlipidemia 11/10/2018    Vitamin D deficiency 11/10/2018    Iron deficiency 06/29/2017    SOB (shortness of breath) 05/29/2017    Fatty liver 03/21/2017    History of hyperlipidemia 01/26/2017    Restless leg syndrome 01/26/2017    Elevated LFTs 01/26/2017    Chronic obstructive pulmonary disease 01/26/2017    Ex-smoker 01/26/2017    Osteoporosis 01/04/2017    History of hypertension 01/04/2017      Plan:     #COVID+   - continue dexamethasone   - pt consulted for remdesevir yesterday; will start first dose today   - continue full dose lovenox  - Contact/droplet precautions   - comfortable on room air  - Zofran for nausea PRN     #Hx HTN   - continue home Imdur 60mg   - pressures stable    #Hx of Hyperlipidemia   - continue home atorvastatin 10mg     #Osteoporosis   - takes fosomax weekly on Sundays; missed 8/2 dose secondary to nausea   - takes daily calcium, vitamin D supplements/ tolerance   - will hold at this time and restart with clinical improvement       #Restless leg syndrome  - continue home ropinirole 3mg nightly      #COPD   - pt reports hx of COPD; uses albuterol nebulizer PRN and no other medications   - has not required albuterol in last week; will continue to monitor      #Environmental allergies   - uses loratadine PRN, montelukast daily, flonase PRN   - no symptoms at this time, continue to monitor     Dispo: pending clinical improvement/stability without oxygen requirement   Diet: Cardiac  DVT PPX: on full dose lovenox     Duglas Joseph  [facility] Internal Medicine HO-2  [facility] Medicine Service Team B    [facility] Medicine Hospitalist Pager numbers:   [facility] Hospitalist Medicine Team A (Sara/Heather): [phone]  [facility]  Hospitalist Medicine Team B (Geovanni/Johann):  [phone]

## 2020-08-05 NOTE — PLAN OF CARE
Problem: Physical Therapy Goal  Goal: Physical Therapy Goal  Description: Goals to be met by: 20     Patient will increase functional independence with mobility by performin. Bed to chair transfer with [place] using no AD.  2. Gait  x 200 feet with [place] using no AD.   3. Lower extremity exercise program x30 reps per handout, with independence.    Outcome: Ongoing, Progressing       PT eval complete.     Ema Pinzon, PT, DPT  2020

## 2020-08-06 VITALS
TEMPERATURE: 97 F | HEART RATE: 68 BPM | OXYGEN SATURATION: 91 % | RESPIRATION RATE: 24 BRPM | WEIGHT: 205.94 LBS | HEIGHT: 62 IN | DIASTOLIC BLOOD PRESSURE: 76 MMHG | BODY MASS INDEX: 37.9 KG/M2 | SYSTOLIC BLOOD PRESSURE: 166 MMHG

## 2020-08-06 LAB
ALBUMIN SERPL BCP-MCNC: 3.1 G/DL (ref 3.5–5.2)
ALP SERPL-CCNC: 77 U/L (ref 55–135)
ALT SERPL W/O P-5'-P-CCNC: 28 U/L (ref 10–44)
ANION GAP SERPL CALC-SCNC: 5 MMOL/L (ref 8–16)
AST SERPL-CCNC: 27 U/L (ref 10–40)
BILIRUB SERPL-MCNC: 0.2 MG/DL (ref 0.1–1)
BUN SERPL-MCNC: 25 MG/DL (ref 8–23)
CALCIUM SERPL-MCNC: 8.6 MG/DL (ref 8.7–10.5)
CHLORIDE SERPL-SCNC: 108 MMOL/L (ref 95–110)
CO2 SERPL-SCNC: 29 MMOL/L (ref 23–29)
CREAT SERPL-MCNC: 0.8 MG/DL (ref 0.5–1.4)
EST. GFR  (AFRICAN AMERICAN): >60 ML/MIN/1.73 M^2
EST. GFR  (NON AFRICAN AMERICAN): >60 ML/MIN/1.73 M^2
GLUCOSE SERPL-MCNC: 80 MG/DL (ref 70–110)
MAGNESIUM SERPL-MCNC: 2 MG/DL (ref 1.6–2.6)
PHOSPHATE SERPL-MCNC: 3.2 MG/DL (ref 2.7–4.5)
POTASSIUM SERPL-SCNC: 3.7 MMOL/L (ref 3.5–5.1)
PROT SERPL-MCNC: 6 G/DL (ref 6–8.4)
SODIUM SERPL-SCNC: 142 MMOL/L (ref 136–145)

## 2020-08-06 PROCEDURE — 94761 N-INVAS EAR/PLS OXIMETRY MLT: CPT

## 2020-08-06 PROCEDURE — 84100 ASSAY OF PHOSPHORUS: CPT

## 2020-08-06 PROCEDURE — 80053 COMPREHEN METABOLIC PANEL: CPT

## 2020-08-06 PROCEDURE — 36415 COLL VENOUS BLD VENIPUNCTURE: CPT

## 2020-08-06 PROCEDURE — 63600175 PHARM REV CODE 636 W HCPCS: Performed by: STUDENT IN AN ORGANIZED HEALTH CARE EDUCATION/TRAINING PROGRAM

## 2020-08-06 PROCEDURE — 25000003 PHARM REV CODE 250: Performed by: STUDENT IN AN ORGANIZED HEALTH CARE EDUCATION/TRAINING PROGRAM

## 2020-08-06 PROCEDURE — 83735 ASSAY OF MAGNESIUM: CPT

## 2020-08-06 RX ADMIN — ISOSORBIDE MONONITRATE 60 MG: 30 TABLET, EXTENDED RELEASE ORAL at 09:08

## 2020-08-06 RX ADMIN — ENOXAPARIN SODIUM 90 MG: 100 INJECTION SUBCUTANEOUS at 09:08

## 2020-08-06 RX ADMIN — DEXAMETHASONE SODIUM PHOSPHATE 6 MG: 4 INJECTION, SOLUTION INTRAMUSCULAR; INTRAVENOUS at 09:08

## 2020-08-06 RX ADMIN — ATORVASTATIN CALCIUM 10 MG: 10 TABLET, FILM COATED ORAL at 09:08

## 2020-08-06 RX ADMIN — REMDESIVIR 100 MG: 100 INJECTION, POWDER, LYOPHILIZED, FOR SOLUTION INTRAVENOUS at 03:08

## 2020-08-06 NOTE — NURSING
Discharge discussed with pt. Pt  acknowledged understanding of discharge medications and follow up appts. . Pt with  0 distress at this time. IV removed with catheter intact.Denies pain or discomfort upon discharge.[facility] ambulance service here to  pt to transport to [facility].

## 2020-08-06 NOTE — NURSING
Home Oxygen Evaluation    Date Performed: 2020    1) Patient's Home O2 Sat on room air, while at rest: 90        If O2 sats on room air at rest are 88% or below, patient qualifies. No additional testing needed. Document N/A in steps 2 and 3. If 89% or above, complete steps 2.      2) Patient's O2 Sat on room air while exercisin        If O2 sats on room air while exercising remain 89% or above patient does not qualify, no further testing needed Document N/A in step 3. If O2 sats on room air while exercising are 88% or below, continue to step 3.      3) Patient's O2 Sat while exercising on O2:NA         (Must show improvement from #2 for patients to qualify)    If O2 sats improve on oxygen, patient qualifies for portable oxygen. If not, the patient does not qualify.

## 2020-08-06 NOTE — PLAN OF CARE
Patient does NOT have transportation to home.  Placed [facility] request for 3pm pickup.    Patient [facility] spoke to Dolores and she is fine with patient returning back to her apartment- they just want patient to come to the back door. (Dolores at [facility] [phone]       08/06/20 9996   Final Note   Assessment Type Final Discharge Note   Anticipated Discharge Disposition Home   Hospital Follow Up  Appt(s) scheduled? No  (sent referral to At home NP program)   Discharge plans and expectations educations in teach back method with documentation complete? Yes   Right Care Referral Info   Post Acute Recommendation No Care     Jennifer Johnson, RN, CCM, CMSRN  RN Transition Navigator  [phone]

## 2020-08-06 NOTE — DISCHARGE SUMMARY
[facility] Internal Medicine Discharge Summary    Primary Team: [facility] Medicine B  Attending Physician: Keyur Galarza MD  Resident: Roger  Intern: Jake    Date of Admit: 8/3/2020  Date of Discharge: 8/6/2020    Discharge to: Home/prior independent living  Condition: Stable    Discharge Diagnoses     Patient Active Problem List   Diagnosis    Osteoporosis    History of hypertension    History of hyperlipidemia    Restless leg syndrome    Elevated LFTs    Chronic obstructive pulmonary disease    Ex-smoker    Fatty liver    SOB (shortness of breath)    Iron deficiency    Hyperlipidemia    Vitamin D deficiency    Pulmonary hypertension    COVID-19 virus infection    Hypoxia    Weak     Consultants and Procedures     Consultants: PT/OT  Procedures: None    Brief History of Present Illness      Shirley F Gilford is a 79 y.o. female who  has a past medical history of Breast cyst, COPD (chronic obstructive pulmonary disease), Eye disorder, Hyperglycemia (1/26/2017), Hyperlipidemia, Hypertension, Osteoporosis, and RLS (restless legs syndrome).  The patient presented to [facility] on 8/3/2020 with 1 week of persistent fever, diarrhea, nausea and vomiting, loss of taste/smell, poor PO intake and weakness.     She was found to be COVID+ in the ED with an oxygen requirement with ambulation and was subsequently admitted for respiratory support and monitoring.    For the full HPI please refer to the History & Physical from this admission.    Hospital Course By Problem with Pertinent Findings     1. COVID19+ with hypoxia on exertion  - rapid COVID 15 test positive in ED with no known sick contacts  - on admit, ferritin 114, CRP 2.28, ddimer 0.56, procal 0.04  - desaturations to mid 80s with ambulation on admission   - while inpt, consented for EUA of remdesivir & received 2 doses  - received dexamethasone x4 days  - received full dose anticoagulation w/lovenox while inpt  - saturations in mid-90s  while inpatient; was intermittently on NC but no true O2 requirement   - on discharge, able to ambulate with saturations acceptable in 90s with no supplemental oxygen   - initial symptoms including anorexia, weakness, nausea improved while inpt  - discharged with self-quarantine precautions, home symptom monitoring and strict return precautions  - discussed discharge with independent living facility to ensure support for self-quarantine     2. Hx of Hypertension  - stable; 120s-130s systolic with intermittent highs to 160s  - continued home Imdur 60mg    3. Hx of Hyperlipidemia  - continued home atorvastatin 10mg    4. Hx of Osteoporosis  - pt missed weekly fosomax dose 8/2 but on discharge, restarted weekly home fosomax   - restarted daily calcium, vitamin D supplements at time of discharge     5. Hx of Restless Leg syndrome  - stable during this admission  - continued home ropinirole 3mg nightly     6. Hx of COPD  - stable with intermittent nonproductive cough  - did not require albuterol while inpt but restarted home albuterol PRN on discharge    7. Environmental allergies  - stable; no issues with rhinorrhea, congestion, wheezing while inpt  - should restart home flonase, montelukast PRN    Discharge Medications        Medication List      ASK your doctor about these medications    albuterol 2.5 mg /3 mL (0.083 %) nebulizer solution  Commonly known as: PROVENTIL  albuterol sulfate 2.5 mg/3 mL (0.083 %) solution for nebulization     alendronate 70 MG tablet  Commonly known as: FOSAMAX  TAKE 1 TABLET BY MOUTH EVERY 7 DAYS     aspirin 81 MG EC tablet  Commonly known as: ECOTRIN  Take 1 tablet (81 mg total) by mouth once daily.     atorvastatin 10 MG tablet  Commonly known as: LIPITOR  TAKE 1 TABLET BY MOUTH ONCE A DAY     CALCIUM 500 + D 500 mg(1,250mg) -200 unit per tablet  Generic drug: calcium-vitamin D3     CENTRUM 3,500-18-0.4 unit-mg-mg Chew  Generic drug: multivit-iron-min-folic acid     cholecalciferol  (vitamin D3) 125 mcg (5,000 unit) Tab  Commonly known as: VITAMIN D3  Take 5,000 Units by mouth once daily.     ferrous sulfate 324 mg (65 mg iron) Tbec  Take 1 tablet (325 mg total) by mouth once daily.     fluticasone propionate 50 mcg/actuation nasal spray  Commonly known as: FLONASE  2 sprays (100 mcg total) by Each Nare route once daily.     isosorbide mononitrate 60 MG 24 hr tablet  Commonly known as: IMDUR  Take 1 tablet (60 mg total) by mouth once daily. For heart and blood pressure     loratadine 10 mg tablet  Commonly known as: CLARITIN  Take 1 tablet (10 mg total) by mouth once daily.     montelukast 10 mg tablet  Commonly known as: SINGULAIR  TAKE 1 TABLET BY MOUTH EVERY EVENING     naproxen 500 MG tablet  Commonly known as: NAPROSYN  Take 1 tablet (500 mg total) by mouth 2 (two) times daily with meals.     rOPINIRole 3 MG tablet  Commonly known as: REQUIP  TAKE 1 TABLET BY MOUTH NIGHTLY     VITAMIN B-12 500 MCG tablet  Generic drug: cyanocobalamin          Discharge Information:   Diet: Cardiac  Physical Activity: As tolerated    Instructions:  1. Take all medications as prescribed. No medication changes were made. No new medications added  2. Keep all follow-up appointments (follow up with PCP).  3. Return to the hospital or call your primary care physicians if any worsening symptoms such as shortness of breath, lightheadedness, increasing fatigue or weakness or trouble breathing occur.    Follow-Up Appointments: PCP     Duglas Joseph MD  [facility] Medicine-Pediatrics -2  Cell [phone]  08/06/2020 10:50 AM

## 2020-08-06 NOTE — PLAN OF CARE
Patient has received discharge instructions.  No new Prescriptions ordered.  Instructions reviewed with pt using teachback method. All questions answered to pt satisfaction. IV access and telemetry removed per floor nurse. Transport  for discharge has been arranged

## 2020-08-06 NOTE — PROGRESS NOTES
U Internal Medicine Resident HO-2 Progress Note    Subjective:      Shirley F Gilford is a 79 y.o. female w/HTN, HLD, osteoporosis, RLS, COPD who is being followed by the [facility] Med service at [facility] for hypoxia 2/2 COVID infection.     Feeling better today. Continues to be free of chest pain or SOB at rest or with ambulation in the room. Yesterday's headache and nausea have resolved.  Discussed hopefully going home in the next few days and she was happy to hear that.   Asked that we speak to the manager of her assisted living facility, Agnes Humphrey [phone], who has some concerns about keeping the other residents safe.    Objective:   Last 24 Hour Vital Signs:  BP  Min: 119/60  Max: 184/78  Temp  Av.5 °F (36.4 °C)  Min: 96.1 °F (35.6 °C)  Max: 98.5 °F (36.9 °C)  Pulse  Av.7  Min: 62  Max: 80  Resp  Av.7  Min: 16  Max: 30  SpO2  Av.4 %  Min: 93 %  Max: 97 %  Weight  Av.4 kg (205 lb 14.6 oz)  Min: 93.4 kg (205 lb 14.6 oz)  Max: 93.4 kg (205 lb 14.6 oz)  I/O last 3 completed shifts:  In: -   Out: 1100 [Urine:1100]    Physical Examination:  General Appearance:    Alert, awake, in no distress.    Head:    Normocephalic, without obvious abnormality. NC in pace.    Lungs:     Speaking in full sentences without signs of dyspnea, no cough    Extremities:   Extremities without obvious trauma, cyanosis or edema   Neurologic:   Moving all extremities, answering questions appropriately, alert and oriented x3     Laboratory:  Laboratory Data Reviewed: yes   Pertinent Findings: Labs from this AM are pending. Yesterday's labs with neutrophil predominant leukocytosis, new.     Microbiology Data Reviewed: yes  Pertinent Findings: none     Other Results:  EKG (my interpretation): no new since admit  Radiology Data Reviewed: yes   No new imaging at this time.     Current Medications:     Scheduled:   atorvastatin  10 mg Oral Daily    dexamethasone  6 mg Intravenous Daily     enoxaparin  1 mg/kg Subcutaneous Q12H    isosorbide mononitrate  60 mg Oral Daily    EUA remdesivir infusion  100 mg Intravenous Q24H    rOPINIRole  3 mg Oral Nightly        PRN:  acetaminophen, benzonatate, ondansetron, sodium chloride 0.9%    Antibiotics and Day Number of Therapy: none    Lines and Day Number of Therapy: L forearm PIV    Assessment:     Shirley F Gilford is a 79 y.o.female with  Patient Active Problem List    Diagnosis Date Noted    Hypoxia     Weak     COVID-19 virus infection 08/03/2020    Pulmonary hypertension 04/30/2019    Hyperlipidemia 11/10/2018    Vitamin D deficiency 11/10/2018    Iron deficiency 06/29/2017    SOB (shortness of breath) 05/29/2017    Fatty liver 03/21/2017    History of hyperlipidemia 01/26/2017    Restless leg syndrome 01/26/2017    Elevated LFTs 01/26/2017    Chronic obstructive pulmonary disease 01/26/2017    Ex-smoker 01/26/2017    Osteoporosis 01/04/2017    History of hypertension 01/04/2017      Plan:     #COVID+   - continue daily dexamethasone   - continue remdesivir  - continue full dose lovenox  - Contact/droplet precautions   - comfortable on room air with intermittent need for 1.5-3L NC.    - she should only be on oxygen for sustained desaturations below 90.   - will trial ambulation today and assess need for O2.   - Zofran for nausea PRN     #Hx HTN   - continue home Imdur 60mg   - pressures stable    #Hx of Hyperlipidemia   - continue home atorvastatin 10mg     #Osteoporosis   - takes fosomax weekly on Sundays; missed 8/2 dose secondary to nausea   - takes daily calcium, vitamin D supplements/ tolerance   - will hold at this time and restart with clinical improvement       #Restless leg syndrome  - continue home ropinirole 3mg nightly      #COPD   - pt reports hx of COPD; uses albuterol nebulizer PRN and no other medications   - has not required albuterol in last week; will continue to monitor      #Environmental allergies   - uses  loratadine PRN, montelukast daily, flonase PRN   - no symptoms at this time, continue to monitor     Dispo: likely today; will discuss planning with  today  Diet: Cardiac  DVT PPX: on full dose lovenox     Duglas Joseph  [facility] Internal Medicine HO-2  [facility] Medicine Service Team B    [facility] Medicine Hospitalist Pager numbers:   [facility] Hospitalist Medicine Team A (Sara/Heather): [phone]  [facility] Hospitalist Medicine Team B (Geovanni/Johann):  [phone]

## 2020-08-06 NOTE — PLAN OF CARE
VN cued into patients room for rounding - Patient is in no acute distress at this time.  Call light within reach. Will continue to monitor closely.  Patient instructed to call immediately for SOB.  Wearing  Oxygen  Lying on left side  Cont pulse ox 96%     Telemetry on

## 2020-08-06 NOTE — PLAN OF CARE
VN cued into patients room for rounding - Patient is in no acute distress at this time.  Call light within reach. Will continue to monitor closely.  Patient instructed to call immediately for SOB.        Pt is currently on room air    Pt is expecting discharge today  And awaiting md to write orders  Pt states she is ready for discharge

## 2020-08-06 NOTE — DISCHARGE INSTRUCTIONS
Your test was POSITIVE for COVID-19 (coronavirus).       Please isolate yourself at home.  You may leave home and/or return to work once the following conditions are met:    If you were not hospitalized and are not severely immunocompromised*:   More than 10 days since symptoms first appeared AND   More than 24 hours fever free without medications AND   Symptoms have improved     If you were hospitalized OR are severely immunocompromised*:   More than 20 days since symptoms first appeared   More than 24 hrs hours fever free without medications   Symptoms have improved    If you had no symptoms but tested postivepositive:   More than 10 days since the date of the first positive test (20 days if severely immunocompromised).   If you develop symptoms, then use the guidelines above.     *Definition of severely immunocompromised:  - Current chemotherapy for cancer  - Untreated HIV with CD4 count less than 200  - Combined primary immunodeficiency disorder  - Prednisone more than 20 mg per day for more than 14 days  - Post-transplant patients    Additional instructions:   Separate yourself from other people and animals in your home.   Call ahead before visiting your doctor.   Wear a facemask when around others.   Cover your coughs and sneezes.   Wash your hands often with soap and water; hand  can be used, too.   Avoid sharing personal household items.   Wipe down surfaces used daily.   Monitor your symptoms. Seek prompt medical attention if your illness is worsening (e.g., difficulty breathing).    Before seeking care, call your healthcare provider.   If you have a medical emergency and need to call 911, notify the dispatch personnel that you have, or are being evaluated for COVID-19. If possible, put on a facemask before emergency medical services arrive.      Contact Tracing    As one of the next steps, you will receive a call or text from the [place] Department of [facility] ([facility]) COVID-19  Tracing Team. See the contact information below so you know not to ignore the health departments call. It is important that you contact them back immediately so they can help.      Contact Tracer Number:  [phone]  Caller ID for most carriers: [facility]     What is contact tracing?  · Contact tracing is a process that helps identify everyone who has been in close contact with an infected person. Contact tracers let those people know they may have been exposed and guide them on next steps. Confidentiality is important for everyone; no one will be told who may have exposed them to the virus.  · Your involvement is important. The more we know about where and how this virus is spreading, the better chance we have at stopping it from spreading further.  What does exposure mean?  · Exposure means you have been within 6 feet for more than 15 minutes with a person who has or had COVID-19.  What kind of questions do the contact tracers ask?  · A contact tracer will confirm your basic contact information including name, address, phone number, and next of kin, as well as asking about any symptoms you may have had. Theyll also ask you how you think you may have gotten sick, such as places where you may have been exposed to the virus, and people you were with. Those names will never be shared with anyone outside of that call, and will only be used to help trace and stop the spread of the virus.   I have privacy concerns. How will the state use my information?  · Your privacy about your health is important. All calls are completed using call centers that use the appropriate health privacy protection measures (HIPAA compliance), meaning that your patient information is safe. No one will ever ask you any questions related to immigration status. Your health comes first.   Do I have to participate?  · You do not have to participate, but we strongly encourage you to. Contact tracing can help us catch and control new  outbreaks as theyre developing to keep your friends and family safe.   What if I dont hear from anyone?  · If you dont receive a call within 24 hours, you can call the number above right away to inquire about your status. That line is open from 8:00 am - 8:00 p.m., 7 days a week.  Contact tracing saves lives! Together, we have the power to beat this virus and keep our loved ones and neighbors safe.    For more information see CDC link below.      https://www.cdc.gov/coronavirus/2019-ncov/hcp/guidance-prevent-spread.html#precautions        Sources:  [facility], [place] [facility] and [facility]  COVID-19 DISCHARGE INSTRUCTIONS View Edit Remove   [facility] COVID 19 PREVENTION View Edit Remove

## 2020-08-07 ENCOUNTER — PATIENT OUTREACH (OUTPATIENT)
Dept: ADMINISTRATIVE | Facility: CLINIC | Age: 80
End: 2020-08-07

## 2020-08-07 ENCOUNTER — TELEPHONE (OUTPATIENT)
Dept: FAMILY MEDICINE | Facility: CLINIC | Age: 80
End: 2020-08-07

## 2020-08-07 NOTE — TELEPHONE ENCOUNTER
Contacted pt - She wanted to know if we would be coming out to see her at her apartment.  I put in a call to the  to see if Home health was arranged at discharge.      I told her I would call her back with an update.       ----- Message from Lala Bustamante sent at 8/7/2020  8:43 AM CDT -----  Regarding: Update care  Contact: [phone]  Patient states she is home from the hospital being treated for covid 19. She is quarantined and has home health. Thanks

## 2020-08-07 NOTE — TELEPHONE ENCOUNTER
Pt has orders for [facility].  They called her today to arrange visit.      I spoke to her.  She feels fine.  She has no respiratory symptoms.  Pt advised if she starts with SOB to call 911.     Pt verbalizes understanding.

## 2020-08-07 NOTE — PATIENT INSTRUCTIONS
Instructions for Patients with Confirmed or Suspected COVID-19    If you are awaiting your test result, you will either be called or it will be released to the patient portal.  If you have any questions about your test, please visit [email or web address] or call our COVID-19 information line at [phone].      Instructions for non-hospitalized or discharged patients with confirmed or suspected COVID-19:       Stay home except to get medical care.    Separate yourself from other people and animals in your home.    Call ahead before visiting your doctor.    Wear a face mask.    Cover your coughs and sneezes.    Clean your hands often.    Avoid sharing personal household items.    Clean all high-touch surfaces every day.    Monitor your symptoms. Seek prompt medical attention if your illness is worsening (e.g., difficulty breathing). Before seeking care, call your healthcare provider.    If you have a medical emergency and must call 911, notify the dispatcher that you have or are being evaluated for COVID-19. If possible, put on a face mask before emergency medical services arrive.    Use the following symptom-based strategy to return to normal activity following a suspected or confirmed case of COVID-19. Continue isolation until:   o At least 3 days (72 hours) have passed since recovery defined as resolution of fever without the use of fever-reducing medications and improvement in respiratory symptoms (e.g. cough, shortness of breath), and   o At least 10 days have passed since the first positive test.       As one of the next steps, you will receive a call or text from the [place] [facility] ([facility]) COVID-19 Tracing Team. See the contact information below so you know not to ignore the health departments call. It is important that you contact them back immediately so they can help.     Contact Tracer Number:  [phone]  Caller ID for most carriers: [place] [facility]    What is  contact tracing?   Contact tracing is a process that helps identify everyone who has been in close contact with an infected person. Contact tracers let those people know they may have been exposed and guide them on next steps. Confidentiality is important for everyone; no one will be told who may have exposed them to the virus.   Your involvement is important. The more we know about where and how this virus is spreading, the better chance we have at stopping it from spreading further.  What does exposure mean?   Exposure means you have been within 6 feet for more than 15 minutes with a person who has or had COVID-19.  What kind of questions do the contact tracers ask?   A contact tracer will confirm your basic contact information including name, address, phone number, and next of kin, as well as asking about any symptoms you may have had. Theyll also ask you how you think you may have gotten sick, such as places where you may have been exposed to the virus, and people you were with. Those names will never be shared with anyone outside of that call, and will only be used to help trace and stop the spread of the virus.   I have privacy concerns. How will the state use my information?   Your privacy about your health is important. All calls are completed using call centers that use the appropriate health privacy protection measures (HIPAA compliance), meaning that your patient information is safe. No one will ever ask you any questions related to immigration status. Your health comes first.   Do I have to participate?   You do not have to participate, but we strongly encourage you to. Contact tracing can help us catch and control new outbreaks as theyre developing to keep your friends and family safe.   What if I dont hear from anyone?   If you dont receive a call within 24 hours, you can call the number above right away to inquire about your status. That line is open from 8:00 am - 8:00 p.m., 7 days a  week.  Contact tracing saves lives! Together, we have the power to beat this virus and keep our loved ones and neighbors safe.       Instructions for household members, intimate partners and caregivers in a non-healthcare setting of a patient with confirmed or suspected COVID-19:         Close contacts should monitor their health and call their healthcare provider right away if they develop symptoms suggestive of COVID-19 (e.g., fever, cough, shortness of breath).    Stay home except to get medical care. Separate yourself from other people and animals in the home.   Monitor the patients symptoms. If the patient is getting sicker, call his or her healthcare provider. If the patient has a medical emergency and you need to call 911, notify the dispatch personnel that the patient has or is being evaluated for COVID-19.    Wear a facemask when around other people such as sharing a room or vehicle and before entering a healthcare provider's office.   Cover coughs and sneezes with a tissue. Throw used tissues in a lined trash can immediately and wash hands.   Clean hands often with soap and water for at least 20 seconds or with an alcohol-based hand , rubbing hands together until they feel dry. Avoid touching your eyes, nose, and mouth with unwashed hands.   Clean all high-touch; surfaces every day, including counters, tabletops, doorknobs, bathroom fixtures, toilets, phones, keyboards, tablets, bedside tables, etc. Use a household cleaning spray or wipe according to label instructions.   Avoid sharing personal household items such as dishes, drinking glasses, cups, towels, bedding, etc. After these items are used, they should be washed thoroughly with soap and water.   Continue isolation until:   At least 3 days (72 hours) have passed since recovery defined as resolution of fever without the use of fever-reducing medications and improvement in respiratory symptoms (e.g. cough, shortness of breath),  and    At least 10 days have passed since the patients first positive test.    https://www.cdc.gov/coronavirus/2019-ncov/your-health/index.htm

## 2020-08-08 LAB
BACTERIA BLD CULT: NORMAL
BACTERIA BLD CULT: NORMAL

## 2020-08-14 ENCOUNTER — CARE AT HOME (OUTPATIENT)
Dept: HOME HEALTH SERVICES | Facility: CLINIC | Age: 80
End: 2020-08-14
Payer: MEDICARE

## 2020-08-14 VITALS
RESPIRATION RATE: 16 BRPM | SYSTOLIC BLOOD PRESSURE: 124 MMHG | WEIGHT: 205 LBS | BODY MASS INDEX: 37.49 KG/M2 | HEART RATE: 80 BPM | TEMPERATURE: 98 F | DIASTOLIC BLOOD PRESSURE: 70 MMHG | OXYGEN SATURATION: 92 %

## 2020-08-14 DIAGNOSIS — Z87.891 EX-SMOKER: ICD-10-CM

## 2020-08-14 DIAGNOSIS — U07.1 COVID-19 VIRUS INFECTION: ICD-10-CM

## 2020-08-14 DIAGNOSIS — J44.9 CHRONIC OBSTRUCTIVE PULMONARY DISEASE, UNSPECIFIED COPD TYPE: Primary | ICD-10-CM

## 2020-08-14 PROCEDURE — 99495 TRANSJ CARE MGMT MOD F2F 14D: CPT | Mod: S$GLB,,, | Performed by: NURSE PRACTITIONER

## 2020-08-14 PROCEDURE — 99495 TCM SERVICES (MODERATE COMPLEXITY): ICD-10-PCS | Mod: S$GLB,,, | Performed by: NURSE PRACTITIONER

## 2020-08-14 NOTE — PROGRESS NOTES
[facility]  Transition of Care Home Visit    Visit Date: 8/14/2020  Encounter Provider: Sherrill Ambrose NP  PCP:  Marc Sands MD    PRESENTING HISTORY      Patient ID: Shirley F Gilford is a 79 y.o. female.    Consult Requested By:  Dr. Keyur Galarza  Reason for Consult:  Hospital Follow Up    Angelique is being seen at home due to covid quarantine     Chief Complaint: Transitional Care and Shortness of Breath      History of Present Illness: Ms. Shirley F Gilford is a 79 y.o. female who was recently admitted to the hospital.    Admit: 8/3/2020   Discharge: 8/6/2020    _Shirley F Gilford is a 79 y.o. female who  has a past medical history of Breast cyst, COPD (chronic obstructive pulmonary disease), Eye disorder, Hyperglycemia (1/26/2017), Hyperlipidemia, Hypertension, Osteoporosis, and RLS (restless legs syndrome).  The patient presented to [facility] on 8/3/2020 with 1 week of persistent fever, diarrhea, nausea and vomiting, loss of taste/smell, poor PO intake and weakness.      She was found to be COVID+ in the ED with an oxygen requirement with ambulation and was subsequently admitted for respiratory support and monitoring.     For the full HPI please refer to the History & Physical from this admission.     Hospital Course By Problem with Pertinent Findings      1. COVID19+ with hypoxia on exertion  - rapid COVID 15 test positive in ED with no known sick contacts  - on admit, ferritin 114, CRP 2.28, ddimer 0.56, procal 0.04  - desaturations to mid 80s with ambulation on admission   - while inpt, consented for EUA of remdesivir & received 2 doses  - received dexamethasone x4 days  - received full dose anticoagulation w/lovenox while inpt  - saturations in mid-90s while inpatient; was intermittently on NC but no true O2 requirement   - on discharge, able to ambulate with saturations acceptable in 90s with no supplemental oxygen   - initial symptoms including anorexia, weakness,  nausea improved while inpt  - discharged with self-quarantine precautions, home symptom monitoring and strict return precautions  - discussed discharge with independent living facility to ensure support for self-quarantine   __________________________________________________________________    Today:    HPI:  Pt is being seen for hospital follow up after recent admit for COVID +. See hospital course    Today, she is found in her apartment at a senior living complex. She reports all symptoms have resolved. She reports being in her usual state health, no fever, SOB GI symptoms since her discharge on 8/6. She has remained quarantined in her apartment since her discharge and has not left.    Only question she has is related to her quaratine, she was told at discharge to stay inside until 8/20    Review of Systems   Constitutional: Negative for activity change, fatigue and fever.   HENT: Negative.    Eyes: Negative.    Respiratory: Negative for chest tightness.    Cardiovascular: Negative.  Negative for leg swelling.   Gastrointestinal: Negative.    Endocrine: Negative.    Genitourinary: Negative.    Musculoskeletal: Negative.    Skin: Negative.    Allergic/Immunologic: Negative.    Neurological: Negative.    Hematological: Negative.    Psychiatric/Behavioral: Negative.  Negative for agitation.   All other systems reviewed and are negative.      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    Hyperglycemia 1/26/2017    Hyperlipidemia     Hypertension     Osteoporosis     RLS (restless legs syndrome)        Past Surgical History:   Procedure Laterality Date    BLADDER SURGERY      cancer Mahamed  Kidd    BREAST SURGERY      left benign tumor    COLONOSCOPY      EYE SURGERY      cataracts    TONSILLECTOMY         Family History   Problem Relation Age of Onset    Cancer Daughter 40        colon    Colon cancer Daughter     No Known Problems Daughter     Diabetes Mother     Stroke Mother     Heart disease Father        Social History     Socioeconomic History    Marital status:      Spouse name: Not on file    Number of children: Not on file    Years of education: Not on file    Highest education level: Not on file   Occupational History    Not on file   Social Needs    Financial resource strain: Not on file    Food insecurity     Worry: Not on file     Inability: Not on file    Transportation needs     Medical: Not on file     Non-medical: Not on file   Tobacco Use    Smoking status: Former Smoker    Smokeless tobacco: Never Used   Substance and Sexual Activity    Alcohol use: No    Drug use: Never    Sexual activity: Not on file   Lifestyle    Physical activity     Days per week: Not on file     Minutes per session: Not on file    Stress: Not on file   Relationships    Social connections     Talks on phone: Not on file     Gets together: Not on file     Attends Baptism service: Not on file     Active member of club or organization: Not on file     Attends meetings of clubs or organizations: Not on file     Relationship status: Not on file   Other Topics Concern    Not on file   Social History Narrative    Not on file       MEDICATIONS & ALLERGIES:     Current Outpatient Medications on File Prior to Visit   Medication Sig Dispense Refill    albuterol (PROVENTIL) 2.5 mg /3 mL (0.083 %) nebulizer solution albuterol sulfate 2.5 mg/3 mL (0.083 %) solution for nebulization 100 each 11    alendronate (FOSAMAX) 70 MG tablet TAKE 1 TABLET BY MOUTH EVERY 7 DAYS 12 tablet 3    aspirin (ECOTRIN) 81 MG EC tablet Take 1 tablet (81 mg total) by mouth once daily. 100 tablet 12     atorvastatin (LIPITOR) 10 MG tablet TAKE 1 TABLET BY MOUTH ONCE A DAY 90 tablet 3    calcium-vitamin D3 (CALCIUM 500 + D) 500 mg(1,250mg) -200 unit per tablet Take 1 tablet by mouth once daily.      cholecalciferol, vitamin D3, (VITAMIN D3) 5,000 unit Tab Take 5,000 Units by mouth once daily. 90 tablet 3    cyanocobalamin (VITAMIN B-12) 500 MCG tablet Take 500 mcg by mouth once daily.      ferrous sulfate 324 mg (65 mg iron) TbEC Take 1 tablet (325 mg total) by mouth once daily.  0    fluticasone propionate (FLONASE) 50 mcg/actuation nasal spray 2 sprays (100 mcg total) by Each Nare route once daily. 1 Bottle 12    isosorbide mononitrate (IMDUR) 60 MG 24 hr tablet Take 1 tablet (60 mg total) by mouth once daily. For heart and blood pressure 90 tablet 3    montelukast (SINGULAIR) 10 mg tablet TAKE 1 TABLET BY MOUTH EVERY EVENING 90 tablet 3    MULTIVIT-IRON-MIN-FOLIC ACID 3,500-18-0.4 UNIT-MG-MG ORAL CHEW Take by mouth.      rOPINIRole (REQUIP) 3 MG tablet TAKE 1 TABLET BY MOUTH NIGHTLY 90 tablet 3    loratadine (CLARITIN) 10 mg tablet Take 1 tablet (10 mg total) by mouth once daily. 90 tablet 3    naproxen (NAPROSYN) 500 MG tablet Take 1 tablet (500 mg total) by mouth 2 (two) times daily with meals. 30 tablet 2     No current facility-administered medications on file prior to visit.         Review of patient's allergies indicates:   Allergen Reactions    Contrast media Swelling    Gabapentin Nausea And Vomiting       OBJECTIVE:     Vital Signs:  Vitals:    08/14/20 0955   BP: 124/70   Pulse: 80   Resp: 16   Temp: 97.6 °F (36.4 °C)     Body mass index is 37.49 kg/m².     Physical Exam:  Physical Exam  Vitals signs reviewed.   Constitutional:       General: She is not in acute distress.     Appearance: She is well-developed.   HENT:      Head: Normocephalic and atraumatic.   Eyes:      Pupils: Pupils are equal, round, and reactive to light.   Neck:      Musculoskeletal: Normal range of motion and neck  supple.   Cardiovascular:      Rate and Rhythm: Normal rate and regular rhythm.      Heart sounds: Normal heart sounds.   Pulmonary:      Effort: Pulmonary effort is normal.      Breath sounds: Normal breath sounds.   Abdominal:      General: Bowel sounds are normal.      Palpations: Abdomen is soft.   Musculoskeletal: Normal range of motion.   Skin:     General: Skin is warm and dry.   Neurological:      Mental Status: She is alert and oriented to person, place, and time.      Cranial Nerves: No cranial nerve deficit.   Psychiatric:         Behavior: Behavior normal.         Thought Content: Thought content normal.         Judgment: Judgment normal.         Laboratory  Lab Results   Component Value Date    WBC 12.58 08/05/2020    HGB 13.4 08/05/2020    HCT 42.4 08/05/2020    MCV 91 08/05/2020     08/05/2020     No results found for: INR, PROTIME  Lab Results   Component Value Date    HGBA1C 6.0 (H) 05/13/2020     No results for input(s): POCTGLUCOSE in the last 72 hours.    Diagnostic Results:      TRANSITION OF CARE:     [facility] Contact Note: 8/7/2020    Family and/or Caretaker present at visit?  No.  Diagnostic tests reviewed/disposition: No diagnosic tests pending after this hospitalization.  Disease/illness education: COVID and COPD  Home health/community services discussion/referrals: Patient does not have home health established from hospital visit.  They do not need home health.  If needed, we will set up home health for the patient.   Establishment or re-establishment of referral orders for community resources: No other necessary community resources.   Discussion with other health care providers: No discussion with other health care providers necessary.     Transition of Care Visit:     I have reviewed and updated the history and problem list.  I have reconciled the medication list.  I have discussed the hospitalization and current medical issues, prognosis and plans with the patient/family.   I  spent more than 50% of time discussing the care with the patient/family.  Total Face-to-Face Encounter: 60 minutes.    Medications Reconciliation:   I have reconciled the patient's home medications and discharge medications with the patient/family. I have updated all changes.  Refer to After-Visit Medication List.    ASSESSMENT & PLAN:     HIGH RISK CONDITION(S):  COVID +    Angelique was seen today for transitional care and shortness of breath.    Diagnoses and all orders for this visit:    Chronic obstructive pulmonary disease, unspecified COPD type    COVID-19 virus infection  -     Ambulatory referral/consult to [facility]    Ex-smoker    Pt is improved and stable at usual level of health  No SOB or fever  She is complaint with all her medications,   Sat is 92 today in RA she reports that is normal for her with her COPD.  Reviewed the current CDC guidelines on quarantine related to COVID pts requiring hospitalization  Rec:  20 days from onset of symptoms  3 days of no fever or SOB  Pt experienced first symptoms around 7/28, positive test on 8/3.    Reinforced she should quarantine until 8/20 and may leave after that date if no return of symptoms  Pt stated understanding  Continue all medications as currently in place  Follow up with PCP as directed    Were controlled substances prescribed?  No    Instructions for the patient:  Prevention steps for patients with confirmed COVID-19        · Stay home and stay away from family members and friends. The CDC says, you can leave home after these three things have happened: 1) You have had no fever for at least 72 hours (that is three full days of no fever without the use of medicine that reduces fevers) 2) AND other symptoms have improved (for example, when your cough or shortness of breath have improved) 3) AND at least 7 days have passed since your symptoms first appeared.  · Separate yourself from other people and animals in your home.  · Call ahead  before visiting your doctor.  · Wear a facemask.  · Cover your coughs and sneezes.  · Wash your hands often with soap and water; hand  can be used, too.  · Avoid sharing personal household items.  · Wipe down surfaces used daily.  · Monitor your symptoms    Scheduled Follow-up :  Future Appointments   Date Time Provider Department Center   8/14/2020  3:00 PM Sherrill [facility]rose NP [id number]       After Visit Medication List :     Medication List          Accurate as of August 14, 2020  9:57 AM. If you have any questions, ask your nurse or doctor.            CONTINUE taking these medications    albuterol 2.5 mg /3 mL (0.083 %) nebulizer solution  Commonly known as: PROVENTIL  albuterol sulfate 2.5 mg/3 mL (0.083 %) solution for nebulization     alendronate 70 MG tablet  Commonly known as: FOSAMAX  TAKE 1 TABLET BY MOUTH EVERY 7 DAYS     aspirin 81 MG EC tablet  Commonly known as: ECOTRIN  Take 1 tablet (81 mg total) by mouth once daily.     atorvastatin 10 MG tablet  Commonly known as: LIPITOR  TAKE 1 TABLET BY MOUTH ONCE A DAY     CALCIUM 500 + D 500 mg(1,250mg) -200 unit per tablet  Generic drug: calcium-vitamin D3     CENTRUM 3,500-18-0.4 unit-mg-mg Chew  Generic drug: multivit-iron-min-folic acid     cholecalciferol (vitamin D3) 125 mcg (5,000 unit) Tab  Commonly known as: VITAMIN D3  Take 5,000 Units by mouth once daily.     ferrous sulfate 324 mg (65 mg iron) Tbec  Take 1 tablet (325 mg total) by mouth once daily.     fluticasone propionate 50 mcg/actuation nasal spray  Commonly known as: FLONASE  2 sprays (100 mcg total) by Each Nare route once daily.     isosorbide mononitrate 60 MG 24 hr tablet  Commonly known as: IMDUR  Take 1 tablet (60 mg total) by mouth once daily. For heart and blood pressure     loratadine 10 mg tablet  Commonly known as: CLARITIN  Take 1 tablet (10 mg total) by mouth once daily.     montelukast 10 mg tablet  Commonly known as: SINGULAIR  TAKE 1 TABLET BY MOUTH EVERY  EVENING     naproxen 500 MG tablet  Commonly known as: NAPROSYN  Take 1 tablet (500 mg total) by mouth 2 (two) times daily with meals.     rOPINIRole 3 MG tablet  Commonly known as: REQUIP  TAKE 1 TABLET BY MOUTH NIGHTLY     VITAMIN B-12 500 MCG tablet  Generic drug: cyanocobalamin            Signature:  Sherrill Ambrose NP    Patient consent obtained prior to treatment

## 2020-10-07 ENCOUNTER — OFFICE VISIT (OUTPATIENT)
Dept: FAMILY MEDICINE | Facility: CLINIC | Age: 80
End: 2020-10-07
Payer: MEDICARE

## 2020-10-07 VITALS
OXYGEN SATURATION: 94 % | WEIGHT: 208.25 LBS | BODY MASS INDEX: 36.9 KG/M2 | DIASTOLIC BLOOD PRESSURE: 72 MMHG | HEART RATE: 91 BPM | HEIGHT: 63 IN | SYSTOLIC BLOOD PRESSURE: 126 MMHG | TEMPERATURE: 99 F

## 2020-10-07 DIAGNOSIS — Z87.891 EX-SMOKER: ICD-10-CM

## 2020-10-07 DIAGNOSIS — J44.9 CHRONIC OBSTRUCTIVE PULMONARY DISEASE, UNSPECIFIED COPD TYPE: ICD-10-CM

## 2020-10-07 DIAGNOSIS — Z86.16 HISTORY OF 2019 NOVEL CORONAVIRUS DISEASE (COVID-19): Primary | ICD-10-CM

## 2020-10-07 PROCEDURE — 99213 PR OFFICE/OUTPT VISIT, EST, LEVL III, 20-29 MIN: ICD-10-PCS | Mod: S$GLB,,, | Performed by: FAMILY MEDICINE

## 2020-10-07 PROCEDURE — 99213 OFFICE O/P EST LOW 20 MIN: CPT | Mod: S$GLB,,, | Performed by: FAMILY MEDICINE

## 2020-10-07 PROCEDURE — 1101F PR PT FALLS ASSESS DOC 0-1 FALLS W/OUT INJ PAST YR: ICD-10-PCS | Mod: CPTII,S$GLB,, | Performed by: FAMILY MEDICINE

## 2020-10-07 PROCEDURE — 1101F PT FALLS ASSESS-DOCD LE1/YR: CPT | Mod: CPTII,S$GLB,, | Performed by: FAMILY MEDICINE

## 2020-10-07 PROCEDURE — 1159F PR MEDICATION LIST DOCUMENTED IN MEDICAL RECORD: ICD-10-PCS | Mod: S$GLB,,, | Performed by: FAMILY MEDICINE

## 2020-10-07 PROCEDURE — 1159F MED LIST DOCD IN RCRD: CPT | Mod: S$GLB,,, | Performed by: FAMILY MEDICINE

## 2020-10-07 NOTE — PROGRESS NOTES
"Subjective:      Patient ID: Shirley F Gilford is a 80 y.o. female.    Chief Complaint: Follow-up      Vitals:    10/07/20 1500   BP: 126/72   Pulse: 91   Temp: 98.9 °F (37.2 °C)   SpO2: (!) 94%   Weight: 94.4 kg (208 lb 3.6 oz)   Height: 5' 2.5" (1.588 m)        HPI   CHECK UP; had covid inpt at [place] 2 months ago; see discharge summary;   Problem List  Patient Active Problem List   Diagnosis    Osteoporosis    History of hypertension    History of hyperlipidemia    Restless leg syndrome    Elevated LFTs    Chronic obstructive pulmonary disease    Ex-smoker    Fatty liver    SOB (shortness of breath)    Iron deficiency    Hyperlipidemia    Vitamin D deficiency    Pulmonary hypertension    COVID-19 virus infection        ALLERGIES:   Review of patient's allergies indicates:   Allergen Reactions    Contrast media Swelling    Gabapentin Nausea And Vomiting       MEDS:   Current Outpatient Medications:     albuterol (PROVENTIL) 2.5 mg /3 mL (0.083 %) nebulizer solution, albuterol sulfate 2.5 mg/3 mL (0.083 %) solution for nebulization, Disp: 100 each, Rfl: 11    alendronate (FOSAMAX) 70 MG tablet, TAKE 1 TABLET BY MOUTH EVERY 7 DAYS, Disp: 12 tablet, Rfl: o    aspirin (ECOTRIN) 81 MG EC tablet, Take 1 tablet (81 mg total) by mouth once daily., Disp: 100 tablet, Rfl: 12    atorvastatin (LIPITOR) 10 MG tablet, TAKE 1 TABLET BY MOUTH ONCE A DAY, Disp: 90 tablet, Rfl: 3    calcium-vitamin D3 (CALCIUM 500 + D) 500 mg(1,250mg) -200 unit per tablet, Take 1 tablet by mouth once daily., Disp: , Rfl:     cholecalciferol, vitamin D3, (VITAMIN D3) 5,000 unit Tab, Take 5,000 Units by mouth once daily., Disp: 90 tablet, Rfl: 3    cyanocobalamin (VITAMIN B-12) 500 MCG tablet, Take 500 mcg by mouth once daily., Disp: , Rfl:     ferrous sulfate 324 mg (65 mg iron) TbEC, Take 1 tablet (325 mg total) by mouth once daily., Disp: , Rfl: 0    fluticasone propionate (FLONASE) 50 mcg/actuation nasal spray, 2 sprays " (100 mcg total) by Each Nare route once daily., Disp: 1 Bottle, Rfl: 12    isosorbide mononitrate (IMDUR) 60 MG 24 hr tablet, Take 1 tablet (60 mg total) by mouth once daily. For heart and blood pressure, Disp: 90 tablet, Rfl: 3    loratadine (CLARITIN) 10 mg tablet, Take 1 tablet (10 mg total) by mouth once daily., Disp: 90 tablet, Rfl: 3    montelukast (SINGULAIR) 10 mg tablet, TAKE 1 TABLET BY MOUTH EVERY EVENING, Disp: 90 tablet, Rfl: 3    MULTIVIT-IRON-MIN-FOLIC ACID 3,500-18-0.4 UNIT-MG-MG ORAL CHEW, Take by mouth., Disp: , Rfl:     naproxen (NAPROSYN) 500 MG tablet, Take 1 tablet (500 mg total) by mouth 2 (two) times daily with meals., Disp: 30 tablet, Rfl: 2    rOPINIRole (REQUIP) 3 MG tablet, TAKE 1 TABLET BY MOUTH NIGHTLY, Disp: 90 tablet, Rfl: 3      History:  Current Providers as of 10/7/2020  PCP: Marc Sands MD  Care Team Provider: Mahamed Kidd MD  Care Team Provider: Alessandro Titus Jr., MD  Care Team Provider: Pavel Murillo LPN  Care Team Provider: Pavel Murillo LPN  Care Team Provider: Isabel Griffin MD  Encounter Provider: Marc Sands MD, starting on Wed Oct 7, 2020 12:00 AM  Referring Provider: not found, starting on Wed Oct 7, 2020 12:00 AM  Consulting Physician: Marc Sands MD, starting on Wed Oct 7, 2020  3:00 PM (Active)   Past Medical History:   Diagnosis Date    Breast cyst     COPD (chronic obstructive pulmonary disease)     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    HENT: Negative.    Respiratory: Negative.    Cardiovascular: Negative.    Gastrointestinal: Negative.    Endocrine: Negative.    Genitourinary: Negative.    Musculoskeletal: Negative.    Psychiatric/Behavioral: Negative.    All other systems reviewed and are negative.    Objective:     Physical Exam  Vitals signs and nursing note reviewed.   Constitutional:       Appearance: She is well-developed. She is obese.   HENT:      Head: Normocephalic.   Eyes:      Conjunctiva/sclera: Conjunctivae normal.      Pupils: Pupils are equal, round, and reactive to light.   Neck:      Musculoskeletal: Normal range of motion and neck supple.   Cardiovascular:      Rate and Rhythm: Normal rate and regular rhythm.      Heart sounds: Normal heart sounds.   Pulmonary:      Effort: Pulmonary effort is normal.      Breath sounds: Normal breath sounds.   Musculoskeletal: Normal range of motion.   Skin:     General: Skin is warm and dry.   Neurological:      Mental Status: She is alert and oriented to person, place, and time.      Deep Tendon Reflexes: Reflexes are normal and symmetric.   Psychiatric:         Behavior: Behavior normal.         Thought Content: Thought content normal.         Judgment: Judgment normal.             Assessment:     1. History of 2019 novel coronavirus disease (COVID-19)    2. Ex-smoker    3. Chronic obstructive pulmonary disease, unspecified COPD type      Plan:        Medication List          Accurate as of October 7, 2020  3:42 PM. If you have any questions, ask your nurse or doctor.            CONTINUE taking these medications    albuterol 2.5 mg /3 mL (0.083 %) nebulizer solution  Commonly known as: PROVENTIL  albuterol sulfate 2.5 mg/3 mL (0.083 %) solution for nebulization     alendronate 70 MG tablet  Commonly known as: FOSAMAX  TAKE 1 TABLET BY MOUTH EVERY 7 DAYS     aspirin 81 MG EC tablet  Commonly known as: ECOTRIN  Take 1 tablet (81 mg total) by mouth once  daily.     atorvastatin 10 MG tablet  Commonly known as: LIPITOR  TAKE 1 TABLET BY MOUTH ONCE A DAY     CALCIUM 500 + D 500 mg(1,250mg) -200 unit per tablet  Generic drug: calcium-vitamin D3     CENTRUM 3,500-18-0.4 unit-mg-mg Chew  Generic drug: multivit-iron-min-folic acid     cholecalciferol (vitamin D3) 125 mcg (5,000 unit) Tab  Commonly known as: VITAMIN D3  Take 5,000 Units by mouth once daily.     ferrous sulfate 324 mg (65 mg iron) Tbec  Take 1 tablet (325 mg total) by mouth once daily.     fluticasone propionate 50 mcg/actuation nasal spray  Commonly known as: FLONASE  2 sprays (100 mcg total) by Each Nare route once daily.     isosorbide mononitrate 60 MG 24 hr tablet  Commonly known as: IMDUR  Take 1 tablet (60 mg total) by mouth once daily. For heart and blood pressure     loratadine 10 mg tablet  Commonly known as: CLARITIN  Take 1 tablet (10 mg total) by mouth once daily.     montelukast 10 mg tablet  Commonly known as: SINGULAIR  TAKE 1 TABLET BY MOUTH EVERY EVENING     naproxen 500 MG tablet  Commonly known as: NAPROSYN  Take 1 tablet (500 mg total) by mouth 2 (two) times daily with meals.     rOPINIRole 3 MG tablet  Commonly known as: REQUIP  TAKE 1 TABLET BY MOUTH NIGHTLY     VITAMIN B-12 500 MCG tablet  Generic drug: cyanocobalamin          History of 2019 novel coronavirus disease (COVID-19)    Ex-smoker    Chronic obstructive pulmonary disease, unspecified COPD type      F/u 6 months

## 2020-10-19 ENCOUNTER — TELEPHONE (OUTPATIENT)
Dept: FAMILY MEDICINE | Facility: CLINIC | Age: 80
End: 2020-10-19

## 2020-10-19 NOTE — TELEPHONE ENCOUNTER
Scheduled.  No further action needed at this time.      ----- Message from Emily Dominguez sent at 10/19/2020 11:30 AM CDT -----  Regarding: lump in right leg, and knee pain  Type:  Needs Medical Advice    Who Called: patient   Symptoms (please be specific): lump in right leg, and arms, knee pain   How long has patient had these symptoms:  patient noticed last week for lumps, knee pain also started last week    Would the patient rather a call back or a response via [facility]? Call back   Best Call Back Number: [phone]  Additional Information: patient would like to have a sooner appt than I can schedule

## 2020-10-20 ENCOUNTER — PES CALL (OUTPATIENT)
Dept: ADMINISTRATIVE | Facility: CLINIC | Age: 80
End: 2020-10-20

## 2020-10-23 ENCOUNTER — HOSPITAL ENCOUNTER (OUTPATIENT)
Dept: RADIOLOGY | Facility: HOSPITAL | Age: 80
Discharge: HOME | End: 2020-10-23
Attending: FAMILY MEDICINE
Payer: MEDICARE

## 2020-10-23 ENCOUNTER — OFFICE VISIT (OUTPATIENT)
Dept: FAMILY MEDICINE | Facility: CLINIC | Age: 80
End: 2020-10-23
Payer: MEDICARE

## 2020-10-23 ENCOUNTER — TELEPHONE (OUTPATIENT)
Dept: FAMILY MEDICINE | Facility: CLINIC | Age: 80
End: 2020-10-23

## 2020-10-23 VITALS
TEMPERATURE: 98 F | HEART RATE: 93 BPM | HEIGHT: 63 IN | DIASTOLIC BLOOD PRESSURE: 76 MMHG | SYSTOLIC BLOOD PRESSURE: 128 MMHG | BODY MASS INDEX: 37.85 KG/M2 | WEIGHT: 213.63 LBS | OXYGEN SATURATION: 93 %

## 2020-10-23 DIAGNOSIS — M79.3 PANNICULITIS: ICD-10-CM

## 2020-10-23 DIAGNOSIS — E78.5 HYPERLIPIDEMIA, UNSPECIFIED HYPERLIPIDEMIA TYPE: ICD-10-CM

## 2020-10-23 DIAGNOSIS — Z86.79 HISTORY OF HYPERTENSION: ICD-10-CM

## 2020-10-23 DIAGNOSIS — M81.0 OSTEOPOROSIS, UNSPECIFIED OSTEOPOROSIS TYPE, UNSPECIFIED PATHOLOGICAL FRACTURE PRESENCE: ICD-10-CM

## 2020-10-23 DIAGNOSIS — Z86.39 HISTORY OF HYPERLIPIDEMIA: ICD-10-CM

## 2020-10-23 DIAGNOSIS — Z87.891 EX-SMOKER: ICD-10-CM

## 2020-10-23 DIAGNOSIS — J44.9 CHRONIC OBSTRUCTIVE PULMONARY DISEASE, UNSPECIFIED COPD TYPE: Primary | ICD-10-CM

## 2020-10-23 DIAGNOSIS — I27.20 PULMONARY HYPERTENSION: ICD-10-CM

## 2020-10-23 DIAGNOSIS — U07.1 COVID-19 VIRUS INFECTION: ICD-10-CM

## 2020-10-23 DIAGNOSIS — D64.9 ANEMIA, UNSPECIFIED TYPE: Primary | ICD-10-CM

## 2020-10-23 DIAGNOSIS — R06.02 SOB (SHORTNESS OF BREATH): ICD-10-CM

## 2020-10-23 DIAGNOSIS — J44.9 CHRONIC OBSTRUCTIVE PULMONARY DISEASE, UNSPECIFIED COPD TYPE: ICD-10-CM

## 2020-10-23 DIAGNOSIS — E55.9 VITAMIN D DEFICIENCY: ICD-10-CM

## 2020-10-23 DIAGNOSIS — R60.0 LOWER LEG EDEMA: ICD-10-CM

## 2020-10-23 PROCEDURE — 99213 PR OFFICE/OUTPT VISIT, EST, LEVL III, 20-29 MIN: ICD-10-PCS | Mod: S$GLB,,, | Performed by: FAMILY MEDICINE

## 2020-10-23 PROCEDURE — 1101F PT FALLS ASSESS-DOCD LE1/YR: CPT | Mod: CPTII,S$GLB,, | Performed by: FAMILY MEDICINE

## 2020-10-23 PROCEDURE — 71046 X-RAY EXAM CHEST 2 VIEWS: CPT | Mod: TC,FY,PO

## 2020-10-23 PROCEDURE — 99499 RISK ADDL DX/OHS AUDIT: ICD-10-PCS | Mod: S$GLB,,, | Performed by: FAMILY MEDICINE

## 2020-10-23 PROCEDURE — 1101F PR PT FALLS ASSESS DOC 0-1 FALLS W/OUT INJ PAST YR: ICD-10-PCS | Mod: CPTII,S$GLB,, | Performed by: FAMILY MEDICINE

## 2020-10-23 PROCEDURE — 99213 OFFICE O/P EST LOW 20 MIN: CPT | Mod: S$GLB,,, | Performed by: FAMILY MEDICINE

## 2020-10-23 PROCEDURE — 1159F PR MEDICATION LIST DOCUMENTED IN MEDICAL RECORD: ICD-10-PCS | Mod: S$GLB,,, | Performed by: FAMILY MEDICINE

## 2020-10-23 PROCEDURE — 1126F PR PAIN SEVERITY QUANTIFIED, NO PAIN PRESENT: ICD-10-PCS | Mod: S$GLB,,, | Performed by: FAMILY MEDICINE

## 2020-10-23 PROCEDURE — 1159F MED LIST DOCD IN RCRD: CPT | Mod: S$GLB,,, | Performed by: FAMILY MEDICINE

## 2020-10-23 PROCEDURE — 99499 UNLISTED E&M SERVICE: CPT | Mod: S$GLB,,, | Performed by: FAMILY MEDICINE

## 2020-10-23 PROCEDURE — 1126F AMNT PAIN NOTED NONE PRSNT: CPT | Mod: S$GLB,,, | Performed by: FAMILY MEDICINE

## 2020-10-23 RX ORDER — ALBUTEROL SULFATE 90 UG/1
2 AEROSOL, METERED RESPIRATORY (INHALATION) EVERY 6 HOURS PRN
Qty: 18 G | Refills: 0 | Status: SHIPPED | OUTPATIENT
Start: 2020-10-23 | End: 2021-04-07 | Stop reason: SDUPTHER

## 2020-10-23 RX ORDER — FERROUS SULFATE 324(65)MG
325 TABLET, DELAYED RELEASE (ENTERIC COATED) ORAL DAILY
Refills: 0 | COMMUNITY
Start: 2020-10-23 | End: 2023-07-17

## 2020-10-23 RX ORDER — OMEPRAZOLE 20 MG/1
20 TABLET, DELAYED RELEASE ORAL
Qty: 60 TABLET | Refills: 5 | Status: SHIPPED | OUTPATIENT
Start: 2020-10-23 | End: 2020-12-07

## 2020-10-23 RX ORDER — METHYLPREDNISOLONE 4 MG/1
TABLET ORAL
Qty: 1 PACKAGE | Refills: 0 | Status: SHIPPED | OUTPATIENT
Start: 2020-10-23 | End: 2020-12-07

## 2020-10-23 RX ORDER — FUROSEMIDE 40 MG/1
40 TABLET ORAL DAILY
Qty: 30 TABLET | Refills: 0 | Status: SHIPPED | OUTPATIENT
Start: 2020-10-23 | End: 2020-12-02

## 2020-10-23 NOTE — PROGRESS NOTES
"Subjective:      Patient ID: Shirley F Gilford is a 80 y.o. female.    Chief Complaint: Follow-up (Covid follow up)      Vitals:    10/23/20 1145 10/23/20 1237   BP: (!) 146/62 128/76   Pulse: 93    Temp: 98.3 °F (36.8 °C)    TempSrc: Temporal    SpO2: (!) 93%    Weight: 96.9 kg (213 lb 10 oz)    Height: 5' 2.5" (1.588 m)         HPI was here 2 weeks ago for follow up admit to [facility] for Covid; pattie then, on a Saturday after the visit,    Pain ans swelling of both lower legs, popliteal fossa, calves; no hx of DFR  Lumps under skin of both foreams, lump right antecubial fossa hurts, lump left mid back, doesn't hurt  Hx of Covid  Not using oxygen at home;  Needs oxygen with walking and leaving home, wants portable concentrator  97 at rest O2 sat, pulse 89  Problem List  Patient Active Problem List   Diagnosis    Osteoporosis    History of hypertension    History of hyperlipidemia    Restless leg syndrome    Elevated LFTs    Chronic obstructive pulmonary disease    Ex-smoker    Fatty liver    SOB (shortness of breath)    Iron deficiency    Hyperlipidemia    Vitamin D deficiency    Pulmonary hypertension    COVID-19 virus infection    Panniculitis        ALLERGIES:   Review of patient's allergies indicates:   Allergen Reactions    Contrast media Swelling    Gabapentin Nausea And Vomiting       MEDS:   Current Outpatient Medications:     albuterol (PROVENTIL) 2.5 mg /3 mL (0.083 %) nebulizer solution, albuterol sulfate 2.5 mg/3 mL (0.083 %) solution for nebulization, Disp: 100 each, Rfl: 11    alendronate (FOSAMAX) 70 MG tablet, TAKE 1 TABLET BY MOUTH EVERY 7 DAYS, Disp: 12 tablet, Rfl: o    atorvastatin (LIPITOR) 10 MG tablet, TAKE 1 TABLET BY MOUTH ONCE A DAY, Disp: 90 tablet, Rfl: 3    calcium-vitamin D3 (CALCIUM 500 + D) 500 mg(1,250mg) -200 unit per tablet, Take 1 tablet by mouth once daily., Disp: , Rfl:     cholecalciferol, vitamin D3, (VITAMIN D3) 5,000 unit Tab, Take 5,000 Units by mouth " once daily., Disp: 90 tablet, Rfl: 3    cyanocobalamin (VITAMIN B-12) 500 MCG tablet, Take 500 mcg by mouth once daily., Disp: , Rfl:     fluticasone propionate (FLONASE) 50 mcg/actuation nasal spray, 2 sprays (100 mcg total) by Each Nare route once daily., Disp: 1 Bottle, Rfl: 12    isosorbide mononitrate (IMDUR) 60 MG 24 hr tablet, Take 1 tablet (60 mg total) by mouth once daily. For heart and blood pressure, Disp: 90 tablet, Rfl: 3    loratadine (CLARITIN) 10 mg tablet, Take 1 tablet (10 mg total) by mouth once daily., Disp: 90 tablet, Rfl: 3    MULTIVIT-IRON-MIN-FOLIC ACID 3,500-18-0.4 UNIT-MG-MG ORAL CHEW, Take by mouth., Disp: , Rfl:     rOPINIRole (REQUIP) 3 MG tablet, TAKE 1 TABLET BY MOUTH NIGHTLY, Disp: 90 tablet, Rfl: 3    albuterol (PROVENTIL/VENTOLIN HFA) 90 mcg/actuation inhaler, Inhale 2 puffs into the lungs every 6 (six) hours as needed for Wheezing., Disp: 18 g, Rfl: 0    ferrous sulfate 324 mg (65 mg iron) TbEC, Take 1 tablet (324 mg total) by mouth once daily., Disp: , Rfl: 0    furosemide (LASIX) 40 MG tablet, Take 1 tablet (40 mg total) by mouth once daily. For fluid, Disp: 30 tablet, Rfl: 0    methylPREDNISolone (MEDROL DOSEPACK) 4 mg tablet, use as directed, Disp: 1 Package, Rfl: 0    montelukast (SINGULAIR) 10 mg tablet, TAKE 1 TABLET BY MOUTH EVERY EVENING (Patient not taking: Reported on 10/23/2020), Disp: 90 tablet, Rfl: 3    omeprazole (PRILOSEC OTC) 20 MG tablet, Take 1 tablet (20 mg total) by mouth 2 (two) times daily before meals., Disp: 60 tablet, Rfl: 5      History:  Current Providers as of 10/23/2020  PCP: Marc Sands MD  Care Team Provider: Mahamed Kidd MD  Care Team Provider: Alessandro Titus Jr., MD  Care Team Provider: Pavel J. 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    HENT: Negative.    Respiratory: Positive for cough and shortness of breath.    Cardiovascular: Negative.    Gastrointestinal: Negative.    Endocrine: Negative.    Genitourinary: Negative.    Musculoskeletal: Positive for joint swelling.   Psychiatric/Behavioral: Negative.    All other systems reviewed and are negative.    Objective:     Physical Exam  Vitals signs and nursing note reviewed.   Constitutional:       Appearance: She is well-developed. She is obese.   HENT:      Head: Normocephalic.   Eyes:      Conjunctiva/sclera: Conjunctivae normal.      Pupils: Pupils are equal, round, and reactive to light.   Neck:      Musculoskeletal: Normal range of motion and neck supple.   Cardiovascular:      Rate and Rhythm: Normal rate and regular rhythm.      Heart sounds: Normal heart sounds.   Pulmonary:      Effort: Pulmonary effort is normal.      Breath sounds: Normal breath sounds.   Musculoskeletal: Normal range of motion.   Skin:     General: Skin is warm and dry.   Neurological:      Mental Status: She is alert and oriented to person, place, and time.      Deep Tendon Reflexes: Reflexes are normal and symmetric.   Psychiatric:          Behavior: Behavior normal.         Thought Content: Thought content normal.         Judgment: Judgment normal.             Assessment:     1. Chronic obstructive pulmonary disease, unspecified COPD type    2. Pulmonary hypertension    3. History of hypertension    4. History of hyperlipidemia    5. Hyperlipidemia, unspecified hyperlipidemia type    6. Vitamin D deficiency    7. Osteoporosis, unspecified osteoporosis type, unspecified pathological fracture presence    8. COVID-19 virus infection    9. Ex-smoker    10. SOB (shortness of breath)    11. Panniculitis    12. Lower leg edema      Plan:        Medication List          Accurate as of October 23, 2020 11:59 PM. If you have any questions, ask your nurse or doctor.            START taking these medications    furosemide 40 MG tablet  Commonly known as: LASIX  Take 1 tablet (40 mg total) by mouth once daily. For fluid  Started by: Marc Sands MD     methylPREDNISolone 4 mg tablet  Commonly known as: MEDROL DOSEPACK  use as directed  Started by: Marc Sands MD     omeprazole 20 MG tablet  Commonly known as: PriLOSEC OTC  Take 1 tablet (20 mg total) by mouth 2 (two) times daily before meals.  Started by: Marc Sands MD        CHANGE how you take these medications    * albuterol 2.5 mg /3 mL (0.083 %) nebulizer solution  Commonly known as: PROVENTIL  albuterol sulfate 2.5 mg/3 mL (0.083 %) solution for nebulization  What changed: Another medication with the same name was added. Make sure you understand how and when to take each.  Changed by: Marc Sands MD     * albuterol 90 mcg/actuation inhaler  Commonly known as: PROVENTIL/VENTOLIN HFA  Inhale 2 puffs into the lungs every 6 (six) hours as needed for Wheezing.  What changed: You were already taking a medication with the same name, and this prescription was added. Make sure you understand how and when to take each.  Changed by: Marc Sands MD     ferrous sulfate 324 mg (65 mg iron) Tbec  Take 1  tablet (324 mg total) by mouth once daily.  What changed: how much to take  Changed by: Marc Sands MD         * This list has 2 medication(s) that are the same as other medications prescribed for you. Read the directions carefully, and ask your doctor or other care provider to review them with you.            CONTINUE taking these medications    alendronate 70 MG tablet  Commonly known as: FOSAMAX  TAKE 1 TABLET BY MOUTH EVERY 7 DAYS     atorvastatin 10 MG tablet  Commonly known as: LIPITOR  TAKE 1 TABLET BY MOUTH ONCE A DAY     CALCIUM 500 + D 500 mg(1,250mg) -200 unit per tablet  Generic drug: calcium-vitamin D3     CENTRUM 3,500-18-0.4 unit-mg-mg Chew  Generic drug: multivit-iron-min-folic acid     cholecalciferol (vitamin D3) 125 mcg (5,000 unit) Tab  Commonly known as: VITAMIN D3  Take 5,000 Units by mouth once daily.     fluticasone propionate 50 mcg/actuation nasal spray  Commonly known as: FLONASE  2 sprays (100 mcg total) by Each Nare route once daily.     isosorbide mononitrate 60 MG 24 hr tablet  Commonly known as: IMDUR  Take 1 tablet (60 mg total) by mouth once daily. For heart and blood pressure     loratadine 10 mg tablet  Commonly known as: CLARITIN  Take 1 tablet (10 mg total) by mouth once daily.     montelukast 10 mg tablet  Commonly known as: SINGULAIR  TAKE 1 TABLET BY MOUTH EVERY EVENING     rOPINIRole 3 MG tablet  Commonly known as: REQUIP  TAKE 1 TABLET BY MOUTH NIGHTLY     VITAMIN B-12 500 MCG tablet  Generic drug: cyanocobalamin        STOP taking these medications    aspirin 81 MG EC tablet  Commonly known as: ECOTRIN  Stopped by: Marc Sands MD     naproxen 500 MG tablet  Commonly known as: NAPROSYN  Stopped by: Marc Sands MD           Where to Get Your Medications      [id number] [place]  [address] [place], [place] [address] [address]    Phone: [phone]   · albuterol 90 mcg/actuation  inhaler  · furosemide 40 MG tablet  · methylPREDNISolone 4 mg tablet  · omeprazole 20 MG tablet     You can get these medications from any pharmacy    You don't need a prescription for these medications  · ferrous sulfate 324 mg (65 mg iron) Tbec       Chronic obstructive pulmonary disease, unspecified COPD type  -     X-Ray Chest PA And Lateral; Future; Expected date: 10/23/2020  -     CBC auto differential; Future; Expected date: 10/23/2020  -     Comprehensive Metabolic Panel; Future; Expected date: 10/23/2020  -     Sedimentation rate; Future  -     TSH; Future  -     CK; Future; Expected date: 10/23/2020  -     C-Reactive Protein; Future; Expected date: 10/23/2020  -     BNP; Future; Expected date: 10/23/2020  -     D dimer, quantitative; Future; Expected date: 10/23/2020  -     Ferritin; Future; Expected date: 10/23/2020  -     Troponin I; Future; Expected date: 10/23/2020  -     CK; Future; Expected date: 10/23/2020  -     Lactate Dehydrogenase; Future; Expected date: 10/23/2020    Pulmonary hypertension  -     X-Ray Chest PA And Lateral; Future; Expected date: 10/23/2020  -     CBC auto differential; Future; Expected date: 10/23/2020  -     Comprehensive Metabolic Panel; Future; Expected date: 10/23/2020  -     Sedimentation rate; Future  -     TSH; Future  -     CK; Future; Expected date: 10/23/2020  -     C-Reactive Protein; Future; Expected date: 10/23/2020  -     BNP; Future; Expected date: 10/23/2020  -     D dimer, quantitative; Future; Expected date: 10/23/2020  -     Ferritin; Future; Expected date: 10/23/2020  -     Troponin I; Future; Expected date: 10/23/2020  -     CK; Future; Expected date: 10/23/2020  -     Lactate Dehydrogenase; Future; Expected date: 10/23/2020    History of hypertension  -     X-Ray Chest PA And Lateral; Future; Expected date: 10/23/2020  -     CBC auto differential; Future; Expected date: 10/23/2020  -     Comprehensive Metabolic Panel; Future; Expected date: 10/23/2020  -      Sedimentation rate; Future  -     TSH; Future  -     CK; Future; Expected date: 10/23/2020  -     C-Reactive Protein; Future; Expected date: 10/23/2020  -     BNP; Future; Expected date: 10/23/2020  -     D dimer, quantitative; Future; Expected date: 10/23/2020  -     Ferritin; Future; Expected date: 10/23/2020  -     Troponin I; Future; Expected date: 10/23/2020  -     CK; Future; Expected date: 10/23/2020  -     Lactate Dehydrogenase; Future; Expected date: 10/23/2020    History of hyperlipidemia  -     X-Ray Chest PA And Lateral; Future; Expected date: 10/23/2020  -     CBC auto differential; Future; Expected date: 10/23/2020  -     Comprehensive Metabolic Panel; Future; Expected date: 10/23/2020  -     Sedimentation rate; Future  -     TSH; Future  -     CK; Future; Expected date: 10/23/2020  -     C-Reactive Protein; Future; Expected date: 10/23/2020  -     BNP; Future; Expected date: 10/23/2020  -     D dimer, quantitative; Future; Expected date: 10/23/2020  -     Ferritin; Future; Expected date: 10/23/2020  -     Troponin I; Future; Expected date: 10/23/2020  -     CK; Future; Expected date: 10/23/2020  -     Lactate Dehydrogenase; Future; Expected date: 10/23/2020    Hyperlipidemia, unspecified hyperlipidemia type  -     X-Ray Chest PA And Lateral; Future; Expected date: 10/23/2020  -     CBC auto differential; Future; Expected date: 10/23/2020  -     Comprehensive Metabolic Panel; Future; Expected date: 10/23/2020  -     Sedimentation rate; Future  -     TSH; Future  -     CK; Future; Expected date: 10/23/2020  -     C-Reactive Protein; Future; Expected date: 10/23/2020  -     BNP; Future; Expected date: 10/23/2020  -     D dimer, quantitative; Future; Expected date: 10/23/2020  -     Ferritin; Future; Expected date: 10/23/2020  -     Troponin I; Future; Expected date: 10/23/2020  -     CK; Future; Expected date: 10/23/2020  -     Lactate Dehydrogenase; Future; Expected date: 10/23/2020    Vitamin D  deficiency  -     X-Ray Chest PA And Lateral; Future; Expected date: 10/23/2020  -     CBC auto differential; Future; Expected date: 10/23/2020  -     Comprehensive Metabolic Panel; Future; Expected date: 10/23/2020  -     Sedimentation rate; Future  -     TSH; Future  -     CK; Future; Expected date: 10/23/2020  -     C-Reactive Protein; Future; Expected date: 10/23/2020  -     BNP; Future; Expected date: 10/23/2020  -     D dimer, quantitative; Future; Expected date: 10/23/2020  -     Ferritin; Future; Expected date: 10/23/2020  -     Troponin I; Future; Expected date: 10/23/2020  -     CK; Future; Expected date: 10/23/2020  -     Lactate Dehydrogenase; Future; Expected date: 10/23/2020    Osteoporosis, unspecified osteoporosis type, unspecified pathological fracture presence  -     X-Ray Chest PA And Lateral; Future; Expected date: 10/23/2020  -     CBC auto differential; Future; Expected date: 10/23/2020  -     Comprehensive Metabolic Panel; Future; Expected date: 10/23/2020  -     Sedimentation rate; Future  -     TSH; Future  -     CK; Future; Expected date: 10/23/2020  -     C-Reactive Protein; Future; Expected date: 10/23/2020  -     BNP; Future; Expected date: 10/23/2020  -     D dimer, quantitative; Future; Expected date: 10/23/2020  -     Ferritin; Future; Expected date: 10/23/2020  -     Troponin I; Future; Expected date: 10/23/2020  -     CK; Future; Expected date: 10/23/2020  -     Lactate Dehydrogenase; Future; Expected date: 10/23/2020    COVID-19 virus infection  -     X-Ray Chest PA And Lateral; Future; Expected date: 10/23/2020  -     CBC auto differential; Future; Expected date: 10/23/2020  -     Comprehensive Metabolic Panel; Future; Expected date: 10/23/2020  -     Sedimentation rate; Future  -     TSH; Future  -     CK; Future; Expected date: 10/23/2020  -     C-Reactive Protein; Future; Expected date: 10/23/2020  -     BNP; Future; Expected date: 10/23/2020  -     D dimer, quantitative;  Future; Expected date: 10/23/2020  -     Ferritin; Future; Expected date: 10/23/2020  -     Troponin I; Future; Expected date: 10/23/2020  -     CK; Future; Expected date: 10/23/2020  -     Lactate Dehydrogenase; Future; Expected date: 10/23/2020    Ex-smoker  -     X-Ray Chest PA And Lateral; Future; Expected date: 10/23/2020  -     CBC auto differential; Future; Expected date: 10/23/2020  -     Comprehensive Metabolic Panel; Future; Expected date: 10/23/2020  -     Sedimentation rate; Future  -     TSH; Future  -     CK; Future; Expected date: 10/23/2020  -     C-Reactive Protein; Future; Expected date: 10/23/2020  -     BNP; Future; Expected date: 10/23/2020  -     D dimer, quantitative; Future; Expected date: 10/23/2020  -     Ferritin; Future; Expected date: 10/23/2020  -     Troponin I; Future; Expected date: 10/23/2020  -     CK; Future; Expected date: 10/23/2020  -     Lactate Dehydrogenase; Future; Expected date: 10/23/2020    SOB (shortness of breath)  -     X-Ray Chest PA And Lateral; Future; Expected date: 10/23/2020  -     CBC auto differential; Future; Expected date: 10/23/2020  -     Comprehensive Metabolic Panel; Future; Expected date: 10/23/2020  -     Sedimentation rate; Future  -     TSH; Future  -     CK; Future; Expected date: 10/23/2020  -     C-Reactive Protein; Future; Expected date: 10/23/2020  -     BNP; Future; Expected date: 10/23/2020  -     D dimer, quantitative; Future; Expected date: 10/23/2020  -     Ferritin; Future; Expected date: 10/23/2020  -     Troponin I; Future; Expected date: 10/23/2020  -     CK; Future; Expected date: 10/23/2020  -     Lactate Dehydrogenase; Future; Expected date: 10/23/2020    Panniculitis  -     X-Ray Chest PA And Lateral; Future; Expected date: 10/23/2020  -     CBC auto differential; Future; Expected date: 10/23/2020  -     Comprehensive Metabolic Panel; Future; Expected date: 10/23/2020  -     Sedimentation rate; Future  -     TSH; Future  -     CK;  Future; Expected date: 10/23/2020  -     C-Reactive Protein; Future; Expected date: 10/23/2020  -     BNP; Future; Expected date: 10/23/2020  -     D dimer, quantitative; Future; Expected date: 10/23/2020  -     Ferritin; Future; Expected date: 10/23/2020  -     Troponin I; Future; Expected date: 10/23/2020  -     CK; Future; Expected date: 10/23/2020  -     Lactate Dehydrogenase; Future; Expected date: 10/23/2020    Lower leg edema  -     X-Ray Chest PA And Lateral; Future; Expected date: 10/23/2020  -     CBC auto differential; Future; Expected date: 10/23/2020  -     Comprehensive Metabolic Panel; Future; Expected date: 10/23/2020  -     Sedimentation rate; Future  -     TSH; Future  -     CK; Future; Expected date: 10/23/2020  -     C-Reactive Protein; Future; Expected date: 10/23/2020  -     BNP; Future; Expected date: 10/23/2020  -     D dimer, quantitative; Future; Expected date: 10/23/2020  -     Ferritin; Future; Expected date: 10/23/2020  -     Troponin I; Future; Expected date: 10/23/2020  -     CK; Future; Expected date: 10/23/2020  -     Lactate Dehydrogenase; Future; Expected date: 10/23/2020    Other orders  -     albuterol (PROVENTIL/VENTOLIN HFA) 90 mcg/actuation inhaler; Inhale 2 puffs into the lungs every 6 (six) hours as needed for Wheezing.  Dispense: 18 g; Refill: 0  -     methylPREDNISolone (MEDROL DOSEPACK) 4 mg tablet; use as directed  Dispense: 1 Package; Refill: 0

## 2020-11-06 ENCOUNTER — PATIENT OUTREACH (OUTPATIENT)
Dept: ADMINISTRATIVE | Facility: OTHER | Age: 80
End: 2020-11-06

## 2020-11-06 NOTE — PROGRESS NOTES
Updates were requested from care everywhere.  Chart was reviewed for overdue Proactive [facility] Encounters (MANJIT) topics (CRS, Breast Cancer Screening, Eye exam)  Health Maintenance has been updated.  LINKS not responding.

## 2020-11-09 ENCOUNTER — TELEPHONE (OUTPATIENT)
Dept: FAMILY MEDICINE | Facility: CLINIC | Age: 80
End: 2020-11-09

## 2020-11-10 ENCOUNTER — TELEPHONE (OUTPATIENT)
Dept: FAMILY MEDICINE | Facility: CLINIC | Age: 80
End: 2020-11-10

## 2020-11-10 NOTE — TELEPHONE ENCOUNTER
----- Message from Oren Terry sent at 11/10/2020 10:29 AM CST -----  Regarding: Appointment Request  Contact: Self/ [phone]  Patient would like to be seen sooner than the next available appointment. Please advise.

## 2020-11-10 NOTE — TELEPHONE ENCOUNTER
Spoke with patient and she reports leg pain and edema. appt scheduled for tomorrow at 2 pm. Patient voices understanding.

## 2020-11-11 ENCOUNTER — OFFICE VISIT (OUTPATIENT)
Dept: FAMILY MEDICINE | Facility: CLINIC | Age: 80
End: 2020-11-11
Payer: MEDICARE

## 2020-11-11 VITALS
TEMPERATURE: 97 F | SYSTOLIC BLOOD PRESSURE: 126 MMHG | HEART RATE: 86 BPM | OXYGEN SATURATION: 94 % | WEIGHT: 212.75 LBS | BODY MASS INDEX: 37.7 KG/M2 | DIASTOLIC BLOOD PRESSURE: 82 MMHG | HEIGHT: 63 IN

## 2020-11-11 DIAGNOSIS — M19.90 ARTHRITIS: Primary | ICD-10-CM

## 2020-11-11 DIAGNOSIS — J44.9 CHRONIC OBSTRUCTIVE PULMONARY DISEASE, UNSPECIFIED COPD TYPE: ICD-10-CM

## 2020-11-11 DIAGNOSIS — D64.9 ANEMIA, UNSPECIFIED TYPE: ICD-10-CM

## 2020-11-11 DIAGNOSIS — U07.1 COVID-19 VIRUS INFECTION: ICD-10-CM

## 2020-11-11 DIAGNOSIS — E61.1 IRON DEFICIENCY: ICD-10-CM

## 2020-11-11 PROCEDURE — 1125F AMNT PAIN NOTED PAIN PRSNT: CPT | Mod: S$GLB,,, | Performed by: FAMILY MEDICINE

## 2020-11-11 PROCEDURE — 20610 DRAIN/INJ JOINT/BURSA W/O US: CPT | Mod: RT,S$GLB,, | Performed by: FAMILY MEDICINE

## 2020-11-11 PROCEDURE — 99213 OFFICE O/P EST LOW 20 MIN: CPT | Mod: 25,S$GLB,, | Performed by: FAMILY MEDICINE

## 2020-11-11 PROCEDURE — 1159F MED LIST DOCD IN RCRD: CPT | Mod: S$GLB,,, | Performed by: FAMILY MEDICINE

## 2020-11-11 PROCEDURE — 1159F PR MEDICATION LIST DOCUMENTED IN MEDICAL RECORD: ICD-10-PCS | Mod: S$GLB,,, | Performed by: FAMILY MEDICINE

## 2020-11-11 PROCEDURE — 1101F PR PT FALLS ASSESS DOC 0-1 FALLS W/OUT INJ PAST YR: ICD-10-PCS | Mod: CPTII,S$GLB,, | Performed by: FAMILY MEDICINE

## 2020-11-11 PROCEDURE — 1125F PR PAIN SEVERITY QUANTIFIED, PAIN PRESENT: ICD-10-PCS | Mod: S$GLB,,, | Performed by: FAMILY MEDICINE

## 2020-11-11 PROCEDURE — 3288F FALL RISK ASSESSMENT DOCD: CPT | Mod: CPTII,S$GLB,, | Performed by: FAMILY MEDICINE

## 2020-11-11 PROCEDURE — 1101F PT FALLS ASSESS-DOCD LE1/YR: CPT | Mod: CPTII,S$GLB,, | Performed by: FAMILY MEDICINE

## 2020-11-11 PROCEDURE — 20610 LARGE JOINT ASPIRATION/INJECTION: R KNEE: ICD-10-PCS | Mod: RT,S$GLB,, | Performed by: FAMILY MEDICINE

## 2020-11-11 PROCEDURE — 99213 PR OFFICE/OUTPT VISIT, EST, LEVL III, 20-29 MIN: ICD-10-PCS | Mod: 25,S$GLB,, | Performed by: FAMILY MEDICINE

## 2020-11-11 PROCEDURE — 3288F PR FALLS RISK ASSESSMENT DOCUMENTED: ICD-10-PCS | Mod: CPTII,S$GLB,, | Performed by: FAMILY MEDICINE

## 2020-11-11 NOTE — PROCEDURES
Large Joint Aspiration/Injection: R knee    Date/Time: 11/11/2020 2:00 PM  Performed by: Marc Sands MD  Authorized by: Marc Sands MD     Indications:  Arthritis  Site marked: the procedure site was marked    Timeout: prior to procedure the correct patient, procedure, and site was verified    Prep: patient was prepped and draped in usual sterile fashion    Local anesthesia used?: No      Details:  Needle Size:  25 G  Ultrasonic Guidance for needle placement?: No    Approach:  Anterior  Location:  Knee  Site:  R knee  Medications:  10 mg triamcinolone acetonide 10 mg/mL  Aspirate amount (mL):  0  Patient tolerance:  Patient tolerated the procedure well with no immediate complications

## 2020-11-11 NOTE — PROGRESS NOTES
"Subjective:      Patient ID: Shirley F Gilford is a 80 y.o. female.    Chief Complaint: Leg Pain (bilaterally )      Vitals:    11/11/20 1407   BP: 126/82   Pulse: 86   Temp: 97.3 °F (36.3 °C)   TempSrc: Temporal   SpO2: (!) 94%   Weight: 96.5 kg (212 lb 11.9 oz)   Height: 5' 2.5" (1.588 m)        HPI   Right knee pain persists; hurts t get up, in the back, to walk; gets better staying sitting or sleeping; now back is hurting; lef tleg is fine; steroid pills helped knee and everything else  Here 3 weeks ago; no knee xray yet      Problem List  Patient Active Problem List   Diagnosis    Osteoporosis    History of hypertension    History of hyperlipidemia    Restless leg syndrome    Elevated LFTs    Chronic obstructive pulmonary disease    Ex-smoker    Fatty liver    SOB (shortness of breath)    Iron deficiency    Hyperlipidemia    Vitamin D deficiency    Pulmonary hypertension    COVID-19 virus infection    Panniculitis        ALLERGIES:   Review of patient's allergies indicates:   Allergen Reactions    Contrast media Swelling    Gabapentin Nausea And Vomiting       MEDS:   Current Outpatient Medications:     albuterol (PROVENTIL) 2.5 mg /3 mL (0.083 %) nebulizer solution, albuterol sulfate 2.5 mg/3 mL (0.083 %) solution for nebulization, Disp: 100 each, Rfl: 11    albuterol (PROVENTIL/VENTOLIN HFA) 90 mcg/actuation inhaler, Inhale 2 puffs into the lungs every 6 (six) hours as needed for Wheezing., Disp: 18 g, Rfl: 0    alendronate (FOSAMAX) 70 MG tablet, TAKE 1 TABLET BY MOUTH EVERY 7 DAYS, Disp: 12 tablet, Rfl: o    calcium-vitamin D3 (CALCIUM 500 + D) 500 mg(1,250mg) -200 unit per tablet, Take 1 tablet by mouth once daily., Disp: , Rfl:     cholecalciferol, vitamin D3, (VITAMIN D3) 5,000 unit Tab, Take 5,000 Units by mouth once daily., Disp: 90 tablet, Rfl: 3    cyanocobalamin (VITAMIN B-12) 500 MCG tablet, Take 500 mcg by mouth once daily., Disp: , Rfl:     ferrous sulfate 324 mg (65 mg " iron) TbEC, Take 1 tablet (324 mg total) by mouth once daily., Disp: , Rfl: 0    fluticasone propionate (FLONASE) 50 mcg/actuation nasal spray, 2 sprays (100 mcg total) by Each Nare route once daily., Disp: 1 Bottle, Rfl: 12    isosorbide mononitrate (IMDUR) 60 MG 24 hr tablet, Take 1 tablet (60 mg total) by mouth once daily. For heart and blood pressure, Disp: 90 tablet, Rfl: 3    loratadine (CLARITIN) 10 mg tablet, Take 1 tablet (10 mg total) by mouth once daily., Disp: 90 tablet, Rfl: 3    montelukast (SINGULAIR) 10 mg tablet, TAKE 1 TABLET BY MOUTH EVERY EVENING, Disp: 90 tablet, Rfl: 3    MULTIVIT-IRON-MIN-FOLIC ACID 3,500-18-0.4 UNIT-MG-MG ORAL CHEW, Take by mouth., Disp: , Rfl:     omeprazole (PRILOSEC OTC) 20 MG tablet, Take 1 tablet (20 mg total) by mouth 2 (two) times daily before meals., Disp: 60 tablet, Rfl: 5    ANORO ELLIPTA 62.5-25 mcg/actuation DsDv, , Disp: , Rfl:     atorvastatin (LIPITOR) 10 MG tablet, TAKE 1 TABLET BY MOUTH ONCE A DAY, Disp: 90 tablet, Rfl: 3    furosemide (LASIX) 40 MG tablet, TAKE 1 TABLET BY MOUTH ONCE A DAY FOR FLUID, Disp: 30 tablet, Rfl: 0    losartan (COZAAR) 50 MG tablet, TAKE 1 TABLET BY MOUTH ONCE A DAY FOR BLOOD PRESSURE, Disp: 90 tablet, Rfl: 3    methylPREDNISolone (MEDROL DOSEPACK) 4 mg tablet, use as directed, Disp: 1 Package, Rfl: 0    rOPINIRole (REQUIP) 3 MG tablet, Take 1 tablet (3 mg total) by mouth nightly., Disp: 90 tablet, Rfl: 3    Current Facility-Administered Medications:     triamcinolone acetonide injection 10 mg, 10 mg, Intra-articular, , Marc Sands MD, 10 mg at 11/11/20 1400      History:  Current Providers as of 11/11/2020  PCP: Marc Sands MD  Care Team Provider: Mahamed Kidd MD  Care Team Provider: Alessandro Titus Jr., MD  Care Team Provider: Pavel Murillo LPN  Care Team Provider: Pavel Murillo LPN  Care Team Provider: Isabel Griffin MD  Encounter Provider: Marc Sands MD, starting on Wed Nov 11, 2020 12:00  AM  Referring Provider: not found, starting on Wed Nov 11, 2020 12:00 AM  Consulting Physician: Marc Sands MD, starting on Wed Nov 11, 2020  2:04 PM (Active)   Past Medical History:   Diagnosis Date    Breast cyst     COPD (chronic obstructive pulmonary disease)     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    HENT: Negative.    Respiratory: Negative.    Cardiovascular: Negative.    Gastrointestinal: Negative.    Endocrine: Negative.    Genitourinary: Negative.    Musculoskeletal: Negative.    Psychiatric/Behavioral: Negative.    All other systems reviewed and are negative.    Objective:     Physical Exam  Vitals signs and nursing note reviewed.   Constitutional:       Appearance: She is well-developed.   HENT:      Head: Normocephalic.   Eyes:      Conjunctiva/sclera: Conjunctivae normal.      Pupils: Pupils are equal, round, and reactive to light.   Neck:      Musculoskeletal: Normal range of motion and neck supple.   Cardiovascular:      Rate and Rhythm: Normal rate and regular rhythm.      Heart sounds: Normal heart sounds.   Pulmonary:      Effort: Pulmonary effort is normal.      Breath sounds: Normal breath sounds.   Musculoskeletal: Normal range of motion.   Skin:     General: Skin is warm and dry.   Neurological:      Mental Status: She is alert and oriented to person, place, and time.      Deep Tendon Reflexes: Reflexes are normal and symmetric.   Psychiatric:          Behavior: Behavior normal.         Thought Content: Thought content normal.         Judgment: Judgment normal.             Assessment:     1. Arthritis    2. Anemia, unspecified type    3. COVID-19 virus infection    4. Chronic obstructive pulmonary disease, unspecified COPD type    5. Iron deficiency      Plan:        Medication List          Accurate as of November 11, 2020 11:59 PM. If you have any questions, ask your nurse or doctor.            CONTINUE taking these medications    * albuterol 2.5 mg /3 mL (0.083 %) nebulizer solution  Commonly known as: PROVENTIL  albuterol sulfate 2.5 mg/3 mL (0.083 %) solution for nebulization     * albuterol 90 mcg/actuation inhaler  Commonly known as: PROVENTIL/VENTOLIN HFA  Inhale 2 puffs into the lungs every 6 (six) hours as needed for Wheezing.     alendronate 70 MG tablet  Commonly known as: FOSAMAX  TAKE 1 TABLET BY MOUTH EVERY 7 DAYS     atorvastatin 10 MG tablet  Commonly known as: LIPITOR  TAKE 1 TABLET BY MOUTH ONCE A DAY     CALCIUM 500 + D 500 mg(1,250mg) -200 unit per tablet  Generic drug: calcium-vitamin D3     CENTRUM 3,500-18-0.4 unit-mg-mg Chew  Generic drug: multivit-iron-min-folic acid     cholecalciferol (vitamin D3) 125 mcg (5,000 unit) Tab  Commonly known as: VITAMIN D3  Take 5,000 Units by mouth once daily.     ferrous sulfate 324 mg (65 mg iron) Tbec  Take 1 tablet (324 mg total) by mouth once daily.     fluticasone propionate 50 mcg/actuation nasal spray  Commonly known as: FLONASE  2 sprays (100 mcg total) by Each Nare route once daily.     furosemide 40 MG tablet  Commonly known as: LASIX  Take 1 tablet (40 mg total) by mouth once daily. For fluid     isosorbide mononitrate 60 MG 24 hr tablet  Commonly known as: IMDUR  Take 1 tablet (60 mg total) by mouth once daily. For heart and blood pressure     loratadine 10 mg tablet  Commonly known as: CLARITIN  Take 1 tablet (10 mg total) by mouth once daily.     methylPREDNISolone 4 mg tablet  Commonly  known as: MEDROL DOSEPACK  use as directed     montelukast 10 mg tablet  Commonly known as: SINGULAIR  TAKE 1 TABLET BY MOUTH EVERY EVENING     omeprazole 20 MG tablet  Commonly known as: PriLOSEC OTC  Take 1 tablet (20 mg total) by mouth 2 (two) times daily before meals.     rOPINIRole 3 MG tablet  Commonly known as: REQUIP  TAKE 1 TABLET BY MOUTH NIGHTLY     VITAMIN B-12 500 MCG tablet  Generic drug: cyanocobalamin         * This list has 2 medication(s) that are the same as other medications prescribed for you. Read the directions carefully, and ask your doctor or other care provider to review them with you.              Arthritis  -     Large Joint Aspiration/Injection: R knee  -     triamcinolone acetonide injection 10 mg    Anemia, unspecified type  -     CBC Auto Differential; Future; Expected date: 11/11/2020    COVID-19 virus infection    Chronic obstructive pulmonary disease, unspecified COPD type    Iron deficiency  -     CBC Auto Differential; Future; Expected date: 11/11/2020

## 2020-11-24 ENCOUNTER — HOSPITAL ENCOUNTER (OUTPATIENT)
Dept: RADIOLOGY | Facility: HOSPITAL | Age: 80
Discharge: HOME | End: 2020-11-24
Attending: STUDENT IN AN ORGANIZED HEALTH CARE EDUCATION/TRAINING PROGRAM
Payer: MEDICARE

## 2020-11-24 ENCOUNTER — TELEPHONE (OUTPATIENT)
Dept: FAMILY MEDICINE | Facility: CLINIC | Age: 80
End: 2020-11-24

## 2020-11-24 ENCOUNTER — OFFICE VISIT (OUTPATIENT)
Dept: FAMILY MEDICINE | Facility: CLINIC | Age: 80
End: 2020-11-24
Payer: MEDICARE

## 2020-11-24 VITALS
SYSTOLIC BLOOD PRESSURE: 146 MMHG | DIASTOLIC BLOOD PRESSURE: 68 MMHG | BODY MASS INDEX: 37.57 KG/M2 | WEIGHT: 212.06 LBS | TEMPERATURE: 98 F | HEART RATE: 80 BPM | HEIGHT: 63 IN | OXYGEN SATURATION: 95 %

## 2020-11-24 DIAGNOSIS — M79.89 RIGHT LEG SWELLING: ICD-10-CM

## 2020-11-24 DIAGNOSIS — M79.89 RIGHT LEG SWELLING: Primary | ICD-10-CM

## 2020-11-24 DIAGNOSIS — G25.81 RESTLESS LEGS SYNDROME: ICD-10-CM

## 2020-11-24 DIAGNOSIS — R22.41 LOCALIZED SWELLING, MASS AND LUMP, RIGHT LOWER LIMB: ICD-10-CM

## 2020-11-24 PROCEDURE — 1125F PR PAIN SEVERITY QUANTIFIED, PAIN PRESENT: ICD-10-PCS | Mod: S$GLB,,, | Performed by: STUDENT IN AN ORGANIZED HEALTH CARE EDUCATION/TRAINING PROGRAM

## 2020-11-24 PROCEDURE — 3288F FALL RISK ASSESSMENT DOCD: CPT | Mod: CPTII,S$GLB,, | Performed by: STUDENT IN AN ORGANIZED HEALTH CARE EDUCATION/TRAINING PROGRAM

## 2020-11-24 PROCEDURE — 1125F AMNT PAIN NOTED PAIN PRSNT: CPT | Mod: S$GLB,,, | Performed by: STUDENT IN AN ORGANIZED HEALTH CARE EDUCATION/TRAINING PROGRAM

## 2020-11-24 PROCEDURE — 1101F PT FALLS ASSESS-DOCD LE1/YR: CPT | Mod: CPTII,S$GLB,, | Performed by: STUDENT IN AN ORGANIZED HEALTH CARE EDUCATION/TRAINING PROGRAM

## 2020-11-24 PROCEDURE — 3288F PR FALLS RISK ASSESSMENT DOCUMENTED: ICD-10-PCS | Mod: CPTII,S$GLB,, | Performed by: STUDENT IN AN ORGANIZED HEALTH CARE EDUCATION/TRAINING PROGRAM

## 2020-11-24 PROCEDURE — 1159F MED LIST DOCD IN RCRD: CPT | Mod: S$GLB,,, | Performed by: STUDENT IN AN ORGANIZED HEALTH CARE EDUCATION/TRAINING PROGRAM

## 2020-11-24 PROCEDURE — 99213 OFFICE O/P EST LOW 20 MIN: CPT | Mod: S$GLB,,, | Performed by: STUDENT IN AN ORGANIZED HEALTH CARE EDUCATION/TRAINING PROGRAM

## 2020-11-24 PROCEDURE — 99213 PR OFFICE/OUTPT VISIT, EST, LEVL III, 20-29 MIN: ICD-10-PCS | Mod: S$GLB,,, | Performed by: STUDENT IN AN ORGANIZED HEALTH CARE EDUCATION/TRAINING PROGRAM

## 2020-11-24 PROCEDURE — 1159F PR MEDICATION LIST DOCUMENTED IN MEDICAL RECORD: ICD-10-PCS | Mod: S$GLB,,, | Performed by: STUDENT IN AN ORGANIZED HEALTH CARE EDUCATION/TRAINING PROGRAM

## 2020-11-24 PROCEDURE — 93971 EXTREMITY STUDY: CPT | Mod: TC,PO,RT

## 2020-11-24 PROCEDURE — 1101F PR PT FALLS ASSESS DOC 0-1 FALLS W/OUT INJ PAST YR: ICD-10-PCS | Mod: CPTII,S$GLB,, | Performed by: STUDENT IN AN ORGANIZED HEALTH CARE EDUCATION/TRAINING PROGRAM

## 2020-11-24 RX ORDER — ROPINIROLE 3 MG/1
3 TABLET, FILM COATED ORAL NIGHTLY
Qty: 90 TABLET | Refills: 3 | Status: SHIPPED | OUTPATIENT
Start: 2020-11-24 | End: 2022-02-15

## 2020-11-24 RX ORDER — UMECLIDINIUM BROMIDE AND VILANTEROL TRIFENATATE 62.5; 25 UG/1; UG/1
POWDER RESPIRATORY (INHALATION)
COMMUNITY
Start: 2020-10-20 | End: 2022-03-16

## 2020-11-24 NOTE — TELEPHONE ENCOUNTER
I called and notified the pt   Of test results per dr mendosa  no evidence of blood clot only.  Please tell patient to start taking her Lasix twice a day until swelling improves.  If she starts to feel muscle cramps or dizzy, she should decrease dosage back to once per day.      Dr mendosa  She said she will do as you advised above  But she is in a lot of pain and wants to know the cause of it.

## 2020-11-24 NOTE — TELEPHONE ENCOUNTER
I spoke with the patient.  She is okay with taking Lasix twice a day.  If her swelling is not improved by Friday she will call the office and we will plan to get a CT scan of her pelvis

## 2020-11-24 NOTE — PROGRESS NOTES
Patient ID: Shirley F Gilford is a 80 y.o. female. This patient is new to me.    Chief Complaint: Knee Pain, Foot Pain, Joint Swelling, and can barely walk    Knee Pain   The incident occurred more than 1 week ago. The incident occurred at home. There was no injury mechanism. The pain is present in the right knee, right thigh and right leg. The quality of the pain is described as aching. The pain is severe. The pain has been constant since onset. Associated symptoms include an inability to bear weight and a loss of motion. Pertinent negatives include no loss of sensation, muscle weakness or tingling. She reports no foreign bodies present. The symptoms are aggravated by movement and weight bearing. Treatments tried: Steroid injection into the knee. The treatment provided no relief.      Patient is a secondary left separate she denies any history of blood clots.  She is not on blood thinner.    Review of Systems  Review of Systems   Constitutional: Negative for fever.   HENT: Negative for ear pain and sinus pain.    Eyes: Negative for discharge.   Respiratory: Negative for cough and shortness of breath.    Cardiovascular: Positive for leg swelling (right). Negative for chest pain.   Gastrointestinal: Negative for diarrhea, nausea and vomiting.   Genitourinary: Negative for urgency.   Musculoskeletal: Negative for myalgias.   Skin: Negative for rash.   Neurological: Negative for tingling, weakness and headaches.   Psychiatric/Behavioral: Negative for depression.   All other systems reviewed and are negative.      Currently Medications  Current Outpatient Medications on File Prior to Visit   Medication Sig Dispense Refill    albuterol (PROVENTIL) 2.5 mg /3 mL (0.083 %) nebulizer solution albuterol sulfate 2.5 mg/3 mL (0.083 %) solution for nebulization 100 each 11    albuterol (PROVENTIL/VENTOLIN HFA) 90 mcg/actuation inhaler Inhale 2 puffs into the lungs every 6 (six) hours as needed for Wheezing. 18 g 0     alendronate (FOSAMAX) 70 MG tablet TAKE 1 TABLET BY MOUTH EVERY 7 DAYS 12 tablet o    ANORO ELLIPTA 62.5-25 mcg/actuation DsDv       atorvastatin (LIPITOR) 10 MG tablet TAKE 1 TABLET BY MOUTH ONCE A DAY 90 tablet 3    calcium-vitamin D3 (CALCIUM 500 + D) 500 mg(1,250mg) -200 unit per tablet Take 1 tablet by mouth once daily.      cholecalciferol, vitamin D3, (VITAMIN D3) 5,000 unit Tab Take 5,000 Units by mouth once daily. 90 tablet 3    cyanocobalamin (VITAMIN B-12) 500 MCG tablet Take 500 mcg by mouth once daily.      ferrous sulfate 324 mg (65 mg iron) TbEC Take 1 tablet (324 mg total) by mouth once daily.  0    fluticasone propionate (FLONASE) 50 mcg/actuation nasal spray 2 sprays (100 mcg total) by Each Nare route once daily. 1 Bottle 12    furosemide (LASIX) 40 MG tablet Take 1 tablet (40 mg total) by mouth once daily. For fluid 30 tablet 0    isosorbide mononitrate (IMDUR) 60 MG 24 hr tablet Take 1 tablet (60 mg total) by mouth once daily. For heart and blood pressure 90 tablet 3    loratadine (CLARITIN) 10 mg tablet Take 1 tablet (10 mg total) by mouth once daily. 90 tablet 3    methylPREDNISolone (MEDROL DOSEPACK) 4 mg tablet use as directed 1 Package 0    montelukast (SINGULAIR) 10 mg tablet TAKE 1 TABLET BY MOUTH EVERY EVENING 90 tablet 3    MULTIVIT-IRON-MIN-FOLIC ACID 3,500-18-0.4 UNIT-MG-MG ORAL CHEW Take by mouth.      omeprazole (PRILOSEC OTC) 20 MG tablet Take 1 tablet (20 mg total) by mouth 2 (two) times daily before meals. 60 tablet 5    [DISCONTINUED] rOPINIRole (REQUIP) 3 MG tablet TAKE 1 TABLET BY MOUTH NIGHTLY 90 tablet 3     Current Facility-Administered Medications on File Prior to Visit   Medication Dose Route Frequency Provider Last Rate Last Dose    triamcinolone acetonide injection 10 mg  10 mg Intra-articular  Marc Sands MD   10 mg at 11/11/20 1400       Physical  Exam  Vitals:    11/24/20 1052   BP: (!) 146/68   BP Location: Right arm   Patient Position: Sitting  "  Pulse: 80   Temp: 97.7 °F (36.5 °C)   TempSrc: Oral   SpO2: 95%   Weight: 96.2 kg (212 lb 1.3 oz)   Height: 5' 2.5" (1.588 m)      Physical Exam  Vitals signs and nursing note reviewed.   Constitutional:       General: She is not in acute distress.     Appearance: She is not ill-appearing.   HENT:      Head: Normocephalic and atraumatic.      Right Ear: External ear normal.      Left Ear: External ear normal.      Nose: Nose normal.      Mouth/Throat:      Mouth: Mucous membranes are moist.   Eyes:      Extraocular Movements: Extraocular movements intact.      Conjunctiva/sclera: Conjunctivae normal.   Neck:      Musculoskeletal: Normal range of motion.   Cardiovascular:      Rate and Rhythm: Normal rate and regular rhythm.      Pulses: Normal pulses.      Heart sounds: No murmur.   Pulmonary:      Effort: Pulmonary effort is normal. No respiratory distress.      Breath sounds: No wheezing.   Abdominal:      General: There is no distension.      Palpations: Abdomen is soft. There is no mass.      Tenderness: There is no abdominal tenderness.   Musculoskeletal:         General: No swelling.      Right lower leg: Edema present.      Comments: There is no warmth or redness present to the right lower extremity.  It is swollen and tender to palpation.  There is 2+ pitting edema of the right leg.   Skin:     Coloration: Skin is not jaundiced.      Findings: No rash.   Neurological:      General: No focal deficit present.      Mental Status: She is alert and oriented to person, place, and time.   Psychiatric:         Mood and Affect: Mood normal.         Thought Content: Thought content normal.         Labs:    Complete Blood Count  Lab Results   Component Value Date    RBC 3.99 (L) 10/23/2020    HGB 10.0 (L) 10/23/2020    HCT 35.4 (L) 10/23/2020    MCV 89 10/23/2020    MCH 25.1 (L) 10/23/2020    MCHC 28.2 (L) 10/23/2020    RDW 14.5 10/23/2020     10/23/2020    MPV 10.9 10/23/2020    GRAN 8.3 (H) 10/23/2020    " GRAN 76.2 (H) 10/23/2020    LYMPH 1.4 10/23/2020    LYMPH 12.4 (L) 10/23/2020    MONO 0.8 10/23/2020    MONO 7.4 10/23/2020    EOS 0.3 10/23/2020    BASO 0.08 10/23/2020    EOSINOPHIL 2.7 10/23/2020    BASOPHIL 0.7 10/23/2020    DIFFMETHOD Automated 10/23/2020       Comprehensive Metabolic Panel  Lab Results   Component Value Date     (H) 10/23/2020    BUN 12 10/23/2020    CREATININE 0.73 10/23/2020     10/23/2020    K 3.6 10/23/2020     10/23/2020    PROT 7.5 10/23/2020    ALBUMIN 4.4 10/23/2020    BILITOT 0.5 10/23/2020    AST 25 10/23/2020    ALKPHOS 100 10/23/2020    CO2 27 10/23/2020    ALT 17 10/23/2020    ANIONGAP 10 10/23/2020    EGFRNONAA >60.0 10/23/2020    ESTGFRAFRICA >60.0 10/23/2020       TSH  Lab Results   Component Value Date    TSH 3.710 10/23/2020       Imaging:  X-Ray Chest PA And Lateral  Narrative: EXAMINATION:  XR CHEST PA AND LATERAL    CLINICAL HISTORY:  Chronic obstructive pulmonary disease, unspecified    COMPARISON:  08/03/2020    FINDINGS:  There is moderate cardiomegaly.  There has been interval development of a mild amount of interstitial markings seen in both lungs with Kerley B-lines bilaterally.  There are findings characteristic of a small hiatal hernia.  The hemidiaphragms are flattened.  There is no pneumothorax or pleural effusion.  There is a mild amount of dextroconvex curvature of the thoracic spine.  There is diffuse bony demineralization.  There is a healed fracture in the lateral aspect of the right 7th rib.  Impression: 1. There is moderate cardiomegaly. There has been interval development of a mild amount of interstitial markings seen in both lungs with Kerley B-lines bilaterally.  This is characteristic of pulmonary edema.  2. The hemidiaphragms are flattened.  This is characteristic of chronic obstructive pulmonary disease.  3. There are findings characteristic of a small hiatal hernia.  4. Osteopenia versus osteoporosis  5. There is a mild amount of  dextroconvex curvature of the thoracic spine.  .    Electronically signed by: Ciro Johnson MD  Date:    10/23/2020  Time:    13:07      Assessment/Plan:      ICD-10-CM ICD-9-CM   1. Right leg swelling  M79.89 729.81   2. Localized swelling, mass and lump, right lower limb   R22.41 782.2   3. Restless legs syndrome  G25.81 333.94     Right leg swelling  -     US Lower Extremity Veins Right; Future; Expected date: 11/24/2020    Localized swelling, mass and lump, right lower limb   -     US Lower Extremity Veins Right; Future; Expected date: 11/24/2020    Restless legs syndrome  -     rOPINIRole (REQUIP) 3 MG tablet; Take 1 tablet (3 mg total) by mouth nightly.  Dispense: 90 tablet; Refill: 3        Patient with history of smoking, but quit 5 years ago.  She has a sedentary lifestyle.  She has no history of blood clots and no history of atrial fibrillation.  She is due for mammogram screening this month, but she declines.  We will obtain ultrasound the right leg to rule out blood clot.  Patient also has needs refill of restless leg syndrome.  Symptoms are well controlled with ropinirole.    Darren Trimble MD

## 2020-12-07 ENCOUNTER — HOSPITAL ENCOUNTER (OUTPATIENT)
Dept: RADIOLOGY | Facility: HOSPITAL | Age: 80
Discharge: HOME | End: 2020-12-07
Attending: STUDENT IN AN ORGANIZED HEALTH CARE EDUCATION/TRAINING PROGRAM
Payer: MEDICARE

## 2020-12-07 ENCOUNTER — OFFICE VISIT (OUTPATIENT)
Dept: FAMILY MEDICINE | Facility: CLINIC | Age: 80
End: 2020-12-07
Payer: MEDICARE

## 2020-12-07 VITALS
SYSTOLIC BLOOD PRESSURE: 122 MMHG | OXYGEN SATURATION: 96 % | DIASTOLIC BLOOD PRESSURE: 72 MMHG | TEMPERATURE: 97 F | BODY MASS INDEX: 35.9 KG/M2 | HEART RATE: 82 BPM | HEIGHT: 63 IN | WEIGHT: 202.63 LBS

## 2020-12-07 DIAGNOSIS — M25.561 CHRONIC PAIN OF RIGHT KNEE: ICD-10-CM

## 2020-12-07 DIAGNOSIS — G89.29 CHRONIC PAIN OF RIGHT KNEE: ICD-10-CM

## 2020-12-07 DIAGNOSIS — M25.561 CHRONIC PAIN OF RIGHT KNEE: Primary | ICD-10-CM

## 2020-12-07 DIAGNOSIS — M25.569 KNEE PAIN, UNSPECIFIED CHRONICITY, UNSPECIFIED LATERALITY: ICD-10-CM

## 2020-12-07 DIAGNOSIS — K21.9 GASTROESOPHAGEAL REFLUX DISEASE, UNSPECIFIED WHETHER ESOPHAGITIS PRESENT: ICD-10-CM

## 2020-12-07 DIAGNOSIS — G89.29 CHRONIC PAIN OF RIGHT KNEE: Primary | ICD-10-CM

## 2020-12-07 PROBLEM — R06.02 SOB (SHORTNESS OF BREATH): Status: RESOLVED | Noted: 2017-05-29 | Resolved: 2020-12-07

## 2020-12-07 PROBLEM — U07.1 COVID-19 VIRUS INFECTION: Status: RESOLVED | Noted: 2020-08-03 | Resolved: 2020-12-07

## 2020-12-07 PROCEDURE — 1125F AMNT PAIN NOTED PAIN PRSNT: CPT | Mod: S$GLB,,, | Performed by: STUDENT IN AN ORGANIZED HEALTH CARE EDUCATION/TRAINING PROGRAM

## 2020-12-07 PROCEDURE — 1159F PR MEDICATION LIST DOCUMENTED IN MEDICAL RECORD: ICD-10-PCS | Mod: S$GLB,,, | Performed by: STUDENT IN AN ORGANIZED HEALTH CARE EDUCATION/TRAINING PROGRAM

## 2020-12-07 PROCEDURE — 99213 OFFICE O/P EST LOW 20 MIN: CPT | Mod: S$GLB,,, | Performed by: STUDENT IN AN ORGANIZED HEALTH CARE EDUCATION/TRAINING PROGRAM

## 2020-12-07 PROCEDURE — 1125F PR PAIN SEVERITY QUANTIFIED, PAIN PRESENT: ICD-10-PCS | Mod: S$GLB,,, | Performed by: STUDENT IN AN ORGANIZED HEALTH CARE EDUCATION/TRAINING PROGRAM

## 2020-12-07 PROCEDURE — 73562 X-RAY EXAM OF KNEE 3: CPT | Mod: TC,FY,PO,RT

## 2020-12-07 PROCEDURE — 1159F MED LIST DOCD IN RCRD: CPT | Mod: S$GLB,,, | Performed by: STUDENT IN AN ORGANIZED HEALTH CARE EDUCATION/TRAINING PROGRAM

## 2020-12-07 PROCEDURE — 99213 PR OFFICE/OUTPT VISIT, EST, LEVL III, 20-29 MIN: ICD-10-PCS | Mod: S$GLB,,, | Performed by: STUDENT IN AN ORGANIZED HEALTH CARE EDUCATION/TRAINING PROGRAM

## 2020-12-07 RX ORDER — PANTOPRAZOLE SODIUM 40 MG/1
40 TABLET, DELAYED RELEASE ORAL DAILY
Qty: 30 TABLET | Refills: 11 | Status: SHIPPED | OUTPATIENT
Start: 2020-12-07 | End: 2021-12-22

## 2020-12-07 RX ORDER — FUROSEMIDE 40 MG/1
40 TABLET ORAL 2 TIMES DAILY
Qty: 60 TABLET | Refills: 3 | Status: SHIPPED | OUTPATIENT
Start: 2020-12-07 | End: 2023-12-14 | Stop reason: CLARIF

## 2020-12-07 NOTE — PROGRESS NOTES
Patient ID: Shirley F Gilford is a 80 y.o. female.     Chief Complaint: Knee Pain    HPI  Patient here for follow-up of right knee pain and swelling.  Swelling is much improved with taking Lasix twice a day.  She continues with right leg pain.  She says the pain is more located in her right knee.  It does not radiate ED.  She has no history of trauma to this knee.  She had surgery on her left knee but does not have problems with that knee.  She is currently taking 2-3 Advil per day for the knee pain.  This is not helping.  Pain is often worse at night.  She does not experience stiffness in the morning.      Review of Systems  Review of Systems   Constitutional: Negative for fever.   HENT: Negative for ear pain and sinus pain.    Eyes: Negative for discharge.   Respiratory: Negative for cough and shortness of breath.    Cardiovascular: Negative for chest pain and leg swelling.   Gastrointestinal: Negative for diarrhea, nausea and vomiting.   Genitourinary: Negative for urgency.   Musculoskeletal: Negative for myalgias.   Skin: Negative for rash.   Neurological: Negative for weakness and headaches.   Psychiatric/Behavioral: Negative for depression.   All other systems reviewed and are negative.      Currently Medications  Current Outpatient Medications on File Prior to Visit   Medication Sig Dispense Refill    albuterol (PROVENTIL) 2.5 mg /3 mL (0.083 %) nebulizer solution albuterol sulfate 2.5 mg/3 mL (0.083 %) solution for nebulization 100 each 11    albuterol (PROVENTIL/VENTOLIN HFA) 90 mcg/actuation inhaler Inhale 2 puffs into the lungs every 6 (six) hours as needed for Wheezing. 18 g 0    alendronate (FOSAMAX) 70 MG tablet TAKE 1 TABLET BY MOUTH EVERY 7 DAYS 12 tablet o    ANORO ELLIPTA 62.5-25 mcg/actuation DsDv       atorvastatin (LIPITOR) 10 MG tablet TAKE 1 TABLET BY MOUTH ONCE A DAY 90 tablet 3    calcium-vitamin D3 (CALCIUM 500 + D) 500 mg(1,250mg) -200 unit per tablet Take 1 tablet by mouth once  "daily.      cholecalciferol, vitamin D3, (VITAMIN D3) 5,000 unit Tab Take 5,000 Units by mouth once daily. 90 tablet 3    cyanocobalamin (VITAMIN B-12) 500 MCG tablet Take 500 mcg by mouth once daily.      ferrous sulfate 324 mg (65 mg iron) TbEC Take 1 tablet (324 mg total) by mouth once daily.  0    fluticasone propionate (FLONASE) 50 mcg/actuation nasal spray 2 sprays (100 mcg total) by Each Nare route once daily. 1 Bottle 12    isosorbide mononitrate (IMDUR) 60 MG 24 hr tablet Take 1 tablet (60 mg total) by mouth once daily. For heart and blood pressure 90 tablet 3    loratadine (CLARITIN) 10 mg tablet Take 1 tablet (10 mg total) by mouth once daily. 90 tablet 3    losartan (COZAAR) 50 MG tablet TAKE 1 TABLET BY MOUTH ONCE A DAY FOR BLOOD PRESSURE 90 tablet 3    montelukast (SINGULAIR) 10 mg tablet TAKE 1 TABLET BY MOUTH EVERY EVENING 90 tablet 3    MULTIVIT-IRON-MIN-FOLIC ACID 3,500-18-0.4 UNIT-MG-MG ORAL CHEW Take by mouth.      rOPINIRole (REQUIP) 3 MG tablet Take 1 tablet (3 mg total) by mouth nightly. 90 tablet 3    [DISCONTINUED] furosemide (LASIX) 40 MG tablet TAKE 1 TABLET BY MOUTH ONCE A DAY FOR FLUID 30 tablet 0    [DISCONTINUED] methylPREDNISolone (MEDROL DOSEPACK) 4 mg tablet use as directed 1 Package 0    [DISCONTINUED] omeprazole (PRILOSEC OTC) 20 MG tablet Take 1 tablet (20 mg total) by mouth 2 (two) times daily before meals. 60 tablet 5     Current Facility-Administered Medications on File Prior to Visit   Medication Dose Route Frequency Provider Last Rate Last Dose    [DISCONTINUED] triamcinolone acetonide injection 10 mg  10 mg Intra-articular  Marc Sands MD   10 mg at 11/11/20 1400       Physical  Exam  Vitals:    12/07/20 1157   BP: 122/72   Pulse: 82   Temp: 97 °F (36.1 °C)   SpO2: 96%   Weight: 91.9 kg (202 lb 9.6 oz)   Height: 5' 2.5" (1.588 m)      Physical Exam  Vitals signs and nursing note reviewed.   Constitutional:       General: She is not in acute distress.     " Appearance: She is not ill-appearing.   HENT:      Head: Normocephalic and atraumatic.      Right Ear: External ear normal.      Left Ear: External ear normal.      Nose: Nose normal.      Mouth/Throat:      Mouth: Mucous membranes are moist.   Eyes:      Extraocular Movements: Extraocular movements intact.      Conjunctiva/sclera: Conjunctivae normal.   Neck:      Musculoskeletal: Normal range of motion.   Cardiovascular:      Rate and Rhythm: Normal rate and regular rhythm.      Pulses: Normal pulses.      Heart sounds: No murmur.   Pulmonary:      Effort: Pulmonary effort is normal. No respiratory distress.      Breath sounds: No wheezing.   Abdominal:      General: There is no distension.      Palpations: Abdomen is soft. There is no mass.      Tenderness: There is no abdominal tenderness.   Musculoskeletal:         General: No swelling.      Right knee: She exhibits decreased range of motion and swelling. She exhibits no effusion, no deformity and no laceration.      Right lower leg: Edema present.      Left lower leg: Edema present.   Skin:     Coloration: Skin is not jaundiced.      Findings: No rash.   Neurological:      General: No focal deficit present.      Mental Status: She is alert and oriented to person, place, and time.   Psychiatric:         Mood and Affect: Mood normal.         Thought Content: Thought content normal.         Labs:    Complete Blood Count  Lab Results   Component Value Date    RBC 3.99 (L) 10/23/2020    HGB 10.0 (L) 10/23/2020    HCT 35.4 (L) 10/23/2020    MCV 89 10/23/2020    MCH 25.1 (L) 10/23/2020    MCHC 28.2 (L) 10/23/2020    RDW 14.5 10/23/2020     10/23/2020    MPV 10.9 10/23/2020    GRAN 8.3 (H) 10/23/2020    GRAN 76.2 (H) 10/23/2020    LYMPH 1.4 10/23/2020    LYMPH 12.4 (L) 10/23/2020    MONO 0.8 10/23/2020    MONO 7.4 10/23/2020    EOS 0.3 10/23/2020    BASO 0.08 10/23/2020    EOSINOPHIL 2.7 10/23/2020    BASOPHIL 0.7 10/23/2020    DIFFMETHOD Automated 10/23/2020        Comprehensive Metabolic Panel  Lab Results   Component Value Date     (H) 10/23/2020    BUN 12 10/23/2020    CREATININE 0.73 10/23/2020     10/23/2020    K 3.6 10/23/2020     10/23/2020    PROT 7.5 10/23/2020    ALBUMIN 4.4 10/23/2020    BILITOT 0.5 10/23/2020    AST 25 10/23/2020    ALKPHOS 100 10/23/2020    CO2 27 10/23/2020    ALT 17 10/23/2020    ANIONGAP 10 10/23/2020    EGFRNONAA >60.0 10/23/2020    ESTGFRAFRICA >60.0 10/23/2020       TSH  Lab Results   Component Value Date    TSH 3.710 10/23/2020       Imaging:  X-Ray Knee 3 View Right  Narrative: EXAMINATION:  XR KNEE 3 VIEW RIGHT    CLINICAL HISTORY:  Pain in right knee    COMPARISON:  None    FINDINGS:  There is no fracture. There is no dislocation.  Impression: Normal study.    Electronically signed by: Ciro Johnson MD  Date:    12/07/2020  Time:    13:29      Assessment/Plan:      ICD-10-CM ICD-9-CM   1. Chronic pain of right knee  M25.561 719.46    G89.29 338.29   2. Gastroesophageal reflux disease, unspecified whether esophagitis present  K21.9 530.81     Chronic pain of right knee  -     X-Ray Knee 3 View Right; Future; Expected date: 12/07/2020    Gastroesophageal reflux disease, unspecified whether esophagitis present    Other orders  -     pantoprazole (PROTONIX) 40 MG tablet; Take 1 tablet (40 mg total) by mouth once daily.  Dispense: 30 tablet; Refill: 11  -     furosemide (LASIX) 40 MG tablet; Take 1 tablet (40 mg total) by mouth 2 (two) times a day.  Dispense: 60 tablet; Refill: 3        X-ray performed and reviewed.  No obvious joint space narrowing.  Will obtain MRI.  Start Protonix as patient is taking multiple ibuprofen per day.  Continue Lasix.    Darren Trimble MD

## 2020-12-11 ENCOUNTER — HOSPITAL ENCOUNTER (OUTPATIENT)
Dept: RADIOLOGY | Facility: HOSPITAL | Age: 80
Discharge: HOME | End: 2020-12-11
Attending: STUDENT IN AN ORGANIZED HEALTH CARE EDUCATION/TRAINING PROGRAM
Payer: MEDICARE

## 2020-12-11 ENCOUNTER — TELEPHONE (OUTPATIENT)
Dept: FAMILY MEDICINE | Facility: CLINIC | Age: 80
End: 2020-12-11

## 2020-12-11 DIAGNOSIS — M25.569 KNEE PAIN, UNSPECIFIED CHRONICITY, UNSPECIFIED LATERALITY: ICD-10-CM

## 2020-12-11 DIAGNOSIS — S83.241A TEAR OF MEDIAL MENISCUS OF RIGHT KNEE, CURRENT, UNSPECIFIED TEAR TYPE, INITIAL ENCOUNTER: Primary | ICD-10-CM

## 2020-12-11 PROCEDURE — 73721 MRI JNT OF LWR EXTRE W/O DYE: CPT | Mod: TC,PO,RT

## 2020-12-11 NOTE — TELEPHONE ENCOUNTER
----- Message from Darren Trimble MD sent at 12/11/2020  2:09 PM CST -----  Results discussed with patient over the phone. She would like referral to Dr. Terry

## 2020-12-14 ENCOUNTER — TELEPHONE (OUTPATIENT)
Dept: FAMILY MEDICINE | Facility: CLINIC | Age: 80
End: 2020-12-14

## 2020-12-14 DIAGNOSIS — S83.241A TEAR OF MEDIAL MENISCUS OF RIGHT KNEE, CURRENT, UNSPECIFIED TEAR TYPE, INITIAL ENCOUNTER: Primary | ICD-10-CM

## 2020-12-14 NOTE — TELEPHONE ENCOUNTER
Spoke with patient and she is requesting form filled out for handicap LP and walker. Informed can  form tomorrow and will fax order for walker to [facility]. Patient voices understanding.

## 2020-12-14 NOTE — TELEPHONE ENCOUNTER
----- Message from Ivone Liu sent at 12/14/2020 12:46 PM CST -----  Contact: [phone]  Type:  Needs Medical Advice    Who Called: PT  Reason for Call: regarding orders for a Walker and a Handicap Sign for her Car, pt is having Knee surgery  Would the patient rather a call back or a response via [facility]?  Call back  Best Call Back Number: [phone]

## 2020-12-15 ENCOUNTER — TELEPHONE (OUTPATIENT)
Dept: FAMILY MEDICINE | Facility: CLINIC | Age: 80
End: 2020-12-15

## 2020-12-15 NOTE — TELEPHONE ENCOUNTER
----- Message from Lala Bustamante sent at 12/15/2020 10:51 AM CST -----  Regarding: records to bring to Dr. Terry  Contact: [phone] /fjjm  Patient is requesting a call back regarding bringing copies of her records to Dr. Terry. Can she pick this up from the office?   Would the patient rather a call back or a response via [facility]?  call  Best Call Back Number:  [phone] /self  Additional Information:

## 2020-12-15 NOTE — TELEPHONE ENCOUNTER
Spoke to pt and informed her the copy of imaging report will be available for  front. Pt is also requesting images as well. I gave pt medical records phone number for [facility]. Pt is to call [phone] and have them send the imaging to the [facility] building in [facility] by [address]. Pt asked if her handicap sticker is ready in informed pt that it is up front ready to be picked up. Pt stated she will come to clinic tomorrow

## 2020-12-17 ENCOUNTER — TELEPHONE (OUTPATIENT)
Dept: FAMILY MEDICINE | Facility: CLINIC | Age: 80
End: 2020-12-17

## 2020-12-17 NOTE — TELEPHONE ENCOUNTER
----- Message from Katja Moore sent at 12/17/2020  8:42 AM CST -----  Type:  Needs Medical Advice    Who Called: pt  Advice Regarding: orders for a walker  Would the patient rather a call back or a response via [facility]? call  Best Call Back Number:   Additional Information: n/a

## 2020-12-17 NOTE — TELEPHONE ENCOUNTER
Pt has been notified and verbalized understanding that I will fax order for walker and medical necessity form to [facility] for approval. Pt is also requesting an order for a portable oxygen concentrator.

## 2021-01-11 ENCOUNTER — TELEPHONE (OUTPATIENT)
Dept: FAMILY MEDICINE | Facility: CLINIC | Age: 81
End: 2021-01-11

## 2021-01-12 ENCOUNTER — OFFICE VISIT (OUTPATIENT)
Dept: FAMILY MEDICINE | Facility: CLINIC | Age: 81
End: 2021-01-12
Payer: MEDICARE

## 2021-01-12 VITALS
HEIGHT: 62 IN | HEART RATE: 87 BPM | DIASTOLIC BLOOD PRESSURE: 86 MMHG | TEMPERATURE: 98 F | OXYGEN SATURATION: 95 % | WEIGHT: 210.31 LBS | BODY MASS INDEX: 38.7 KG/M2 | SYSTOLIC BLOOD PRESSURE: 132 MMHG

## 2021-01-12 DIAGNOSIS — T50.Z95A ADVERSE EFFECT OF VACCINE, INITIAL ENCOUNTER: Primary | ICD-10-CM

## 2021-01-12 PROCEDURE — 99213 PR OFFICE/OUTPT VISIT, EST, LEVL III, 20-29 MIN: ICD-10-PCS | Mod: S$GLB,,, | Performed by: FAMILY MEDICINE

## 2021-01-12 PROCEDURE — 1159F MED LIST DOCD IN RCRD: CPT | Mod: S$GLB,,, | Performed by: FAMILY MEDICINE

## 2021-01-12 PROCEDURE — 1159F PR MEDICATION LIST DOCUMENTED IN MEDICAL RECORD: ICD-10-PCS | Mod: S$GLB,,, | Performed by: FAMILY MEDICINE

## 2021-01-12 PROCEDURE — 1101F PT FALLS ASSESS-DOCD LE1/YR: CPT | Mod: CPTII,S$GLB,, | Performed by: FAMILY MEDICINE

## 2021-01-12 PROCEDURE — 1101F PR PT FALLS ASSESS DOC 0-1 FALLS W/OUT INJ PAST YR: ICD-10-PCS | Mod: CPTII,S$GLB,, | Performed by: FAMILY MEDICINE

## 2021-01-12 PROCEDURE — 3288F FALL RISK ASSESSMENT DOCD: CPT | Mod: CPTII,S$GLB,, | Performed by: FAMILY MEDICINE

## 2021-01-12 PROCEDURE — 1126F AMNT PAIN NOTED NONE PRSNT: CPT | Mod: S$GLB,,, | Performed by: FAMILY MEDICINE

## 2021-01-12 PROCEDURE — 3288F PR FALLS RISK ASSESSMENT DOCUMENTED: ICD-10-PCS | Mod: CPTII,S$GLB,, | Performed by: FAMILY MEDICINE

## 2021-01-12 PROCEDURE — 99213 OFFICE O/P EST LOW 20 MIN: CPT | Mod: S$GLB,,, | Performed by: FAMILY MEDICINE

## 2021-01-12 PROCEDURE — 1126F PR PAIN SEVERITY QUANTIFIED, NO PAIN PRESENT: ICD-10-PCS | Mod: S$GLB,,, | Performed by: FAMILY MEDICINE

## 2021-03-24 ENCOUNTER — OFFICE VISIT (OUTPATIENT)
Dept: FAMILY MEDICINE | Facility: CLINIC | Age: 81
End: 2021-03-24
Payer: MEDICARE

## 2021-03-24 ENCOUNTER — TELEPHONE (OUTPATIENT)
Dept: FAMILY MEDICINE | Facility: CLINIC | Age: 81
End: 2021-03-24

## 2021-03-24 ENCOUNTER — HOSPITAL ENCOUNTER (OUTPATIENT)
Dept: RADIOLOGY | Facility: HOSPITAL | Age: 81
Discharge: HOME | End: 2021-03-24
Attending: STUDENT IN AN ORGANIZED HEALTH CARE EDUCATION/TRAINING PROGRAM
Payer: MEDICARE

## 2021-03-24 VITALS
SYSTOLIC BLOOD PRESSURE: 120 MMHG | WEIGHT: 212.5 LBS | HEART RATE: 80 BPM | DIASTOLIC BLOOD PRESSURE: 62 MMHG | HEIGHT: 62 IN | OXYGEN SATURATION: 97 % | BODY MASS INDEX: 39.11 KG/M2 | TEMPERATURE: 98 F

## 2021-03-24 DIAGNOSIS — M79.89 RIGHT LEG SWELLING: ICD-10-CM

## 2021-03-24 DIAGNOSIS — M79.604 RIGHT LEG PAIN: Primary | ICD-10-CM

## 2021-03-24 DIAGNOSIS — M25.561 CHRONIC PAIN OF RIGHT KNEE: ICD-10-CM

## 2021-03-24 DIAGNOSIS — M79.604 RIGHT LEG PAIN: ICD-10-CM

## 2021-03-24 DIAGNOSIS — G89.29 CHRONIC PAIN OF RIGHT KNEE: ICD-10-CM

## 2021-03-24 PROCEDURE — 93971 EXTREMITY STUDY: CPT | Mod: TC,PO,RT

## 2021-03-24 PROCEDURE — 1126F AMNT PAIN NOTED NONE PRSNT: CPT | Mod: S$GLB,,, | Performed by: STUDENT IN AN ORGANIZED HEALTH CARE EDUCATION/TRAINING PROGRAM

## 2021-03-24 PROCEDURE — 1159F PR MEDICATION LIST DOCUMENTED IN MEDICAL RECORD: ICD-10-PCS | Mod: S$GLB,,, | Performed by: STUDENT IN AN ORGANIZED HEALTH CARE EDUCATION/TRAINING PROGRAM

## 2021-03-24 PROCEDURE — 1159F MED LIST DOCD IN RCRD: CPT | Mod: S$GLB,,, | Performed by: STUDENT IN AN ORGANIZED HEALTH CARE EDUCATION/TRAINING PROGRAM

## 2021-03-24 PROCEDURE — 1101F PT FALLS ASSESS-DOCD LE1/YR: CPT | Mod: CPTII,S$GLB,, | Performed by: STUDENT IN AN ORGANIZED HEALTH CARE EDUCATION/TRAINING PROGRAM

## 2021-03-24 PROCEDURE — 99214 OFFICE O/P EST MOD 30 MIN: CPT | Mod: S$GLB,,, | Performed by: STUDENT IN AN ORGANIZED HEALTH CARE EDUCATION/TRAINING PROGRAM

## 2021-03-24 PROCEDURE — 1101F PR PT FALLS ASSESS DOC 0-1 FALLS W/OUT INJ PAST YR: ICD-10-PCS | Mod: CPTII,S$GLB,, | Performed by: STUDENT IN AN ORGANIZED HEALTH CARE EDUCATION/TRAINING PROGRAM

## 2021-03-24 PROCEDURE — 99214 PR OFFICE/OUTPT VISIT, EST, LEVL IV, 30-39 MIN: ICD-10-PCS | Mod: S$GLB,,, | Performed by: STUDENT IN AN ORGANIZED HEALTH CARE EDUCATION/TRAINING PROGRAM

## 2021-03-24 PROCEDURE — 3288F FALL RISK ASSESSMENT DOCD: CPT | Mod: CPTII,S$GLB,, | Performed by: STUDENT IN AN ORGANIZED HEALTH CARE EDUCATION/TRAINING PROGRAM

## 2021-03-24 PROCEDURE — 3288F PR FALLS RISK ASSESSMENT DOCUMENTED: ICD-10-PCS | Mod: CPTII,S$GLB,, | Performed by: STUDENT IN AN ORGANIZED HEALTH CARE EDUCATION/TRAINING PROGRAM

## 2021-03-24 PROCEDURE — 1126F PR PAIN SEVERITY QUANTIFIED, NO PAIN PRESENT: ICD-10-PCS | Mod: S$GLB,,, | Performed by: STUDENT IN AN ORGANIZED HEALTH CARE EDUCATION/TRAINING PROGRAM

## 2021-04-07 ENCOUNTER — OFFICE VISIT (OUTPATIENT)
Dept: FAMILY MEDICINE | Facility: CLINIC | Age: 81
End: 2021-04-07
Payer: MEDICARE

## 2021-04-07 VITALS
HEIGHT: 62 IN | TEMPERATURE: 97 F | OXYGEN SATURATION: 93 % | WEIGHT: 215.5 LBS | DIASTOLIC BLOOD PRESSURE: 76 MMHG | SYSTOLIC BLOOD PRESSURE: 124 MMHG | BODY MASS INDEX: 39.66 KG/M2 | HEART RATE: 84 BPM

## 2021-04-07 DIAGNOSIS — E46 PROTEIN-CALORIE MALNUTRITION, UNSPECIFIED SEVERITY: ICD-10-CM

## 2021-04-07 DIAGNOSIS — G89.29 CHRONIC PAIN OF RIGHT KNEE: Primary | ICD-10-CM

## 2021-04-07 DIAGNOSIS — M75.101 NONTRAUMATIC TEAR OF RIGHT ROTATOR CUFF, UNSPECIFIED TEAR EXTENT: ICD-10-CM

## 2021-04-07 DIAGNOSIS — I27.20 PULMONARY HYPERTENSION: ICD-10-CM

## 2021-04-07 DIAGNOSIS — M25.561 CHRONIC PAIN OF RIGHT KNEE: Primary | ICD-10-CM

## 2021-04-07 DIAGNOSIS — I87.2 VENOUS INSUFFICIENCY OF BOTH LOWER EXTREMITIES: ICD-10-CM

## 2021-04-07 DIAGNOSIS — D52.0 DIETARY FOLATE DEFICIENCY ANEMIA: ICD-10-CM

## 2021-04-07 DIAGNOSIS — J44.9 CHRONIC OBSTRUCTIVE PULMONARY DISEASE, UNSPECIFIED COPD TYPE: ICD-10-CM

## 2021-04-07 DIAGNOSIS — Z87.891 EX-SMOKER: ICD-10-CM

## 2021-04-07 DIAGNOSIS — D64.9 ANEMIA, UNSPECIFIED TYPE: ICD-10-CM

## 2021-04-07 PROCEDURE — 1101F PR PT FALLS ASSESS DOC 0-1 FALLS W/OUT INJ PAST YR: ICD-10-PCS | Mod: CPTII,S$GLB,, | Performed by: FAMILY MEDICINE

## 2021-04-07 PROCEDURE — 3288F FALL RISK ASSESSMENT DOCD: CPT | Mod: CPTII,S$GLB,, | Performed by: FAMILY MEDICINE

## 2021-04-07 PROCEDURE — 1159F MED LIST DOCD IN RCRD: CPT | Mod: S$GLB,,, | Performed by: FAMILY MEDICINE

## 2021-04-07 PROCEDURE — 1159F PR MEDICATION LIST DOCUMENTED IN MEDICAL RECORD: ICD-10-PCS | Mod: S$GLB,,, | Performed by: FAMILY MEDICINE

## 2021-04-07 PROCEDURE — 1125F AMNT PAIN NOTED PAIN PRSNT: CPT | Mod: S$GLB,,, | Performed by: FAMILY MEDICINE

## 2021-04-07 PROCEDURE — 3288F PR FALLS RISK ASSESSMENT DOCUMENTED: ICD-10-PCS | Mod: CPTII,S$GLB,, | Performed by: FAMILY MEDICINE

## 2021-04-07 PROCEDURE — 99499 UNLISTED E&M SERVICE: CPT | Mod: S$GLB,,, | Performed by: FAMILY MEDICINE

## 2021-04-07 PROCEDURE — 1125F PR PAIN SEVERITY QUANTIFIED, PAIN PRESENT: ICD-10-PCS | Mod: S$GLB,,, | Performed by: FAMILY MEDICINE

## 2021-04-07 PROCEDURE — 99214 OFFICE O/P EST MOD 30 MIN: CPT | Mod: S$GLB,,, | Performed by: FAMILY MEDICINE

## 2021-04-07 PROCEDURE — 99499 RISK ADDL DX/OHS AUDIT: ICD-10-PCS | Mod: S$GLB,,, | Performed by: FAMILY MEDICINE

## 2021-04-07 PROCEDURE — 99214 PR OFFICE/OUTPT VISIT, EST, LEVL IV, 30-39 MIN: ICD-10-PCS | Mod: S$GLB,,, | Performed by: FAMILY MEDICINE

## 2021-04-07 PROCEDURE — 1101F PT FALLS ASSESS-DOCD LE1/YR: CPT | Mod: CPTII,S$GLB,, | Performed by: FAMILY MEDICINE

## 2021-04-07 RX ORDER — ALBUTEROL SULFATE 90 UG/1
2 AEROSOL, METERED RESPIRATORY (INHALATION) EVERY 6 HOURS PRN
Qty: 18 G | Refills: 11 | Status: SHIPPED | OUTPATIENT
Start: 2021-04-07 | End: 2022-05-12

## 2021-04-08 ENCOUNTER — LAB VISIT (OUTPATIENT)
Dept: LAB | Facility: HOSPITAL | Age: 81
End: 2021-04-08
Attending: FAMILY MEDICINE
Payer: MEDICARE

## 2021-04-08 DIAGNOSIS — D52.0 DIETARY FOLATE DEFICIENCY ANEMIA: ICD-10-CM

## 2021-04-08 DIAGNOSIS — Z87.891 EX-SMOKER: ICD-10-CM

## 2021-04-08 DIAGNOSIS — D64.9 ANEMIA, UNSPECIFIED TYPE: ICD-10-CM

## 2021-04-08 DIAGNOSIS — E46 PROTEIN-CALORIE MALNUTRITION, UNSPECIFIED SEVERITY: ICD-10-CM

## 2021-04-08 DIAGNOSIS — M75.101 NONTRAUMATIC TEAR OF RIGHT ROTATOR CUFF, UNSPECIFIED TEAR EXTENT: ICD-10-CM

## 2021-04-08 DIAGNOSIS — I87.2 VENOUS INSUFFICIENCY OF BOTH LOWER EXTREMITIES: ICD-10-CM

## 2021-04-08 DIAGNOSIS — I27.20 PULMONARY HYPERTENSION: ICD-10-CM

## 2021-04-08 DIAGNOSIS — G89.29 CHRONIC PAIN OF RIGHT KNEE: ICD-10-CM

## 2021-04-08 DIAGNOSIS — M25.561 CHRONIC PAIN OF RIGHT KNEE: ICD-10-CM

## 2021-04-08 DIAGNOSIS — J44.9 CHRONIC OBSTRUCTIVE PULMONARY DISEASE, UNSPECIFIED COPD TYPE: ICD-10-CM

## 2021-04-08 LAB
BASOPHILS # BLD AUTO: 0.08 K/UL (ref 0–0.2)
BASOPHILS NFR BLD: 0.8 % (ref 0–1.9)
DIFFERENTIAL METHOD: ABNORMAL
EOSINOPHIL # BLD AUTO: 0.4 K/UL (ref 0–0.5)
EOSINOPHIL NFR BLD: 4 % (ref 0–8)
ERYTHROCYTE [DISTWIDTH] IN BLOOD BY AUTOMATED COUNT: 14.6 % (ref 11.5–14.5)
HCT VFR BLD AUTO: 43.9 % (ref 37–48.5)
HGB BLD-MCNC: 13.6 G/DL (ref 12–16)
IMM GRANULOCYTES # BLD AUTO: 0.07 K/UL (ref 0–0.04)
IMM GRANULOCYTES NFR BLD AUTO: 0.7 % (ref 0–0.5)
LYMPHOCYTES # BLD AUTO: 1.6 K/UL (ref 1–4.8)
LYMPHOCYTES NFR BLD: 15.2 % (ref 18–48)
MCH RBC QN AUTO: 29.4 PG (ref 27–31)
MCHC RBC AUTO-ENTMCNC: 31 G/DL (ref 32–36)
MCV RBC AUTO: 95 FL (ref 82–98)
MONOCYTES # BLD AUTO: 0.8 K/UL (ref 0.3–1)
MONOCYTES NFR BLD: 7.7 % (ref 4–15)
NEUTROPHILS # BLD AUTO: 7.6 K/UL (ref 1.8–7.7)
NEUTROPHILS NFR BLD: 71.6 % (ref 38–73)
NRBC BLD-RTO: 0 /100 WBC
PLATELET # BLD AUTO: 223 K/UL (ref 150–450)
PMV BLD AUTO: 10.5 FL (ref 9.2–12.9)
RBC # BLD AUTO: 4.62 M/UL (ref 4–5.4)
RETICS/RBC NFR AUTO: 2 % (ref 0.5–2.5)
WBC # BLD AUTO: 10.56 K/UL (ref 3.9–12.7)

## 2021-04-08 PROCEDURE — 36415 COLL VENOUS BLD VENIPUNCTURE: CPT | Mod: PO | Performed by: FAMILY MEDICINE

## 2021-04-08 PROCEDURE — 82607 VITAMIN B-12: CPT | Mod: PO | Performed by: FAMILY MEDICINE

## 2021-04-08 PROCEDURE — 85045 AUTOMATED RETICULOCYTE COUNT: CPT | Mod: PO | Performed by: FAMILY MEDICINE

## 2021-04-08 PROCEDURE — 82746 ASSAY OF FOLIC ACID SERUM: CPT | Mod: PO | Performed by: FAMILY MEDICINE

## 2021-04-08 PROCEDURE — 83540 ASSAY OF IRON: CPT | Mod: PO | Performed by: FAMILY MEDICINE

## 2021-04-08 PROCEDURE — 85025 COMPLETE CBC W/AUTO DIFF WBC: CPT | Mod: PO | Performed by: FAMILY MEDICINE

## 2021-04-09 LAB
FOLATE SERPL-MCNC: 15.6 NG/ML (ref 4–24)
IRON SERPL-MCNC: 83 UG/DL (ref 30–160)
VIT B12 SERPL-MCNC: >2000 PG/ML (ref 210–950)

## 2021-04-12 ENCOUNTER — TELEPHONE (OUTPATIENT)
Dept: FAMILY MEDICINE | Facility: CLINIC | Age: 81
End: 2021-04-12

## 2021-05-26 ENCOUNTER — OFFICE VISIT (OUTPATIENT)
Dept: FAMILY MEDICINE | Facility: CLINIC | Age: 81
End: 2021-05-26
Payer: MEDICARE

## 2021-05-26 VITALS
SYSTOLIC BLOOD PRESSURE: 128 MMHG | OXYGEN SATURATION: 91 % | DIASTOLIC BLOOD PRESSURE: 78 MMHG | HEIGHT: 62 IN | HEART RATE: 77 BPM | BODY MASS INDEX: 39.07 KG/M2 | TEMPERATURE: 98 F | WEIGHT: 212.31 LBS

## 2021-05-26 DIAGNOSIS — J01.10 ACUTE NON-RECURRENT FRONTAL SINUSITIS: ICD-10-CM

## 2021-05-26 DIAGNOSIS — E87.70 HYPERVOLEMIA, UNSPECIFIED HYPERVOLEMIA TYPE: Primary | ICD-10-CM

## 2021-05-26 PROCEDURE — 1159F PR MEDICATION LIST DOCUMENTED IN MEDICAL RECORD: ICD-10-PCS | Mod: S$GLB,,, | Performed by: STUDENT IN AN ORGANIZED HEALTH CARE EDUCATION/TRAINING PROGRAM

## 2021-05-26 PROCEDURE — 1159F MED LIST DOCD IN RCRD: CPT | Mod: S$GLB,,, | Performed by: STUDENT IN AN ORGANIZED HEALTH CARE EDUCATION/TRAINING PROGRAM

## 2021-05-26 PROCEDURE — 1101F PR PT FALLS ASSESS DOC 0-1 FALLS W/OUT INJ PAST YR: ICD-10-PCS | Mod: CPTII,S$GLB,, | Performed by: STUDENT IN AN ORGANIZED HEALTH CARE EDUCATION/TRAINING PROGRAM

## 2021-05-26 PROCEDURE — 99214 PR OFFICE/OUTPT VISIT, EST, LEVL IV, 30-39 MIN: ICD-10-PCS | Mod: S$GLB,,, | Performed by: STUDENT IN AN ORGANIZED HEALTH CARE EDUCATION/TRAINING PROGRAM

## 2021-05-26 PROCEDURE — 1125F PR PAIN SEVERITY QUANTIFIED, PAIN PRESENT: ICD-10-PCS | Mod: S$GLB,,, | Performed by: STUDENT IN AN ORGANIZED HEALTH CARE EDUCATION/TRAINING PROGRAM

## 2021-05-26 PROCEDURE — 3288F PR FALLS RISK ASSESSMENT DOCUMENTED: ICD-10-PCS | Mod: CPTII,S$GLB,, | Performed by: STUDENT IN AN ORGANIZED HEALTH CARE EDUCATION/TRAINING PROGRAM

## 2021-05-26 PROCEDURE — 1101F PT FALLS ASSESS-DOCD LE1/YR: CPT | Mod: CPTII,S$GLB,, | Performed by: STUDENT IN AN ORGANIZED HEALTH CARE EDUCATION/TRAINING PROGRAM

## 2021-05-26 PROCEDURE — 99214 OFFICE O/P EST MOD 30 MIN: CPT | Mod: S$GLB,,, | Performed by: STUDENT IN AN ORGANIZED HEALTH CARE EDUCATION/TRAINING PROGRAM

## 2021-05-26 PROCEDURE — 3288F FALL RISK ASSESSMENT DOCD: CPT | Mod: CPTII,S$GLB,, | Performed by: STUDENT IN AN ORGANIZED HEALTH CARE EDUCATION/TRAINING PROGRAM

## 2021-05-26 PROCEDURE — 1125F AMNT PAIN NOTED PAIN PRSNT: CPT | Mod: S$GLB,,, | Performed by: STUDENT IN AN ORGANIZED HEALTH CARE EDUCATION/TRAINING PROGRAM

## 2021-08-26 DIAGNOSIS — M81.0 OSTEOPOROSIS, UNSPECIFIED OSTEOPOROSIS TYPE, UNSPECIFIED PATHOLOGICAL FRACTURE PRESENCE: Primary | ICD-10-CM

## 2021-08-27 RX ORDER — ALENDRONATE SODIUM 70 MG/1
70 TABLET ORAL
Qty: 12 TABLET | Status: SHIPPED | OUTPATIENT
Start: 2021-08-27 | End: 2021-08-27 | Stop reason: SDUPTHER

## 2021-08-27 RX ORDER — ALENDRONATE SODIUM 70 MG/1
70 TABLET ORAL
Qty: 12 TABLET | OUTPATIENT
Start: 2021-08-27 | End: 2021-10-10

## 2021-10-11 DIAGNOSIS — M81.0 OSTEOPOROSIS, UNSPECIFIED OSTEOPOROSIS TYPE, UNSPECIFIED PATHOLOGICAL FRACTURE PRESENCE: ICD-10-CM

## 2021-10-12 RX ORDER — ALENDRONATE SODIUM 70 MG/1
TABLET ORAL
Qty: 12 TABLET | Refills: 3 | Status: SHIPPED | OUTPATIENT
Start: 2021-10-12 | End: 2022-11-04

## 2021-12-17 ENCOUNTER — TELEPHONE (OUTPATIENT)
Dept: FAMILY MEDICINE | Facility: CLINIC | Age: 81
End: 2021-12-17
Payer: MEDICARE

## 2021-12-22 RX ORDER — PANTOPRAZOLE SODIUM 40 MG/1
TABLET, DELAYED RELEASE ORAL
Qty: 30 TABLET | Refills: 11 | Status: SHIPPED | OUTPATIENT
Start: 2021-12-22 | End: 2023-01-31 | Stop reason: SDUPTHER

## 2022-01-10 ENCOUNTER — HOSPITAL ENCOUNTER (OUTPATIENT)
Dept: RADIOLOGY | Facility: HOSPITAL | Age: 82
Discharge: HOME | End: 2022-01-10
Attending: STUDENT IN AN ORGANIZED HEALTH CARE EDUCATION/TRAINING PROGRAM
Payer: MEDICARE

## 2022-01-10 ENCOUNTER — OFFICE VISIT (OUTPATIENT)
Dept: FAMILY MEDICINE | Facility: CLINIC | Age: 82
End: 2022-01-10
Payer: MEDICARE

## 2022-01-10 VITALS
SYSTOLIC BLOOD PRESSURE: 130 MMHG | OXYGEN SATURATION: 91 % | BODY MASS INDEX: 40.03 KG/M2 | DIASTOLIC BLOOD PRESSURE: 84 MMHG | TEMPERATURE: 98 F | HEIGHT: 62 IN | HEART RATE: 76 BPM | WEIGHT: 217.5 LBS

## 2022-01-10 DIAGNOSIS — J44.9 CHRONIC OBSTRUCTIVE PULMONARY DISEASE, UNSPECIFIED COPD TYPE: ICD-10-CM

## 2022-01-10 DIAGNOSIS — M19.90 ARTHRITIS: ICD-10-CM

## 2022-01-10 DIAGNOSIS — R06.09 DYSPNEA ON EXERTION: Primary | ICD-10-CM

## 2022-01-10 DIAGNOSIS — R06.09 DYSPNEA ON EXERTION: ICD-10-CM

## 2022-01-10 DIAGNOSIS — I27.20 PULMONARY HYPERTENSION: ICD-10-CM

## 2022-01-10 PROCEDURE — 1159F PR MEDICATION LIST DOCUMENTED IN MEDICAL RECORD: ICD-10-PCS | Mod: CPTII,S$GLB,, | Performed by: STUDENT IN AN ORGANIZED HEALTH CARE EDUCATION/TRAINING PROGRAM

## 2022-01-10 PROCEDURE — 3288F FALL RISK ASSESSMENT DOCD: CPT | Mod: CPTII,S$GLB,, | Performed by: STUDENT IN AN ORGANIZED HEALTH CARE EDUCATION/TRAINING PROGRAM

## 2022-01-10 PROCEDURE — 71046 X-RAY EXAM CHEST 2 VIEWS: CPT | Mod: TC,FY,PO

## 2022-01-10 PROCEDURE — 99214 OFFICE O/P EST MOD 30 MIN: CPT | Mod: S$GLB,,, | Performed by: STUDENT IN AN ORGANIZED HEALTH CARE EDUCATION/TRAINING PROGRAM

## 2022-01-10 PROCEDURE — 99499 UNLISTED E&M SERVICE: CPT | Mod: S$GLB,,, | Performed by: STUDENT IN AN ORGANIZED HEALTH CARE EDUCATION/TRAINING PROGRAM

## 2022-01-10 PROCEDURE — 1159F MED LIST DOCD IN RCRD: CPT | Mod: CPTII,S$GLB,, | Performed by: STUDENT IN AN ORGANIZED HEALTH CARE EDUCATION/TRAINING PROGRAM

## 2022-01-10 PROCEDURE — 3288F PR FALLS RISK ASSESSMENT DOCUMENTED: ICD-10-PCS | Mod: CPTII,S$GLB,, | Performed by: STUDENT IN AN ORGANIZED HEALTH CARE EDUCATION/TRAINING PROGRAM

## 2022-01-10 PROCEDURE — 1101F PT FALLS ASSESS-DOCD LE1/YR: CPT | Mod: CPTII,S$GLB,, | Performed by: STUDENT IN AN ORGANIZED HEALTH CARE EDUCATION/TRAINING PROGRAM

## 2022-01-10 PROCEDURE — 3075F PR MOST RECENT SYSTOLIC BLOOD PRESS GE 130-139MM HG: ICD-10-PCS | Mod: CPTII,S$GLB,, | Performed by: STUDENT IN AN ORGANIZED HEALTH CARE EDUCATION/TRAINING PROGRAM

## 2022-01-10 PROCEDURE — 3075F SYST BP GE 130 - 139MM HG: CPT | Mod: CPTII,S$GLB,, | Performed by: STUDENT IN AN ORGANIZED HEALTH CARE EDUCATION/TRAINING PROGRAM

## 2022-01-10 PROCEDURE — 1101F PR PT FALLS ASSESS DOC 0-1 FALLS W/OUT INJ PAST YR: ICD-10-PCS | Mod: CPTII,S$GLB,, | Performed by: STUDENT IN AN ORGANIZED HEALTH CARE EDUCATION/TRAINING PROGRAM

## 2022-01-10 PROCEDURE — 1160F RVW MEDS BY RX/DR IN RCRD: CPT | Mod: CPTII,S$GLB,, | Performed by: STUDENT IN AN ORGANIZED HEALTH CARE EDUCATION/TRAINING PROGRAM

## 2022-01-10 PROCEDURE — 3079F PR MOST RECENT DIASTOLIC BLOOD PRESSURE 80-89 MM HG: ICD-10-PCS | Mod: CPTII,S$GLB,, | Performed by: STUDENT IN AN ORGANIZED HEALTH CARE EDUCATION/TRAINING PROGRAM

## 2022-01-10 PROCEDURE — 3079F DIAST BP 80-89 MM HG: CPT | Mod: CPTII,S$GLB,, | Performed by: STUDENT IN AN ORGANIZED HEALTH CARE EDUCATION/TRAINING PROGRAM

## 2022-01-10 PROCEDURE — 1160F PR REVIEW ALL MEDS BY PRESCRIBER/CLIN PHARMACIST DOCUMENTED: ICD-10-PCS | Mod: CPTII,S$GLB,, | Performed by: STUDENT IN AN ORGANIZED HEALTH CARE EDUCATION/TRAINING PROGRAM

## 2022-01-10 PROCEDURE — 1125F PR PAIN SEVERITY QUANTIFIED, PAIN PRESENT: ICD-10-PCS | Mod: CPTII,S$GLB,, | Performed by: STUDENT IN AN ORGANIZED HEALTH CARE EDUCATION/TRAINING PROGRAM

## 2022-01-10 PROCEDURE — 1125F AMNT PAIN NOTED PAIN PRSNT: CPT | Mod: CPTII,S$GLB,, | Performed by: STUDENT IN AN ORGANIZED HEALTH CARE EDUCATION/TRAINING PROGRAM

## 2022-01-10 PROCEDURE — 99499 RISK ADDL DX/OHS AUDIT: ICD-10-PCS | Mod: S$GLB,,, | Performed by: STUDENT IN AN ORGANIZED HEALTH CARE EDUCATION/TRAINING PROGRAM

## 2022-01-10 PROCEDURE — 99214 PR OFFICE/OUTPT VISIT, EST, LEVL IV, 30-39 MIN: ICD-10-PCS | Mod: S$GLB,,, | Performed by: STUDENT IN AN ORGANIZED HEALTH CARE EDUCATION/TRAINING PROGRAM

## 2022-01-10 RX ORDER — MELOXICAM 7.5 MG/1
7.5 TABLET ORAL DAILY
Qty: 90 TABLET | Refills: 3 | Status: SHIPPED | OUTPATIENT
Start: 2022-01-10 | End: 2023-05-21

## 2022-01-10 NOTE — PROGRESS NOTES
Patient ID: Shirley F Gilford is a 81 y.o. female.     Chief Complaint: Follow-up    Shortness of Breath  This is a new problem. The current episode started more than 1 month ago. The problem occurs daily. The problem has been gradually worsening. Associated symptoms include leg swelling. Pertinent negatives include no abdominal pain, chest pain, claudication, coryza, ear pain, fever, headaches, leg pain, neck pain, orthopnea, PND, rash, rhinorrhea, sore throat, sputum production, swollen glands, syncope, vomiting or wheezing. The symptoms are aggravated by any activity (patient reports getting short of breath when walking from her car to the grocery store). Treatments tried: albuterol inhaler. The treatment provided no relief. Her past medical history is significant for chronic lung disease. There is no history of PE or pneumonia.     Patient also reports new daytime fatigue. She is unsure if she snores as she lives alone.        Review of Systems  Review of Systems   Constitutional: Negative for fever.   HENT: Negative for ear pain, rhinorrhea, sinus pain and sore throat.    Eyes: Negative for discharge.   Respiratory: Positive for shortness of breath. Negative for cough, sputum production and wheezing.    Cardiovascular: Positive for leg swelling. Negative for chest pain, orthopnea, claudication, syncope and PND.   Gastrointestinal: Negative for abdominal pain, diarrhea, nausea and vomiting.   Genitourinary: Negative for urgency.   Musculoskeletal: Negative for myalgias and neck pain.   Skin: Negative for rash.   Neurological: Negative for weakness and headaches.   Psychiatric/Behavioral: Negative for depression.   All other systems reviewed and are negative.      Currently Medications  Current Outpatient Medications on File Prior to Visit   Medication Sig Dispense Refill    albuterol (PROVENTIL/VENTOLIN HFA) 90 mcg/actuation inhaler Inhale 2 puffs into the lungs every 6 (six) hours as needed for Wheezing. 18 g  11    alendronate (FOSAMAX) 70 MG tablet TAKE 1 TABLET BY MOUTH EVERY 7 DAYS 12 tablet 3    ANORO ELLIPTA 62.5-25 mcg/actuation DsDv       atorvastatin (LIPITOR) 10 MG tablet TAKE 1 TABLET BY MOUTH ONCE A DAY 90 tablet 3    calcium-vitamin D3 (OS-MIKE 500 + D3) 500 mg-5 mcg (200 unit) per tablet Take 1 tablet by mouth once daily.      cyanocobalamin 500 MCG tablet Take 500 mcg by mouth once daily.      ferrous sulfate 324 mg (65 mg iron) TbEC Take 1 tablet (324 mg total) by mouth once daily.  0    fluticasone propionate (FLONASE) 50 mcg/actuation nasal spray 2 sprays (100 mcg total) by Each Nare route once daily. 1 Bottle 12    furosemide (LASIX) 40 MG tablet Take 1 tablet (40 mg total) by mouth 2 (two) times a day. 60 tablet 3    isosorbide mononitrate (IMDUR) 60 MG 24 hr tablet TAKE 1 TABLET BY MOUTH EVERY DAY FOR HEART AND BLOOD PRESSURE 90 tablet 3    loratadine (CLARITIN) 10 mg tablet Take 1 tablet (10 mg total) by mouth once daily. 90 tablet 3    losartan (COZAAR) 50 MG tablet TAKE 1 TABLET BY MOUTH ONCE A DAY FOR BLOOD PRESSURE 90 tablet 3    montelukast (SINGULAIR) 10 mg tablet TAKE 1 TABLET BY MOUTH EVERY EVENING 90 tablet 3    MULTIVIT-IRON-MIN-FOLIC ACID 3,500-18-0.4 UNIT-MG-MG ORAL CHEW Take by mouth.      pantoprazole (PROTONIX) 40 MG tablet TAKE 1 TABLET BY MOUTH EVERY DAY 30 tablet 11    rOPINIRole (REQUIP) 3 MG tablet Take 1 tablet (3 mg total) by mouth nightly. 90 tablet 3    albuterol (PROVENTIL) 2.5 mg /3 mL (0.083 %) nebulizer solution albuterol sulfate 2.5 mg/3 mL (0.083 %) solution for nebulization (Patient not taking: No sig reported) 100 each 11    cholecalciferol, vitamin D3, (VITAMIN D3) 5,000 unit Tab Take 5,000 Units by mouth once daily. (Patient not taking: No sig reported) 90 tablet 3     No current facility-administered medications on file prior to visit.       Physical  Exam  Vitals:    01/10/22 1113   BP: 130/84   BP Location: Right arm   Patient Position: Sitting  "  Pulse: 76   Temp: 98.2 °F (36.8 °C)   SpO2: (!) 91%   Weight: 98.6 kg (217 lb 7.7 oz)   Height: 5' 2" (1.575 m)      Body mass index is 39.78 kg/m².    Physical Exam  Vitals and nursing note reviewed.   Constitutional:       General: She is not in acute distress.     Appearance: She is not ill-appearing.   HENT:      Head: Normocephalic and atraumatic.      Right Ear: External ear normal.      Left Ear: External ear normal.      Nose: Nose normal.      Mouth/Throat:      Mouth: Mucous membranes are moist.   Eyes:      Extraocular Movements: Extraocular movements intact.      Conjunctiva/sclera: Conjunctivae normal.   Cardiovascular:      Rate and Rhythm: Normal rate and regular rhythm.      Pulses: Normal pulses.      Heart sounds: No murmur heard.      Pulmonary:      Effort: Pulmonary effort is normal. No respiratory distress.      Breath sounds: No wheezing.      Comments: Decreased lung sounds at the left lung base  Abdominal:      General: There is no distension.      Palpations: Abdomen is soft. There is no mass.      Tenderness: There is no abdominal tenderness.   Musculoskeletal:         General: No swelling.      Cervical back: Normal range of motion.      Right lower leg: No edema.      Left lower leg: No edema.   Skin:     Coloration: Skin is not jaundiced.      Findings: No rash.   Neurological:      General: No focal deficit present.      Mental Status: She is alert and oriented to person, place, and time.   Psychiatric:         Mood and Affect: Mood normal.         Thought Content: Thought content normal.         Labs:    Complete Blood Count  Lab Results   Component Value Date    RBC 4.62 04/08/2021    HGB 13.6 04/08/2021    HCT 43.9 04/08/2021    MCV 95 04/08/2021    MCH 29.4 04/08/2021    MCHC 31.0 (L) 04/08/2021    RDW 14.6 (H) 04/08/2021     04/08/2021    MPV 10.5 04/08/2021    GRAN 7.6 04/08/2021    GRAN 71.6 04/08/2021    LYMPH 1.6 04/08/2021    LYMPH 15.2 (L) 04/08/2021    MONO 0.8 " 04/08/2021    MONO 7.7 04/08/2021    EOS 0.4 04/08/2021    BASO 0.08 04/08/2021    EOSINOPHIL 4.0 04/08/2021    BASOPHIL 0.8 04/08/2021    DIFFMETHOD Automated 04/08/2021       Comprehensive Metabolic Panel  No results found for: GLU, BUN, CREATININE, NA, K, CL, PROT, ALBUMIN, BILITOT, AST, ALKPHOS, CO2, ALT, ANIONGAP, EGFRNONAA, ESTGFRAFRICA    TSH  No results found for: TSH    Imaging:  US Lower Extremity Veins Right  Narrative: EXAMINATION:  US LOWER EXTREMITY VEINS RIGHT    CLINICAL HISTORY:  Pain in right leg    TECHNIQUE:  Multiple static images are submitted for interpretation with color flow and spectral doppler imaging.    COMPARISON:  11/24/2020    FINDINGS:  The veins in the right lower extremity are normal in appearance and have normal compressibility. There are normal venous waveforms seen with augmentation during the compression of the veins. The right common femoral vein has a velocity of 40 cm/sec.  There is a cystic area in the right popliteal fossa that measures 4.0 cm by 1.3 cm by 1.4 cm.  Impression: 1. No deep venous thrombosis  2. There is a cystic area in the right popliteal fossa that measures 4.0 cm by 1.3 cm by 1.4 cm.  If additional imaging evaluation is clinically indicated, I recommend consideration of an MRI examination of the right knee without IV contrast.    Electronically signed by: Ciro Johnson MD  Date:    03/24/2021  Time:    14:05      Assessment/Plan:    Problem List Items Addressed This Visit        Pulmonary    Chronic obstructive pulmonary disease    Pulmonary hypertension      Other Visit Diagnoses     Dyspnea on exertion    -  Primary    Relevant Orders    X-Ray Chest PA And Lateral    Echo    Arthritis        Relevant Medications    meloxicam (MOBIC) 7.5 MG tablet       cxr today.     Follow up after echo. Patient may need referral to sleep medicine or may need referral to cardiology.     Discussed how to stay healthy including: diet, exercise, refraining from smoking  and discussed screening exams / tests needed for age, sex and family Hx.        Darren Trimble MD

## 2022-01-11 ENCOUNTER — TELEPHONE (OUTPATIENT)
Dept: FAMILY MEDICINE | Facility: CLINIC | Age: 82
End: 2022-01-11
Payer: MEDICARE

## 2022-01-19 ENCOUNTER — HOSPITAL ENCOUNTER (OUTPATIENT)
Dept: CARDIOLOGY | Facility: HOSPITAL | Age: 82
Discharge: HOME | End: 2022-01-19
Attending: STUDENT IN AN ORGANIZED HEALTH CARE EDUCATION/TRAINING PROGRAM
Payer: MEDICARE

## 2022-01-19 VITALS — HEIGHT: 62 IN | WEIGHT: 217 LBS | BODY MASS INDEX: 39.93 KG/M2

## 2022-01-19 DIAGNOSIS — R06.09 DYSPNEA ON EXERTION: ICD-10-CM

## 2022-01-19 LAB
AORTIC ROOT ANNULUS: 2.68 CM
AV INDEX (PROSTH): 0.65
AV MEAN GRADIENT: 8 MMHG
AV PEAK GRADIENT: 15 MMHG
AV VALVE AREA: 2.29 CM2
AV VELOCITY RATIO: 0.64
BSA FOR ECHO PROCEDURE: 2.07 M2
CV ECHO LV RWT: 0.4 CM
DOP CALC AO PEAK VEL: 1.95 M/S
DOP CALC AO VTI: 34.26 CM
DOP CALC LVOT AREA: 3.5 CM2
DOP CALC LVOT DIAMETER: 2.11 CM
DOP CALC LVOT PEAK VEL: 1.25 M/S
DOP CALC LVOT STROKE VOLUME: 78.32 CM3
DOP CALC MV VTI: 43.59 CM
DOP CALCLVOT PEAK VEL VTI: 22.41 CM
E WAVE DECELERATION TIME: 215.42 MSEC
E/A RATIO: 1.08
E/E' RATIO: 23.17 M/S
ECHO LV POSTERIOR WALL: 1.04 CM (ref 0.6–1.1)
EJECTION FRACTION: 65 %
FRACTIONAL SHORTENING: 39 % (ref 28–44)
INTERVENTRICULAR SEPTUM: 0.88 CM (ref 0.6–1.1)
LA MAJOR: 6.28 CM
LA MINOR: 6.22 CM
LA WIDTH: 4.87 CM
LEFT ATRIUM SIZE: 3.98 CM
LEFT ATRIUM VOLUME INDEX MOD: 47.7 ML/M2
LEFT ATRIUM VOLUME INDEX: 52 ML/M2
LEFT ATRIUM VOLUME MOD: 94.53 CM3
LEFT ATRIUM VOLUME: 102.97 CM3
LEFT INTERNAL DIMENSION IN SYSTOLE: 3.13 CM (ref 2.1–4)
LEFT VENTRICLE DIASTOLIC VOLUME INDEX: 64.23 ML/M2
LEFT VENTRICLE DIASTOLIC VOLUME: 127.17 ML
LEFT VENTRICLE MASS INDEX: 92 G/M2
LEFT VENTRICLE SYSTOLIC VOLUME INDEX: 19.7 ML/M2
LEFT VENTRICLE SYSTOLIC VOLUME: 38.95 ML
LEFT VENTRICULAR INTERNAL DIMENSION IN DIASTOLE: 5.16 CM (ref 3.5–6)
LEFT VENTRICULAR MASS: 181.57 G
LV LATERAL E/E' RATIO: 23.17 M/S
LV SEPTAL E/E' RATIO: 23.17 M/S
MV PEAK A VEL: 1.29 M/S
MV PEAK E VEL: 1.39 M/S
MV PEAK GRADIENT: 9 MMHG
MV STENOSIS PRESSURE HALF TIME: 62.47 MS
MV VALVE AREA BY CONTINUITY EQUATION: 1.8 CM2
MV VALVE AREA P 1/2 METHOD: 3.52 CM2
PISA TR MAX VEL: 3.66 M/S
PULM VEIN S/D RATIO: 0.94
PV PEAK D VEL: 0.35 M/S
PV PEAK S VEL: 0.33 M/S
PV PEAK VELOCITY: 1.21 CM/S
RA MAJOR: 5.6 CM
RA PRESSURE: 3 MMHG
RA WIDTH: 4.58 CM
RIGHT VENTRICULAR END-DIASTOLIC DIMENSION: 2.85 CM
SINUS: 2.36 CM
TDI LATERAL: 0.06 M/S
TDI SEPTAL: 0.06 M/S
TDI: 0.06 M/S
TR MAX PG: 54 MMHG
TV REST PULMONARY ARTERY PRESSURE: 57 MMHG

## 2022-01-19 PROCEDURE — 93306 TTE W/DOPPLER COMPLETE: CPT | Mod: 26,,, | Performed by: INTERNAL MEDICINE

## 2022-01-19 PROCEDURE — 93306 ECHO (CUPID ONLY): ICD-10-PCS | Mod: 26,,, | Performed by: INTERNAL MEDICINE

## 2022-01-19 PROCEDURE — 93306 TTE W/DOPPLER COMPLETE: CPT | Mod: PO

## 2022-01-20 DIAGNOSIS — R93.1 ABNORMAL ECHOCARDIOGRAM: Primary | ICD-10-CM

## 2022-01-21 ENCOUNTER — OFFICE VISIT (OUTPATIENT)
Dept: FAMILY MEDICINE | Facility: CLINIC | Age: 82
End: 2022-01-21
Payer: MEDICARE

## 2022-01-21 VITALS
HEART RATE: 80 BPM | HEIGHT: 62 IN | WEIGHT: 216.69 LBS | SYSTOLIC BLOOD PRESSURE: 130 MMHG | TEMPERATURE: 98 F | DIASTOLIC BLOOD PRESSURE: 80 MMHG | OXYGEN SATURATION: 93 % | BODY MASS INDEX: 39.88 KG/M2

## 2022-01-21 DIAGNOSIS — I51.89 DIASTOLIC DYSFUNCTION: ICD-10-CM

## 2022-01-21 DIAGNOSIS — I51.7 LEFT ATRIAL ENLARGEMENT: ICD-10-CM

## 2022-01-21 DIAGNOSIS — R93.1 ABNORMAL ECHOCARDIOGRAM: ICD-10-CM

## 2022-01-21 DIAGNOSIS — I27.20 PULMONARY HYPERTENSION: ICD-10-CM

## 2022-01-21 DIAGNOSIS — R06.09 DYSPNEA ON EXERTION: Primary | ICD-10-CM

## 2022-01-21 PROCEDURE — 99214 PR OFFICE/OUTPT VISIT, EST, LEVL IV, 30-39 MIN: ICD-10-PCS | Mod: S$GLB,,, | Performed by: STUDENT IN AN ORGANIZED HEALTH CARE EDUCATION/TRAINING PROGRAM

## 2022-01-21 PROCEDURE — 1101F PT FALLS ASSESS-DOCD LE1/YR: CPT | Mod: CPTII,S$GLB,, | Performed by: STUDENT IN AN ORGANIZED HEALTH CARE EDUCATION/TRAINING PROGRAM

## 2022-01-21 PROCEDURE — 99214 OFFICE O/P EST MOD 30 MIN: CPT | Mod: S$GLB,,, | Performed by: STUDENT IN AN ORGANIZED HEALTH CARE EDUCATION/TRAINING PROGRAM

## 2022-01-21 PROCEDURE — 3288F PR FALLS RISK ASSESSMENT DOCUMENTED: ICD-10-PCS | Mod: CPTII,S$GLB,, | Performed by: STUDENT IN AN ORGANIZED HEALTH CARE EDUCATION/TRAINING PROGRAM

## 2022-01-21 PROCEDURE — 1159F MED LIST DOCD IN RCRD: CPT | Mod: CPTII,S$GLB,, | Performed by: STUDENT IN AN ORGANIZED HEALTH CARE EDUCATION/TRAINING PROGRAM

## 2022-01-21 PROCEDURE — 3079F PR MOST RECENT DIASTOLIC BLOOD PRESSURE 80-89 MM HG: ICD-10-PCS | Mod: CPTII,S$GLB,, | Performed by: STUDENT IN AN ORGANIZED HEALTH CARE EDUCATION/TRAINING PROGRAM

## 2022-01-21 PROCEDURE — 1160F PR REVIEW ALL MEDS BY PRESCRIBER/CLIN PHARMACIST DOCUMENTED: ICD-10-PCS | Mod: CPTII,S$GLB,, | Performed by: STUDENT IN AN ORGANIZED HEALTH CARE EDUCATION/TRAINING PROGRAM

## 2022-01-21 PROCEDURE — 3079F DIAST BP 80-89 MM HG: CPT | Mod: CPTII,S$GLB,, | Performed by: STUDENT IN AN ORGANIZED HEALTH CARE EDUCATION/TRAINING PROGRAM

## 2022-01-21 PROCEDURE — 3288F FALL RISK ASSESSMENT DOCD: CPT | Mod: CPTII,S$GLB,, | Performed by: STUDENT IN AN ORGANIZED HEALTH CARE EDUCATION/TRAINING PROGRAM

## 2022-01-21 PROCEDURE — 99499 RISK ADDL DX/OHS AUDIT: ICD-10-PCS | Mod: S$GLB,,, | Performed by: STUDENT IN AN ORGANIZED HEALTH CARE EDUCATION/TRAINING PROGRAM

## 2022-01-21 PROCEDURE — 1159F PR MEDICATION LIST DOCUMENTED IN MEDICAL RECORD: ICD-10-PCS | Mod: CPTII,S$GLB,, | Performed by: STUDENT IN AN ORGANIZED HEALTH CARE EDUCATION/TRAINING PROGRAM

## 2022-01-21 PROCEDURE — 3075F SYST BP GE 130 - 139MM HG: CPT | Mod: CPTII,S$GLB,, | Performed by: STUDENT IN AN ORGANIZED HEALTH CARE EDUCATION/TRAINING PROGRAM

## 2022-01-21 PROCEDURE — 1126F AMNT PAIN NOTED NONE PRSNT: CPT | Mod: CPTII,S$GLB,, | Performed by: STUDENT IN AN ORGANIZED HEALTH CARE EDUCATION/TRAINING PROGRAM

## 2022-01-21 PROCEDURE — 1126F PR PAIN SEVERITY QUANTIFIED, NO PAIN PRESENT: ICD-10-PCS | Mod: CPTII,S$GLB,, | Performed by: STUDENT IN AN ORGANIZED HEALTH CARE EDUCATION/TRAINING PROGRAM

## 2022-01-21 PROCEDURE — 99499 UNLISTED E&M SERVICE: CPT | Mod: S$GLB,,, | Performed by: STUDENT IN AN ORGANIZED HEALTH CARE EDUCATION/TRAINING PROGRAM

## 2022-01-21 PROCEDURE — 3075F PR MOST RECENT SYSTOLIC BLOOD PRESS GE 130-139MM HG: ICD-10-PCS | Mod: CPTII,S$GLB,, | Performed by: STUDENT IN AN ORGANIZED HEALTH CARE EDUCATION/TRAINING PROGRAM

## 2022-01-21 PROCEDURE — 1101F PR PT FALLS ASSESS DOC 0-1 FALLS W/OUT INJ PAST YR: ICD-10-PCS | Mod: CPTII,S$GLB,, | Performed by: STUDENT IN AN ORGANIZED HEALTH CARE EDUCATION/TRAINING PROGRAM

## 2022-01-21 PROCEDURE — 1160F RVW MEDS BY RX/DR IN RCRD: CPT | Mod: CPTII,S$GLB,, | Performed by: STUDENT IN AN ORGANIZED HEALTH CARE EDUCATION/TRAINING PROGRAM

## 2022-01-23 PROBLEM — I51.89 DIASTOLIC DYSFUNCTION: Status: ACTIVE | Noted: 2022-01-23

## 2022-01-23 PROBLEM — I51.7 LEFT ATRIAL ENLARGEMENT: Status: ACTIVE | Noted: 2022-01-23

## 2022-01-23 NOTE — PROGRESS NOTES
Patient ID: Shirley F Gilford is a 81 y.o. female.     Chief Complaint: Follow-up    Shortness of Breath  This is a recurrent problem. The current episode started more than 1 month ago. The problem occurs daily. The problem has been unchanged. Pertinent negatives include no abdominal pain, chest pain, claudication, coryza, ear pain, fever, headaches, leg pain, leg swelling, neck pain, orthopnea, rash, rhinorrhea, sore throat, sputum production, swollen glands, syncope or vomiting. The symptoms are aggravated by any activity. She has tried beta agonist inhalers, leukotriene antagonists and ipratropium inhalers for the symptoms. The treatment provided no relief.     Echo was performed two days ago and patient is here for results.     Review of Systems  Review of Systems   Constitutional: Negative for fever.   HENT: Negative for ear pain, rhinorrhea, sinus pain and sore throat.    Eyes: Negative for discharge.   Respiratory: Positive for shortness of breath. Negative for cough and sputum production.    Cardiovascular: Negative for chest pain, orthopnea, claudication, leg swelling and syncope.   Gastrointestinal: Negative for abdominal pain, diarrhea, nausea and vomiting.   Genitourinary: Negative for urgency.   Musculoskeletal: Negative for myalgias and neck pain.   Skin: Negative for rash.   Neurological: Negative for weakness and headaches.   Psychiatric/Behavioral: Negative for depression.   All other systems reviewed and are negative.      Currently Medications  Current Outpatient Medications on File Prior to Visit   Medication Sig Dispense Refill    albuterol (PROVENTIL) 2.5 mg /3 mL (0.083 %) nebulizer solution albuterol sulfate 2.5 mg/3 mL (0.083 %) solution for nebulization 100 each 11    albuterol (PROVENTIL/VENTOLIN HFA) 90 mcg/actuation inhaler Inhale 2 puffs into the lungs every 6 (six) hours as needed for Wheezing. 18 g 11    alendronate (FOSAMAX) 70 MG tablet TAKE 1 TABLET BY MOUTH EVERY 7 DAYS 12  "tablet 3    ANORO ELLIPTA 62.5-25 mcg/actuation DsDv       atorvastatin (LIPITOR) 10 MG tablet TAKE 1 TABLET BY MOUTH ONCE A DAY 90 tablet 3    calcium-vitamin D3 (OS-MIKE 500 + D3) 500 mg-5 mcg (200 unit) per tablet Take 1 tablet by mouth once daily.      cyanocobalamin 500 MCG tablet Take 500 mcg by mouth once daily.      ferrous sulfate 324 mg (65 mg iron) TbEC Take 1 tablet (324 mg total) by mouth once daily.  0    fluticasone propionate (FLONASE) 50 mcg/actuation nasal spray 2 sprays (100 mcg total) by Each Nare route once daily. 1 Bottle 12    furosemide (LASIX) 40 MG tablet Take 1 tablet (40 mg total) by mouth 2 (two) times a day. 60 tablet 3    isosorbide mononitrate (IMDUR) 60 MG 24 hr tablet TAKE 1 TABLET BY MOUTH EVERY DAY FOR HEART AND BLOOD PRESSURE 90 tablet 3    loratadine (CLARITIN) 10 mg tablet Take 1 tablet (10 mg total) by mouth once daily. 90 tablet 3    losartan (COZAAR) 50 MG tablet TAKE 1 TABLET BY MOUTH ONCE A DAY FOR BLOOD PRESSURE 90 tablet 3    meloxicam (MOBIC) 7.5 MG tablet Take 1 tablet (7.5 mg total) by mouth once daily. 90 tablet 3    montelukast (SINGULAIR) 10 mg tablet TAKE 1 TABLET BY MOUTH EVERY EVENING 90 tablet 3    MULTIVIT-IRON-MIN-FOLIC ACID 3,500-18-0.4 UNIT-MG-MG ORAL CHEW Take by mouth.      pantoprazole (PROTONIX) 40 MG tablet TAKE 1 TABLET BY MOUTH EVERY DAY 30 tablet 11    rOPINIRole (REQUIP) 3 MG tablet Take 1 tablet (3 mg total) by mouth nightly. 90 tablet 3    cholecalciferol, vitamin D3, (VITAMIN D3) 5,000 unit Tab Take 5,000 Units by mouth once daily. (Patient not taking: No sig reported) 90 tablet 3     No current facility-administered medications on file prior to visit.       Physical  Exam  Vitals:    01/21/22 1534   BP: 130/80   BP Location: Left arm   Patient Position: Sitting   Pulse: 80   Temp: 97.7 °F (36.5 °C)   SpO2: (!) 93%   Weight: 98.3 kg (216 lb 11.4 oz)   Height: 5' 2" (1.575 m)      Body mass index is 39.64 kg/m².    Physical " Exam    Labs:    Complete Blood Count  Lab Results   Component Value Date    RBC 4.62 04/08/2021    HGB 13.6 04/08/2021    HCT 43.9 04/08/2021    MCV 95 04/08/2021    MCH 29.4 04/08/2021    MCHC 31.0 (L) 04/08/2021    RDW 14.6 (H) 04/08/2021     04/08/2021    MPV 10.5 04/08/2021    GRAN 7.6 04/08/2021    GRAN 71.6 04/08/2021    LYMPH 1.6 04/08/2021    LYMPH 15.2 (L) 04/08/2021    MONO 0.8 04/08/2021    MONO 7.7 04/08/2021    EOS 0.4 04/08/2021    BASO 0.08 04/08/2021    EOSINOPHIL 4.0 04/08/2021    BASOPHIL 0.8 04/08/2021    DIFFMETHOD Automated 04/08/2021       Comprehensive Metabolic Panel  No results found for: GLU, BUN, CREATININE, NA, K, CL, PROT, ALBUMIN, BILITOT, AST, ALKPHOS, CO2, ALT, ANIONGAP, EGFRNONAA, ESTGFRAFRICA    TSH  No results found for: TSH    Imaging:  Echo  · The left ventricle is mildly enlarged with normal systolic function.  · The estimated ejection fraction is 65%.  · Grade II left ventricular diastolic dysfunction.  · Normal right ventricular size with normal right ventricular systolic   function.  · Severe left atrial enlargement.  · Mild-to-moderate mitral regurgitation.  · Mild to moderate tricuspid regurgitation.  · Mild pulmonic regurgitation.  · There is pulmonary hypertension.  · Normal central venous pressure (3 mmHg).  · The estimated PA systolic pressure is 57 mmHg.  · Mass noted in the right atrium at the superior portion of the   inter-atrial septum, measuring 2.7 cm in diameter.         Assessment/Plan:    Problem List Items Addressed This Visit        Pulmonary    Pulmonary hypertension       Cardiac/Vascular    Diastolic dysfunction    Left atrial enlargement      Other Visit Diagnoses     Dyspnea on exertion    -  Primary    Abnormal echocardiogram        - recent echo with LA enlargement, pulmonary HTN, diastolic dysfunction, and a mass in the right atrium. referral to cardiology placed in previous encounter             Darren Trimble MD

## 2022-01-25 ENCOUNTER — OFFICE VISIT (OUTPATIENT)
Dept: CARDIOLOGY | Facility: CLINIC | Age: 82
End: 2022-01-25
Payer: MEDICARE

## 2022-01-25 VITALS
DIASTOLIC BLOOD PRESSURE: 87 MMHG | OXYGEN SATURATION: 88 % | WEIGHT: 216 LBS | BODY MASS INDEX: 39.75 KG/M2 | HEART RATE: 80 BPM | SYSTOLIC BLOOD PRESSURE: 155 MMHG | HEIGHT: 62 IN

## 2022-01-25 DIAGNOSIS — E78.5 HYPERLIPIDEMIA, UNSPECIFIED HYPERLIPIDEMIA TYPE: ICD-10-CM

## 2022-01-25 DIAGNOSIS — I10 HYPERTENSION, UNSPECIFIED TYPE: ICD-10-CM

## 2022-01-25 DIAGNOSIS — R93.1 ABNORMAL ECHOCARDIOGRAM: ICD-10-CM

## 2022-01-25 DIAGNOSIS — I51.89 CARDIAC MASS: Primary | ICD-10-CM

## 2022-01-25 PROCEDURE — 99215 PR OFFICE/OUTPT VISIT, EST, LEVL V, 40-54 MIN: ICD-10-PCS | Mod: S$GLB,,, | Performed by: INTERNAL MEDICINE

## 2022-01-25 PROCEDURE — 3079F DIAST BP 80-89 MM HG: CPT | Mod: CPTII,S$GLB,, | Performed by: INTERNAL MEDICINE

## 2022-01-25 PROCEDURE — 1160F RVW MEDS BY RX/DR IN RCRD: CPT | Mod: CPTII,S$GLB,, | Performed by: INTERNAL MEDICINE

## 2022-01-25 PROCEDURE — 1159F MED LIST DOCD IN RCRD: CPT | Mod: CPTII,S$GLB,, | Performed by: INTERNAL MEDICINE

## 2022-01-25 PROCEDURE — 3288F FALL RISK ASSESSMENT DOCD: CPT | Mod: CPTII,S$GLB,, | Performed by: INTERNAL MEDICINE

## 2022-01-25 PROCEDURE — 99215 OFFICE O/P EST HI 40 MIN: CPT | Mod: S$GLB,,, | Performed by: INTERNAL MEDICINE

## 2022-01-25 PROCEDURE — 1126F PR PAIN SEVERITY QUANTIFIED, NO PAIN PRESENT: ICD-10-PCS | Mod: CPTII,S$GLB,, | Performed by: INTERNAL MEDICINE

## 2022-01-25 PROCEDURE — 1126F AMNT PAIN NOTED NONE PRSNT: CPT | Mod: CPTII,S$GLB,, | Performed by: INTERNAL MEDICINE

## 2022-01-25 PROCEDURE — 1159F PR MEDICATION LIST DOCUMENTED IN MEDICAL RECORD: ICD-10-PCS | Mod: CPTII,S$GLB,, | Performed by: INTERNAL MEDICINE

## 2022-01-25 PROCEDURE — 3079F PR MOST RECENT DIASTOLIC BLOOD PRESSURE 80-89 MM HG: ICD-10-PCS | Mod: CPTII,S$GLB,, | Performed by: INTERNAL MEDICINE

## 2022-01-25 PROCEDURE — 99499 RISK ADDL DX/OHS AUDIT: ICD-10-PCS | Mod: S$GLB,,, | Performed by: INTERNAL MEDICINE

## 2022-01-25 PROCEDURE — 3077F SYST BP >= 140 MM HG: CPT | Mod: CPTII,S$GLB,, | Performed by: INTERNAL MEDICINE

## 2022-01-25 PROCEDURE — 99499 UNLISTED E&M SERVICE: CPT | Mod: S$GLB,,, | Performed by: INTERNAL MEDICINE

## 2022-01-25 PROCEDURE — 99999 PR PBB SHADOW E&M-EST. PATIENT-LVL IV: CPT | Mod: PBBFAC,,, | Performed by: INTERNAL MEDICINE

## 2022-01-25 PROCEDURE — 1160F PR REVIEW ALL MEDS BY PRESCRIBER/CLIN PHARMACIST DOCUMENTED: ICD-10-PCS | Mod: CPTII,S$GLB,, | Performed by: INTERNAL MEDICINE

## 2022-01-25 PROCEDURE — 3288F PR FALLS RISK ASSESSMENT DOCUMENTED: ICD-10-PCS | Mod: CPTII,S$GLB,, | Performed by: INTERNAL MEDICINE

## 2022-01-25 PROCEDURE — 3077F PR MOST RECENT SYSTOLIC BLOOD PRESSURE >= 140 MM HG: ICD-10-PCS | Mod: CPTII,S$GLB,, | Performed by: INTERNAL MEDICINE

## 2022-01-25 PROCEDURE — 1101F PR PT FALLS ASSESS DOC 0-1 FALLS W/OUT INJ PAST YR: ICD-10-PCS | Mod: CPTII,S$GLB,, | Performed by: INTERNAL MEDICINE

## 2022-01-25 PROCEDURE — 99999 PR PBB SHADOW E&M-EST. PATIENT-LVL IV: ICD-10-PCS | Mod: PBBFAC,,, | Performed by: INTERNAL MEDICINE

## 2022-01-25 PROCEDURE — 1101F PT FALLS ASSESS-DOCD LE1/YR: CPT | Mod: CPTII,S$GLB,, | Performed by: INTERNAL MEDICINE

## 2022-01-25 NOTE — PROGRESS NOTES
Cardiology Clinic note    Subjective:   Patient ID:  Shirley F Gilford is a 81 y.o. female who has been referred by Dr Trimble for an abnormal echocardiogram    HPI:   Cardiac mass noted on echo. No CP. Does endorse ALMONTE - has been worsening. No orthopnea, PND, leg swelling.    HTN: On medications    HLD: Tolerating statin    SH Tobacco Quit 2015  FH No premature CAD    Patient Active Problem List    Diagnosis Date Noted    Diastolic dysfunction 01/23/2022    Left atrial enlargement 01/23/2022    Chronic pain of right knee 04/07/2021    Panniculitis 10/23/2020    Pulmonary hypertension 04/30/2019    Hyperlipidemia 11/10/2018    Vitamin D deficiency 11/10/2018    Iron deficiency 06/29/2017    Fatty liver 03/21/2017    History of hyperlipidemia 01/26/2017    Restless leg syndrome 01/26/2017    Elevated LFTs 01/26/2017    Chronic obstructive pulmonary disease 01/26/2017    Ex-smoker 01/26/2017    Osteoporosis 01/04/2017    History of hypertension 01/04/2017       Patient's Medications   New Prescriptions    No medications on file   Previous Medications    ALBUTEROL (PROVENTIL) 2.5 MG /3 ML (0.083 %) NEBULIZER SOLUTION    albuterol sulfate 2.5 mg/3 mL (0.083 %) solution for nebulization    ALBUTEROL (PROVENTIL/VENTOLIN HFA) 90 MCG/ACTUATION INHALER    Inhale 2 puffs into the lungs every 6 (six) hours as needed for Wheezing.    ALENDRONATE (FOSAMAX) 70 MG TABLET    TAKE 1 TABLET BY MOUTH EVERY 7 DAYS    ANORO ELLIPTA 62.5-25 MCG/ACTUATION DSDV        ATORVASTATIN (LIPITOR) 10 MG TABLET    TAKE 1 TABLET BY MOUTH ONCE A DAY    CALCIUM-VITAMIN D3 (OS-MIKE 500 + D3) 500 MG-5 MCG (200 UNIT) PER TABLET    Take 1 tablet by mouth once daily.    CHOLECALCIFEROL, VITAMIN D3, (VITAMIN D3) 5,000 UNIT TAB    Take 5,000 Units by mouth once daily.    CYANOCOBALAMIN 500 MCG TABLET    Take 500 mcg by mouth once daily.    FERROUS SULFATE 324 MG (65 MG IRON) TBEC    Take 1 tablet (324 mg total) by mouth once daily.     "FLUTICASONE PROPIONATE (FLONASE) 50 MCG/ACTUATION NASAL SPRAY    2 sprays (100 mcg total) by Each Nare route once daily.    FUROSEMIDE (LASIX) 40 MG TABLET    Take 1 tablet (40 mg total) by mouth 2 (two) times a day.    ISOSORBIDE MONONITRATE (IMDUR) 60 MG 24 HR TABLET    TAKE 1 TABLET BY MOUTH EVERY DAY FOR HEART AND BLOOD PRESSURE    LORATADINE (CLARITIN) 10 MG TABLET    Take 1 tablet (10 mg total) by mouth once daily.    LOSARTAN (COZAAR) 50 MG TABLET    TAKE 1 TABLET BY MOUTH ONCE A DAY FOR BLOOD PRESSURE    MELOXICAM (MOBIC) 7.5 MG TABLET    Take 1 tablet (7.5 mg total) by mouth once daily.    MONTELUKAST (SINGULAIR) 10 MG TABLET    TAKE 1 TABLET BY MOUTH EVERY EVENING    MULTIVIT-IRON-MIN-FOLIC ACID 3,500-18-0.4 UNIT-MG-MG ORAL CHEW    Take by mouth.    PANTOPRAZOLE (PROTONIX) 40 MG TABLET    TAKE 1 TABLET BY MOUTH EVERY DAY    ROPINIROLE (REQUIP) 3 MG TABLET    Take 1 tablet (3 mg total) by mouth nightly.   Modified Medications    No medications on file   Discontinued Medications    No medications on file        Review of Systems   Constitutional: Negative for fever.   HENT: Negative for nosebleeds.    Cardiovascular: Negative for chest pain, irregular heartbeat, leg swelling, near-syncope, orthopnea, paroxysmal nocturnal dyspnea and syncope.        As above   Respiratory: Negative for hemoptysis.    Hematologic/Lymphatic: Negative for bleeding problem.   Musculoskeletal: Positive for arthritis.   Gastrointestinal: Negative for hematochezia.   Genitourinary: Negative for hematuria.   Neurological: Negative for seizures.   Allergic/Immunologic: Positive for environmental allergies.         Objective:   Vitals  Vitals:    01/25/22 1507   BP: (!) 155/87   Pulse: 80   SpO2: (!) 88%   Weight: 98 kg (216 lb)   Height: 5' 2" (1.575 m)          Physical Exam  Constitutional:       General: She is not in acute distress.     Appearance: She is well-developed. She is not diaphoretic.   HENT:      Head: Normocephalic. "   Neck:      Vascular: No JVD.   Cardiovascular:      Rate and Rhythm: Normal rate and regular rhythm.      Heart sounds: No murmur heard.  No friction rub. No gallop.    Pulmonary:      Effort: Pulmonary effort is normal. No respiratory distress.      Breath sounds: Normal breath sounds.   Abdominal:      Palpations: Abdomen is soft.      Tenderness: There is no abdominal tenderness.   Musculoskeletal:         General: No swelling.      Cervical back: Normal range of motion.   Skin:     General: Skin is warm.   Neurological:      Mental Status: She is alert.   Psychiatric:         Mood and Affect: Mood normal.             Assessment:     1. Cardiac mass    2. Abnormal echocardiogram    3. Hypertension, unspecified type    4. Hyperlipidemia, unspecified hyperlipidemia type        Plan:   Shirley F Gilford is a 81 y.o. female h/o HTN, HLD  COPD    Accompanied by friend Shiva    EKG personally reviewed. My interpretation:  8/3/20: SR 70s, normal axis. Normal EKG. QTc 428    Cardiac mass  - Echo Jan 2022: Mild LVE. LVEF 65%, DD2. Normal RVSF. Severe LAE. Mild-mod MR, TR. Mild VT. PASP 57, CVP 3. Mass noted in the right atrium at the superior portion of the inter-atrial septum, measuring 2.7 cm in diameter.  - Discussed with Dr Beltran- noted unchanged from prior echo, with prior CT fat density  - Repeat echo 6 months    Prior positive stress test  - Regadenoson nuclear stress test 2019: There is a small reversible perfusion defect in the apicolateral wall of the left ventricle.  This is characteristic of ischemia.  - Angiogram was deferred at the time due to resolution of ALMONTE; with plans to proceed if recurrent / worsening symptoms  - Chest CT 2018: There is partial collapse of the right middle lobe. There is an ill-defined area of increased density in the inferior medial aspect of the lingula.  If additional imaging evaluation is clinically indicated, I recommend consideration of a PET-CT examination. PET scan 2018:  No evidence of active malignancy.  No suspicious lung activity seen.  - May benefit form RHC (given PASP) and LHC  - Recommend pulmonary FUP    HTN  Will check BP at home and call in 1 week  ISMN, losartan    Furosemide for occ leg swelling    HLD  Lab Results   Component Value Date    LDLCALC 94.0 05/13/2020   Statin as above    Continue with current medical plan and lifestyle changes.    No orders of the defined types were placed in this encounter.      Follow up as scheduled  Return sooner for concerns or questions. If symptoms persist go to the ED    She expressed verbal understanding and agreed with the plan      Elodia Zepeda MD  Interventional Cardiology  [facility]  Phone: [phone]

## 2022-01-26 ENCOUNTER — TELEPHONE (OUTPATIENT)
Dept: CARDIOLOGY | Facility: CLINIC | Age: 82
End: 2022-01-26

## 2022-01-26 ENCOUNTER — TELEPHONE (OUTPATIENT)
Dept: CARDIOLOGY | Facility: CLINIC | Age: 82
End: 2022-01-26
Payer: MEDICARE

## 2022-01-26 PROBLEM — I51.89 CARDIAC MASS: Status: ACTIVE | Noted: 2022-01-26

## 2022-01-26 NOTE — TELEPHONE ENCOUNTER
Called pt to follow up in regards to response from Dr. Zepeda  Informed pt of message regarding BP     Pt stated she was informed [facility] has transportation system to bring her to a test at [facility]  Informed pt she has to call her insurance [employer] to help assist with transportation for appointments       Pt stated will call insurance to help coordinate transportation    Please place order for pt to have cta     Will call pt to follow up and schedule test at [facility]    ND

## 2022-01-28 ENCOUNTER — TELEPHONE (OUTPATIENT)
Dept: CARDIOLOGY | Facility: CLINIC | Age: 82
End: 2022-01-28

## 2022-01-28 NOTE — TELEPHONE ENCOUNTER
Pt called office spectra link     Pt stated feeling tired and SOB      Pt BP readings yesterday:  158/89    Waited 20 minutes  144/93      Today: 144/77    Pt request to scheduled cardiac cta test  Informed pt will send a message to Dr. Zepeda to place and sign the order  Will call back to schedule      Please advise    Thank you   ND

## 2022-02-02 DIAGNOSIS — R06.09 DOE (DYSPNEA ON EXERTION): Primary | ICD-10-CM

## 2022-02-02 RX ORDER — SODIUM CHLORIDE 0.9 % (FLUSH) 0.9 %
10 SYRINGE (ML) INJECTION
Status: DISCONTINUED | OUTPATIENT
Start: 2022-02-02 | End: 2024-02-15

## 2022-02-13 DIAGNOSIS — G25.81 RESTLESS LEGS SYNDROME: ICD-10-CM

## 2022-02-15 RX ORDER — ROPINIROLE 3 MG/1
TABLET, FILM COATED ORAL
Qty: 90 TABLET | Refills: 3 | Status: SHIPPED | OUTPATIENT
Start: 2022-02-15 | End: 2023-02-11

## 2022-02-22 ENCOUNTER — TELEPHONE (OUTPATIENT)
Dept: INTERNAL MEDICINE | Facility: CLINIC | Age: 82
End: 2022-02-22
Payer: MEDICARE

## 2022-02-28 NOTE — TELEPHONE ENCOUNTER
No new care gaps identified.  Powered by [employer]. Reference number: [id number].   2/27/2022 11:00:14 PM CST

## 2022-03-06 RX ORDER — MONTELUKAST SODIUM 10 MG/1
TABLET ORAL
Qty: 90 TABLET | Refills: 3 | Status: SHIPPED | OUTPATIENT
Start: 2022-03-06 | End: 2023-03-21

## 2022-03-06 RX ORDER — LOSARTAN POTASSIUM 50 MG/1
TABLET ORAL
Qty: 90 TABLET | Refills: 3 | Status: SHIPPED | OUTPATIENT
Start: 2022-03-06 | End: 2023-03-04

## 2022-03-06 NOTE — TELEPHONE ENCOUNTER
Refill Routing Note   Medication(s) are not appropriate for processing by [facility] for the following reason(s):      - Required vitals are abnormal  - Unclear if patient follows with you     [facility] action(s):  Defer       Medication Therapy Plan: Unclear if pt still follows with you; BP elevated;  --->Care Gap information included in message below if applicable.   Medication reconciliation completed: No   Automatic Epic Generated Protocol Data:        Requested Prescriptions   Pending Prescriptions Disp Refills    losartan (COZAAR) 50 MG tablet [Pharmacy Med Name: LOSARTAN POTASSIUM 50 MG TAB] 90 tablet 3     Sig: TAKE 1 TABLET BY MOUTH ONCE A DAY FOR BLOOD PRESSURE       Cardiovascular:  Angiotensin Receptor Blockers Failed - 2/27/2022 11:00 PM        Failed - Last BP in normal range within 360 days     BP Readings from Last 1 Encounters:   01/25/22 (!) 155/87               Failed - Cr is 1.39 or below and within 360 days     Lab Results   Component Value Date    CREATININE 0.73 10/23/2020    CREATININE 0.8 08/06/2020    CREATININE 1.0 08/05/2020              Failed - K is 5.2 or below and within 360 days     Potassium   Date Value Ref Range Status   10/23/2020 3.6 3.5 - 5.1 mmol/L Final   08/06/2020 3.7 3.5 - 5.1 mmol/L Final   08/05/2020 4.1 3.5 - 5.1 mmol/L Final              Failed - eGFR within 360 days     Lab Results   Component Value Date    EGFRNONAA >60.0 10/23/2020    EGFRNONAA >60 08/06/2020    EGFRNONAA 54 (A) 08/05/2020                Passed - Patient is at least 18 years old        Passed - Valid encounter within last 15 months     Recent Visits  Date Type Provider Dept   04/07/21 Office Visit Marc Snads MD [facility]   01/12/21 Office Visit Marc Sands MD [facility]   11/11/20 Office Visit Marc Sands MD [facility]   10/23/20 Office Visit Marc Sands MD [facility]   10/07/20 Office Visit Marc Sands MD [facility]    05/13/20 Office Visit Marc Sands MD [facility]   Showing recent visits within past 720 days and meeting all other requirements  Future Appointments  No visits were found meeting these conditions.  Showing future appointments within next 150 days and meeting all other requirements                  montelukast (SINGULAIR) 10 mg tablet [Pharmacy Med Name: MONTELUKAST SOD 10 MG TABLET] 90 tablet 3     Sig: TAKE 1 TABLET BY MOUTH EVERY EVENING       Pulmonology:  Leukotriene Inhibitors Passed - 2/27/2022 11:00 PM        Passed - Patient is at least 18 years old        Passed - Valid encounter within last 15 months     Recent Visits  Date Type Provider Dept   04/07/21 Office Visit Marc Sands MD [facility]   01/12/21 Office Visit Marc Sands MD [facility]   11/11/20 Office Visit Marc Sands MD [facility]   10/23/20 Office Visit Marc Sands MD [facility]   10/07/20 Office Visit Marc Sands MD [facility]   05/13/20 Office Visit Marc Sands MD [facility]   Showing recent visits within past 720 days and meeting all other requirements  Future Appointments  No visits were found meeting these conditions.  Showing future appointments within next 150 days and meeting all other requirements                      Appointments  past 12m or future 3m with PCP    Date Provider   Last Visit   4/7/2021 Marc Sands MD   Next Visit   Visit date not found Marc Sands MD   ED visits in past 90 days: 0        Note composed:10:34 AM 03/06/2022

## 2022-03-10 ENCOUNTER — TELEPHONE (OUTPATIENT)
Dept: FAMILY MEDICINE | Facility: CLINIC | Age: 82
End: 2022-03-10
Payer: MEDICARE

## 2022-03-10 NOTE — TELEPHONE ENCOUNTER
----- Message from Robyn Canales sent at 3/10/2022  3:41 PM CST -----  Type:  Needs Medical Advice    Who Called:  pt  Symptoms (please be specific):  would like to get  a call back to discuss getting a script called in to try Trilogy for COPD     Pharmacy name and phone #:   [id number] [address]   Phone: [phone]  Fax:  [phone]        Would the patient rather a call back or a response via [facility]?  Call   Best Call Back Number: [phone] or [phone] cell   Additional Information:

## 2022-03-15 ENCOUNTER — TELEPHONE (OUTPATIENT)
Dept: FAMILY MEDICINE | Facility: CLINIC | Age: 82
End: 2022-03-15
Payer: MEDICARE

## 2022-03-15 NOTE — TELEPHONE ENCOUNTER
----- Message from Racheal Langford sent at 3/15/2022 12:42 PM CDT -----  Type:  Patient Returning Call    Who Called:pt  Would the patient rather a call back or a response via [facility]? Call   Best Call Back Number: [phone]  Additional Information:    Checking on status of triligy Inhaler  (not on med list) for COPD  Anoro is not helping much      [facility]

## 2022-03-15 NOTE — TELEPHONE ENCOUNTER
----- Message from Zuri Hernandez sent at 3/15/2022  5:05 PM CDT -----  Type:  Patient Returning Call    Who Called:pt  Who Left Message for Patient:office  Does the patient know what this is regarding?:prescription refill  Would the patient rather a call back or a response via [facility]? call  Best Call Back Number:[phone]  Additional Information:

## 2022-03-16 NOTE — TELEPHONE ENCOUNTER
----- Message from Racheal Langford sent at 3/15/2022 12:42 PM CDT -----  Type:  Patient Returning Call     Who Called:pt  Would the patient rather a call back or a response via [facility]? Call   Best Call Back Number: [phone]  Additional Information:     Checking on status of triligy - pt called last week and asked if dr ward can prescribe triligy instead of anoro for COPD  Anoro is not helping much        [facility]

## 2022-05-12 NOTE — TELEPHONE ENCOUNTER
Refill Routing Note   Medication(s) are not appropriate for processing by [facility] for the following reason(s):      - Medication not active on medication list    [facility] action(s):  Defer Medication-related problems identified: Requires labs     Medication Therapy Plan: epic data adherence is not shown  Medication reconciliation completed: No     Appointments  past 12m or future 3m with PCP    Date Provider   Last Visit   4/7/2021 Marc Sands MD   Next Visit   Visit date not found Marc Sands MD   ED visits in past 90 days: 0        Note composed:3:42 PM 05/12/2022

## 2022-05-12 NOTE — TELEPHONE ENCOUNTER
Care Due:                  Date            Visit Type   Department     Provider  --------------------------------------------------------------------------------                                EP -                              PRIMARY      [facility] FAMILY  Last Visit: 01-      CARE ([facility])   MEDICINE       Darren Trimble  Next Visit: None Scheduled  None         None Found                                                            Last  Test          Frequency    Reason                     Performed    Due Date  --------------------------------------------------------------------------------    CMP.........  12 months..  atorvastatin, furosemide,   10-   10-                             losartan.................    Lipid Panel.  12 months..  atorvastatin.............  05- 05-    [facility] Embedded Care Gaps. Reference number: [id number]. 5/12/2022   12:28:45 PM CDT

## 2022-05-13 RX ORDER — ALBUTEROL SULFATE 90 UG/1
AEROSOL, METERED RESPIRATORY (INHALATION)
Qty: 20.1 G | Refills: 11 | Status: SHIPPED | OUTPATIENT
Start: 2022-05-13 | End: 2024-03-08

## 2022-05-20 ENCOUNTER — PES CALL (OUTPATIENT)
Dept: ADMINISTRATIVE | Facility: CLINIC | Age: 82
End: 2022-05-20
Payer: MEDICARE

## 2022-06-20 ENCOUNTER — TELEPHONE (OUTPATIENT)
Dept: FAMILY MEDICINE | Facility: CLINIC | Age: 82
End: 2022-06-20
Payer: MEDICARE

## 2022-06-20 NOTE — TELEPHONE ENCOUNTER
----- Message from Lala Bustamante sent at 6/20/2022 11:17 AM CDT -----  Regarding: sooner/advice  Contact: [phone]  Type:  Needs Medical Advice/sooner than July appt    Who Called:  self   Symptoms (please be specific):  sore throat and tongue seems to be sore. It burns when she eats or drinks warm things. Her mouth is very dry when she wakes up in the morning.     How long has patient had these symptoms:  comes and goes for a month  Pharmacy name and phone #:    [id number] [address];  Would the patient rather a call back or a response via [facility]?  Call   Best Call Back Number:  [phone]  Additional Information:

## 2022-07-06 ENCOUNTER — PES CALL (OUTPATIENT)
Dept: ADMINISTRATIVE | Facility: CLINIC | Age: 82
End: 2022-07-06
Payer: MEDICARE

## 2022-08-31 DIAGNOSIS — Z78.0 MENOPAUSE: ICD-10-CM

## 2022-09-08 ENCOUNTER — TELEPHONE (OUTPATIENT)
Dept: FAMILY MEDICINE | Facility: CLINIC | Age: 82
End: 2022-09-08
Payer: MEDICARE

## 2022-09-08 DIAGNOSIS — I27.20 PULMONARY HYPERTENSION: Primary | ICD-10-CM

## 2022-09-08 DIAGNOSIS — Z78.0 POST-MENOPAUSAL: ICD-10-CM

## 2022-09-08 DIAGNOSIS — D64.9 ANEMIA, UNSPECIFIED TYPE: ICD-10-CM

## 2022-09-08 DIAGNOSIS — M19.90 ARTHRITIS: ICD-10-CM

## 2022-09-08 DIAGNOSIS — I51.89 DIASTOLIC DYSFUNCTION: ICD-10-CM

## 2022-09-08 DIAGNOSIS — E78.5 HYPERLIPIDEMIA, UNSPECIFIED HYPERLIPIDEMIA TYPE: ICD-10-CM

## 2022-09-08 DIAGNOSIS — J44.9 CHRONIC OBSTRUCTIVE PULMONARY DISEASE, UNSPECIFIED COPD TYPE: ICD-10-CM

## 2022-09-08 DIAGNOSIS — Z87.891 EX-SMOKER: ICD-10-CM

## 2022-09-08 NOTE — TELEPHONE ENCOUNTER
----- Message from Zuri Hernandez sent at 9/8/2022  9:34 AM CDT -----  Type:  Needs Medical Advice    Who Called: pt  Symptoms (please be specific): pt has a wellness scheduled for 10-03-22 and the pt needs to have her lab orders put in       Would the patient rather a call back or a response via [facility]? call  Best Call Back Number: [phone]  Additional Information:

## 2022-09-19 ENCOUNTER — PATIENT OUTREACH (OUTPATIENT)
Dept: ADMINISTRATIVE | Facility: HOSPITAL | Age: 82
End: 2022-09-19
Payer: MEDICARE

## 2022-09-19 NOTE — PROGRESS NOTES
Care Everywhere updates requested and reviewed.  Immunizations reconciled. Media reports reviewed.  Duplicate HM overrides and  orders removed.  Overdue HM topic chart audit and/or requested.  Overdue lab testing linked to upcoming lab appointments if applies.    Dexa scheduled for 22    Health Maintenance Due   Topic Date Due    DEXA Scan  2018    COVID-19 Vaccine (2 - Moderna series) 2021    Shingles Vaccine (3 of 3) 2021    Influenza Vaccine (1) 2022

## 2022-09-20 ENCOUNTER — TELEPHONE (OUTPATIENT)
Dept: FAMILY MEDICINE | Facility: CLINIC | Age: 82
End: 2022-09-20
Payer: MEDICARE

## 2022-09-30 ENCOUNTER — LAB VISIT (OUTPATIENT)
Dept: LAB | Facility: HOSPITAL | Age: 82
End: 2022-09-30
Attending: FAMILY MEDICINE
Payer: MEDICARE

## 2022-09-30 DIAGNOSIS — I27.20 PULMONARY HYPERTENSION: ICD-10-CM

## 2022-09-30 DIAGNOSIS — Z87.891 EX-SMOKER: ICD-10-CM

## 2022-09-30 DIAGNOSIS — D64.9 ANEMIA, UNSPECIFIED TYPE: ICD-10-CM

## 2022-09-30 DIAGNOSIS — E78.5 HYPERLIPIDEMIA, UNSPECIFIED HYPERLIPIDEMIA TYPE: ICD-10-CM

## 2022-09-30 DIAGNOSIS — I51.89 DIASTOLIC DYSFUNCTION: ICD-10-CM

## 2022-09-30 DIAGNOSIS — J44.9 CHRONIC OBSTRUCTIVE PULMONARY DISEASE, UNSPECIFIED COPD TYPE: ICD-10-CM

## 2022-09-30 DIAGNOSIS — M19.90 ARTHRITIS: ICD-10-CM

## 2022-09-30 DIAGNOSIS — Z78.0 POST-MENOPAUSAL: ICD-10-CM

## 2022-09-30 LAB
ALBUMIN SERPL BCP-MCNC: 4.6 G/DL (ref 3.5–5.2)
ALP SERPL-CCNC: 93 U/L (ref 38–126)
ALT SERPL W/O P-5'-P-CCNC: 38 U/L (ref 10–44)
ANION GAP SERPL CALC-SCNC: 11 MMOL/L (ref 8–16)
AST SERPL-CCNC: 34 U/L (ref 15–46)
BASOPHILS # BLD AUTO: 0.08 K/UL (ref 0–0.2)
BASOPHILS NFR BLD: 0.7 % (ref 0–1.9)
BILIRUB SERPL-MCNC: 0.3 MG/DL (ref 0.1–1)
CALCIUM SERPL-MCNC: 9.3 MG/DL (ref 8.7–10.5)
CHLORIDE SERPL-SCNC: 104 MMOL/L (ref 95–110)
CHOLEST SERPL-MCNC: 154 MG/DL (ref 120–199)
CHOLEST/HDLC SERPL: 3 {RATIO} (ref 2–5)
CO2 SERPL-SCNC: 29 MMOL/L (ref 23–29)
CREAT SERPL-MCNC: 0.88 MG/DL (ref 0.5–1.4)
DIFFERENTIAL METHOD: ABNORMAL
EOSINOPHIL # BLD AUTO: 0.5 K/UL (ref 0–0.5)
EOSINOPHIL NFR BLD: 3.8 % (ref 0–8)
ERYTHROCYTE [DISTWIDTH] IN BLOOD BY AUTOMATED COUNT: 14.7 % (ref 11.5–14.5)
EST. GFR  (NO RACE VARIABLE): >60 ML/MIN/1.73 M^2
GLUCOSE SERPL-MCNC: 143 MG/DL (ref 70–110)
HCT VFR BLD AUTO: 45.3 % (ref 37–48.5)
HDLC SERPL-MCNC: 52 MG/DL (ref 40–75)
HDLC SERPL: 33.8 % (ref 20–50)
HGB BLD-MCNC: 13.8 G/DL (ref 12–16)
IMM GRANULOCYTES # BLD AUTO: 0.04 K/UL (ref 0–0.04)
IMM GRANULOCYTES NFR BLD AUTO: 0.3 % (ref 0–0.5)
LDLC SERPL CALC-MCNC: 83.8 MG/DL (ref 63–159)
LYMPHOCYTES # BLD AUTO: 1.9 K/UL (ref 1–4.8)
LYMPHOCYTES NFR BLD: 16.2 % (ref 18–48)
MCH RBC QN AUTO: 27.4 PG (ref 27–31)
MCHC RBC AUTO-ENTMCNC: 30.5 G/DL (ref 32–36)
MCV RBC AUTO: 90 FL (ref 82–98)
MONOCYTES # BLD AUTO: 0.9 K/UL (ref 0.3–1)
MONOCYTES NFR BLD: 7.8 % (ref 4–15)
NEUTROPHILS # BLD AUTO: 8.5 K/UL (ref 1.8–7.7)
NEUTROPHILS NFR BLD: 71.2 % (ref 38–73)
NONHDLC SERPL-MCNC: 102 MG/DL
NRBC BLD-RTO: 0 /100 WBC
PLATELET # BLD AUTO: 265 K/UL (ref 150–450)
PMV BLD AUTO: 9.9 FL (ref 9.2–12.9)
POTASSIUM SERPL-SCNC: 3.9 MMOL/L (ref 3.5–5.1)
PROT SERPL-MCNC: 7.2 G/DL (ref 6–8.4)
RBC # BLD AUTO: 5.03 M/UL (ref 4–5.4)
SODIUM SERPL-SCNC: 144 MMOL/L (ref 136–145)
TRIGL SERPL-MCNC: 91 MG/DL (ref 30–150)
UUN UR-MCNC: 16 MG/DL (ref 7–17)
WBC # BLD AUTO: 11.97 K/UL (ref 3.9–12.7)

## 2022-09-30 PROCEDURE — 85025 COMPLETE CBC W/AUTO DIFF WBC: CPT | Mod: PO | Performed by: FAMILY MEDICINE

## 2022-09-30 PROCEDURE — 80053 COMPREHEN METABOLIC PANEL: CPT | Mod: PO | Performed by: FAMILY MEDICINE

## 2022-09-30 PROCEDURE — 80061 LIPID PANEL: CPT | Performed by: FAMILY MEDICINE

## 2022-09-30 PROCEDURE — 36415 COLL VENOUS BLD VENIPUNCTURE: CPT | Mod: PO | Performed by: FAMILY MEDICINE

## 2022-10-03 ENCOUNTER — OFFICE VISIT (OUTPATIENT)
Dept: FAMILY MEDICINE | Facility: CLINIC | Age: 82
End: 2022-10-03
Payer: MEDICARE

## 2022-10-03 VITALS
HEIGHT: 62 IN | TEMPERATURE: 98 F | WEIGHT: 226.06 LBS | SYSTOLIC BLOOD PRESSURE: 136 MMHG | HEART RATE: 78 BPM | OXYGEN SATURATION: 93 % | DIASTOLIC BLOOD PRESSURE: 86 MMHG | BODY MASS INDEX: 41.6 KG/M2

## 2022-10-03 DIAGNOSIS — J44.9 CHRONIC OBSTRUCTIVE PULMONARY DISEASE, UNSPECIFIED COPD TYPE: Primary | ICD-10-CM

## 2022-10-03 PROCEDURE — 90694 VACC AIIV4 NO PRSRV 0.5ML IM: CPT | Mod: S$GLB,,, | Performed by: STUDENT IN AN ORGANIZED HEALTH CARE EDUCATION/TRAINING PROGRAM

## 2022-10-03 PROCEDURE — 1126F PR PAIN SEVERITY QUANTIFIED, NO PAIN PRESENT: ICD-10-PCS | Mod: CPTII,S$GLB,, | Performed by: STUDENT IN AN ORGANIZED HEALTH CARE EDUCATION/TRAINING PROGRAM

## 2022-10-03 PROCEDURE — 99214 PR OFFICE/OUTPT VISIT, EST, LEVL IV, 30-39 MIN: ICD-10-PCS | Mod: 25,S$GLB,, | Performed by: STUDENT IN AN ORGANIZED HEALTH CARE EDUCATION/TRAINING PROGRAM

## 2022-10-03 PROCEDURE — 3075F SYST BP GE 130 - 139MM HG: CPT | Mod: CPTII,S$GLB,, | Performed by: STUDENT IN AN ORGANIZED HEALTH CARE EDUCATION/TRAINING PROGRAM

## 2022-10-03 PROCEDURE — 99499 UNLISTED E&M SERVICE: CPT | Mod: S$GLB,,, | Performed by: STUDENT IN AN ORGANIZED HEALTH CARE EDUCATION/TRAINING PROGRAM

## 2022-10-03 PROCEDURE — 1101F PT FALLS ASSESS-DOCD LE1/YR: CPT | Mod: CPTII,S$GLB,, | Performed by: STUDENT IN AN ORGANIZED HEALTH CARE EDUCATION/TRAINING PROGRAM

## 2022-10-03 PROCEDURE — G0008 FLU VACCINE - QUADRIVALENT - ADJUVANTED: ICD-10-PCS | Mod: S$GLB,,, | Performed by: STUDENT IN AN ORGANIZED HEALTH CARE EDUCATION/TRAINING PROGRAM

## 2022-10-03 PROCEDURE — 3075F PR MOST RECENT SYSTOLIC BLOOD PRESS GE 130-139MM HG: ICD-10-PCS | Mod: CPTII,S$GLB,, | Performed by: STUDENT IN AN ORGANIZED HEALTH CARE EDUCATION/TRAINING PROGRAM

## 2022-10-03 PROCEDURE — G0008 ADMIN INFLUENZA VIRUS VAC: HCPCS | Mod: S$GLB,,, | Performed by: STUDENT IN AN ORGANIZED HEALTH CARE EDUCATION/TRAINING PROGRAM

## 2022-10-03 PROCEDURE — 1126F AMNT PAIN NOTED NONE PRSNT: CPT | Mod: CPTII,S$GLB,, | Performed by: STUDENT IN AN ORGANIZED HEALTH CARE EDUCATION/TRAINING PROGRAM

## 2022-10-03 PROCEDURE — 1159F MED LIST DOCD IN RCRD: CPT | Mod: CPTII,S$GLB,, | Performed by: STUDENT IN AN ORGANIZED HEALTH CARE EDUCATION/TRAINING PROGRAM

## 2022-10-03 PROCEDURE — 90694 FLU VACCINE - QUADRIVALENT - ADJUVANTED: ICD-10-PCS | Mod: S$GLB,,, | Performed by: STUDENT IN AN ORGANIZED HEALTH CARE EDUCATION/TRAINING PROGRAM

## 2022-10-03 PROCEDURE — 1159F PR MEDICATION LIST DOCUMENTED IN MEDICAL RECORD: ICD-10-PCS | Mod: CPTII,S$GLB,, | Performed by: STUDENT IN AN ORGANIZED HEALTH CARE EDUCATION/TRAINING PROGRAM

## 2022-10-03 PROCEDURE — 3079F PR MOST RECENT DIASTOLIC BLOOD PRESSURE 80-89 MM HG: ICD-10-PCS | Mod: CPTII,S$GLB,, | Performed by: STUDENT IN AN ORGANIZED HEALTH CARE EDUCATION/TRAINING PROGRAM

## 2022-10-03 PROCEDURE — 1160F PR REVIEW ALL MEDS BY PRESCRIBER/CLIN PHARMACIST DOCUMENTED: ICD-10-PCS | Mod: CPTII,S$GLB,, | Performed by: STUDENT IN AN ORGANIZED HEALTH CARE EDUCATION/TRAINING PROGRAM

## 2022-10-03 PROCEDURE — 99214 OFFICE O/P EST MOD 30 MIN: CPT | Mod: 25,S$GLB,, | Performed by: STUDENT IN AN ORGANIZED HEALTH CARE EDUCATION/TRAINING PROGRAM

## 2022-10-03 PROCEDURE — 3288F PR FALLS RISK ASSESSMENT DOCUMENTED: ICD-10-PCS | Mod: CPTII,S$GLB,, | Performed by: STUDENT IN AN ORGANIZED HEALTH CARE EDUCATION/TRAINING PROGRAM

## 2022-10-03 PROCEDURE — 3079F DIAST BP 80-89 MM HG: CPT | Mod: CPTII,S$GLB,, | Performed by: STUDENT IN AN ORGANIZED HEALTH CARE EDUCATION/TRAINING PROGRAM

## 2022-10-03 PROCEDURE — 1160F RVW MEDS BY RX/DR IN RCRD: CPT | Mod: CPTII,S$GLB,, | Performed by: STUDENT IN AN ORGANIZED HEALTH CARE EDUCATION/TRAINING PROGRAM

## 2022-10-03 PROCEDURE — 99499 RISK ADDL DX/OHS AUDIT: ICD-10-PCS | Mod: S$GLB,,, | Performed by: STUDENT IN AN ORGANIZED HEALTH CARE EDUCATION/TRAINING PROGRAM

## 2022-10-03 PROCEDURE — 1101F PR PT FALLS ASSESS DOC 0-1 FALLS W/OUT INJ PAST YR: ICD-10-PCS | Mod: CPTII,S$GLB,, | Performed by: STUDENT IN AN ORGANIZED HEALTH CARE EDUCATION/TRAINING PROGRAM

## 2022-10-03 PROCEDURE — 3288F FALL RISK ASSESSMENT DOCD: CPT | Mod: CPTII,S$GLB,, | Performed by: STUDENT IN AN ORGANIZED HEALTH CARE EDUCATION/TRAINING PROGRAM

## 2022-10-03 RX ORDER — TIOTROPIUM BROMIDE 18 UG/1
18 CAPSULE ORAL; RESPIRATORY (INHALATION) DAILY
Qty: 30 CAPSULE | Refills: 11 | Status: SHIPPED | OUTPATIENT
Start: 2022-10-03 | End: 2023-10-05

## 2022-10-03 NOTE — PROGRESS NOTES
Patient ID: Shirley F Gilford is a 82 y.o. female.     Chief Complaint: shortness of breath    Shortness of Breath  This is a chronic problem. The current episode started more than 1 month ago. The problem occurs daily. The problem has been unchanged. Pertinent negatives include no abdominal pain, chest pain, claudication, coryza, ear pain, fever, headaches, leg pain, leg swelling, orthopnea, PND, rash, rhinorrhea, sore throat, sputum production or vomiting. The symptoms are aggravated by exercise. She has tried beta agonist inhalers, leukotriene antagonists and steroid inhalers for the symptoms. The treatment provided mild relief.      Patient reports shortness of breath from walking to the office from the parking lot.     Review of Systems  Review of Systems   Constitutional:  Negative for fever.   HENT:  Negative for ear pain, rhinorrhea, sinus pain and sore throat.    Eyes:  Negative for discharge.   Respiratory:  Positive for shortness of breath. Negative for cough and sputum production.    Cardiovascular:  Negative for chest pain, orthopnea, claudication, leg swelling and PND.   Gastrointestinal:  Negative for abdominal pain, diarrhea, nausea and vomiting.   Genitourinary:  Negative for urgency.   Musculoskeletal:  Negative for myalgias.   Skin:  Negative for rash.   Neurological:  Negative for weakness and headaches.   Psychiatric/Behavioral:  Negative for depression.    All other systems reviewed and are negative.    Currently Medications  Current Outpatient Medications on File Prior to Visit   Medication Sig Dispense Refill    albuterol (PROVENTIL/VENTOLIN HFA) 90 mcg/actuation inhaler INHALE 2 PUFFS INTO THE LUNGS EVERY 6 HOURS AS NEEDED FOR WHEEZING 20.1 g 11    alendronate (FOSAMAX) 70 MG tablet TAKE 1 TABLET BY MOUTH EVERY 7 DAYS 12 tablet 3    atorvastatin (LIPITOR) 10 MG tablet TAKE 1 TABLET BY MOUTH ONCE A DAY 90 tablet 3    calcium-vitamin D3 (OS-MIKE 500 + D3) 500 mg-5 mcg (200 unit) per tablet  Take 1 tablet by mouth once daily.      cyanocobalamin 500 MCG tablet Take 500 mcg by mouth once daily.      ferrous sulfate 324 mg (65 mg iron) TbEC Take 1 tablet (324 mg total) by mouth once daily.  0    fluticasone propionate (FLONASE) 50 mcg/actuation nasal spray 2 sprays (100 mcg total) by Each Nare route once daily. 1 Bottle 12    fluticasone-umeclidin-vilanter (TRELEGY ELLIPTA) 100-62.5-25 mcg DsDv Inhale 1 puff into the lungs once daily. 1 each 11    furosemide (LASIX) 40 MG tablet Take 1 tablet (40 mg total) by mouth 2 (two) times a day. 60 tablet 3    isosorbide mononitrate (IMDUR) 60 MG 24 hr tablet TAKE 1 TABLET BY MOUTH EVERY DAY FOR HEART AND BLOOD PRESSURE 90 tablet 3    loratadine (CLARITIN) 10 mg tablet Take 1 tablet (10 mg total) by mouth once daily. 90 tablet 3    losartan (COZAAR) 50 MG tablet TAKE 1 TABLET BY MOUTH ONCE A DAY FOR BLOOD PRESSURE 90 tablet 3    meloxicam (MOBIC) 7.5 MG tablet Take 1 tablet (7.5 mg total) by mouth once daily. 90 tablet 3    montelukast (SINGULAIR) 10 mg tablet TAKE 1 TABLET BY MOUTH EVERY EVENING 90 tablet 3    MULTIVIT-IRON-MIN-FOLIC ACID 3,500-18-0.4 UNIT-MG-MG ORAL CHEW Take by mouth.      pantoprazole (PROTONIX) 40 MG tablet TAKE 1 TABLET BY MOUTH EVERY DAY 30 tablet 11    rOPINIRole (REQUIP) 3 MG tablet TAKE 1 TABLET BY MOUTH NIGHTLY 90 tablet 3    cholecalciferol, vitamin D3, (VITAMIN D3) 5,000 unit Tab Take 5,000 Units by mouth once daily. (Patient not taking: No sig reported) 90 tablet 3    [DISCONTINUED] albuterol (PROVENTIL) 2.5 mg /3 mL (0.083 %) nebulizer solution albuterol sulfate 2.5 mg/3 mL (0.083 %) solution for nebulization (Patient not taking: No sig reported) 100 each 11     Current Facility-Administered Medications on File Prior to Visit   Medication Dose Route Frequency Provider Last Rate Last Admin    sodium chloride 0.9% flush 10 mL  10 mL Intravenous PRN Elodia Zepeda MD           Physical  Exam  Vitals:    10/03/22 1057   BP: 136/86   BP  "Location: Right arm   Patient Position: Sitting   Pulse: 78   Temp: 98.2 °F (36.8 °C)   SpO2: (!) 93%   Weight: 102.6 kg (226 lb 1.3 oz)   Height: 5' 2" (1.575 m)      Body mass index is 41.35 kg/m².    Physical Exam  Vitals and nursing note reviewed.   Constitutional:       General: She is not in acute distress.     Appearance: She is not ill-appearing.   HENT:      Head: Normocephalic and atraumatic.      Right Ear: External ear normal.      Left Ear: External ear normal.      Nose: Nose normal.      Mouth/Throat:      Mouth: Mucous membranes are moist.   Eyes:      Extraocular Movements: Extraocular movements intact.      Conjunctiva/sclera: Conjunctivae normal.   Cardiovascular:      Rate and Rhythm: Normal rate and regular rhythm.      Pulses: Normal pulses.      Heart sounds: No murmur heard.  Pulmonary:      Effort: Pulmonary effort is normal. No respiratory distress.      Breath sounds: No wheezing.   Abdominal:      General: There is no distension.      Palpations: Abdomen is soft. There is no mass.      Tenderness: There is no abdominal tenderness.   Musculoskeletal:         General: No swelling.      Cervical back: Normal range of motion.   Skin:     Coloration: Skin is not jaundiced.      Findings: No rash.   Neurological:      General: No focal deficit present.      Mental Status: She is alert and oriented to person, place, and time.   Psychiatric:         Mood and Affect: Mood normal.         Thought Content: Thought content normal.       Labs:    Complete Blood Count  Lab Results   Component Value Date    RBC 5.03 09/30/2022    HGB 13.8 09/30/2022    HCT 45.3 09/30/2022    MCV 90 09/30/2022    MCH 27.4 09/30/2022    MCHC 30.5 (L) 09/30/2022    RDW 14.7 (H) 09/30/2022     09/30/2022    MPV 9.9 09/30/2022    GRAN 8.5 (H) 09/30/2022    GRAN 71.2 09/30/2022    LYMPH 1.9 09/30/2022    LYMPH 16.2 (L) 09/30/2022    MONO 0.9 09/30/2022    MONO 7.8 09/30/2022    EOS 0.5 09/30/2022    BASO 0.08 " 09/30/2022    EOSINOPHIL 3.8 09/30/2022    BASOPHIL 0.7 09/30/2022    DIFFMETHOD Automated 09/30/2022       Comprehensive Metabolic Panel  Lab Results   Component Value Date     (H) 09/30/2022    BUN 16 09/30/2022    CREATININE 0.88 09/30/2022     09/30/2022    K 3.9 09/30/2022     09/30/2022    PROT 7.2 09/30/2022    ALBUMIN 4.6 09/30/2022    BILITOT 0.3 09/30/2022    AST 34 09/30/2022    ALKPHOS 93 09/30/2022    CO2 29 09/30/2022    ALT 38 09/30/2022    ANIONGAP 11 09/30/2022       TSH  No results found for: TSH    Imaging:  Echo  · The left ventricle is mildly enlarged with normal systolic function.  · The estimated ejection fraction is 65%.  · Grade II left ventricular diastolic dysfunction.  · Normal right ventricular size with normal right ventricular systolic   function.  · Severe left atrial enlargement.  · Mild-to-moderate mitral regurgitation.  · Mild to moderate tricuspid regurgitation.  · Mild pulmonic regurgitation.  · There is pulmonary hypertension.  · Normal central venous pressure (3 mmHg).  · The estimated PA systolic pressure is 57 mmHg.  · Mass noted in the right atrium at the superior portion of the   inter-atrial septum, measuring 2.7 cm in diameter.         Assessment/Plan:    Problem List Items Addressed This Visit          Pulmonary    Chronic obstructive pulmonary disease - Primary      Continue trelegy. Stat spiriva. If shortness of breath does not improve, follow up with cardiology.     Darren Trimble MD

## 2022-10-03 NOTE — PROGRESS NOTES
Patient, Shirley F Gilford (MRN #[id number]), presented with a recorded BMI of 41.35 kg/m^2 consistent with the definition of morbid obesity (ICD-10 E66.01). The patient's morbid obesity was monitored, evaluated, addressed and/or treated. This addendum to the medical record is made on 10/03/2022.

## 2022-10-04 DIAGNOSIS — R73.9 HYPERGLYCEMIA: Primary | ICD-10-CM

## 2022-10-04 NOTE — PROGRESS NOTES
Please contact the patient and let them know that her sugars have increased.  I want to check for diabetes.  I will add a hemoglobin A1C.  '  Everything else was fine

## 2022-10-06 ENCOUNTER — PES CALL (OUTPATIENT)
Dept: ADMINISTRATIVE | Facility: CLINIC | Age: 82
End: 2022-10-06
Payer: MEDICARE

## 2022-10-25 ENCOUNTER — PES CALL (OUTPATIENT)
Dept: ADMINISTRATIVE | Facility: CLINIC | Age: 82
End: 2022-10-25
Payer: MEDICARE

## 2022-11-12 ENCOUNTER — PES CALL (OUTPATIENT)
Dept: ADMINISTRATIVE | Facility: CLINIC | Age: 82
End: 2022-11-12
Payer: MEDICARE

## 2022-11-16 ENCOUNTER — TELEPHONE (OUTPATIENT)
Dept: FAMILY MEDICINE | Facility: CLINIC | Age: 82
End: 2022-11-16
Payer: MEDICARE

## 2022-11-16 NOTE — TELEPHONE ENCOUNTER
----- Message from Renae Weaver DO sent at 10/4/2022  2:24 PM CDT -----  Please contact the patient and let them know that her sugars have increased.  I want to check for diabetes.  I will add a hemoglobin A1C.  '  Everything else was fine

## 2022-11-18 ENCOUNTER — LAB VISIT (OUTPATIENT)
Dept: LAB | Facility: HOSPITAL | Age: 82
End: 2022-11-18
Attending: FAMILY MEDICINE
Payer: MEDICARE

## 2022-11-18 DIAGNOSIS — R73.9 HYPERGLYCEMIA: ICD-10-CM

## 2022-11-18 LAB
ESTIMATED AVG GLUCOSE: 126 MG/DL (ref 68–131)
HBA1C MFR BLD: 6 % (ref 4–5.6)

## 2022-11-18 PROCEDURE — 36415 COLL VENOUS BLD VENIPUNCTURE: CPT | Mod: PO | Performed by: FAMILY MEDICINE

## 2022-11-18 PROCEDURE — 83036 HEMOGLOBIN GLYCOSYLATED A1C: CPT | Performed by: FAMILY MEDICINE

## 2022-11-21 ENCOUNTER — TELEPHONE (OUTPATIENT)
Dept: FAMILY MEDICINE | Facility: CLINIC | Age: 82
End: 2022-11-21
Payer: MEDICARE

## 2022-11-21 DIAGNOSIS — R73.9 HYPERGLYCEMIA: Primary | ICD-10-CM

## 2022-11-21 NOTE — TELEPHONE ENCOUNTER
Please contact the patient and let them know that their results were fine and do not require any change in treatment A1C good; repeat 6 months     Letter mailed to the pt  A1c ordered and scheduled

## 2022-12-15 ENCOUNTER — PES CALL (OUTPATIENT)
Dept: ADMINISTRATIVE | Facility: CLINIC | Age: 82
End: 2022-12-15
Payer: MEDICARE

## 2022-12-27 ENCOUNTER — TELEPHONE (OUTPATIENT)
Dept: FAMILY MEDICINE | Facility: CLINIC | Age: 82
End: 2022-12-27
Payer: MEDICARE

## 2022-12-27 NOTE — TELEPHONE ENCOUNTER
----- Message from Lala Bustamante sent at 12/27/2022  3:33 PM CST -----  Regarding: advice  Contact: [phone]  Type:  Needs Medical Advice    Who Called:  self   Symptoms (please be specific):  her left leg between the knee and ankle is swollen and hurting. There is a sore spot in the center of it. Both of her feet are also swollen. It hurts when she walks   How long has patient had these symptoms:   sore spot on her leg today  Pharmacy name and phone #:   [phone]  Would the patient rather a call back or a response via [facility]?    Best Call Back Number:  [phone]  Additional Information:  [id number] - [place], [address];

## 2022-12-28 ENCOUNTER — OFFICE VISIT (OUTPATIENT)
Dept: FAMILY MEDICINE | Facility: CLINIC | Age: 82
End: 2022-12-28
Payer: MEDICARE

## 2022-12-28 ENCOUNTER — TELEPHONE (OUTPATIENT)
Dept: FAMILY MEDICINE | Facility: CLINIC | Age: 82
End: 2022-12-28

## 2022-12-28 VITALS
WEIGHT: 223.75 LBS | SYSTOLIC BLOOD PRESSURE: 162 MMHG | DIASTOLIC BLOOD PRESSURE: 82 MMHG | TEMPERATURE: 98 F | BODY MASS INDEX: 40.93 KG/M2 | HEART RATE: 79 BPM | OXYGEN SATURATION: 89 %

## 2022-12-28 DIAGNOSIS — R73.9 HYPERGLYCEMIA: ICD-10-CM

## 2022-12-28 DIAGNOSIS — L03.116 CELLULITIS OF LEFT LOWER EXTREMITY: Primary | ICD-10-CM

## 2022-12-28 PROBLEM — Z87.891 EX-SMOKER: Status: RESOLVED | Noted: 2017-01-26 | Resolved: 2022-12-28

## 2022-12-28 PROCEDURE — 1159F PR MEDICATION LIST DOCUMENTED IN MEDICAL RECORD: ICD-10-PCS | Mod: CPTII,S$GLB,, | Performed by: FAMILY MEDICINE

## 2022-12-28 PROCEDURE — 3079F PR MOST RECENT DIASTOLIC BLOOD PRESSURE 80-89 MM HG: ICD-10-PCS | Mod: CPTII,S$GLB,, | Performed by: FAMILY MEDICINE

## 2022-12-28 PROCEDURE — 1126F AMNT PAIN NOTED NONE PRSNT: CPT | Mod: CPTII,S$GLB,, | Performed by: FAMILY MEDICINE

## 2022-12-28 PROCEDURE — 3077F PR MOST RECENT SYSTOLIC BLOOD PRESSURE >= 140 MM HG: ICD-10-PCS | Mod: CPTII,S$GLB,, | Performed by: FAMILY MEDICINE

## 2022-12-28 PROCEDURE — 99214 OFFICE O/P EST MOD 30 MIN: CPT | Mod: S$GLB,,, | Performed by: FAMILY MEDICINE

## 2022-12-28 PROCEDURE — 1126F PR PAIN SEVERITY QUANTIFIED, NO PAIN PRESENT: ICD-10-PCS | Mod: CPTII,S$GLB,, | Performed by: FAMILY MEDICINE

## 2022-12-28 PROCEDURE — 3288F FALL RISK ASSESSMENT DOCD: CPT | Mod: CPTII,S$GLB,, | Performed by: FAMILY MEDICINE

## 2022-12-28 PROCEDURE — 3077F SYST BP >= 140 MM HG: CPT | Mod: CPTII,S$GLB,, | Performed by: FAMILY MEDICINE

## 2022-12-28 PROCEDURE — 99214 PR OFFICE/OUTPT VISIT, EST, LEVL IV, 30-39 MIN: ICD-10-PCS | Mod: S$GLB,,, | Performed by: FAMILY MEDICINE

## 2022-12-28 PROCEDURE — 1101F PT FALLS ASSESS-DOCD LE1/YR: CPT | Mod: CPTII,S$GLB,, | Performed by: FAMILY MEDICINE

## 2022-12-28 PROCEDURE — 1159F MED LIST DOCD IN RCRD: CPT | Mod: CPTII,S$GLB,, | Performed by: FAMILY MEDICINE

## 2022-12-28 PROCEDURE — 1101F PR PT FALLS ASSESS DOC 0-1 FALLS W/OUT INJ PAST YR: ICD-10-PCS | Mod: CPTII,S$GLB,, | Performed by: FAMILY MEDICINE

## 2022-12-28 PROCEDURE — 3079F DIAST BP 80-89 MM HG: CPT | Mod: CPTII,S$GLB,, | Performed by: FAMILY MEDICINE

## 2022-12-28 PROCEDURE — 3288F PR FALLS RISK ASSESSMENT DOCUMENTED: ICD-10-PCS | Mod: CPTII,S$GLB,, | Performed by: FAMILY MEDICINE

## 2022-12-28 RX ORDER — SEMAGLUTIDE 1.34 MG/ML
0.25 INJECTION, SOLUTION SUBCUTANEOUS
Qty: 1 PEN | Refills: 5 | Status: SHIPPED | OUTPATIENT
Start: 2022-12-28 | End: 2023-07-17 | Stop reason: SDUPTHER

## 2022-12-28 RX ORDER — CEPHALEXIN 500 MG/1
500 CAPSULE ORAL EVERY 8 HOURS
Qty: 30 CAPSULE | Refills: 0 | Status: SHIPPED | OUTPATIENT
Start: 2022-12-28 | End: 2023-01-19

## 2022-12-28 NOTE — PROGRESS NOTES
Subjective:      Patient ID: Shirley F Gilford is a 82 y.o. female.    Chief Complaint: Leg Swelling (Left leg)      Vitals:    12/28/22 1025   BP: (!) 162/82   Pulse: 79   Temp: 97.9 °F (36.6 °C)   TempSrc: Oral   SpO2: (!) 89%   Weight: 101.5 kg (223 lb 12.3 oz)        HPI   Left lower leg red and swollen  No pain; fever or chills; first time; not a diabetic  Good pedal pulses  Obese      Problem List  Patient Active Problem List   Diagnosis    Osteoporosis    History of hypertension    History of hyperlipidemia    Restless leg syndrome    Elevated LFTs    Chronic obstructive pulmonary disease    Fatty liver    Iron deficiency    Hyperlipidemia    Vitamin D deficiency    Pulmonary hypertension    Panniculitis    Chronic pain of right knee    Diastolic dysfunction    Left atrial enlargement    Cardiac mass    Hyperglycemia        ALLERGIES:   Review of patient's allergies indicates:   Allergen Reactions    Covid-19vacc,(astrazeneca)(pf)      Fever, headache, weak    Contrast media Swelling    Gabapentin Nausea And Vomiting       MEDS:   Current Outpatient Medications:     albuterol (PROVENTIL/VENTOLIN HFA) 90 mcg/actuation inhaler, INHALE 2 PUFFS INTO THE LUNGS EVERY 6 HOURS AS NEEDED FOR WHEEZING, Disp: 20.1 g, Rfl: 11    alendronate (FOSAMAX) 70 MG tablet, TAKE 1 TABLET BY MOUTH EVERY 7 DAYS, Disp: 12 tablet, Rfl: 3    atorvastatin (LIPITOR) 10 MG tablet, TAKE 1 TABLET BY MOUTH ONCE A DAY, Disp: 90 tablet, Rfl: 3    calcium-vitamin D3 (OS-MIKE 500 + D3) 500 mg-5 mcg (200 unit) per tablet, Take 1 tablet by mouth once daily., Disp: , Rfl:     cyanocobalamin 500 MCG tablet, Take 500 mcg by mouth once daily., Disp: , Rfl:     ferrous sulfate 324 mg (65 mg iron) TbEC, Take 1 tablet (324 mg total) by mouth once daily., Disp: , Rfl: 0    fluticasone propionate (FLONASE) 50 mcg/actuation nasal spray, 2 sprays (100 mcg total) by Each Nare route once daily., Disp: 1 Bottle, Rfl: 12    fluticasone-umeclidin-vilanter (TRELEGY  ELLIPTA) 100-62.5-25 mcg DsDv, Inhale 1 puff into the lungs once daily., Disp: 1 each, Rfl: 11    furosemide (LASIX) 40 MG tablet, Take 1 tablet (40 mg total) by mouth 2 (two) times a day., Disp: 60 tablet, Rfl: 3    isosorbide mononitrate (IMDUR) 60 MG 24 hr tablet, TAKE 1 TABLET BY MOUTH EVERY DAY FOR HEART AND BLOOD PRESSURE, Disp: 90 tablet, Rfl: 3    loratadine (CLARITIN) 10 mg tablet, Take 1 tablet (10 mg total) by mouth once daily., Disp: 90 tablet, Rfl: 3    losartan (COZAAR) 50 MG tablet, TAKE 1 TABLET BY MOUTH ONCE A DAY FOR BLOOD PRESSURE, Disp: 90 tablet, Rfl: 3    meloxicam (MOBIC) 7.5 MG tablet, Take 1 tablet (7.5 mg total) by mouth once daily., Disp: 90 tablet, Rfl: 3    montelukast (SINGULAIR) 10 mg tablet, TAKE 1 TABLET BY MOUTH EVERY EVENING, Disp: 90 tablet, Rfl: 3    MULTIVIT-IRON-MIN-FOLIC ACID 3,500-18-0.4 UNIT-MG-MG ORAL CHEW, Take by mouth., Disp: , Rfl:     pantoprazole (PROTONIX) 40 MG tablet, TAKE 1 TABLET BY MOUTH EVERY DAY, Disp: 30 tablet, Rfl: 11    rOPINIRole (REQUIP) 3 MG tablet, TAKE 1 TABLET BY MOUTH NIGHTLY, Disp: 90 tablet, Rfl: 3    tiotropium (SPIRIVA) 18 mcg inhalation capsule, Inhale 1 capsule (18 mcg total) into the lungs once daily. Controller, Disp: 30 capsule, Rfl: 11    cephALEXin (KEFLEX) 500 MG capsule, Take 1 capsule (500 mg total) by mouth every 8 (eight) hours. For infection, Disp: 30 capsule, Rfl: 0    cholecalciferol, vitamin D3, (VITAMIN D3) 5,000 unit Tab, Take 5,000 Units by mouth once daily. (Patient not taking: Reported on 1/25/2022), Disp: 90 tablet, Rfl: 3    semaglutide (OZEMPIC) 0.25 mg or 0.5 mg(2 mg/1.5 mL) pen injector, Inject 0.25 mg into the skin every 7 days., Disp: 1 pen, Rfl: 5  No current facility-administered medications for this visit.    Facility-Administered Medications Ordered in Other Visits:     sodium chloride 0.9% flush 10 mL, 10 mL, Intravenous, PRN, Elodia Zepeda MD      History:  Current Providers as of 12/28/2022  PCP: Marc KAUFMAN  MD Shavon  Care Team Provider: Mahamed Kidd MD  Care Team Provider: Alessandro Titus Jr., MD  Care Team Provider: Pavel Murillo LPN  Care Team Provider: Isabel Griffin MD  Care Team Provider: Malu Camarillo LPN  Encounter Provider: Marc Sands MD, starting on Wed Dec 28, 2022 12:00 AM  Referring Provider: not found, starting on Wed Dec 28, 2022 12:00 AM  Consulting Physician: Marc Sands MD, starting on Wed Dec 28, 2022 10:23 AM (Active)   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    HENT: Negative.     Respiratory: Negative.     Cardiovascular: Negative.    Gastrointestinal: Negative.    Endocrine: Negative.    Genitourinary: Negative.    Musculoskeletal: Negative.    Skin:  Positive for color change and rash.   Psychiatric/Behavioral: Negative.     All other systems reviewed and are negative.  Objective:     Physical Exam  Vitals and nursing note reviewed.   Constitutional:       Appearance: She is well-developed.   HENT:      Head: Normocephalic.   Eyes:      Conjunctiva/sclera: Conjunctivae normal.      Pupils: Pupils are equal, round, and reactive to light.   Cardiovascular:      Rate and Rhythm: Normal rate and regular rhythm.      Pulses: Normal pulses.      Heart sounds: Normal heart sounds.   Pulmonary:      Effort: Pulmonary effort is normal.      Breath sounds: Normal breath  sounds.   Musculoskeletal:         General: Swelling present. Normal range of motion.      Cervical back: Normal range of motion and neck supple.      Left lower leg: Edema present.   Skin:     General: Skin is warm and dry.   Neurological:      Mental Status: She is alert and oriented to person, place, and time.      Deep Tendon Reflexes: Reflexes are normal and symmetric.   Psychiatric:         Behavior: Behavior normal.         Thought Content: Thought content normal.         Judgment: Judgment normal.           Assessment:     1. BMI 40.0-44.9, adult    2. Hyperglycemia      Plan:        Medication List            Accurate as of December 28, 2022 11:05 AM. If you have any questions, ask your nurse or doctor.                START taking these medications      cephALEXin 500 MG capsule  Commonly known as: KEFLEX  Take 1 capsule (500 mg total) by mouth every 8 (eight) hours. For infection  Started by: Marc Sands MD     OZEMPIC 0.25 mg or 0.5 mg(2 mg/1.5 mL) pen injector  Generic drug: semaglutide  Inject 0.25 mg into the skin every 7 days.  Started by: Marc Sands MD            CONTINUE taking these medications      albuterol 90 mcg/actuation inhaler  Commonly known as: PROVENTIL/VENTOLIN HFA  INHALE 2 PUFFS INTO THE LUNGS EVERY 6 HOURS AS NEEDED FOR WHEEZING     alendronate 70 MG tablet  Commonly known as: FOSAMAX  TAKE 1 TABLET BY MOUTH EVERY 7 DAYS     atorvastatin 10 MG tablet  Commonly known as: LIPITOR  TAKE 1 TABLET BY MOUTH ONCE A DAY     calcium-vitamin D3 500 mg-5 mcg (200 unit) per tablet  Commonly known as: OS-MIKE 500 + D3     cholecalciferol (vitamin D3) 125 mcg (5,000 unit) Tab  Commonly known as: VITAMIN D3  Take 5,000 Units by mouth once daily.     cyanocobalamin 500 MCG tablet     ferrous sulfate 324 mg (65 mg iron) Tbec  Take 1 tablet (324 mg total) by mouth once daily.     fluticasone propionate 50 mcg/actuation nasal spray  Commonly known as: FLONASE  2 sprays (100 mcg total) by Each  Nare route once daily.     fluticasone-umeclidin-vilanter 100-62.5-25 mcg Dsdv  Commonly known as: TRELEGY ELLIPTA  Inhale 1 puff into the lungs once daily.     furosemide 40 MG tablet  Commonly known as: LASIX  Take 1 tablet (40 mg total) by mouth 2 (two) times a day.     isosorbide mononitrate 60 MG 24 hr tablet  Commonly known as: IMDUR  TAKE 1 TABLET BY MOUTH EVERY DAY FOR HEART AND BLOOD PRESSURE     loratadine 10 mg tablet  Commonly known as: CLARITIN  Take 1 tablet (10 mg total) by mouth once daily.     losartan 50 MG tablet  Commonly known as: COZAAR  TAKE 1 TABLET BY MOUTH ONCE A DAY FOR BLOOD PRESSURE     meloxicam 7.5 MG tablet  Commonly known as: MOBIC  Take 1 tablet (7.5 mg total) by mouth once daily.     montelukast 10 mg tablet  Commonly known as: SINGULAIR  TAKE 1 TABLET BY MOUTH EVERY EVENING     multivit-iron-min-folic acid 3,500-18-0.4 unit-mg-mg Chew     pantoprazole 40 MG tablet  Commonly known as: PROTONIX  TAKE 1 TABLET BY MOUTH EVERY DAY     rOPINIRole 3 MG tablet  Commonly known as: REQUIP  TAKE 1 TABLET BY MOUTH NIGHTLY     tiotropium 18 mcg inhalation capsule  Commonly known as: SPIRIVA  Inhale 1 capsule (18 mcg total) into the lungs once daily. Controller               Where to Get Your Medications        These medications were sent to [id number] [place] [id number] [address] [address]      Phone: [phone]   cephALEXin 500 MG capsule  OZEMPIC 0.25 mg or 0.5 mg(2 mg/1.5 mL) pen injector       BMI 40.0-44.9, adult    Hyperglycemia    Other orders  -     semaglutide (OZEMPIC) 0.25 mg or 0.5 mg(2 mg/1.5 mL) pen injector; Inject 0.25 mg into the skin every 7 days.  Dispense: 1 pen; Refill: 5  -     cephALEXin (KEFLEX) 500 MG capsule; Take 1 capsule (500 mg total) by mouth every 8 (eight) hours. For infection  Dispense: 30 capsule; Refill: 0

## 2023-01-19 RX ORDER — CEPHALEXIN 500 MG/1
CAPSULE ORAL
Qty: 30 CAPSULE | Refills: 0 | Status: SHIPPED | OUTPATIENT
Start: 2023-01-19 | End: 2023-02-20

## 2023-01-31 RX ORDER — PANTOPRAZOLE SODIUM 40 MG/1
40 TABLET, DELAYED RELEASE ORAL DAILY
Qty: 30 TABLET | Refills: 11 | Status: SHIPPED | OUTPATIENT
Start: 2023-01-31 | End: 2024-02-27

## 2023-01-31 NOTE — TELEPHONE ENCOUNTER
No new care gaps identified.  [facility] Embedded Care Gaps. Reference number: [id number]. 1/31/2023   10:12:43 AM CST

## 2023-02-01 ENCOUNTER — TELEPHONE (OUTPATIENT)
Dept: FAMILY MEDICINE | Facility: CLINIC | Age: 83
End: 2023-02-01

## 2023-02-01 NOTE — TELEPHONE ENCOUNTER
----- Message from Rishi Esquivel sent at 2/1/2023  8:18 AM CST -----  Contact: pt  Type:  Sooner Apoointment Request    Caller is requesting a sooner appointment.  Caller declined first available appointment listed below.  Caller will not accept being placed on the waitlist and is requesting a message be sent to doctor.  Name of Caller:pt  When is the first available appointment? May   Symptoms: follow up  Would the patient rather a call back or a response [facility]? call  Best Call Back Number:[phone]  Additional Information: pt missed 02/01/23 appointment

## 2023-02-09 DIAGNOSIS — G25.81 RESTLESS LEGS SYNDROME: ICD-10-CM

## 2023-02-11 RX ORDER — ROPINIROLE 3 MG/1
TABLET, FILM COATED ORAL
Qty: 90 TABLET | Refills: 3 | Status: SHIPPED | OUTPATIENT
Start: 2023-02-11 | End: 2024-02-27

## 2023-02-14 ENCOUNTER — TELEPHONE (OUTPATIENT)
Dept: FAMILY MEDICINE | Facility: CLINIC | Age: 83
End: 2023-02-14
Payer: MEDICARE

## 2023-02-14 NOTE — TELEPHONE ENCOUNTER
----- Message from Lashonda Sharpe sent at 2/14/2023  3:30 PM CST -----  Type:  Patient Returning Call    Who Called:Pt   Would the patient rather a call back or a response via [facility]? Call back   Best Call Back Number:[phone]  Additional Information: Pt is calling to get letter for handicap plate

## 2023-02-15 ENCOUNTER — TELEPHONE (OUTPATIENT)
Dept: FAMILY MEDICINE | Facility: CLINIC | Age: 83
End: 2023-02-15
Payer: MEDICARE

## 2023-02-15 NOTE — TELEPHONE ENCOUNTER
----- Message from Lala Bustamante sent at 2/15/2023  3:50 PM CST -----  Regarding: DMV form  Contact: [phone]  Patient is requesting a call back regarding her completed and signed form for the DMV. Please fax to her: [phone]  Would the patient rather a call back or a response via [facility]?  Call when it is ready   Best Call Back Number:  [phone]  Additional Information:

## 2023-02-16 ENCOUNTER — TELEPHONE (OUTPATIENT)
Dept: FAMILY MEDICINE | Facility: CLINIC | Age: 83
End: 2023-02-16
Payer: MEDICARE

## 2023-02-16 NOTE — TELEPHONE ENCOUNTER
----- Message from Lala Bustamante sent at 2/16/2023  4:17 PM CST -----  Regarding: Fax  Contact: [phone]  Regarding: DMV form  Contact: [phone]  Patient is requesting a call back regarding her completed and signed form for the DMV. Please fax to her: [phone]   Would the patient rather a call back or a response via [facility]?  Call when it is ready   Best Call Back Number:  [phone]  Additional Information:  the office closes at 4:30 today

## 2023-02-17 ENCOUNTER — TELEPHONE (OUTPATIENT)
Dept: FAMILY MEDICINE | Facility: CLINIC | Age: 83
End: 2023-02-17
Payer: MEDICARE

## 2023-02-17 NOTE — TELEPHONE ENCOUNTER
----- Message from Zuri Hernandez sent at 2/17/2023  8:12 AM CST -----  Type:  Needs Medical Advice    Who Called: pt  Symptoms (please be specific): pt is following up on her completed paper work for her to get a handicap licence plate pt will come by the office to pick it up       Would the patient rather a call back or a response via [facility]? Call   Best Call Back Number: [phone]  Additional Information:

## 2023-02-20 DIAGNOSIS — E61.1 IRON DEFICIENCY: ICD-10-CM

## 2023-02-20 DIAGNOSIS — E55.9 VITAMIN D DEFICIENCY: ICD-10-CM

## 2023-02-20 DIAGNOSIS — R73.9 HYPERGLYCEMIA: ICD-10-CM

## 2023-02-20 DIAGNOSIS — E78.5 HYPERLIPIDEMIA, UNSPECIFIED HYPERLIPIDEMIA TYPE: ICD-10-CM

## 2023-02-20 RX ORDER — CEPHALEXIN 500 MG/1
CAPSULE ORAL
Qty: 30 CAPSULE | Refills: 0 | Status: SHIPPED | OUTPATIENT
Start: 2023-02-20 | End: 2023-07-17

## 2023-02-20 RX ORDER — ATORVASTATIN CALCIUM 10 MG/1
TABLET, FILM COATED ORAL
Qty: 90 TABLET | Refills: 3 | Status: ON HOLD | OUTPATIENT
Start: 2023-02-20 | End: 2023-12-15 | Stop reason: HOSPADM

## 2023-02-20 NOTE — TELEPHONE ENCOUNTER
Care Due:                  Date            Visit Type   Department     Provider  --------------------------------------------------------------------------------                                EP -                              PRIMARY      [facility]  Last Visit: 12-      CARE ([facility])   BYRON Sands                               -                              PRIMARY      [facility]  Next Visit: 03-      CARE ([facility])   BYRON Sands                                                            Last  Test          Frequency    Reason                     Performed    Due Date  --------------------------------------------------------------------------------    HBA1C.......  6 months...  semaglutide..............  11- 05-    [facility] Embedded Care Gaps. Reference number: [id number]. 2/20/2023   2:06:59 AM CST

## 2023-02-21 NOTE — TELEPHONE ENCOUNTER
Refill Routing Note   Medication(s) are not appropriate for processing by [facility] for the following reason(s):         Drug-disease interaction  Medication outside of protocol    [facility] action(s):  Defer  Route    Medication Therapy Plan: Drug-Disease: atorvastatin and Hyperglycemia    Appointments  past 12m or future 3m with PCP    Date Provider   Last Visit   12/28/2022 Marc Sands MD   Next Visit   3/9/2023 Marc Sands MD   ED visits in past 90 days: 0        Note composed:9:42 PM 02/20/2023

## 2023-02-23 ENCOUNTER — PATIENT OUTREACH (OUTPATIENT)
Dept: ADMINISTRATIVE | Facility: HOSPITAL | Age: 83
End: 2023-02-23
Payer: MEDICARE

## 2023-02-23 NOTE — PROGRESS NOTES
Care Everywhere updates requested and reviewed.  Immunizations reconciled. Media reports reviewed.  Duplicate HM overrides and  orders removed.  Overdue HM topic chart audit and/or requested.  Overdue lab testing linked to upcoming lab appointments if applies.    DIS reviewed for  DEXA      Health Maintenance Due   Topic Date Due    DEXA Scan  2018    COVID-19 Vaccine (2 - Moderna series) 2021    Shingles Vaccine (3 of 3) 2021

## 2023-03-01 NOTE — TELEPHONE ENCOUNTER
Refill Routing Note   Medication(s) are not appropriate for processing by [facility] for the following reason(s):       Required vitals abnormal    [facility] action(s):  Defer    Medication Therapy Plan: FOV;    Appointments  past 12m or future 3m with PCP    Date Provider   Last Visit   12/28/2022 Marc Sands MD   Next Visit   3/9/2023 Marc Sands MD   ED visits in past 90 days: 0        Note composed:7:41 AM 03/01/2023

## 2023-03-01 NOTE — TELEPHONE ENCOUNTER
No new care gaps identified.  [facility] Embedded Care Gaps. Reference number: [id number]. 2/28/2023   10:25:56 PM CST

## 2023-03-04 RX ORDER — LOSARTAN POTASSIUM 50 MG/1
TABLET ORAL
Qty: 90 TABLET | Refills: 3 | Status: ON HOLD | OUTPATIENT
Start: 2023-03-04 | End: 2023-12-15 | Stop reason: HOSPADM

## 2023-03-20 ENCOUNTER — TELEPHONE (OUTPATIENT)
Dept: FAMILY MEDICINE | Facility: CLINIC | Age: 83
End: 2023-03-20
Payer: MEDICARE

## 2023-04-24 ENCOUNTER — TELEPHONE (OUTPATIENT)
Dept: FAMILY MEDICINE | Facility: CLINIC | Age: 83
End: 2023-04-24
Payer: MEDICARE

## 2023-04-24 DIAGNOSIS — R21 RASH: Primary | ICD-10-CM

## 2023-04-24 NOTE — TELEPHONE ENCOUNTER
----- Message from Nathan Smith sent at 4/24/2023 12:17 PM CDT -----  Contact: pt  Type:  Needs Medical Advice    Who Called: pt   Symptoms (please be specific): rash under stomach   How long has patient had these symptoms:  one month   Pharmacy name and phone #:  [id number] - [place], [place] - [address] [place] [address]  Would the patient rather a call back or a response via [facility]? call  Best Call Back Number: [phone]  Additional Information:   Pt requesting a call regarding rash  Pt not sure if she needs to come in and see provider or provider needs to send a referral to a derm. to be seen

## 2023-05-02 ENCOUNTER — TELEPHONE (OUTPATIENT)
Dept: FAMILY MEDICINE | Facility: CLINIC | Age: 83
End: 2023-05-02
Payer: MEDICARE

## 2023-05-05 ENCOUNTER — TELEPHONE (OUTPATIENT)
Dept: FAMILY MEDICINE | Facility: CLINIC | Age: 83
End: 2023-05-05
Payer: MEDICARE

## 2023-05-16 ENCOUNTER — TELEPHONE (OUTPATIENT)
Dept: FAMILY MEDICINE | Facility: CLINIC | Age: 83
End: 2023-05-16
Payer: MEDICARE

## 2023-05-19 DIAGNOSIS — R73.9 HYPERGLYCEMIA: Primary | ICD-10-CM

## 2023-05-19 DIAGNOSIS — Z78.0 POST-MENOPAUSAL: ICD-10-CM

## 2023-05-19 DIAGNOSIS — M19.90 ARTHRITIS: ICD-10-CM

## 2023-05-21 RX ORDER — MELOXICAM 7.5 MG/1
TABLET ORAL
Qty: 90 TABLET | Refills: 1 | Status: SHIPPED | OUTPATIENT
Start: 2023-05-21 | End: 2023-12-14 | Stop reason: SDUPTHER

## 2023-05-24 NOTE — TELEPHONE ENCOUNTER
Pt needs a1c and dexa  Scheduled to see dr ward in July  I LM for the pt to rtn call to schedule these test soon

## 2023-05-31 ENCOUNTER — LAB VISIT (OUTPATIENT)
Dept: LAB | Facility: HOSPITAL | Age: 83
End: 2023-05-31
Attending: FAMILY MEDICINE
Payer: MEDICARE

## 2023-05-31 DIAGNOSIS — R73.9 HYPERGLYCEMIA: ICD-10-CM

## 2023-05-31 LAB
ESTIMATED AVG GLUCOSE: 117 MG/DL (ref 68–131)
HBA1C MFR BLD: 5.7 % (ref 4–5.6)

## 2023-05-31 PROCEDURE — 36415 COLL VENOUS BLD VENIPUNCTURE: CPT | Mod: PO | Performed by: FAMILY MEDICINE

## 2023-05-31 PROCEDURE — 83036 HEMOGLOBIN GLYCOSYLATED A1C: CPT | Performed by: FAMILY MEDICINE

## 2023-06-28 ENCOUNTER — TELEPHONE (OUTPATIENT)
Dept: ADMINISTRATIVE | Facility: OTHER | Age: 83
End: 2023-06-28
Payer: MEDICARE

## 2023-06-28 NOTE — TELEPHONE ENCOUNTER
Petros Banks Staff  Caller: Pt (Today,  9:25 AM)  .Type:  Needs Medical Advice     Who Called: pt   Would the patient rather a call back or a response via [facility]?  Call back   Best Call Back Number: [phone]   Additional Information: Pt. Is returning a call back to [place]       I notified the pt of lab results

## 2023-06-28 NOTE — TELEPHONE ENCOUNTER
Unable to reach patient. Voicemail left for patient to contact clinic.  --------------------------------------------------------------------------  ----- Message from Darren Trimble MD sent at 5/31/2023  4:59 PM CDT -----  Your bloodwork looks fine and does not require any change in treatment.     Please contact me if you have any additional concerns.    Sincerely,    Darren Trimble MD

## 2023-07-03 ENCOUNTER — PATIENT OUTREACH (OUTPATIENT)
Dept: ADMINISTRATIVE | Facility: HOSPITAL | Age: 83
End: 2023-07-03
Payer: MEDICARE

## 2023-07-14 ENCOUNTER — PATIENT OUTREACH (OUTPATIENT)
Dept: ADMINISTRATIVE | Facility: OTHER | Age: 83
End: 2023-07-14
Payer: MEDICARE

## 2023-07-14 NOTE — PROGRESS NOTES
CHW - Initial Contact    This Community Health Worker completed the Social Determinant of Health questionnaire with MRN [id number] over the phone today.    Pt identified barriers of most importance are: No barriers reported   Referrals to community agencies completed with patient/caregiver consent outside of [facility] include: No  Referrals were put through [facility] - No  Support and Services: No support & services have been documented.  Other information discussed the patient needs / wants help with: SDOH comeplete. No assistance requested.   Follow up required: No  No future outreach task assigned

## 2023-07-17 ENCOUNTER — OFFICE VISIT (OUTPATIENT)
Dept: FAMILY MEDICINE | Facility: CLINIC | Age: 83
End: 2023-07-17
Payer: MEDICARE

## 2023-07-17 ENCOUNTER — LAB VISIT (OUTPATIENT)
Dept: LAB | Facility: HOSPITAL | Age: 83
End: 2023-07-17
Attending: FAMILY MEDICINE
Payer: MEDICARE

## 2023-07-17 VITALS
TEMPERATURE: 98 F | HEART RATE: 80 BPM | DIASTOLIC BLOOD PRESSURE: 76 MMHG | HEIGHT: 63 IN | SYSTOLIC BLOOD PRESSURE: 138 MMHG | BODY MASS INDEX: 37.32 KG/M2 | WEIGHT: 210.63 LBS | OXYGEN SATURATION: 85 %

## 2023-07-17 DIAGNOSIS — E66.01 MORBID (SEVERE) OBESITY DUE TO EXCESS CALORIES: ICD-10-CM

## 2023-07-17 DIAGNOSIS — Z78.9 ADVANCE DIRECTIVE IN CHART: ICD-10-CM

## 2023-07-17 DIAGNOSIS — I27.20 PULMONARY HYPERTENSION: ICD-10-CM

## 2023-07-17 DIAGNOSIS — J44.9 CHRONIC OBSTRUCTIVE PULMONARY DISEASE, UNSPECIFIED COPD TYPE: Primary | ICD-10-CM

## 2023-07-17 DIAGNOSIS — E46 PROTEIN-CALORIE MALNUTRITION, UNSPECIFIED SEVERITY: ICD-10-CM

## 2023-07-17 DIAGNOSIS — J44.9 CHRONIC OBSTRUCTIVE PULMONARY DISEASE, UNSPECIFIED COPD TYPE: ICD-10-CM

## 2023-07-17 LAB
ALBUMIN SERPL BCP-MCNC: 4.3 G/DL (ref 3.5–5.2)
ALBUMIN/CREAT UR: 21.1 UG/MG (ref 0–30)
ALP SERPL-CCNC: 88 U/L (ref 38–126)
ALT SERPL W/O P-5'-P-CCNC: 23 U/L (ref 10–44)
ANION GAP SERPL CALC-SCNC: 9 MMOL/L (ref 8–16)
AST SERPL-CCNC: 31 U/L (ref 15–46)
BASOPHILS # BLD AUTO: 0.08 K/UL (ref 0–0.2)
BASOPHILS NFR BLD: 0.8 % (ref 0–1.9)
BILIRUB SERPL-MCNC: 0.4 MG/DL (ref 0.1–1)
CALCIUM SERPL-MCNC: 9.1 MG/DL (ref 8.7–10.5)
CHLORIDE SERPL-SCNC: 107 MMOL/L (ref 95–110)
CHOLEST SERPL-MCNC: 157 MG/DL (ref 120–199)
CHOLEST/HDLC SERPL: 3.3 {RATIO} (ref 2–5)
CO2 SERPL-SCNC: 27 MMOL/L (ref 23–29)
CREAT SERPL-MCNC: 0.78 MG/DL (ref 0.5–1.4)
CREAT UR-MCNC: 76 MG/DL (ref 15–325)
DIFFERENTIAL METHOD: ABNORMAL
EOSINOPHIL # BLD AUTO: 0.2 K/UL (ref 0–0.5)
EOSINOPHIL NFR BLD: 1.8 % (ref 0–8)
ERYTHROCYTE [DISTWIDTH] IN BLOOD BY AUTOMATED COUNT: 14.5 % (ref 11.5–14.5)
EST. GFR  (NO RACE VARIABLE): >60 ML/MIN/1.73 M^2
GLUCOSE SERPL-MCNC: 97 MG/DL (ref 70–110)
HCT VFR BLD AUTO: 42.6 % (ref 37–48.5)
HDLC SERPL-MCNC: 48 MG/DL (ref 40–75)
HDLC SERPL: 30.6 % (ref 20–50)
HGB BLD-MCNC: 12.6 G/DL (ref 12–16)
IMM GRANULOCYTES # BLD AUTO: 0.04 K/UL (ref 0–0.04)
IMM GRANULOCYTES NFR BLD AUTO: 0.4 % (ref 0–0.5)
LDLC SERPL CALC-MCNC: 92.6 MG/DL (ref 63–159)
LYMPHOCYTES # BLD AUTO: 1.6 K/UL (ref 1–4.8)
LYMPHOCYTES NFR BLD: 15.5 % (ref 18–48)
MCH RBC QN AUTO: 26.3 PG (ref 27–31)
MCHC RBC AUTO-ENTMCNC: 29.6 G/DL (ref 32–36)
MCV RBC AUTO: 89 FL (ref 82–98)
MICROALBUMIN UR DL<=1MG/L-MCNC: 16 UG/ML
MONOCYTES # BLD AUTO: 0.6 K/UL (ref 0.3–1)
MONOCYTES NFR BLD: 6 % (ref 4–15)
NEUTROPHILS # BLD AUTO: 7.8 K/UL (ref 1.8–7.7)
NEUTROPHILS NFR BLD: 75.5 % (ref 38–73)
NONHDLC SERPL-MCNC: 109 MG/DL
NRBC BLD-RTO: 0 /100 WBC
PLATELET # BLD AUTO: 263 K/UL (ref 150–450)
PMV BLD AUTO: 10.3 FL (ref 9.2–12.9)
POTASSIUM SERPL-SCNC: 4.5 MMOL/L (ref 3.5–5.1)
PROT SERPL-MCNC: 7.3 G/DL (ref 6–8.4)
RBC # BLD AUTO: 4.8 M/UL (ref 4–5.4)
SODIUM SERPL-SCNC: 143 MMOL/L (ref 136–145)
TRIGL SERPL-MCNC: 82 MG/DL (ref 30–150)
TSH SERPL DL<=0.005 MIU/L-ACNC: 2.68 UIU/ML (ref 0.4–4)
UUN UR-MCNC: 15 MG/DL (ref 7–17)
WBC # BLD AUTO: 10.32 K/UL (ref 3.9–12.7)

## 2023-07-17 PROCEDURE — 1159F PR MEDICATION LIST DOCUMENTED IN MEDICAL RECORD: ICD-10-PCS | Mod: CPTII,S$GLB,, | Performed by: FAMILY MEDICINE

## 2023-07-17 PROCEDURE — 80061 LIPID PANEL: CPT | Performed by: FAMILY MEDICINE

## 2023-07-17 PROCEDURE — 82570 ASSAY OF URINE CREATININE: CPT | Mod: PO | Performed by: FAMILY MEDICINE

## 2023-07-17 PROCEDURE — 3078F PR MOST RECENT DIASTOLIC BLOOD PRESSURE < 80 MM HG: ICD-10-PCS | Mod: CPTII,S$GLB,, | Performed by: FAMILY MEDICINE

## 2023-07-17 PROCEDURE — 1126F PR PAIN SEVERITY QUANTIFIED, NO PAIN PRESENT: ICD-10-PCS | Mod: CPTII,S$GLB,, | Performed by: FAMILY MEDICINE

## 2023-07-17 PROCEDURE — 84443 ASSAY THYROID STIM HORMONE: CPT | Mod: PO | Performed by: FAMILY MEDICINE

## 2023-07-17 PROCEDURE — 1101F PT FALLS ASSESS-DOCD LE1/YR: CPT | Mod: CPTII,S$GLB,, | Performed by: FAMILY MEDICINE

## 2023-07-17 PROCEDURE — 1101F PR PT FALLS ASSESS DOC 0-1 FALLS W/OUT INJ PAST YR: ICD-10-PCS | Mod: CPTII,S$GLB,, | Performed by: FAMILY MEDICINE

## 2023-07-17 PROCEDURE — 1126F AMNT PAIN NOTED NONE PRSNT: CPT | Mod: CPTII,S$GLB,, | Performed by: FAMILY MEDICINE

## 2023-07-17 PROCEDURE — 99214 OFFICE O/P EST MOD 30 MIN: CPT | Mod: S$GLB,,, | Performed by: FAMILY MEDICINE

## 2023-07-17 PROCEDURE — 1159F MED LIST DOCD IN RCRD: CPT | Mod: CPTII,S$GLB,, | Performed by: FAMILY MEDICINE

## 2023-07-17 PROCEDURE — 3077F PR MOST RECENT SYSTOLIC BLOOD PRESSURE >= 140 MM HG: ICD-10-PCS | Mod: CPTII,S$GLB,, | Performed by: FAMILY MEDICINE

## 2023-07-17 PROCEDURE — 36415 COLL VENOUS BLD VENIPUNCTURE: CPT | Mod: PO | Performed by: FAMILY MEDICINE

## 2023-07-17 PROCEDURE — 3077F SYST BP >= 140 MM HG: CPT | Mod: CPTII,S$GLB,, | Performed by: FAMILY MEDICINE

## 2023-07-17 PROCEDURE — 80053 COMPREHEN METABOLIC PANEL: CPT | Mod: PO | Performed by: FAMILY MEDICINE

## 2023-07-17 PROCEDURE — 3078F DIAST BP <80 MM HG: CPT | Mod: CPTII,S$GLB,, | Performed by: FAMILY MEDICINE

## 2023-07-17 PROCEDURE — 3288F FALL RISK ASSESSMENT DOCD: CPT | Mod: CPTII,S$GLB,, | Performed by: FAMILY MEDICINE

## 2023-07-17 PROCEDURE — 1160F PR REVIEW ALL MEDS BY PRESCRIBER/CLIN PHARMACIST DOCUMENTED: ICD-10-PCS | Mod: CPTII,S$GLB,, | Performed by: FAMILY MEDICINE

## 2023-07-17 PROCEDURE — 99214 PR OFFICE/OUTPT VISIT, EST, LEVL IV, 30-39 MIN: ICD-10-PCS | Mod: S$GLB,,, | Performed by: FAMILY MEDICINE

## 2023-07-17 PROCEDURE — 85025 COMPLETE CBC W/AUTO DIFF WBC: CPT | Mod: PO | Performed by: FAMILY MEDICINE

## 2023-07-17 PROCEDURE — 1160F RVW MEDS BY RX/DR IN RCRD: CPT | Mod: CPTII,S$GLB,, | Performed by: FAMILY MEDICINE

## 2023-07-17 PROCEDURE — 3288F PR FALLS RISK ASSESSMENT DOCUMENTED: ICD-10-PCS | Mod: CPTII,S$GLB,, | Performed by: FAMILY MEDICINE

## 2023-07-17 RX ORDER — SEMAGLUTIDE 1.34 MG/ML
0.25 INJECTION, SOLUTION SUBCUTANEOUS
Qty: 1 EACH | Refills: 5 | Status: SHIPPED | OUTPATIENT
Start: 2023-07-17 | End: 2023-12-14

## 2023-07-17 NOTE — PROGRESS NOTES
"Subjective:      Patient ID: Shirley F Gilford is a 82 y.o. female.    Chief Complaint: Follow-up (6 month )      Vitals:    07/17/23 1104   BP: (!) 142/78   Pulse: 80   Temp: 97.7 °F (36.5 °C)   SpO2: (!) 85%   Weight: 95.5 kg (210 lb 10.4 oz)   Height: 5' 3" (1.6 m)        HPI   Check up below dx; wants forms to be DNR; [place] scanned in sighed, disccused with her  Lost 13 pounds since Christmas  Poor appetite  No pain, no N, V; BM normal  Bp down  Wants portable concentrator for leaving apt  A1c 5.7    Problem List  Patient Active Problem List   Diagnosis    Osteoporosis    History of hypertension    History of hyperlipidemia    Restless leg syndrome    Elevated LFTs    Chronic obstructive pulmonary disease    Fatty liver    Iron deficiency    Cellulitis of left lower extremity    Hyperlipidemia    Vitamin D deficiency    Pulmonary hypertension    Panniculitis    Chronic pain of right knee    Diastolic dysfunction    Left atrial enlargement    Cardiac mass    Hyperglycemia    BMI 40.0-44.9, adult    Protein-calorie malnutrition, unspecified severity    Morbid (severe) obesity due to excess calories        ALLERGIES:   Review of patient's allergies indicates:   Allergen Reactions    Covid-19vacc,(astrazeneca)(pf)      Fever, headache, weak    Contrast media Swelling    Gabapentin Nausea And Vomiting       MEDS:   Current Outpatient Medications:     albuterol (PROVENTIL/VENTOLIN HFA) 90 mcg/actuation inhaler, INHALE 2 PUFFS INTO THE LUNGS EVERY 6 HOURS AS NEEDED FOR WHEEZING, Disp: 20.1 g, Rfl: 11    alendronate (FOSAMAX) 70 MG tablet, TAKE 1 TABLET BY MOUTH EVERY 7 DAYS, Disp: 12 tablet, Rfl: 3    atorvastatin (LIPITOR) 10 MG tablet, TAKE 1 TABLET BY MOUTH ONCE A DAY, Disp: 90 tablet, Rfl: 3    calcium-vitamin D3 (OS-MIKE 500 + D3) 500 mg-5 mcg (200 unit) per tablet, Take 1 tablet by mouth once daily., Disp: , Rfl:     fluticasone propionate (FLONASE) 50 mcg/actuation nasal spray, 2 sprays (100 mcg total) by Each " Nare route once daily., Disp: 1 Bottle, Rfl: 12    furosemide (LASIX) 40 MG tablet, Take 1 tablet (40 mg total) by mouth 2 (two) times a day., Disp: 60 tablet, Rfl: 3    isosorbide mononitrate (IMDUR) 60 MG 24 hr tablet, TAKE 1 TABLET BY MOUTH EVERY DAY FOR HEART AND BLOOD PRESSURE, Disp: 90 tablet, Rfl: 3    loratadine (CLARITIN) 10 mg tablet, Take 1 tablet (10 mg total) by mouth once daily., Disp: 90 tablet, Rfl: 3    losartan (COZAAR) 50 MG tablet, TAKE 1 TABLET BY MOUTH ONCE A DAY FOR BLOOD PRESSURE, Disp: 90 tablet, Rfl: 3    meloxicam (MOBIC) 7.5 MG tablet, TAKE 1 TABLET BY MOUTH ONCE DAILY, Disp: 90 tablet, Rfl: 1    montelukast (SINGULAIR) 10 mg tablet, TAKE 1 TABLET BY MOUTH EVERY EVENING, Disp: 90 tablet, Rfl: 3    MULTIVIT-IRON-MIN-FOLIC ACID 3,500-18-0.4 UNIT-MG-MG ORAL CHEW, Take by mouth., Disp: , Rfl:     pantoprazole (PROTONIX) 40 MG tablet, Take 1 tablet (40 mg total) by mouth once daily., Disp: 30 tablet, Rfl: 11    rOPINIRole (REQUIP) 3 MG tablet, TAKE 1 TABLET BY MOUTH NIGHTLY, Disp: 90 tablet, Rfl: 3    semaglutide (OZEMPIC) 0.25 mg or 0.5 mg(2 mg/1.5 mL) pen injector, Inject 0.25 mg into the skin every 7 days., Disp: 1 pen, Rfl: 5    tiotropium (SPIRIVA) 18 mcg inhalation capsule, Inhale 1 capsule (18 mcg total) into the lungs once daily. Controller, Disp: 30 capsule, Rfl: 11    TRELEGY ELLIPTA 100-62.5-25 mcg DsDv, INHALE 1 PUFF BY MOUTH INTO THE LUNGS ONCE A DAY, Disp: 180 each, Rfl: 3    cephALEXin (KEFLEX) 500 MG capsule, TAKE 1 CAPSULE BY MOUTH EVERY 8 HOURS FOR INFECTION (Patient not taking: Reported on 7/17/2023), Disp: 30 capsule, Rfl: 0    cholecalciferol, vitamin D3, (VITAMIN D3) 5,000 unit Tab, Take 5,000 Units by mouth once daily. (Patient not taking: Reported on 1/25/2022), Disp: 90 tablet, Rfl: 3    cyanocobalamin 500 MCG tablet, Take 500 mcg by mouth once daily., Disp: , Rfl:     ferrous sulfate 324 mg (65 mg iron) TbEC, Take 1 tablet (324 mg total) by mouth once daily. (Patient  not taking: Reported on 7/17/2023), Disp: , Rfl: 0  No current facility-administered medications for this visit.    Facility-Administered Medications Ordered in Other Visits:     sodium chloride 0.9% flush 10 mL, 10 mL, Intravenous, PRN, Elodia Zepeda MD      History:  Current Providers as of 7/17/2023  PCP: Marc Sands MD  Care Team Provider: Mahamed Kidd MD  Care Team Provider: Alessandro Titus Jr., MD  Care Team Provider: Isabel Griffin MD  Care Team Provider: Malu Camarillo LPN  Encounter Provider: Marc Sands MD, starting on Mon Jul 17, 2023 12:00 AM  Referring Provider: not found, starting on Mon Jul 17, 2023 12:00 AM  Consulting Physician: Marc Sands MD, starting on Mon Jul 17, 2023 10:57 AM (Active)   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   HENT: Negative.     Respiratory: Negative.     Cardiovascular: Negative.    Gastrointestinal: Negative.    Endocrine: Negative.    Genitourinary: Negative.    Musculoskeletal: Negative.    Psychiatric/Behavioral: Negative.     All other systems reviewed and are negative.  Objective:     Physical Exam  Vitals and nursing note reviewed.   Constitutional:       Appearance: She is well-developed.   HENT:      Head: Normocephalic.   Eyes:      Conjunctiva/sclera: Conjunctivae normal.       Pupils: Pupils are equal, round, and reactive to light.   Cardiovascular:      Rate and Rhythm: Normal rate and regular rhythm.      Heart sounds: Normal heart sounds.   Pulmonary:      Effort: Pulmonary effort is normal.      Breath sounds: Normal breath sounds.   Musculoskeletal:         General: Normal range of motion.      Cervical back: Normal range of motion and neck supple.   Skin:     General: Skin is warm and dry.   Neurological:      Mental Status: She is alert and oriented to person, place, and time.      Deep Tendon Reflexes: Reflexes are normal and symmetric.   Psychiatric:         Behavior: Behavior normal.         Thought Content: Thought content normal.         Judgment: Judgment normal.           Assessment:     1. Protein-calorie malnutrition, unspecified severity    2. Morbid (severe) obesity due to excess calories    3. Pulmonary hypertension    4. Chronic obstructive pulmonary disease, unspecified COPD type      Plan:        Medication List            Accurate as of July 17, 2023 11:44 AM. If you have any questions, ask your nurse or doctor.                CONTINUE taking these medications      albuterol 90 mcg/actuation inhaler  Commonly known as: PROVENTIL/VENTOLIN HFA  INHALE 2 PUFFS INTO THE LUNGS EVERY 6 HOURS AS NEEDED FOR WHEEZING     alendronate 70 MG tablet  Commonly known as: FOSAMAX  TAKE 1 TABLET BY MOUTH EVERY 7 DAYS     atorvastatin 10 MG tablet  Commonly known as: LIPITOR  TAKE 1 TABLET BY MOUTH ONCE A DAY     calcium-vitamin D3 500 mg-5 mcg (200 unit) per tablet  Commonly known as: OS-MIKE 500 + D3     cephALEXin 500 MG capsule  Commonly known as: KEFLEX  TAKE 1 CAPSULE BY MOUTH EVERY 8 HOURS FOR INFECTION     cholecalciferol (vitamin D3) 125 mcg (5,000 unit) Tab  Commonly known as: VITAMIN D3  Take 5,000 Units by mouth once daily.     cyanocobalamin 500 MCG tablet     ferrous sulfate 324 mg (65 mg iron) Tbec  Take 1 tablet (324 mg total) by mouth once daily.     fluticasone  propionate 50 mcg/actuation nasal spray  Commonly known as: FLONASE  2 sprays (100 mcg total) by Each Nare route once daily.     furosemide 40 MG tablet  Commonly known as: LASIX  Take 1 tablet (40 mg total) by mouth 2 (two) times a day.     isosorbide mononitrate 60 MG 24 hr tablet  Commonly known as: IMDUR  TAKE 1 TABLET BY MOUTH EVERY DAY FOR HEART AND BLOOD PRESSURE     loratadine 10 mg tablet  Commonly known as: CLARITIN  Take 1 tablet (10 mg total) by mouth once daily.     losartan 50 MG tablet  Commonly known as: COZAAR  TAKE 1 TABLET BY MOUTH ONCE A DAY FOR BLOOD PRESSURE     meloxicam 7.5 MG tablet  Commonly known as: MOBIC  TAKE 1 TABLET BY MOUTH ONCE DAILY     montelukast 10 mg tablet  Commonly known as: SINGULAIR  TAKE 1 TABLET BY MOUTH EVERY EVENING     multivit-iron-min-folic acid 3,500-18-0.4 unit-mg-mg Chew     OZEMPIC 0.25 mg or 0.5 mg(2 mg/1.5 mL) pen injector  Generic drug: semaglutide  Inject 0.25 mg into the skin every 7 days.     pantoprazole 40 MG tablet  Commonly known as: PROTONIX  Take 1 tablet (40 mg total) by mouth once daily.     rOPINIRole 3 MG tablet  Commonly known as: REQUIP  TAKE 1 TABLET BY MOUTH NIGHTLY     tiotropium 18 mcg inhalation capsule  Commonly known as: SPIRIVA  Inhale 1 capsule (18 mcg total) into the lungs once daily. Controller     TRELEGY ELLIPTA 100-62.5-25 mcg Dsdv  Generic drug: fluticasone-umeclidin-vilanter  INHALE 1 PUFF BY MOUTH INTO THE LUNGS ONCE A DAY            Protein-calorie malnutrition, unspecified severity    Morbid (severe) obesity due to excess calories    Pulmonary hypertension    Chronic obstructive pulmonary disease, unspecified COPD type      [employer] comleted; see media

## 2023-07-18 ENCOUNTER — TELEPHONE (OUTPATIENT)
Dept: FAMILY MEDICINE | Facility: CLINIC | Age: 83
End: 2023-07-18
Payer: MEDICARE

## 2023-07-18 NOTE — TELEPHONE ENCOUNTER
Faxed medical necessity, orders & office notes to [facility] for home oxygen

## 2023-07-31 DIAGNOSIS — M81.0 OSTEOPOROSIS, UNSPECIFIED OSTEOPOROSIS TYPE, UNSPECIFIED PATHOLOGICAL FRACTURE PRESENCE: ICD-10-CM

## 2023-08-01 RX ORDER — ALENDRONATE SODIUM 70 MG/1
TABLET ORAL
Qty: 12 TABLET | Refills: 3 | Status: ON HOLD | OUTPATIENT
Start: 2023-08-01 | End: 2023-12-15 | Stop reason: HOSPADM

## 2023-08-30 RX ORDER — ISOSORBIDE MONONITRATE 60 MG/1
TABLET, EXTENDED RELEASE ORAL
Qty: 90 TABLET | Refills: 3 | Status: ON HOLD | OUTPATIENT
Start: 2023-08-30 | End: 2023-12-15 | Stop reason: HOSPADM

## 2023-08-30 NOTE — TELEPHONE ENCOUNTER
Care Due:                  Date            Visit Type   Department     Provider  --------------------------------------------------------------------------------                                EP -                              PRIMARY      [facility]  Last Visit: 07-      CARE ([facility])   BYRON Sands                               -                              [facility]  Next Visit: 01-      CARE ([facility])   BYRON Sands                                                            Last  Test          Frequency    Reason                     Performed    Due Date  --------------------------------------------------------------------------------    HBA1C.......  6 months...  semaglutide..............  05- 11-    [facility] Embedded Care Due Messages. Reference number: [id number].   8/30/2023 8:43:22 AM CDT

## 2023-10-04 NOTE — TELEPHONE ENCOUNTER
Refill Routing Note   Medication(s) are not appropriate for processing by [facility] for the following reason(s):      No active prescription written by provider    [facility] action(s):  Defer Care Due:  None identified            Appointments  past 12m or future 3m with PCP    Date Provider   Last Visit   7/17/2023 Marc Sands MD   Next Visit   1/18/2024 Marc Sands MD   ED visits in past 90 days: 0        Note composed:10:57 AM 10/04/2023

## 2023-10-04 NOTE — TELEPHONE ENCOUNTER
No care due was identified.  Health [facility] Embedded Care Due Messages. Reference number: [id number].   10/04/2023 9:54:56 AM CDT

## 2023-10-05 RX ORDER — TIOTROPIUM BROMIDE 18 UG/1
CAPSULE ORAL; RESPIRATORY (INHALATION)
Qty: 90 CAPSULE | Refills: 3 | Status: SHIPPED | OUTPATIENT
Start: 2023-10-05 | End: 2024-02-15 | Stop reason: SDUPTHER

## 2023-10-17 ENCOUNTER — OFFICE VISIT (OUTPATIENT)
Dept: FAMILY MEDICINE | Facility: CLINIC | Age: 83
End: 2023-10-17
Payer: MEDICARE

## 2023-10-17 VITALS
OXYGEN SATURATION: 90 % | DIASTOLIC BLOOD PRESSURE: 88 MMHG | BODY MASS INDEX: 36.72 KG/M2 | SYSTOLIC BLOOD PRESSURE: 128 MMHG | HEIGHT: 63 IN | WEIGHT: 207.25 LBS | HEART RATE: 78 BPM

## 2023-10-17 DIAGNOSIS — M54.9 MUSCULOSKELETAL BACK PAIN: Primary | ICD-10-CM

## 2023-10-17 PROCEDURE — 3079F PR MOST RECENT DIASTOLIC BLOOD PRESSURE 80-89 MM HG: ICD-10-PCS | Mod: CPTII,S$GLB,, | Performed by: STUDENT IN AN ORGANIZED HEALTH CARE EDUCATION/TRAINING PROGRAM

## 2023-10-17 PROCEDURE — 1101F PT FALLS ASSESS-DOCD LE1/YR: CPT | Mod: CPTII,S$GLB,, | Performed by: STUDENT IN AN ORGANIZED HEALTH CARE EDUCATION/TRAINING PROGRAM

## 2023-10-17 PROCEDURE — 1126F PR PAIN SEVERITY QUANTIFIED, NO PAIN PRESENT: ICD-10-PCS | Mod: CPTII,S$GLB,, | Performed by: STUDENT IN AN ORGANIZED HEALTH CARE EDUCATION/TRAINING PROGRAM

## 2023-10-17 PROCEDURE — 3079F DIAST BP 80-89 MM HG: CPT | Mod: CPTII,S$GLB,, | Performed by: STUDENT IN AN ORGANIZED HEALTH CARE EDUCATION/TRAINING PROGRAM

## 2023-10-17 PROCEDURE — 1159F PR MEDICATION LIST DOCUMENTED IN MEDICAL RECORD: ICD-10-PCS | Mod: CPTII,S$GLB,, | Performed by: STUDENT IN AN ORGANIZED HEALTH CARE EDUCATION/TRAINING PROGRAM

## 2023-10-17 PROCEDURE — 99214 OFFICE O/P EST MOD 30 MIN: CPT | Mod: S$GLB,,, | Performed by: STUDENT IN AN ORGANIZED HEALTH CARE EDUCATION/TRAINING PROGRAM

## 2023-10-17 PROCEDURE — 1101F PR PT FALLS ASSESS DOC 0-1 FALLS W/OUT INJ PAST YR: ICD-10-PCS | Mod: CPTII,S$GLB,, | Performed by: STUDENT IN AN ORGANIZED HEALTH CARE EDUCATION/TRAINING PROGRAM

## 2023-10-17 PROCEDURE — 1160F PR REVIEW ALL MEDS BY PRESCRIBER/CLIN PHARMACIST DOCUMENTED: ICD-10-PCS | Mod: CPTII,S$GLB,, | Performed by: STUDENT IN AN ORGANIZED HEALTH CARE EDUCATION/TRAINING PROGRAM

## 2023-10-17 PROCEDURE — 3288F FALL RISK ASSESSMENT DOCD: CPT | Mod: CPTII,S$GLB,, | Performed by: STUDENT IN AN ORGANIZED HEALTH CARE EDUCATION/TRAINING PROGRAM

## 2023-10-17 PROCEDURE — 3074F PR MOST RECENT SYSTOLIC BLOOD PRESSURE < 130 MM HG: ICD-10-PCS | Mod: CPTII,S$GLB,, | Performed by: STUDENT IN AN ORGANIZED HEALTH CARE EDUCATION/TRAINING PROGRAM

## 2023-10-17 PROCEDURE — 3074F SYST BP LT 130 MM HG: CPT | Mod: CPTII,S$GLB,, | Performed by: STUDENT IN AN ORGANIZED HEALTH CARE EDUCATION/TRAINING PROGRAM

## 2023-10-17 PROCEDURE — 3288F PR FALLS RISK ASSESSMENT DOCUMENTED: ICD-10-PCS | Mod: CPTII,S$GLB,, | Performed by: STUDENT IN AN ORGANIZED HEALTH CARE EDUCATION/TRAINING PROGRAM

## 2023-10-17 PROCEDURE — 99214 PR OFFICE/OUTPT VISIT, EST, LEVL IV, 30-39 MIN: ICD-10-PCS | Mod: S$GLB,,, | Performed by: STUDENT IN AN ORGANIZED HEALTH CARE EDUCATION/TRAINING PROGRAM

## 2023-10-17 PROCEDURE — 1159F MED LIST DOCD IN RCRD: CPT | Mod: CPTII,S$GLB,, | Performed by: STUDENT IN AN ORGANIZED HEALTH CARE EDUCATION/TRAINING PROGRAM

## 2023-10-17 PROCEDURE — 1126F AMNT PAIN NOTED NONE PRSNT: CPT | Mod: CPTII,S$GLB,, | Performed by: STUDENT IN AN ORGANIZED HEALTH CARE EDUCATION/TRAINING PROGRAM

## 2023-10-17 PROCEDURE — 1160F RVW MEDS BY RX/DR IN RCRD: CPT | Mod: CPTII,S$GLB,, | Performed by: STUDENT IN AN ORGANIZED HEALTH CARE EDUCATION/TRAINING PROGRAM

## 2023-10-17 RX ORDER — METHOCARBAMOL 500 MG/1
500 TABLET, FILM COATED ORAL 3 TIMES DAILY PRN
Qty: 30 TABLET | Refills: 0 | Status: SHIPPED | OUTPATIENT
Start: 2023-10-17 | End: 2023-10-27

## 2023-10-20 ENCOUNTER — TELEPHONE (OUTPATIENT)
Dept: FAMILY MEDICINE | Facility: CLINIC | Age: 83
End: 2023-10-20
Payer: MEDICARE

## 2023-10-20 RX ORDER — TRAMADOL HYDROCHLORIDE 50 MG/1
50 TABLET ORAL EVERY 8 HOURS PRN
Qty: 14 TABLET | Refills: 0 | Status: SHIPPED | OUTPATIENT
Start: 2023-10-20 | End: 2023-12-14 | Stop reason: CLARIF

## 2023-10-20 NOTE — TELEPHONE ENCOUNTER
----- Message from Lala Bustamante sent at 10/20/2023  9:33 AM CDT -----  Regarding: advice  Contact: [phone]  Type:  Needs Medical Advice    Who Called:  pt   Symptoms (please be specific):  the pain in the left side of her back is worse and now going down her leg.  The rx is not helping the pain when she walks   How long has patient had these symptoms:  6 days   Pharmacy name and phone #:   [id number] [address]  Would the patient rather a call back or a response via [facility]?  Call   Best Call Back Number:  [phone]  Additional Information:        Pt requesting stronger Rx for pain. What do you suggest

## 2023-10-24 ENCOUNTER — TELEPHONE (OUTPATIENT)
Dept: FAMILY MEDICINE | Facility: CLINIC | Age: 83
End: 2023-10-24
Payer: MEDICARE

## 2023-10-24 NOTE — TELEPHONE ENCOUNTER
"----- Message from Raquel Benz sent at 10/24/2023  8:56 AM CDT -----  Needs advice from nurse:      Who Called:pt  Regarding:was seen on 10/17 for back pain/states pain is worse and now affecting legs/states she can barely walk/meds are not helping/when she puts weight on left leg "I see stars".   Would the patient rather a call back or VIA [facility]?  Best Call Back number:[phone]  Additional Info:[id number] - [place], [place] - [address] [place] [address] (Ph: [phone])      "

## 2023-10-25 ENCOUNTER — TELEPHONE (OUTPATIENT)
Dept: FAMILY MEDICINE | Facility: CLINIC | Age: 83
End: 2023-10-25
Payer: MEDICARE

## 2023-10-25 RX ORDER — METHYLPREDNISOLONE 4 MG/1
TABLET ORAL
Qty: 1 EACH | Refills: 0 | Status: SHIPPED | OUTPATIENT
Start: 2023-10-25 | End: 2023-12-14

## 2023-10-25 RX ORDER — AMITRIPTYLINE HYDROCHLORIDE 10 MG/1
10 TABLET, FILM COATED ORAL 2 TIMES DAILY
Qty: 30 TABLET | Refills: 0 | Status: ON HOLD | OUTPATIENT
Start: 2023-10-25 | End: 2023-12-15 | Stop reason: HOSPADM

## 2023-10-25 RX ORDER — HYDROCODONE BITARTRATE AND ACETAMINOPHEN 5; 325 MG/1; MG/1
1 TABLET ORAL EVERY 12 HOURS PRN
Qty: 20 TABLET | Refills: 0 | Status: SHIPPED | OUTPATIENT
Start: 2023-10-25 | End: 2023-11-09 | Stop reason: SDUPTHER

## 2023-10-25 NOTE — TELEPHONE ENCOUNTER
----- Message from Audra Mcdermott sent at 10/25/2023 10:26 AM CDT -----  Symptom: Back Pain - Not From Injury  Outcome: Instruct patient to Call 911 NOW!  Reason: Age over 30: sudden AND severe upper back pain

## 2023-11-02 ENCOUNTER — TELEPHONE (OUTPATIENT)
Dept: FAMILY MEDICINE | Facility: CLINIC | Age: 83
End: 2023-11-02
Payer: MEDICARE

## 2023-11-02 NOTE — TELEPHONE ENCOUNTER
VIOLET: Spoke to patient regarding leg pain. Pt stated the HYDROcodone-acetaminophen is no longer working, pt can only stand for a few minutes at a time, pt is getting no relief when seated, feels a fire like pain that shots through her legs while sitting. Reports that pain is increasing and getting worse.     Advised pt to go to the ER for immediate assistance . Pt verbally understood and stated she will go sometime today.

## 2023-11-02 NOTE — TELEPHONE ENCOUNTER
----- Message from Joe Dsouza sent at 11/2/2023  9:44 AM CDT -----  .Type:  Needs Medical Advice    Who Called: pt    Would the patient rather a call back or a response via [facility]? Call back  Best Call Back Number: [phone]  Additional Information:     Pt stated her left leg is hurting really bad today and the medication that she is taking is not helping please call pt back

## 2023-11-04 ENCOUNTER — HOSPITAL ENCOUNTER (EMERGENCY)
Facility: HOSPITAL | Age: 83
Discharge: HOME | End: 2023-11-04
Attending: FAMILY MEDICINE
Payer: MEDICARE

## 2023-11-04 VITALS
DIASTOLIC BLOOD PRESSURE: 73 MMHG | WEIGHT: 207 LBS | BODY MASS INDEX: 36.67 KG/M2 | TEMPERATURE: 98 F | SYSTOLIC BLOOD PRESSURE: 173 MMHG | OXYGEN SATURATION: 92 % | RESPIRATION RATE: 18 BRPM | HEART RATE: 78 BPM

## 2023-11-04 DIAGNOSIS — M54.16 LUMBAR RADICULOPATHY: Primary | ICD-10-CM

## 2023-11-04 DIAGNOSIS — M25.552 LEFT HIP PAIN: ICD-10-CM

## 2023-11-04 DIAGNOSIS — I71.40 ABDOMINAL ANEURYSM: ICD-10-CM

## 2023-11-04 PROCEDURE — 96372 THER/PROPH/DIAG INJ SC/IM: CPT | Performed by: FAMILY MEDICINE

## 2023-11-04 PROCEDURE — 63600175 PHARM REV CODE 636 W HCPCS: Mod: ER | Performed by: FAMILY MEDICINE

## 2023-11-04 PROCEDURE — 99284 EMERGENCY DEPT VISIT MOD MDM: CPT | Mod: ER

## 2023-11-04 RX ORDER — MORPHINE SULFATE 4 MG/ML
4 INJECTION, SOLUTION INTRAMUSCULAR; INTRAVENOUS
Status: COMPLETED | OUTPATIENT
Start: 2023-11-04 | End: 2023-11-04

## 2023-11-04 RX ORDER — HYDROCODONE BITARTRATE AND ACETAMINOPHEN 5; 325 MG/1; MG/1
1 TABLET ORAL EVERY 8 HOURS PRN
Qty: 9 TABLET | Refills: 0 | Status: SHIPPED | OUTPATIENT
Start: 2023-11-04 | End: 2023-12-14 | Stop reason: SDUPTHER

## 2023-11-04 RX ADMIN — MORPHINE SULFATE 4 MG: 4 INJECTION INTRAVENOUS at 08:11

## 2023-11-04 NOTE — PROGRESS NOTES
Patient ID: Shirley F Gilford is a 83 y.o. female.     Chief Complaint: Back Pain    Back Pain  This is a new problem. The current episode started 1 to 4 weeks ago. The problem occurs daily. The problem is unchanged. The pain is present in the lumbar spine. The quality of the pain is described as aching. The pain does not radiate. The symptoms are aggravated by bending and position. Pertinent negatives include no bladder incontinence, bowel incontinence, chest pain, dysuria, fever, headaches, leg pain, numbness, paresis, paresthesias, tingling or weakness. She has tried NSAIDs for the symptoms.          Review of Systems  Review of Systems   Constitutional:  Negative for fever.   HENT:  Negative for ear pain and sinus pain.    Eyes:  Negative for discharge.   Respiratory:  Negative for cough and shortness of breath.    Cardiovascular:  Negative for chest pain and leg swelling.   Gastrointestinal:  Negative for bowel incontinence, diarrhea, nausea and vomiting.   Genitourinary:  Negative for bladder incontinence, dysuria and urgency.   Musculoskeletal:  Positive for back pain. Negative for myalgias.   Skin:  Negative for rash.   Neurological:  Negative for tingling, weakness, numbness, headaches and paresthesias.   Psychiatric/Behavioral:  Negative for depression.    All other systems reviewed and are negative.      Currently Medications  Current Outpatient Medications on File Prior to Visit   Medication Sig Dispense Refill    albuterol (PROVENTIL/VENTOLIN HFA) 90 mcg/actuation inhaler INHALE 2 PUFFS INTO THE LUNGS EVERY 6 HOURS AS NEEDED FOR WHEEZING 20.1 g 11    alendronate (FOSAMAX) 70 MG tablet TAKE 1 TABLET BY MOUTH EVERY 7 DAYS 12 tablet 3    atorvastatin (LIPITOR) 10 MG tablet TAKE 1 TABLET BY MOUTH ONCE A DAY 90 tablet 3    calcium-vitamin D3 (OS-MIKE 500 + D3) 500 mg-5 mcg (200 unit) per tablet Take 1 tablet by mouth once daily.      cyanocobalamin 500 MCG tablet Take 500 mcg by mouth once daily.       "fluticasone propionate (FLONASE) 50 mcg/actuation nasal spray 2 sprays (100 mcg total) by Each Nare route once daily. 1 Bottle 12    furosemide (LASIX) 40 MG tablet Take 1 tablet (40 mg total) by mouth 2 (two) times a day. 60 tablet 3    isosorbide mononitrate (IMDUR) 60 MG 24 hr tablet TAKE 1 TABLET BY MOUTH EVERY DAY FOR HEART AND BLOOD PRESSURE 90 tablet 3    loratadine (CLARITIN) 10 mg tablet Take 1 tablet (10 mg total) by mouth once daily. 90 tablet 3    losartan (COZAAR) 50 MG tablet TAKE 1 TABLET BY MOUTH ONCE A DAY FOR BLOOD PRESSURE 90 tablet 3    meloxicam (MOBIC) 7.5 MG tablet TAKE 1 TABLET BY MOUTH ONCE DAILY 90 tablet 1    montelukast (SINGULAIR) 10 mg tablet TAKE 1 TABLET BY MOUTH EVERY EVENING 90 tablet 3    MULTIVIT-IRON-MIN-FOLIC ACID 3,500-18-0.4 UNIT-MG-MG ORAL CHEW Take by mouth.      pantoprazole (PROTONIX) 40 MG tablet Take 1 tablet (40 mg total) by mouth once daily. 30 tablet 11    rOPINIRole (REQUIP) 3 MG tablet TAKE 1 TABLET BY MOUTH NIGHTLY 90 tablet 3    semaglutide (OZEMPIC) 0.25 mg or 0.5 mg(2 mg/1.5 mL) pen injector Inject 0.25 mg into the skin every 7 days. 1 each 5    SPIRIVA WITH HANDIHALER 18 mcg inhalation capsule INHALE THE CONTENTS OF 1 CAPSULE BY MOUTH INTO THE LUNGS ONCE A DAY 90 capsule 3    TRELEGY ELLIPTA 100-62.5-25 mcg DsDv INHALE 1 PUFF BY MOUTH INTO THE LUNGS ONCE A  each 3     Current Facility-Administered Medications on File Prior to Visit   Medication Dose Route Frequency Provider Last Rate Last Admin    sodium chloride 0.9% flush 10 mL  10 mL Intravenous PRN Elodia Zepeda MD           Physical  Exam  Vitals:    10/17/23 1312   BP: 128/88   BP Location: Left arm   Patient Position: Sitting   Pulse: 78   SpO2: (!) 90%   Weight: 94 kg (207 lb 3.7 oz)   Height: 5' 3" (1.6 m)      Body mass index is 36.71 kg/m².  Wt Readings from Last 3 Encounters:   11/04/23 93.9 kg (207 lb)   10/17/23 94 kg (207 lb 3.7 oz)   07/17/23 95.5 kg (210 lb 10.4 oz)       Physical " Exam  Vitals and nursing note reviewed.   Constitutional:       General: She is not in acute distress.     Appearance: She is not ill-appearing.   HENT:      Head: Normocephalic and atraumatic.      Right Ear: External ear normal.      Left Ear: External ear normal.      Nose: Nose normal.      Mouth/Throat:      Mouth: Mucous membranes are moist.   Eyes:      Extraocular Movements: Extraocular movements intact.      Conjunctiva/sclera: Conjunctivae normal.   Cardiovascular:      Rate and Rhythm: Normal rate and regular rhythm.      Pulses: Normal pulses.      Heart sounds: No murmur heard.  Pulmonary:      Effort: Pulmonary effort is normal. No respiratory distress.      Breath sounds: No wheezing.   Abdominal:      General: There is no distension.      Palpations: Abdomen is soft. There is no mass.      Tenderness: There is no abdominal tenderness.   Musculoskeletal:         General: No swelling.      Cervical back: Normal range of motion.   Skin:     Coloration: Skin is not jaundiced.      Findings: No rash.   Neurological:      General: No focal deficit present.      Mental Status: She is alert and oriented to person, place, and time.   Psychiatric:         Mood and Affect: Mood normal.         Thought Content: Thought content normal.         Labs:    Complete Blood Count  Lab Results   Component Value Date    RBC 4.80 07/17/2023    HGB 12.6 07/17/2023    HCT 42.6 07/17/2023    MCV 89 07/17/2023    MCH 26.3 (L) 07/17/2023    MCHC 29.6 (L) 07/17/2023    RDW 14.5 07/17/2023     07/17/2023    MPV 10.3 07/17/2023    GRAN 7.8 (H) 07/17/2023    GRAN 75.5 (H) 07/17/2023    LYMPH 1.6 07/17/2023    LYMPH 15.5 (L) 07/17/2023    MONO 0.6 07/17/2023    MONO 6.0 07/17/2023    EOS 0.2 07/17/2023    BASO 0.08 07/17/2023    EOSINOPHIL 1.8 07/17/2023    BASOPHIL 0.8 07/17/2023    DIFFMETHOD Automated 07/17/2023       Comprehensive Metabolic Panel  Lab Results   Component Value Date    GLU 97 07/17/2023    BUN 15  07/17/2023    CREATININE 0.78 07/17/2023     07/17/2023    K 4.5 07/17/2023     07/17/2023    PROT 7.3 07/17/2023    ALBUMIN 4.3 07/17/2023    BILITOT 0.4 07/17/2023    AST 31 07/17/2023    ALKPHOS 88 07/17/2023    CO2 27 07/17/2023    ALT 23 07/17/2023    ANIONGAP 9 07/17/2023       TSH  Lab Results   Component Value Date    TSH 2.680 07/17/2023       Imaging:  X-Ray Hips Bilateral 2 View Incl AP Pelvis  Narrative: EXAMINATION:  XR HIPS BILATERAL 2 VIEW INCL AP PELVIS    CLINICAL HISTORY:  Pain in left hip    COMPARISON:  None    FINDINGS:  There is no fracture. There is no dislocation.  There is mild narrowing of the joint space of both hips.  There are mild osteoarthritic changes in the pubic symphysis.  There is a mild amount of atherosclerosis.  Impression: 1. There is mild narrowing of the joint space of both hips.  2. There are mild osteoarthritic changes in the pubic symphysis.    Electronically signed by: Ciro Johnson MD  Date:    11/04/2023  Time:    10:20  X-Ray Lumbar Spine Ap And Lateral  Narrative: EXAMINATION:  XR LUMBAR SPINE AP AND LATERAL    CLINICAL HISTORY:  low back pain;    COMPARISON:  01/23/2018    FINDINGS:  There are 5 lumbar type vertebral bodies.  There is a mild amount of levoconvex curvature of the thoracolumbar spine.  There is no acute fracture visualized.  There is grade 1 anterolisthesis of L4 on L5.  There is normal lumbar lordosis.  There is a moderate amount of atherosclerosis.  There is an aneurysm of the abdominal aorta.  At the level of the L3 vertebral body it has an AP diameter of 4.8 cm.  Impression: 1. There is an aneurysm of the abdominal aorta. At the level of the L3 vertebral body it has an AP diameter of 4.8 cm.  2. There is a mild amount of levoconvex curvature of the thoracolumbar spine.  3. There is grade 1 anterolisthesis of L4 on L5.  4. The above findings and impressions were discussed with Dr. Key via the telephone at 10:10 on  11/04/2023.    Electronically signed by: Ciro Johnson MD  Date:    11/04/2023  Time:    10:12      Assessment/Plan:    1. Musculoskeletal back pain  -     methocarbamoL (ROBAXIN) 500 MG Tab; Take 1 tablet (500 mg total) by mouth 3 (three) times daily as needed (muscle pain).  Dispense: 30 tablet; Refill: 0         Discussed how to stay healthy including: diet, exercise, refraining from smoking and discussed screening exams / tests needed for age, sex and family Hx.        Darren Trimble MD

## 2023-11-04 NOTE — ED PROVIDER NOTES
Encounter Date: 11/4/2023       History     Chief Complaint   Patient presents with    Leg Pain     PT reports lower back pain that radiates down left hip and left leg since 10/16.PT reports pain has been slowly worsening     83-year-old female chronic lumbar pain radiating to left lower leg.  Has been out of control for last few days.  She is take NSAIDs, gabapentin, and also tramadol.  She was started on steroid Dosepak but still pain not getting better.  No tingling numbness or weakness.  No saddle anesthesia.  No bowel or bladder incontinence.  Patient claims she is fine as long she is sitting and laying in bed but once she starts walking her pain is exacerbated.  History of COPD, hypertension, hyperlipidemia, osteoporosis    The history is provided by the patient.     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    Hyperglycemia 1/26/2017    Hyperlipidemia     Hypertension     Osteoporosis     RLS (restless legs syndrome)      Past Surgical History:   Procedure Laterality Date    BLADDER SURGERY      cancer Mahamed Kidd    BREAST SURGERY      left benign tumor    COLONOSCOPY      EYE SURGERY      cataracts    TONSILLECTOMY       Family History   Problem Relation Age of Onset    Cancer Daughter 40        colon    Colon cancer Daughter     No Known Problems Daughter     Diabetes Mother     Stroke Mother     Heart disease Father      Social History     Tobacco Use    Smoking status: Former     Passive exposure: Past    Smokeless tobacco: Never   Substance Use Topics    Alcohol use: No    Drug use: Never     Review of Systems   Constitutional:  Negative for fever.   HENT:  Negative for sore throat.    Respiratory:  Negative for  shortness of breath.    Cardiovascular:  Negative for chest pain.   Gastrointestinal:  Negative for nausea.   Genitourinary:  Negative for dysuria.   Musculoskeletal:  Positive for arthralgias and back pain.   Skin:  Negative for rash.   Neurological:  Negative for weakness.   Hematological:  Does not bruise/bleed easily.   All other systems reviewed and are negative.      Physical Exam     Initial Vitals [11/04/23 0758]   BP Pulse Resp Temp SpO2   (!) 191/77 88 20 98 °F (36.7 °C) (!) 94 %      MAP       --         Physical Exam    Nursing note and vitals reviewed.  Constitutional: Vital signs are normal. She appears well-developed and well-nourished. She is active. No distress.   HENT:   Head: Normocephalic.   Nose: Nose normal.   Mouth/Throat: Oropharynx is clear and moist and mucous membranes are normal.   Eyes: Conjunctivae, EOM and lids are normal.   Neck: Neck supple.   Normal range of motion.  Cardiovascular:  Normal rate, regular rhythm, S1 normal, S2 normal and normal heart sounds.           Pulmonary/Chest: Breath sounds normal. No respiratory distress. She has no wheezes. She has no rhonchi. She has no rales.   Abdominal: Abdomen is soft. Bowel sounds are normal. She exhibits no distension. There is no abdominal tenderness. There is no rebound and no guarding.   Musculoskeletal:         General: Normal range of motion.      Right upper arm: Normal.      Left upper arm: Normal.      Cervical back: Normal range of motion and neck supple.        Back:       Right lower leg: Normal.      Left lower leg: Normal.      Comments: Lateral lumbar pain radiating to left gluteal and left posterior thigh area.  No tingling numbness weakness in left lower leg.  Normal sensation in gluteal area.  SLR positive at 60°.  DTR 2+.  Plantar reflex normal.       Neurological: She is alert and oriented to person, place, and time. She has normal strength. She displays normal reflexes. No cranial nerve deficit or sensory deficit.  GCS score is 15. GCS eye subscore is 4. GCS verbal subscore is 5. GCS motor subscore is 6.   Skin: Skin is warm. Capillary refill takes less than 2 seconds.   Psychiatric: She has a normal mood and affect. Her speech is normal and behavior is normal. Thought content normal. Cognition and memory are normal.         ED Course   Procedures  Labs Reviewed - No data to display       Imaging Results              X-Ray Hips Bilateral 2 View Incl AP Pelvis (Final result)  Result time 11/04/23 10:20:28      Final result by NISSA Johnson Sr., MD (11/04/23 10:20:28)                   Impression:      1. There is mild narrowing of the joint space of both hips.  2. There are mild osteoarthritic changes in the pubic symphysis.      Electronically signed by: Ciro Johnson MD  Date:    11/04/2023  Time:    10:20               Narrative:    EXAMINATION:  XR HIPS BILATERAL 2 VIEW INCL AP PELVIS    CLINICAL HISTORY:  Pain in left hip    COMPARISON:  None    FINDINGS:  There is no fracture. There is no dislocation.  There is mild narrowing of the joint space of both hips.  There are mild osteoarthritic changes in the pubic symphysis.  There is a mild amount of atherosclerosis.                                       X-Ray Lumbar Spine Ap And Lateral (Final result)  Result time 11/04/23 10:12:21      Final result by NISSA Johnson Sr., MD (11/04/23 10:12:21)                   Impression:      1. There is an aneurysm of the abdominal aorta. At the level of the L3 vertebral body it has an AP diameter of 4.8 cm.  2. There is a mild amount of levoconvex curvature of the thoracolumbar spine.  3. There is grade 1 anterolisthesis of L4 on L5.  4. The above findings and impressions were discussed with Dr. Key via the telephone at 10:10 on 11/04/2023.      Electronically signed by: Ciro Johnson MD  Date:    11/04/2023  Time:    10:12               Narrative:    EXAMINATION:  XR LUMBAR SPINE AP AND LATERAL    CLINICAL  HISTORY:  low back pain;    COMPARISON:  01/23/2018    FINDINGS:  There are 5 lumbar type vertebral bodies.  There is a mild amount of levoconvex curvature of the thoracolumbar spine.  There is no acute fracture visualized.  There is grade 1 anterolisthesis of L4 on L5.  There is normal lumbar lordosis.  There is a moderate amount of atherosclerosis.  There is an aneurysm of the abdominal aorta.  At the level of the L3 vertebral body it has an AP diameter of 4.8 cm.                                       Medications   morphine injection 4 mg (4 mg Intramuscular Given 11/4/23 0826)     Medical Decision Making  83-year-old female chronic lumbar pain radiating to left lower leg.- neurovascular intact in left lower leg.  No saddle anesthesia.    Differential diagnosis include and not limited to- lumbar radiculopathy/sciatica/degenerative, arthritis, diskitis, spinal stenosis, lumbar fracture, muscle strain, tendinitis, sacroiliitis, bursitis.    Negative SLR and normal neurological examination with DTR 2+ and plantars normal.  Patient has used Ultram, NSAIDs, s teroid.  But still not getting better.  X-rays of lumbar and pelvic= DJD, abdominal aneurysm.  Calcific  On review of old ultrasound she had 3 cm abdominal aneurysm in 2014.  Rates and incidental finding to 4.8 cm on x-ray.  She does not have acute abdominal pain.  She needs further dedicated ultrasound for aneurysm.    Pain improved with morphine in ED. prescription for hydrocodone if needed for severe pain.  Follow up with primary care physician.  MRI is scheduled.  ED with worsening symptoms.      Amount and/or Complexity of Data Reviewed  External Data Reviewed: radiology and notes.     Details: On review of old ultrasound she had 3 cm abdominal aneurysm in 2014.  Radiology: ordered.     Details: DJD, abdominal aneurysm.    Risk  Prescription drug management.                               Clinical Impression:   Final diagnoses:  [M25.552] Left hip  pain  [M54.16] Lumbar radiculopathy (Primary)  [I71.40] Abdominal aneurysm        ED Disposition Condition    Discharge Stable          ED Prescriptions       Medication Sig Dispense Start Date End Date Auth. Provider    HYDROcodone-acetaminophen (NORCO) 5-325 mg per tablet Take 1 tablet by mouth every 8 (eight) hours as needed for Pain. 9 tablet 11/4/2023 -- Parminder Key MD          Follow-up Information       Follow up With Specialties Details Why Contact Info    Marc Sands MD Family Medicine Schedule an appointment as soon as possible for a visit  For  re-check [address] [place] [id number]  [phone]               Parminder Key MD  11/04/23 1029       Parminder Key MD  11/04/23 1048

## 2023-11-04 NOTE — ED NOTES
PT presents to the ED with C/O pain to left hip x 2 weeks. Radiates down left leg. Intermittent numbness/tingling. Denies recent injury. Recently diagnosed with sciatica. Denies episodes of incontinence. VSS. AAOx4.

## 2023-11-06 ENCOUNTER — TELEPHONE (OUTPATIENT)
Dept: FAMILY MEDICINE | Facility: CLINIC | Age: 83
End: 2023-11-06
Payer: MEDICARE

## 2023-11-06 DIAGNOSIS — M54.9 MUSCULOSKELETAL BACK PAIN: Primary | ICD-10-CM

## 2023-11-06 NOTE — TELEPHONE ENCOUNTER
"Pt believes she has sciatic nerve. Pt was provided pain medication by ER provider. Pt had an xray in the ER, stated pt has disks that are "out of whack".     Pt states nothing is working for her, no pain medication has worked. Pt cannot walk. States pain is severe.     Wants to know what you suggest. If possible please call pt to discuss.     "

## 2023-11-06 NOTE — TELEPHONE ENCOUNTER
----- Message from Rayna Crews sent at 11/6/2023 11:06 AM CST -----  Type:  Needs Medical Advice    Who Called:  pt  Symptoms (please be specific):    How long has patient had these symptoms:    Pharmacy name and phone #:    Would the patient rather a call back or a response via [facility]?   Best Call Back Number:  [phone]  Additional Information: wants to speak to the office about the visit she had with an [facility]

## 2023-11-07 ENCOUNTER — TELEPHONE (OUTPATIENT)
Dept: FAMILY MEDICINE | Facility: CLINIC | Age: 83
End: 2023-11-07
Payer: MEDICARE

## 2023-11-07 NOTE — TELEPHONE ENCOUNTER
Spoke to pt. Pt asked if going to pain management would be best as she does not want to become addicted to medication. I explained that he pain management work to ensure that pt's pain levels are being managed without becoming addicted to medication. Pt stated that she will give pain management a try and let us know.

## 2023-11-07 NOTE — TELEPHONE ENCOUNTER
----- Message from Arcenio Bryant sent at 11/7/2023 10:40 AM CST -----  Type:  Needs Medical Advice    Who Called: patient  Would the patient rather a call back or a response via [facility]? call  Best Call Back Number: [phone]  Additional Information: She needs to talk about the call she received from pain management

## 2023-11-08 ENCOUNTER — TELEPHONE (OUTPATIENT)
Dept: FAMILY MEDICINE | Facility: CLINIC | Age: 83
End: 2023-11-08
Payer: MEDICARE

## 2023-11-08 NOTE — TELEPHONE ENCOUNTER
----- Message from Jennifer Urrutia sent at 11/8/2023 11:19 AM CST -----  Type:  Needs Medical Advice    Who Called: Pt   Symptoms (please be specific): Sciatic nerve pain    Pharmacy name and phone #:   [id number] - [place], [place] - [address] [address]  Phone: [phone] Fax: [phone]  Would the patient rather a call back or a response via [facility]? Call back   Best Call Back Number: [phone]  Additional Information: Please be advised, pt stated if they can be given something for pain in the meantime until her appt with Pain Management and have it sent to pharmacy before 2PM so she can go pick it up

## 2023-11-09 RX ORDER — HYDROCODONE BITARTRATE AND ACETAMINOPHEN 5; 325 MG/1; MG/1
1 TABLET ORAL EVERY 12 HOURS PRN
Qty: 20 TABLET | Refills: 0 | Status: SHIPPED | OUTPATIENT
Start: 2023-11-09 | End: 2024-02-15

## 2023-11-09 NOTE — TELEPHONE ENCOUNTER
Not likely I'll do it before 2 pm as requested unless you are standing in front of me and a computer telling me to do it now.

## 2023-11-09 NOTE — TELEPHONE ENCOUNTER
Patient informed of Rx.     Patient stated she saw a commercial for a medication called Skyriza for arthritis and sciatic pain. Pt wants to know if she can take this medication.

## 2023-11-13 ENCOUNTER — TELEPHONE (OUTPATIENT)
Dept: FAMILY MEDICINE | Facility: CLINIC | Age: 83
End: 2023-11-13
Payer: MEDICARE

## 2023-11-13 NOTE — TELEPHONE ENCOUNTER
----- Message from Latoya Nj sent at 11/13/2023  3:42 PM CST -----  Regarding: In home care    Patient is requesting assistance with having someone help her in the home, pt states she can barely walk due to extreme pain.     I informed pt if she wants care giving services she will have to pay out of pocket bc it is not covered under insurance or if she can apply to a medicaid or state program. Pt declined.     Pt wants to know if there is anything you can do for her.

## 2023-11-14 NOTE — TELEPHONE ENCOUNTER
Attempted to reach pt. Left message to contact clinic.     Patient resource: [phone] (Long term care in-home service)

## 2023-11-15 NOTE — TELEPHONE ENCOUNTER
Spoke to pt. Pt refuses NH placement however Latoya had a number for long term in home care that I provided to the pt. Pt accepted the number and explained that she really only needs help a couple days a week due to the pain that she is experiencing.

## 2023-11-16 ENCOUNTER — TELEPHONE (OUTPATIENT)
Dept: FAMILY MEDICINE | Facility: CLINIC | Age: 83
End: 2023-11-16
Payer: MEDICARE

## 2023-11-16 NOTE — TELEPHONE ENCOUNTER
----- Message from Mikki Barron sent at 11/16/2023 10:28 AM CST -----  Type:  Needs Medical Advice    Who Called: pt    Would the patient rather a call back or a response via [facility]? call  Best Call Back Number: [phone]  Additional Information: pt requesting a new prescription  TRELEGY ELLIPTA 100-62.5-25 mcg DsDv

## 2023-11-24 ENCOUNTER — TELEPHONE (OUTPATIENT)
Dept: PAIN MEDICINE | Facility: CLINIC | Age: 83
End: 2023-11-24
Payer: MEDICARE

## 2023-11-24 ENCOUNTER — OFFICE VISIT (OUTPATIENT)
Dept: PAIN MEDICINE | Facility: CLINIC | Age: 83
End: 2023-11-24
Payer: MEDICARE

## 2023-11-24 DIAGNOSIS — M54.9 DORSALGIA, UNSPECIFIED: Primary | ICD-10-CM

## 2023-11-24 DIAGNOSIS — M19.90 ARTHRITIS: ICD-10-CM

## 2023-11-24 DIAGNOSIS — M51.37 DDD (DEGENERATIVE DISC DISEASE), LUMBOSACRAL: ICD-10-CM

## 2023-11-24 DIAGNOSIS — M54.17 LUMBOSACRAL RADICULOPATHY: ICD-10-CM

## 2023-11-24 DIAGNOSIS — M54.9 MUSCULOSKELETAL BACK PAIN: ICD-10-CM

## 2023-11-24 PROCEDURE — 99203 OFFICE O/P NEW LOW 30 MIN: CPT | Mod: S$GLB,,, | Performed by: EMERGENCY MEDICINE

## 2023-11-24 PROCEDURE — 99203 PR OFFICE/OUTPT VISIT, NEW, LEVL III, 30-44 MIN: ICD-10-PCS | Mod: S$GLB,,, | Performed by: EMERGENCY MEDICINE

## 2023-11-24 PROCEDURE — 99999 PR PBB SHADOW E&M-EST. PATIENT-LVL II: CPT | Mod: PBBFAC,,, | Performed by: EMERGENCY MEDICINE

## 2023-11-24 PROCEDURE — 99999 PR PBB SHADOW E&M-EST. PATIENT-LVL II: ICD-10-PCS | Mod: PBBFAC,,, | Performed by: EMERGENCY MEDICINE

## 2023-11-24 RX ORDER — GABAPENTIN 300 MG/1
CAPSULE ORAL
Qty: 69 CAPSULE | Refills: 0 | Status: SHIPPED | OUTPATIENT
Start: 2023-11-24 | End: 2024-02-15 | Stop reason: SDUPTHER

## 2023-11-24 RX ORDER — MELOXICAM 7.5 MG/1
7.5 TABLET ORAL DAILY PRN
Qty: 30 TABLET | Refills: 0 | Status: SHIPPED | OUTPATIENT
Start: 2023-11-24 | End: 2023-12-14 | Stop reason: CLARIF

## 2023-11-24 NOTE — PROGRESS NOTES
This note was completed with dictation software and grammatical errors may exist.    PCP: Marc Sands MD    No chief complaint on file.       Original HISTORY OF PRESENT ILLNESS: Shirley F Gilford is a 83 y.o. year old female patient who has a past medical history of Breast cyst, COPD (chronic obstructive pulmonary disease), Eye disorder, Hyperglycemia, Hyperlipidemia, Hypertension, Osteoporosis, and RLS (restless legs syndrome). She presents in referral from Dr. Marc Sands for left leg pain    Original Pain Description:  The pain is located in the left lower back and is radiating to the left lateral ankle . The pain is described as aching, sharp, and throbbing. Exacerbating factors: Standing. Mitigating factors nothing. Symptoms interfere with daily activity and sleeping. The patient feels like symptoms have been worsening. Patient denies night fever/night sweats, urinary incontinence, bowel incontinence, significant weight loss, significant motor weakness, and loss of sensations.    Original PAIN SCORES:  Best: Pain is 10  Worst: Pain is 10  Current: Pain is 10         No data to display                INTERVAL HISTORY: (Newest visit at the bottom)   Interval History (Date):       6 weeks of Conservative therapy:  PT: Not yet  Chiro:No  HEP: Unable due to pain      Treatments / Medications: (Ice/Heat/NSAIDS/APAP/etc):  Alleve  Tylenol arthritis  Elavil 10mg  Norco 5mg - not helping  Mobic 7.5mg - not taking   Tramadol 50mg - not taking    Antiplatelets/Anticoagulants:  No    Interventional Pain Procedures: (Previous injections)  No    IMAGING:    EXAMINATION:  XR LUMBAR SPINE AP AND LATERAL     CLINICAL HISTORY:  low back pain;     COMPARISON:  01/23/2018     FINDINGS:  There are 5 lumbar type vertebral bodies.  There is a mild amount of levoconvex curvature of the thoracolumbar spine.  There is no acute fracture visualized.  There is grade 1 anterolisthesis of L4 on L5.  There is normal lumbar lordosis.   There is a moderate amount of atherosclerosis.  There is an aneurysm of the abdominal aorta.  At the level of the L3 vertebral body it has an AP diameter of 4.8 cm.     Impression:     1. There is an aneurysm of the abdominal aorta. At the level of the L3 vertebral body it has an AP diameter of 4.8 cm.  2. There is a mild amount of levoconvex curvature of the thoracolumbar spine.  3. There is grade 1 anterolisthesis of L4 on L5.  4. The above findings and impressions were discussed with Dr. Key via the telephone at 10:10 on 11/04/2023.        Electronically signed by: Ciro Johnson MD  Date:                                            11/04/2023  Time:                                           10:12  Past Surgical History:   Procedure Laterality Date    BLADDER SURGERY      cancer Mahamed Kidd    BREAST SURGERY      left benign tumor    COLONOSCOPY      EYE SURGERY      cataracts    TONSILLECTOMY         Social History     Socioeconomic History    Marital status:    Tobacco Use    Smoking status: Former     Passive exposure: Past    Smokeless tobacco: Never   Substance and Sexual Activity    Alcohol use: No    Drug use: Never     Social Determinants of Health     Financial Resource Strain: Low Risk  (7/14/2023)    Overall Financial Resource Strain (CARDIA)     Difficulty of Paying Living Expenses: Not hard at all   Food Insecurity: No Food Insecurity (7/14/2023)    Hunger Vital Sign     Worried About Running Out of Food in the Last Year: Never true     Ran Out of Food in the Last Year: Never true   Transportation Needs: No Transportation Needs (7/14/2023)    PRAPARE - Transportation     Lack of Transportation (Medical): No     Lack of Transportation (Non-Medical): No   Physical Activity: Inactive (7/14/2023)    Exercise Vital Sign     Days of Exercise per Week: 0 days     Minutes of Exercise per Session: 0 min   Stress: No Stress Concern Present (7/14/2023)    [employer] -  Occupational Stress Questionnaire     Feeling of Stress : Not at all   Social Connections: Moderately Isolated (7/14/2023)    Social Connection and Isolation Panel [NHANES]     Frequency of Communication with Friends and Family: Once a week     Frequency of Social Gatherings with Friends and Family: More than three times a week     Attends [employer] Services: 1 to 4 times per year     Active Member of Clubs or Organizations: No     Attends Club or Organization Meetings: Never     Marital Status:    Housing Stability: Low Risk  (7/14/2023)    Housing Stability Vital Sign     Unable to Pay for Housing in the Last Year: No     Number of Places Lived in the Last Year: 1     Unstable Housing in the Last Year: No       Medications/Allergies: See med card    ROS:  GENERAL: No fever. No chills. No fatigue. Denies weight loss. Denies weight gain.  HEENT: Denies headaches. Denies vision change. Denies eye pain. Denies double vision. Denies ear pain.   CV: Denies chest pain.   PULM: Denies of shortness of breath.  GI: Denies constipation. No diarrhea. No abdominal pain. Denies nausea. Denies vomiting. No blood in stool.  HEME: Denies bleeding problems.  : Denies urgency. No painful urination. No blood in urine.  MS: Denies joint stiffness. Denies joint swelling.  Denies back pain.  SKIN: Denies rash.   NEURO: Denies seizures. No weakness.  PSYCH:  Denies difficulty sleeping. No anxiety. Denies depression. No suicidal thoughts.       VITALS: There were no vitals filed for this visit.  There is no height or weight on file to calculate BMI.       No data to display                PHYSICAL EXAM:   GENERAL: Well appearing, in no acute distress, alert and oriented x3.  PSYCH:  Mood and affect appropriate.  SKIN: Skin color, texture, turgor normal, no rashes or lesions.  HEENT:  Normocephalic, atraumatic. Cranial nerves grossly intact.  NECK: No pain to palpation over the cervical paraspinous muscles. No pain to palpation  over facets. No pain with neck flexion, extension, or lateral flexion.   PULM: No evidence of respiratory difficulty, symmetric chest rise.  GI:  Non-distended  BACK: Normal range of motion. No pain to palpation over the spinous processes. No pain to palpation over facet joints. There is no pain with palpation over the sacroiliac joints bilaterally.   EXTREMITIES: No deformities, edema, or skin discoloration.   NEURO: Sensation is equal and appropriate bilaterally. Bilateral upper and lower extremity strength is normal and symmetric. Bilateral upper and lower extremity coordination and muscle stretch reflexes are physiologic and symmetric. Plantar response are downgoing. Straight leg raising in the supine position is positive to radicular pain.   GAIT: normal.      LABS:    Lab Results   Component Value Date    LABA1C 5.9 (H) 03/27/2017    HGBA1C 5.7 (H) 05/31/2023       Lab Results   Component Value Date    WBC 10.32 07/17/2023    HGB 12.6 07/17/2023    HCT 42.6 07/17/2023    MCV 89 07/17/2023     07/17/2023           ASSESSMENT: 83 y.o. year old female with pain, consistent with:    Encounter Diagnosis   Name Primary?    Musculoskeletal back pain        DISCUSSION: Shirley F Gilford is a patient who comes to us with radicular pain     PLAN:  - I have stressed the importance of physical activity and a home exercise plan to help with pain and improve health.  - Patient can continue with medications for now since they are providing benefits, using them appropriately, and without side effects.  - Patient is having significant pain in her L4/5 and L5/S1 dermatome, will schedule for procedure  - Medications:Start Neurontin 300mg gradually to three times a day to help with radicular pain.   - Imaging: Reviewed available imaging with patient and answered any questions they had regarding study.Order MRI of the Lumbar Spine to help evaluate possible source of pain.  - Follow up visit: return to clinic in 2-3  weeks      I would like to thank Marc Sands MD for the opportunity to assist in the care of this patient. We had a very nice visit and I look forward to continuing their care. Please let me know if I can be of further assistance.     Abran Lloyd MD  11/24/2023

## 2023-11-28 ENCOUNTER — TELEPHONE (OUTPATIENT)
Dept: FAMILY MEDICINE | Facility: CLINIC | Age: 83
End: 2023-11-28
Payer: MEDICARE

## 2023-11-28 ENCOUNTER — TELEPHONE (OUTPATIENT)
Dept: PAIN MEDICINE | Facility: CLINIC | Age: 83
End: 2023-11-28
Payer: MEDICARE

## 2023-11-28 DIAGNOSIS — M54.17 LUMBOSACRAL RADICULOPATHY: Primary | ICD-10-CM

## 2023-11-28 NOTE — TELEPHONE ENCOUNTER
----- Message from Hector Lloyd MD sent at 2023  3:00 PM CST -----  Regarding: Order for GILFORD,SHIRLEY F    Patient Name: GILFORD,SHIRLEY F([id number])  Sex: Female  : 1940      PCP: SANDY CASPER    Center: [facility]     Level of Service:77025     RI OFFICE/OUTPT VISIT, NEW, LEVL III, 30-44 MIN    Types of orders made on 2023: Imaging, Medications, Outpatient Referral,                                       Procedure   Request    Order Date:2023  Ordering User:HECTOR LLOYD [[id number]]  Encounter Provider:Hector Lloyd MD [[id number]]  Authorizing Provider: Hector Lloyd MD [[id number]]  Department:[facility] PAIN MANAGEMENT[[id number]]    Common Order Information  Procedure -> Transforaminal Injection (Specify level and laterality) Cmt: Left             L4/5 and L5/S1    Order Specific Information  Order: Procedure Order t  o Pain Management [Custom: [id number]]  Order #:          [id number]: 1 FUTURE    Priority: Routine  Class: Clinic Performed    Future Order Information      Expires on:2024            Expected by:2023                   Comment:Patient choice    Associated Diagnoses      M54.17 Lumbosacral radiculopathy      M51.37 DDD (degenerative disc disease), lumbosacral      Physician -> lloyd           Is patient on anti-coagulants? -> No         Facility Name: -> [facility]         Follow-up: -> 2 weeks           Priority: Routine  Class: Clinic Performed    Future Order Information      Expires on:2024            Expected by:2023                   Comment:Patient choice    Associated Diagnoses      M54.17 Lumbosacral radiculopathy      M51.37 DDD (degenerative disc disease), lumbosacral        Procedure -> Transforaminal Injection (Specify level and laterality) Cmt:                 Left L4/5 and L5/S1        Physician -> lloyd         Is patient on anti-coagulants? -> No         Facility Name: -> [facility]         Follow-up: -> 2 weeks

## 2023-11-28 NOTE — TELEPHONE ENCOUNTER
----- Message from Travisbettie Bryant sent at 11/28/2023  9:38 AM CST -----  Type:  Needs Medical Advice    Who Called: patient  Would the patient rather a call back or a response via [facility]? call  Best Call Back Number: [phone]  Additional Information:     She needs to confirm that her MRI is scheduled in [place] and not at [facility] because she is unable to have it done there and she is trying to get this done as soon as possible.       I LM with details for the pt advising MRI is scheduled for Dec 5 in [place]  I advised she call back with any questions

## 2023-11-28 NOTE — TELEPHONE ENCOUNTER
Darshana  I spoke with the patient   She is asking if she can be scheduled to see Dr Lloyd in [place]  She said she is unable to travel to [place].  Joey not sure if Dr Lloyd works at both locations.    Can you please reach out to her again to discuss options?    Mary

## 2023-11-29 ENCOUNTER — TELEPHONE (OUTPATIENT)
Dept: PAIN MEDICINE | Facility: CLINIC | Age: 83
End: 2023-11-29
Payer: MEDICARE

## 2023-11-29 NOTE — TELEPHONE ENCOUNTER
----- Message from Camille Marin MA sent at 11/29/2023  9:30 AM CST -----  Contact: pt    ----- Message -----  From: Nathan Smith  Sent: 11/29/2023   9:27 AM CST  To: Gabino Osullivan Staff    Type:  Procedure Information     Who Called: pt  Would the patient rather a call back or a response via [facility]? call  Best Call Back Number: [phone]  Additional Information:   Pt requesting a call from nurse Gilliland

## 2023-12-05 ENCOUNTER — HOSPITAL ENCOUNTER (OUTPATIENT)
Dept: RADIOLOGY | Facility: HOSPITAL | Age: 83
Discharge: HOME | End: 2023-12-05
Attending: EMERGENCY MEDICINE
Payer: MEDICARE

## 2023-12-05 DIAGNOSIS — M54.9 DORSALGIA, UNSPECIFIED: ICD-10-CM

## 2023-12-05 PROCEDURE — 72148 MRI LUMBAR SPINE WITHOUT CONTRAST: ICD-10-PCS | Mod: 26,,, | Performed by: STUDENT IN AN ORGANIZED HEALTH CARE EDUCATION/TRAINING PROGRAM

## 2023-12-05 PROCEDURE — 72148 MRI LUMBAR SPINE W/O DYE: CPT | Mod: TC,PN

## 2023-12-05 PROCEDURE — 72148 MRI LUMBAR SPINE W/O DYE: CPT | Mod: 26,,, | Performed by: STUDENT IN AN ORGANIZED HEALTH CARE EDUCATION/TRAINING PROGRAM

## 2023-12-06 ENCOUNTER — TELEPHONE (OUTPATIENT)
Dept: PAIN MEDICINE | Facility: HOSPITAL | Age: 83
End: 2023-12-06
Payer: MEDICARE

## 2023-12-06 NOTE — TELEPHONE ENCOUNTER
Discussed with patient findings of her MRI.  Patient denies any fall over the last several months.  Patient reports that she did initially have pain in her lower back, but now the majority of her pain is still in her leg in a radicular fashion.

## 2023-12-08 ENCOUNTER — OFFICE VISIT (OUTPATIENT)
Dept: PAIN MEDICINE | Facility: CLINIC | Age: 83
End: 2023-12-08
Payer: MEDICARE

## 2023-12-08 VITALS
SYSTOLIC BLOOD PRESSURE: 179 MMHG | DIASTOLIC BLOOD PRESSURE: 88 MMHG | BODY MASS INDEX: 36.67 KG/M2 | HEART RATE: 69 BPM | WEIGHT: 207 LBS

## 2023-12-08 DIAGNOSIS — S32.040A COMPRESSION FRACTURE OF L4 VERTEBRA, INITIAL ENCOUNTER: ICD-10-CM

## 2023-12-08 DIAGNOSIS — M51.37 DDD (DEGENERATIVE DISC DISEASE), LUMBOSACRAL: ICD-10-CM

## 2023-12-08 DIAGNOSIS — M54.17 LUMBOSACRAL RADICULOPATHY: Primary | ICD-10-CM

## 2023-12-08 PROCEDURE — 99999 PR PBB SHADOW E&M-EST. PATIENT-LVL III: ICD-10-PCS | Mod: PBBFAC,,, | Performed by: EMERGENCY MEDICINE

## 2023-12-08 PROCEDURE — 3077F PR MOST RECENT SYSTOLIC BLOOD PRESSURE >= 140 MM HG: ICD-10-PCS | Mod: CPTII,S$GLB,, | Performed by: EMERGENCY MEDICINE

## 2023-12-08 PROCEDURE — 99214 OFFICE O/P EST MOD 30 MIN: CPT | Mod: S$GLB,,, | Performed by: EMERGENCY MEDICINE

## 2023-12-08 PROCEDURE — 1159F PR MEDICATION LIST DOCUMENTED IN MEDICAL RECORD: ICD-10-PCS | Mod: CPTII,S$GLB,, | Performed by: EMERGENCY MEDICINE

## 2023-12-08 PROCEDURE — 1101F PR PT FALLS ASSESS DOC 0-1 FALLS W/OUT INJ PAST YR: ICD-10-PCS | Mod: CPTII,S$GLB,, | Performed by: EMERGENCY MEDICINE

## 2023-12-08 PROCEDURE — 1125F PR PAIN SEVERITY QUANTIFIED, PAIN PRESENT: ICD-10-PCS | Mod: CPTII,S$GLB,, | Performed by: EMERGENCY MEDICINE

## 2023-12-08 PROCEDURE — 1101F PT FALLS ASSESS-DOCD LE1/YR: CPT | Mod: CPTII,S$GLB,, | Performed by: EMERGENCY MEDICINE

## 2023-12-08 PROCEDURE — 3288F FALL RISK ASSESSMENT DOCD: CPT | Mod: CPTII,S$GLB,, | Performed by: EMERGENCY MEDICINE

## 2023-12-08 PROCEDURE — 99999 PR PBB SHADOW E&M-EST. PATIENT-LVL III: CPT | Mod: PBBFAC,,, | Performed by: EMERGENCY MEDICINE

## 2023-12-08 PROCEDURE — 3288F PR FALLS RISK ASSESSMENT DOCUMENTED: ICD-10-PCS | Mod: CPTII,S$GLB,, | Performed by: EMERGENCY MEDICINE

## 2023-12-08 PROCEDURE — 3079F DIAST BP 80-89 MM HG: CPT | Mod: CPTII,S$GLB,, | Performed by: EMERGENCY MEDICINE

## 2023-12-08 PROCEDURE — 1125F AMNT PAIN NOTED PAIN PRSNT: CPT | Mod: CPTII,S$GLB,, | Performed by: EMERGENCY MEDICINE

## 2023-12-08 PROCEDURE — 3079F PR MOST RECENT DIASTOLIC BLOOD PRESSURE 80-89 MM HG: ICD-10-PCS | Mod: CPTII,S$GLB,, | Performed by: EMERGENCY MEDICINE

## 2023-12-08 PROCEDURE — 3077F SYST BP >= 140 MM HG: CPT | Mod: CPTII,S$GLB,, | Performed by: EMERGENCY MEDICINE

## 2023-12-08 PROCEDURE — 99214 PR OFFICE/OUTPT VISIT, EST, LEVL IV, 30-39 MIN: ICD-10-PCS | Mod: S$GLB,,, | Performed by: EMERGENCY MEDICINE

## 2023-12-08 PROCEDURE — 1159F MED LIST DOCD IN RCRD: CPT | Mod: CPTII,S$GLB,, | Performed by: EMERGENCY MEDICINE

## 2023-12-08 NOTE — PROGRESS NOTES
This note was completed with dictation software and grammatical errors may exist.    PCP: Marc Sands MD    No chief complaint on file.     Interval History 12/08/2023: Since their last visit the pain has been unchanged. Patient was started on medications during their last visit and they have been taking it.  They are not participating in physical therapy and are not participating in home exercise plan due to pain.  Discussed with patient MRI finding of L4 compression fracture.  Patient reports that she is not had any recent falls to her knowledge, but does recall a period of intense lower back pain a few months ago, that persists intermittently.  She states that the radicular pain is the worst pain to her.      Original HISTORY OF PRESENT ILLNESS: Shirley F Gilford is a 83 y.o. year old female patient who has a past medical history of Breast cyst, COPD (chronic obstructive pulmonary disease), Eye disorder, Hyperglycemia, Hyperlipidemia, Hypertension, Osteoporosis, and RLS (restless legs syndrome). She presents in referral from No ref. provider found for left leg pain    Original Pain Description:  The pain is located in the left lower back and is radiating to the left lateral ankle . The pain is described as aching, sharp, and throbbing. Exacerbating factors: Standing. Mitigating factors nothing. Symptoms interfere with daily activity and sleeping. The patient feels like symptoms have been worsening. Patient denies night fever/night sweats, urinary incontinence, bowel incontinence, significant weight loss, significant motor weakness, and loss of sensations.    Original PAIN SCORES:  Best: Pain is 10  Worst: Pain is 10  Current: Pain is 10        12/8/2023     9:17 AM   Last 3 PDI Scores   Pain Disability Index (PDI) 30       INTERVAL HISTORY: (Newest visit at the bottom)   Interval History (Date):       6 weeks of Conservative therapy:  PT: Not yet  Chiro:No  HEP: Unable due to pain      Treatments /  Medications: (Ice/Heat/NSAIDS/APAP/etc):  Alleve  Tylenol arthritis  Elavil 10mg  Norco 5mg - not helping  Mobic 7.5mg - not taking   Tramadol 50mg - not taking    Antiplatelets/Anticoagulants:  No    Interventional Pain Procedures: (Previous injections)  No    IMAGING:    EXAMINATION:  XR LUMBAR SPINE AP AND LATERAL     CLINICAL HISTORY:  low back pain;     COMPARISON:  01/23/2018     FINDINGS:  There are 5 lumbar type vertebral bodies.  There is a mild amount of levoconvex curvature of the thoracolumbar spine.  There is no acute fracture visualized.  There is grade 1 anterolisthesis of L4 on L5.  There is normal lumbar lordosis.  There is a moderate amount of atherosclerosis.  There is an aneurysm of the abdominal aorta.  At the level of the L3 vertebral body it has an AP diameter of 4.8 cm.     Impression:     1. There is an aneurysm of the abdominal aorta. At the level of the L3 vertebral body it has an AP diameter of 4.8 cm.  2. There is a mild amount of levoconvex curvature of the thoracolumbar spine.  3. There is grade 1 anterolisthesis of L4 on L5.  4. The above findings and impressions were discussed with Dr. Key via the telephone at 10:10 on 11/04/2023.        Electronically signed by: Ciro Johnson MD  Date:                                            11/04/2023  Time:                                           10:12    EXAMINATION:  MRI LUMBAR SPINE WITHOUT CONTRAST     CLINICAL HISTORY:  Low back pain, symptoms persist with > 6wks conservative treatment; Dorsalgia, unspecified     TECHNIQUE:  Multiplanar, multisequence MR images were acquired from the thoracolumbar junction to the sacrum without the administration of contrast.     COMPARISON:  X-ray dated 11/04/2023     FINDINGS:  Mild lumbar levocurvature noted.  Alignment is otherwise unremarkable with no significant listhesis.  There is an acute to subacute compression fracture of L4 with approximately 60% loss of vertebral body height.   Minimal retropulsion of the superior endplate.  Edema extends into the right L4 pedicle and superior articulating facet.  No other acute fracture or compression deformity seen.  No aggressive focal signal abnormality.     Conus medullaris terminates at the L1 level.  Conus medullaris is normal in size and signal.     T12-L1: No significant disc pathology.  No significant spinal canal or neural foraminal stenosis.     L1-L2: Trace disc bulge.  Mild bilateral facet arthropathy.  No significant spinal canal or neural foraminal stenosis.     L2-L3: Small circumferential disc bulge.  Mild bilateral facet arthropathy.  No significant spinal canal stenosis.  Mild bilateral neural foraminal stenosis.     L3-L4: Trace disc bulge and minimal retropulsion of the L4 superior endplate.  Bilateral facet arthropathy with ligamentum flavum thickening.  Mild spinal canal stenosis.  Mild-to-moderate bilateral neural foraminal stenosis.     L4-L5: Trace asymmetric to left disc bulge.  Left greater than right facet arthropathy with ligamentum flavum thickening.  Mild spinal canal stenosis.  Moderate to severe left and mild right neural foraminal stenosis.     L5-S1: Trace asymmetric to left disc bulge.  Mild bilateral facet arthropathy.  No significant spinal canal stenosis.  Mild left neural foraminal stenosis.  No significant right neural foraminal stenosis.     Paraspinal soft tissues are unremarkable.  Infrarenal abdominal aortic aneurysm measuring up to 4.0 cm in maximum transverse diameter.  Visualized intra-abdominal and pelvic contents are otherwise unremarkable     Impression:     Acute to subacute compression fracture of L4 with approximately 60% loss of vertebral body height and minimal retropulsion of the superior endplate.  Edema extends into the right L4 pedicle and superior articulating facet likely related to contusion or stress reaction.     Mild lumbar levocurvature and multilevel degenerative changes as detailed  above.     Mild spinal canal stenosis at L3-L4 and L4-L5.     Varying degrees of bilateral neural foraminal stenosis as detailed above, moderate to severe at the left L4-L5 level.     Infrarenal abdominal aortic aneurysm measuring up to 4.0 cm in maximum transverse diameter.        Electronically signed by: Lucius Carreon  Date:                                            12/05/2023  Time:                                           11:26  Past Surgical History:   Procedure Laterality Date    BLADDER SURGERY      cancer Mahamed Kidd    BREAST SURGERY      left benign tumor    COLONOSCOPY      EYE SURGERY      cataracts    TONSILLECTOMY         Social History     Socioeconomic History    Marital status:    Tobacco Use    Smoking status: Former     Passive exposure: Past    Smokeless tobacco: Never   Substance and Sexual Activity    Alcohol use: No    Drug use: Never     Social Determinants of Health     Financial Resource Strain: Low Risk  (7/14/2023)    Overall Financial Resource Strain (CARDIA)     Difficulty of Paying Living Expenses: Not hard at all   Food Insecurity: No Food Insecurity (7/14/2023)    Hunger Vital Sign     Worried About Running Out of Food in the Last Year: Never true     Ran Out of Food in the Last Year: Never true   Transportation Needs: No Transportation Needs (7/14/2023)    PRAPARE - Transportation     Lack of Transportation (Medical): No     Lack of Transportation (Non-Medical): No   Physical Activity: Inactive (7/14/2023)    Exercise Vital Sign     Days of Exercise per Week: 0 days     Minutes of Exercise per Session: 0 min   Stress: No Stress Concern Present (7/14/2023)    [employer] - Occupational Stress Questionnaire     Feeling of Stress : Not at all   Social Connections: Moderately Isolated (7/14/2023)    Social Connection and Isolation Panel [NHANES]     Frequency of Communication with Friends and Family: Once a week     Frequency of Social Gatherings  with Friends and Family: More than three times a week     Attends [employer] Services: 1 to 4 times per year     Active Member of Clubs or Organizations: No     Attends Club or Organization Meetings: Never     Marital Status:    Housing Stability: Low Risk  (7/14/2023)    Housing Stability Vital Sign     Unable to Pay for Housing in the Last Year: No     Number of Places Lived in the Last Year: 1     Unstable Housing in the Last Year: No       Medications/Allergies: See med card    ROS:  GENERAL: No fever. No chills. No fatigue. Denies weight loss. Denies weight gain.  HEENT: Denies headaches. Denies vision change. Denies eye pain. Denies double vision. Denies ear pain.   CV: Denies chest pain.   PULM: Denies of shortness of breath.  GI: Denies constipation. No diarrhea. No abdominal pain. Denies nausea. Denies vomiting. No blood in stool.  HEME: Denies bleeding problems.  : Denies urgency. No painful urination. No blood in urine.  MS: Denies joint stiffness. Denies joint swelling.  Denies back pain.  SKIN: Denies rash.   NEURO: Denies seizures. No weakness.  PSYCH:  Denies difficulty sleeping. No anxiety. Denies depression. No suicidal thoughts.       VITALS:   Vitals:    12/08/23 0916   BP: (!) 179/88   Pulse: 69   Weight: 93.9 kg (207 lb 0.2 oz)   PainSc: 10-Worst pain ever   PainLoc: Back     Body mass index is 36.67 kg/m².      12/8/2023     9:17 AM   Last 3 PDI Scores   Pain Disability Index (PDI) 30       PHYSICAL EXAM:   GENERAL: Well appearing, in no acute distress, alert and oriented x3.  PSYCH:  Mood and affect appropriate.  SKIN: Skin color, texture, turgor normal, no rashes or lesions.  HEENT:  Normocephalic, atraumatic. Cranial nerves grossly intact.  NECK: No pain to palpation over the cervical paraspinous muscles. No pain to palpation over facets. No pain with neck flexion, extension, or lateral flexion.   PULM: No evidence of respiratory difficulty, symmetric chest rise.  GI:   Non-distended  BACK: Normal range of motion. Pain to palpation over the spinous processes of the L4/L5 region. No pain to palpation over facet joints. There is no pain with palpation over the sacroiliac joints bilaterally.   EXTREMITIES: No deformities, edema, or skin discoloration.   NEURO: Sensation is equal and appropriate bilaterally. Bilateral upper and lower extremity strength is normal and symmetric. Bilateral upper and lower extremity coordination and muscle stretch reflexes are physiologic and symmetric. Plantar response are downgoing. Straight leg raising in the supine position is positive to radicular pain.   GAIT: normal.      LABS:    Lab Results   Component Value Date    LABA1C 5.9 (H) 03/27/2017    HGBA1C 5.7 (H) 05/31/2023       Lab Results   Component Value Date    WBC 10.32 07/17/2023    HGB 12.6 07/17/2023    HCT 42.6 07/17/2023    MCV 89 07/17/2023     07/17/2023           ASSESSMENT: 83 y.o. year old female with pain, consistent with:    Encounter Diagnoses   Name Primary?    Lumbosacral radiculopathy Yes    DDD (degenerative disc disease), lumbosacral     Compression fracture of L4 vertebra, initial encounter          DISCUSSION: Shirley F Gilford is a patient who comes to us with radicular pain     PLAN:  - I have stressed the importance of physical activity and a home exercise plan to help with pain and improve health.  - Patient can continue with medications for now since they are providing benefits, using them appropriately, and without side effects.  - will proceed with transforaminal MARTHA as previously ordered  - will set up for L4 kyphoplasty  - Follow up visit: return to clinic in 2-3 weeks after procedure      I would like to thank No ref. provider found for the opportunity to assist in the care of this patient. We had a very nice visit and I look forward to continuing their care. Please let me know if I can be of further assistance.     Abran Lloyd MD  12/08/2023

## 2023-12-14 ENCOUNTER — HOSPITAL ENCOUNTER (INPATIENT)
Facility: HOSPITAL | Age: 83
LOS: 1 days | Discharge: HOSPICE | DRG: 177 | End: 2023-12-15
Attending: EMERGENCY MEDICINE | Admitting: INTERNAL MEDICINE
Payer: MEDICARE

## 2023-12-14 ENCOUNTER — TELEPHONE (OUTPATIENT)
Dept: FAMILY MEDICINE | Facility: CLINIC | Age: 83
End: 2023-12-14
Payer: MEDICARE

## 2023-12-14 DIAGNOSIS — U07.1 COVID-19: ICD-10-CM

## 2023-12-14 DIAGNOSIS — I47.29 VENTRICULAR TACHYCARDIA (PAROXYSMAL): ICD-10-CM

## 2023-12-14 DIAGNOSIS — R41.82 ALTERED MENTAL STATUS: ICD-10-CM

## 2023-12-14 DIAGNOSIS — N17.9 ACUTE KIDNEY INJURY: ICD-10-CM

## 2023-12-14 DIAGNOSIS — R05.9 COUGH: ICD-10-CM

## 2023-12-14 DIAGNOSIS — T79.6XXA TRAUMATIC RHABDOMYOLYSIS, INITIAL ENCOUNTER: ICD-10-CM

## 2023-12-14 DIAGNOSIS — Z51.5 PALLIATIVE CARE ENCOUNTER: ICD-10-CM

## 2023-12-14 DIAGNOSIS — S06.330A INTRAPARENCHYMAL HEMATOMA OF BRAIN WITHOUT LOSS OF CONSCIOUSNESS, UNSPECIFIED LATERALITY, INITIAL ENCOUNTER: ICD-10-CM

## 2023-12-14 DIAGNOSIS — J96.01 ACUTE HYPOXIC RESPIRATORY FAILURE: ICD-10-CM

## 2023-12-14 DIAGNOSIS — I47.20 VENTRICULAR TACHYCARDIA: Primary | ICD-10-CM

## 2023-12-14 LAB
ALBUMIN SERPL BCP-MCNC: 3.2 G/DL (ref 3.5–5.2)
ALP SERPL-CCNC: 74 U/L (ref 55–135)
ALT SERPL W/O P-5'-P-CCNC: 61 U/L (ref 10–44)
AMMONIA PLAS-SCNC: 30 UMOL/L (ref 10–50)
AMPHET+METHAMPHET UR QL: NEGATIVE
ANION GAP SERPL CALC-SCNC: 18 MMOL/L (ref 8–16)
AST SERPL-CCNC: 97 U/L (ref 10–40)
BACTERIA #/AREA URNS HPF: ABNORMAL /HPF
BARBITURATES UR QL SCN>200 NG/ML: NEGATIVE
BASOPHILS # BLD AUTO: 0.02 K/UL (ref 0–0.2)
BASOPHILS # BLD AUTO: 0.03 K/UL (ref 0–0.2)
BASOPHILS NFR BLD: 0.1 % (ref 0–1.9)
BASOPHILS NFR BLD: 0.2 % (ref 0–1.9)
BENZODIAZ UR QL SCN>200 NG/ML: NEGATIVE
BILIRUB SERPL-MCNC: 0.4 MG/DL (ref 0.1–1)
BILIRUB UR QL STRIP: ABNORMAL
BNP SERPL-MCNC: 250 PG/ML (ref 0–99)
BUN SERPL-MCNC: 56 MG/DL (ref 8–23)
BZE UR QL SCN: NEGATIVE
CALCIUM SERPL-MCNC: 8.7 MG/DL (ref 8.7–10.5)
CANNABINOIDS UR QL SCN: NEGATIVE
CHLORIDE SERPL-SCNC: 106 MMOL/L (ref 95–110)
CK SERPL-CCNC: 2331 U/L (ref 20–180)
CLARITY UR: ABNORMAL
CO2 SERPL-SCNC: 17 MMOL/L (ref 23–29)
COLOR UR: YELLOW
CREAT SERPL-MCNC: 2.1 MG/DL (ref 0.5–1.4)
CREAT UR-MCNC: 152 MG/DL (ref 15–325)
CRP SERPL-MCNC: 151.4 MG/L (ref 0–8.2)
DIFFERENTIAL METHOD BLD: ABNORMAL
DIFFERENTIAL METHOD BLD: ABNORMAL
EOSINOPHIL # BLD AUTO: 0 K/UL (ref 0–0.5)
EOSINOPHIL # BLD AUTO: 0 K/UL (ref 0–0.5)
EOSINOPHIL NFR BLD: 0 % (ref 0–8)
EOSINOPHIL NFR BLD: 0 % (ref 0–8)
ERYTHROCYTE [DISTWIDTH] IN BLOOD BY AUTOMATED COUNT: 17.7 % (ref 11.5–14.5)
ERYTHROCYTE [DISTWIDTH] IN BLOOD BY AUTOMATED COUNT: 17.7 % (ref 11.5–14.5)
EST. GFR  (NO RACE VARIABLE): 23 ML/MIN/1.73 M^2
FERRITIN SERPL-MCNC: 164 NG/ML (ref 20–300)
GLUCOSE SERPL-MCNC: 122 MG/DL (ref 70–110)
GLUCOSE UR QL STRIP: NEGATIVE
HCT VFR BLD AUTO: 42.4 % (ref 37–48.5)
HCT VFR BLD AUTO: 43.5 % (ref 37–48.5)
HGB BLD-MCNC: 13.3 G/DL (ref 12–16)
HGB BLD-MCNC: 13.5 G/DL (ref 12–16)
HGB UR QL STRIP: ABNORMAL
HYALINE CASTS #/AREA URNS LPF: 12 /LPF
IMM GRANULOCYTES # BLD AUTO: 0.06 K/UL (ref 0–0.04)
IMM GRANULOCYTES # BLD AUTO: 0.11 K/UL (ref 0–0.04)
IMM GRANULOCYTES NFR BLD AUTO: 0.4 % (ref 0–0.5)
IMM GRANULOCYTES NFR BLD AUTO: 0.6 % (ref 0–0.5)
INFLUENZA A, MOLECULAR: NEGATIVE
INFLUENZA B, MOLECULAR: NEGATIVE
KETONES UR QL STRIP: ABNORMAL
LACTATE SERPL-SCNC: 1.1 MMOL/L (ref 0.5–2.2)
LDH SERPL L TO P-CCNC: 411 U/L (ref 110–260)
LEUKOCYTE ESTERASE UR QL STRIP: NEGATIVE
LYMPHOCYTES # BLD AUTO: 0.6 K/UL (ref 1–4.8)
LYMPHOCYTES # BLD AUTO: 0.6 K/UL (ref 1–4.8)
LYMPHOCYTES NFR BLD: 3.1 % (ref 18–48)
LYMPHOCYTES NFR BLD: 4.2 % (ref 18–48)
MAGNESIUM SERPL-MCNC: 2.1 MG/DL (ref 1.6–2.6)
MCH RBC QN AUTO: 26.3 PG (ref 27–31)
MCH RBC QN AUTO: 26.4 PG (ref 27–31)
MCHC RBC AUTO-ENTMCNC: 31 G/DL (ref 32–36)
MCHC RBC AUTO-ENTMCNC: 31.4 G/DL (ref 32–36)
MCV RBC AUTO: 84 FL (ref 82–98)
MCV RBC AUTO: 85 FL (ref 82–98)
METHADONE UR QL SCN>300 NG/ML: NEGATIVE
MICROSCOPIC COMMENT: ABNORMAL
MONOCYTES # BLD AUTO: 1.8 K/UL (ref 0.3–1)
MONOCYTES # BLD AUTO: 1.9 K/UL (ref 0.3–1)
MONOCYTES NFR BLD: 10.4 % (ref 4–15)
MONOCYTES NFR BLD: 12.7 % (ref 4–15)
NEUTROPHILS # BLD AUTO: 11.7 K/UL (ref 1.8–7.7)
NEUTROPHILS # BLD AUTO: 15.4 K/UL (ref 1.8–7.7)
NEUTROPHILS NFR BLD: 82.6 % (ref 38–73)
NEUTROPHILS NFR BLD: 85.7 % (ref 38–73)
NITRITE UR QL STRIP: NEGATIVE
NRBC BLD-RTO: 0 /100 WBC
NRBC BLD-RTO: 0 /100 WBC
OPIATES UR QL SCN: NEGATIVE
PCP UR QL SCN>25 NG/ML: NEGATIVE
PH UR STRIP: 6 [PH] (ref 5–8)
PLATELET # BLD AUTO: 233 K/UL (ref 150–450)
PLATELET # BLD AUTO: 254 K/UL (ref 150–450)
PMV BLD AUTO: 10.3 FL (ref 9.2–12.9)
PMV BLD AUTO: 10.5 FL (ref 9.2–12.9)
POCT GLUCOSE: 65 MG/DL (ref 70–110)
POCT GLUCOSE: 71 MG/DL (ref 70–110)
POCT GLUCOSE: 82 MG/DL (ref 70–110)
POTASSIUM SERPL-SCNC: 4.2 MMOL/L (ref 3.5–5.1)
PROCALCITONIN SERPL IA-MCNC: 0.22 NG/ML
PROT SERPL-MCNC: 6.5 G/DL (ref 6–8.4)
PROT UR QL STRIP: ABNORMAL
RBC # BLD AUTO: 5.04 M/UL (ref 4–5.4)
RBC # BLD AUTO: 5.14 M/UL (ref 4–5.4)
RBC #/AREA URNS HPF: 2 /HPF (ref 0–4)
RSV AG SPEC QL IA: NEGATIVE
SARS-COV-2 RDRP RESP QL NAA+PROBE: POSITIVE
SODIUM SERPL-SCNC: 141 MMOL/L (ref 136–145)
SP GR UR STRIP: 1.02 (ref 1–1.03)
SPECIMEN SOURCE: NORMAL
SPECIMEN SOURCE: NORMAL
SQUAMOUS #/AREA URNS HPF: 1 /HPF
TOXICOLOGY INFORMATION: NORMAL
TROPONIN I SERPL DL<=0.01 NG/ML-MCNC: 0.07 NG/ML (ref 0–0.03)
TROPONIN I SERPL DL<=0.01 NG/ML-MCNC: 0.11 NG/ML (ref 0–0.03)
URN SPEC COLLECT METH UR: ABNORMAL
UROBILINOGEN UR STRIP-ACNC: NEGATIVE EU/DL
WBC # BLD AUTO: 14.2 K/UL (ref 3.9–12.7)
WBC # BLD AUTO: 17.97 K/UL (ref 3.9–12.7)
WBC #/AREA URNS HPF: 2 /HPF (ref 0–5)

## 2023-12-14 PROCEDURE — 87634 RSV DNA/RNA AMP PROBE: CPT | Performed by: EMERGENCY MEDICINE

## 2023-12-14 PROCEDURE — 82140 ASSAY OF AMMONIA: CPT | Performed by: EMERGENCY MEDICINE

## 2023-12-14 PROCEDURE — 83880 ASSAY OF NATRIURETIC PEPTIDE: CPT | Performed by: EMERGENCY MEDICINE

## 2023-12-14 PROCEDURE — 11000001 HC ACUTE MED/SURG PRIVATE ROOM

## 2023-12-14 PROCEDURE — 85025 COMPLETE CBC W/AUTO DIFF WBC: CPT | Performed by: EMERGENCY MEDICINE

## 2023-12-14 PROCEDURE — 99285 EMERGENCY DEPT VISIT HI MDM: CPT | Mod: 25

## 2023-12-14 PROCEDURE — 25000003 PHARM REV CODE 250: Performed by: EMERGENCY MEDICINE

## 2023-12-14 PROCEDURE — 82728 ASSAY OF FERRITIN: CPT | Performed by: EMERGENCY MEDICINE

## 2023-12-14 PROCEDURE — 63600175 PHARM REV CODE 636 W HCPCS: Performed by: EMERGENCY MEDICINE

## 2023-12-14 PROCEDURE — 82550 ASSAY OF CK (CPK): CPT | Performed by: EMERGENCY MEDICINE

## 2023-12-14 PROCEDURE — 80307 DRUG TEST PRSMV CHEM ANLYZR: CPT | Performed by: EMERGENCY MEDICINE

## 2023-12-14 PROCEDURE — 63600175 PHARM REV CODE 636 W HCPCS

## 2023-12-14 PROCEDURE — 96361 HYDRATE IV INFUSION ADD-ON: CPT

## 2023-12-14 PROCEDURE — 96365 THER/PROPH/DIAG IV INF INIT: CPT

## 2023-12-14 PROCEDURE — 99291 CRITICAL CARE FIRST HOUR: CPT | Mod: ,,, | Performed by: NURSE PRACTITIONER

## 2023-12-14 PROCEDURE — 80053 COMPREHEN METABOLIC PANEL: CPT | Performed by: EMERGENCY MEDICINE

## 2023-12-14 PROCEDURE — 83735 ASSAY OF MAGNESIUM: CPT | Performed by: EMERGENCY MEDICINE

## 2023-12-14 PROCEDURE — 84484 ASSAY OF TROPONIN QUANT: CPT | Mod: 91 | Performed by: STUDENT IN AN ORGANIZED HEALTH CARE EDUCATION/TRAINING PROGRAM

## 2023-12-14 PROCEDURE — 84484 ASSAY OF TROPONIN QUANT: CPT | Performed by: EMERGENCY MEDICINE

## 2023-12-14 PROCEDURE — 87040 BLOOD CULTURE FOR BACTERIA: CPT | Performed by: EMERGENCY MEDICINE

## 2023-12-14 PROCEDURE — 83615 LACTATE (LD) (LDH) ENZYME: CPT | Performed by: EMERGENCY MEDICINE

## 2023-12-14 PROCEDURE — 87502 INFLUENZA DNA AMP PROBE: CPT | Performed by: EMERGENCY MEDICINE

## 2023-12-14 PROCEDURE — 25000003 PHARM REV CODE 250: Performed by: STUDENT IN AN ORGANIZED HEALTH CARE EDUCATION/TRAINING PROGRAM

## 2023-12-14 PROCEDURE — U0002 COVID-19 LAB TEST NON-CDC: HCPCS | Performed by: EMERGENCY MEDICINE

## 2023-12-14 PROCEDURE — 93005 ELECTROCARDIOGRAM TRACING: CPT

## 2023-12-14 PROCEDURE — 63600175 PHARM REV CODE 636 W HCPCS: Mod: JZ,TB | Performed by: STUDENT IN AN ORGANIZED HEALTH CARE EDUCATION/TRAINING PROGRAM

## 2023-12-14 PROCEDURE — 86140 C-REACTIVE PROTEIN: CPT | Performed by: EMERGENCY MEDICINE

## 2023-12-14 PROCEDURE — 96367 TX/PROPH/DG ADDL SEQ IV INF: CPT

## 2023-12-14 PROCEDURE — 25000003 PHARM REV CODE 250: Performed by: INTERNAL MEDICINE

## 2023-12-14 PROCEDURE — 96366 THER/PROPH/DIAG IV INF ADDON: CPT

## 2023-12-14 PROCEDURE — 85025 COMPLETE CBC W/AUTO DIFF WBC: CPT | Mod: 91 | Performed by: STUDENT IN AN ORGANIZED HEALTH CARE EDUCATION/TRAINING PROGRAM

## 2023-12-14 PROCEDURE — 27000207 HC ISOLATION

## 2023-12-14 PROCEDURE — 81000 URINALYSIS NONAUTO W/SCOPE: CPT | Mod: 59 | Performed by: EMERGENCY MEDICINE

## 2023-12-14 PROCEDURE — 96368 THER/DIAG CONCURRENT INF: CPT

## 2023-12-14 PROCEDURE — 83605 ASSAY OF LACTIC ACID: CPT | Performed by: EMERGENCY MEDICINE

## 2023-12-14 PROCEDURE — 84145 PROCALCITONIN (PCT): CPT | Performed by: STUDENT IN AN ORGANIZED HEALTH CARE EDUCATION/TRAINING PROGRAM

## 2023-12-14 PROCEDURE — 63600175 PHARM REV CODE 636 W HCPCS: Performed by: STUDENT IN AN ORGANIZED HEALTH CARE EDUCATION/TRAINING PROGRAM

## 2023-12-14 RX ORDER — SEMAGLUTIDE 0.68 MG/ML
0.25 INJECTION, SOLUTION SUBCUTANEOUS
Status: ON HOLD | COMMUNITY
Start: 2023-12-07 | End: 2023-12-15 | Stop reason: HOSPADM

## 2023-12-14 RX ORDER — AMIODARONE HYDROCHLORIDE 150 MG/3ML
150 INJECTION, SOLUTION INTRAVENOUS
Status: DISCONTINUED | OUTPATIENT
Start: 2023-12-14 | End: 2023-12-14

## 2023-12-14 RX ORDER — PEN NEEDLE, DIABETIC 32GX 5/32"
NEEDLE, DISPOSABLE MISCELLANEOUS
Status: ON HOLD | COMMUNITY
Start: 2023-10-05 | End: 2023-12-15 | Stop reason: HOSPADM

## 2023-12-14 RX ORDER — SODIUM CHLORIDE 0.9 % (FLUSH) 0.9 %
10 SYRINGE (ML) INJECTION
Status: DISCONTINUED | OUTPATIENT
Start: 2023-12-14 | End: 2023-12-15 | Stop reason: HOSPADM

## 2023-12-14 RX ORDER — NICARDIPINE HYDROCHLORIDE 0.2 MG/ML
0-15 INJECTION INTRAVENOUS CONTINUOUS
Status: CANCELLED | OUTPATIENT
Start: 2023-12-14

## 2023-12-14 RX ORDER — INFLUENZA A VIRUS A/VICTORIA/4897/2022 IVR-238 (H1N1) ANTIGEN (FORMALDEHYDE INACTIVATED), INFLUENZA A VIRUS A/DARWIN/9/2021 SAN-010 (H3N2) ANTIGEN (FORMALDEHYDE INACTIVATED), INFLUENZA B VIRUS B/PHUKET/3073/2013 ANTIGEN (FORMALDEHYDE INACTIVATED), AND INFLUENZA B VIRUS B/MICHIGAN/01/2021 ANTIGEN (FORMALDEHYDE INACTIVATED) 60; 60; 60; 60 UG/.7ML; UG/.7ML; UG/.7ML; UG/.7ML
INJECTION, SUSPENSION INTRAMUSCULAR
COMMUNITY
Start: 2023-10-12 | End: 2023-12-14 | Stop reason: CLARIF

## 2023-12-14 RX ORDER — DEXTROSE 40 %
25 GEL (GRAM) ORAL
Status: DISCONTINUED | OUTPATIENT
Start: 2023-12-14 | End: 2023-12-15 | Stop reason: HOSPADM

## 2023-12-14 RX ORDER — ISOSORBIDE MONONITRATE 30 MG/1
60 TABLET, EXTENDED RELEASE ORAL DAILY
Status: DISCONTINUED | OUTPATIENT
Start: 2023-12-14 | End: 2023-12-15

## 2023-12-14 RX ORDER — MUPIROCIN 20 MG/G
OINTMENT TOPICAL 2 TIMES DAILY
Status: DISCONTINUED | OUTPATIENT
Start: 2023-12-14 | End: 2023-12-15 | Stop reason: HOSPADM

## 2023-12-14 RX ORDER — SODIUM CHLORIDE 9 MG/ML
500 INJECTION, SOLUTION INTRAVENOUS
Status: COMPLETED | OUTPATIENT
Start: 2023-12-14 | End: 2023-12-14

## 2023-12-14 RX ORDER — LOSARTAN POTASSIUM 50 MG/1
50 TABLET ORAL DAILY
Status: DISCONTINUED | OUTPATIENT
Start: 2023-12-14 | End: 2023-12-15 | Stop reason: HOSPADM

## 2023-12-14 RX ORDER — AZITHROMYCIN 250 MG/1
500 TABLET, FILM COATED ORAL DAILY
Status: DISCONTINUED | OUTPATIENT
Start: 2023-12-15 | End: 2023-12-15 | Stop reason: HOSPADM

## 2023-12-14 RX ADMIN — AMIODARONE HYDROCHLORIDE 1 MG/MIN: 1.8 INJECTION, SOLUTION INTRAVENOUS at 01:12

## 2023-12-14 RX ADMIN — SODIUM CHLORIDE 500 ML: 9 INJECTION, SOLUTION INTRAVENOUS at 01:12

## 2023-12-14 RX ADMIN — MUPIROCIN: 20 OINTMENT TOPICAL at 11:12

## 2023-12-14 RX ADMIN — AMIODARONE HYDROCHLORIDE 1 MG/MIN: 1.8 INJECTION, SOLUTION INTRAVENOUS at 07:12

## 2023-12-14 RX ADMIN — VANCOMYCIN HYDROCHLORIDE 1750 MG: 500 INJECTION, POWDER, LYOPHILIZED, FOR SOLUTION INTRAVENOUS at 07:12

## 2023-12-14 RX ADMIN — REMDESIVIR 200 MG: 100 INJECTION, POWDER, LYOPHILIZED, FOR SOLUTION INTRAVENOUS at 11:12

## 2023-12-14 RX ADMIN — AMIODARONE HYDROCHLORIDE 150 MG: 1.5 INJECTION, SOLUTION INTRAVENOUS at 01:12

## 2023-12-14 RX ADMIN — CEFTRIAXONE SODIUM 1 G: 1 INJECTION, POWDER, FOR SOLUTION INTRAMUSCULAR; INTRAVENOUS at 04:12

## 2023-12-14 NOTE — TELEPHONE ENCOUNTER
See detailed message below  who Called: Louann Ying    Would the patient rather a call back or a response via [facility]? Call back  Best Call Back Number:   Additional Information:    Louann stated she sent a message for a call back and wanted to update her number since she will not be home

## 2023-12-14 NOTE — SUBJECTIVE & OBJECTIVE
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    Hyperglycemia 1/26/2017    Hyperlipidemia     Hypertension     Osteoporosis     RLS (restless legs syndrome)        Past Surgical History:   Procedure Laterality Date    BLADDER SURGERY      cancer Mahamed Kidd    BREAST SURGERY      left benign tumor    COLONOSCOPY      EYE SURGERY      cataracts    TONSILLECTOMY         Review of patient's allergies indicates:   Allergen Reactions    Covid-19vacc,(astrazeneca)(pf)      Fever, headache, weak    Contrast media Swelling    Gabapentin Nausea And Vomiting       Current Facility-Administered Medications on File Prior to Encounter   Medication    sodium chloride 0.9% flush 10 mL     Current Outpatient Medications on File Prior to Encounter   Medication Sig    albuterol (PROVENTIL/VENTOLIN HFA) 90 mcg/actuation inhaler INHALE 2 PUFFS INTO THE LUNGS EVERY 6 HOURS AS NEEDED FOR WHEEZING    alendronate (FOSAMAX) 70 MG tablet TAKE 1 TABLET BY MOUTH EVERY 7 DAYS    amitriptyline (ELAVIL) 10 MG tablet Take 1 tablet (10 mg total) by mouth 2 (two) times a day. For sciatica    atorvastatin (LIPITOR) 10 MG tablet TAKE 1 TABLET BY MOUTH ONCE A DAY    calcium-vitamin D3 (OS-MIKE 500 + D3) 500 mg-5 mcg (200 unit) per tablet Take 1 tablet by mouth once daily.    cyanocobalamin 500 MCG tablet Take 500 mcg by mouth once daily.    fluticasone propionate (FLONASE) 50 mcg/actuation nasal spray 2 sprays (100 mcg total) by Each Nare route once daily.    fluticasone-umeclidin-vilanter (TRELEGY ELLIPTA) 100-62.5-25 mcg DsDv INHALE 1 PUFF BY MOUTH INTO THE LUNGS ONCE A DAY    furosemide (LASIX) 40 MG tablet Take 1 tablet (40 mg total) by mouth 2 (two) times a day.    gabapentin (NEURONTIN) 300 MG capsule Take 1 capsule (300 mg total) by mouth every evening for 7 days, THEN 1 capsule (300 mg total) 2 (two) times daily for 7 days, THEN 1 capsule (300 mg  total) 3 (three) times daily for 16 days.    HYDROcodone-acetaminophen (NORCO) 5-325 mg per tablet Take 1 tablet by mouth every 8 (eight) hours as needed for Pain.    HYDROcodone-acetaminophen (NORCO) 5-325 mg per tablet Take 1 tablet by mouth every 12 (twelve) hours as needed for Pain.    isosorbide mononitrate (IMDUR) 60 MG 24 hr tablet TAKE 1 TABLET BY MOUTH EVERY DAY FOR HEART AND BLOOD PRESSURE    loratadine (CLARITIN) 10 mg tablet Take 1 tablet (10 mg total) by mouth once daily.    losartan (COZAAR) 50 MG tablet TAKE 1 TABLET BY MOUTH ONCE A DAY FOR BLOOD PRESSURE    meloxicam (MOBIC) 7.5 MG tablet TAKE 1 TABLET BY MOUTH ONCE DAILY    meloxicam (MOBIC) 7.5 MG tablet Take 1 tablet (7.5 mg total) by mouth daily as needed for Pain.    methylPREDNISolone (MEDROL DOSEPACK) 4 mg tablet use as directed    montelukast (SINGULAIR) 10 mg tablet TAKE 1 TABLET BY MOUTH EVERY EVENING    MULTIVIT-IRON-MIN-FOLIC ACID 3,500-18-0.4 UNIT-MG-MG ORAL CHEW Take by mouth.    pantoprazole (PROTONIX) 40 MG tablet Take 1 tablet (40 mg total) by mouth once daily.    rOPINIRole (REQUIP) 3 MG tablet TAKE 1 TABLET BY MOUTH NIGHTLY    semaglutide (OZEMPIC) 0.25 mg or 0.5 mg(2 mg/1.5 mL) pen injector Inject 0.25 mg into the skin every 7 days.    SPIRIVA WITH HANDIHALER 18 mcg inhalation capsule INHALE THE CONTENTS OF 1 CAPSULE BY MOUTH INTO THE LUNGS ONCE A DAY    traMADoL (ULTRAM) 50 mg tablet Take 1 tablet (50 mg total) by mouth every 8 (eight) hours as needed for Pain.     Family History       Problem Relation (Age of Onset)    Cancer Daughter (40)    Colon cancer Daughter    Diabetes Mother    Heart disease Father    No Known Problems Daughter    Stroke Mother          Tobacco Use    Smoking status: Former     Passive exposure: Past    Smokeless tobacco: Never   Substance and Sexual Activity    Alcohol use: No    Drug use: Never    Sexual activity: Not on file     Review of Systems   Unable to perform ROS: Acuity of condition  "  Cardiovascular:  Negative for chest pain and palpitations.   Musculoskeletal:  Positive for falls.   Psychiatric/Behavioral:  Positive for altered mental status.      Objective:     Vital Signs (Most Recent):  Temp: 97.5 °F (36.4 °C) (12/14/23 1149)  Pulse: 99 (12/14/23 1351)  Resp: (!) 35 (12/14/23 1351)  BP: (!) 147/63 (12/14/23 1351)  SpO2: 100 % (12/14/23 1215) Vital Signs (24h Range):  Temp:  [97.5 °F (36.4 °C)] 97.5 °F (36.4 °C)  Pulse:  [89-99] 99  Resp:  [17-35] 35  SpO2:  [98 %-100 %] 100 %  BP: (134-177)/() 147/63        There is no height or weight on file to calculate BMI.    SpO2: 100 %         Intake/Output Summary (Last 24 hours) at 12/14/2023 1452  Last data filed at 12/14/2023 1350  Gross per 24 hour   Intake 500 ml   Output --   Net 500 ml       Lines/Drains/Airways       Peripheral Intravenous Line  Duration                  Peripheral IV - Single Lumen 20 G Anterior;Left;Proximal Forearm -- days                     Physical Exam  Constitutional:       Appearance: She is ill-appearing.   Cardiovascular:      Rate and Rhythm: Tachycardia present. Rhythm irregular.   Musculoskeletal:      Right lower leg: No edema.      Left lower leg: No edema.   Skin:     General: Skin is cool.      Coloration: Skin is cyanotic.          Significant Labs: BMP:   Recent Labs   Lab 12/14/23  1216   *      K 4.2      CO2 17*   BUN 56*   CREATININE 2.1*   CALCIUM 8.7   MG 2.1   , CMP   Recent Labs   Lab 12/14/23  1216      K 4.2      CO2 17*   *   BUN 56*   CREATININE 2.1*   CALCIUM 8.7   PROT 6.5   ALBUMIN 3.2*   BILITOT 0.4   ALKPHOS 74   AST 97*   ALT 61*   ANIONGAP 18*   , CBC   Recent Labs   Lab 12/14/23  1216   WBC 17.97*   HGB 13.5   HCT 43.5      , INR No results for input(s): "INR", "PROTIME" in the last 48 hours., Lipid Panel No results for input(s): "CHOL", "HDL", "LDLCALC", "TRIG", "CHOLHDL" in the last 48 hours., Troponin   Recent Labs   Lab " 12/14/23  1216   TROPONINI 0.114*   , and All pertinent lab results from the last 24 hours have been reviewed.    Significant Imaging: Echocardiogram: Transthoracic echo (TTE) complete ([facility] Only):   Results for orders placed or performed during the hospital encounter of 01/19/22   Echo   Result Value Ref Range    BSA 2.07 m2    TDI SEPTAL 0.06 m/s    LV LATERAL E/E' RATIO 23.17 m/s    LV SEPTAL E/E' RATIO 23.17 m/s    LA WIDTH 4.87 cm    TDI LATERAL 0.06 m/s    PV PEAK VELOCITY 1.21 cm/s    LVIDd 5.16 3.5 - 6.0 cm    IVS 0.88 0.6 - 1.1 cm    Posterior Wall 1.04 0.6 - 1.1 cm    Ao root annulus 2.68 cm    LVIDs 3.13 2.1 - 4.0 cm    FS 39 28 - 44 %    LA volume 102.97 cm3    Sinus 2.36 cm    LV mass 181.57 g    LA size 3.98 cm    RVDD 2.85 cm    Left Ventricle Relative Wall Thickness 0.40 cm    AV mean gradient 8 mmHg    AV valve area 2.29 cm2    AV Velocity Ratio 0.64     AV index (prosthetic) 0.65     MV valve area p 1/2 method 3.52 cm2    MV valve area by continuity eq 1.80 cm2    E/A ratio 1.08     Mean e' 0.06 m/s    E wave deceleration time 215.42 msec    Pulm vein S/D ratio 0.94     LVOT diameter 2.11 cm    LVOT area 3.5 cm2    LVOT peak jose 1.25 m/s    LVOT peak VTI 22.41 cm    Ao peak jose 1.95 m/s    Ao VTI 34.26 cm    LVOT stroke volume 78.32 cm3    AV peak gradient 15 mmHg    MV peak gradient 9 mmHg    E/E' ratio 23.17 m/s    MV Peak E Jose 1.39 m/s    MV VTI 43.59 cm    MV stenosis pressure 1/2 time 62.47 ms    MV Peak A Jose 1.29 m/s    PV Peak S Jose 0.33 m/s    PV Peak D Jose 0.35 m/s    LV Systolic Volume 38.95 mL    LV Systolic Volume Index 19.7 mL/m2    LV Diastolic Volume 127.17 mL    LV Diastolic Volume Index 64.23 mL/m2    LA Volume Index 52.0 mL/m2    LV Mass Index 92 g/m2    RA Major Axis 5.60 cm    Left Atrium Minor Axis 6.22 cm    Left Atrium Major Axis 6.28 cm    LA Volume Index (Mod) 47.7 mL/m2    LA volume (mod) 94.53 cm3    RA Width 4.58 cm    Right Atrial Pressure (from IVC) 3 mmHg    EF 65  %    TV resting pulmonary artery pressure 57 mmHg    TR Max Jose 3.66 m/s    Triscuspid Valve Regurgitation Peak Gradient 54 mmHg    Narrative    · The left ventricle is mildly enlarged with normal systolic function.  · The estimated ejection fraction is 65%.  · Grade II left ventricular diastolic dysfunction.  · Normal right ventricular size with normal right ventricular systolic   function.  · Severe left atrial enlargement.  · Mild-to-moderate mitral regurgitation.  · Mild to moderate tricuspid regurgitation.  · Mild pulmonic regurgitation.  · There is pulmonary hypertension.  · Normal central venous pressure (3 mmHg).  · The estimated PA systolic pressure is 57 mmHg.  · Mass noted in the right atrium at the superior portion of the   inter-atrial septum, measuring 2.7 cm in diameter.

## 2023-12-14 NOTE — Clinical Note
Diagnosis: Ventricular tachycardia (paroxysmal) [319344]   Future Attending Provider: CHIKIS FRANCIS [[id number]]   Admitting Provider:: CHIKIS FRANCIS [[id number]]   Special Needs:: No Special Needs [1]

## 2023-12-14 NOTE — ED PROVIDER NOTES
"Encounter Date: 12/14/2023       History     Chief Complaint   Patient presents with    Fall     Pt arrived via EMS from assisted living center fell  3 days ago , found by family  last night on floor  , pt states  she hit her head, but no LOC, pt states 3 days ago pt had possible covid exposure,  + n/v noted, GCS=15, family reports pt " talking out of her head"      Patient is an 83-year-old female with a past history of COPD, hyperglycemia, hyperlipidemia, hypertension and osteoporosis who presents to the ED from her independent living facility via EMS with complaint of fall.  Patient reportedly sustained a mechanical fall approximately 3 days ago.  She states she got her feet "tripped up" causing her fall and hit her head.  She denies any LOC. She states that she was lying on the floor for approximately 3 days until she was checked on by family members today.  Patient states that she was walking to take a COVID test prior to her fall she was having symptoms concerning for possible COVID infection.  She does report possible COVID exposure prior to the fall.  Per EMS, the family arrived to find the patient on the floor and state that she was talking out of her head and hallucinating.  Per EMS, the patient has been answering their questions appropriately and has maintained a GCS of 15 throughout.  The patient does report 1 episode of vomiting described as nonbloody and non bilious as well as intermittent nausea since the fall.  She denies any overt fever, cough, abdominal pain, chest pain or shortness of breath.  Per EMS, patient had an O2 sat of 94% on room air which improved to 97% with 2 L of supplemental oxygen.  He has not known whether not the patient is on supplemental oxygen for COPD.  The patient denies any pain complaints such as neck pain or back pain at this time.          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    Hyperglycemia 1/26/2017    Hyperlipidemia     Hypertension     Osteoporosis     RLS (restless legs syndrome)      Past Surgical History:   Procedure Laterality Date    BLADDER SURGERY      cancer Mahamed Kidd    BREAST SURGERY      left benign tumor    COLONOSCOPY      EYE SURGERY      cataracts    TONSILLECTOMY       Family History   Problem Relation Age of Onset    Cancer Daughter 40        colon    Colon cancer Daughter     No Known Problems Daughter     Diabetes Mother     Stroke Mother     Heart disease Father      Social History     Tobacco Use    Smoking status: Former     Passive exposure: Past    Smokeless tobacco: Never   Substance Use Topics    Alcohol use: No    Drug use: Never     Review of Systems   Constitutional:  Negative for chills and fever.   Respiratory:  Negative for chest tightness and shortness of breath.    Cardiovascular:  Negative for chest pain, palpitations and leg swelling.   Gastrointestinal:  Positive for nausea and vomiting. Negative for abdominal pain, constipation and diarrhea.   Genitourinary:  Negative for dysuria and flank pain.   Musculoskeletal:  Negative for back pain.   Skin:  Negative for pallor and rash.   Neurological:  Negative for dizziness, seizures, speech difficulty, light-headedness, numbness and headaches.   Psychiatric/Behavioral:  Negative for agitation, confusion and hallucinations.        Physical Exam     Initial Vitals [12/14/23 1149]   BP Pulse Resp Temp SpO2   134/70 92 17 97.5 °F (36.4 °C) 98 %      MAP       --         Physical Exam    Nursing note and vitals reviewed.  Constitutional: She appears well-developed and well-nourished. No distress.   Well-appearing  Non toxic   No acute distress noted    HENT:   Head: Normocephalic and atraumatic.   Right Ear:  External ear normal.   Left Ear: External ear normal.   No overt findings of head trauma appreciated.    Eyes: Conjunctivae and EOM are normal. Pupils are equal, round, and reactive to light.   Neck: Neck supple.   No midline tenderness noted.    Normal range of motion.  Cardiovascular:  Normal rate, regular rhythm, normal heart sounds and intact distal pulses.           Pulmonary/Chest: Breath sounds normal.   Abdominal: Abdomen is soft. Bowel sounds are normal.   Musculoskeletal:         General: No tenderness or edema. Normal range of motion.      Cervical back: Normal range of motion and neck supple.     Neurological: She is alert. She has normal strength.   Pt oriented to person and place.   No focal neurological deficit appreciated  Strength 5/5   Sensation grossly in tact  MAEW  CGS 15   Unable to assess gait in light of presentation    Skin: Skin is warm. Capillary refill takes less than 2 seconds.   Psychiatric: She has a normal mood and affect. Thought content normal.         ED Course   Critical Care    Date/Time: 12/14/2023 8:14 PM    Performed by: Nathanael Hinson MD  Authorized by: Nathanael Hinson MD  Direct patient critical care time: 20 minutes  Additional history critical care time: 5 minutes  Ordering / reviewing critical care time: 10 minutes  Documentation critical care time: 10 minutes  Consulting other physicians critical care time: 10 minutes  Consult with family critical care time: 10 minutes  Other critical care time: 5 minutes  Total critical care time (exclusive of procedural time) : 70 minutes        Labs Reviewed   CBC W/ AUTO DIFFERENTIAL - Abnormal; Notable for the following components:       Result Value    WBC 17.97 (*)     MCH 26.3 (*)     MCHC 31.0 (*)     RDW 17.7 (*)     Immature Granulocytes 0.6 (*)     Gran # (ANC) 15.4 (*)     Immature Grans (Abs) 0.11 (*)     Lymph # 0.6 (*)     Mono # 1.9 (*)     Gran % 85.7 (*)     Lymph % 3.1 (*)     All other components within  normal limits   COMPREHENSIVE METABOLIC PANEL - Abnormal; Notable for the following components:    CO2 17 (*)     Glucose 122 (*)     BUN 56 (*)     Creatinine 2.1 (*)     Albumin 3.2 (*)     AST 97 (*)     ALT 61 (*)     eGFR 23 (*)     Anion Gap 18 (*)     All other components within normal limits   TROPONIN I - Abnormal; Notable for the following components:    Troponin I 0.114 (*)     All other components within normal limits   URINALYSIS, REFLEX TO URINE CULTURE - Abnormal; Notable for the following components:    Appearance, UA Hazy (*)     Protein, UA 1+ (*)     Ketones, UA Trace (*)     Bilirubin (UA) 1+ (*)     Occult Blood UA 1+ (*)     All other components within normal limits    Narrative:     Specimen Source->Urine   CK - Abnormal; Notable for the following components:    CPK 2331 (*)     All other components within normal limits   SARS-COV-2 RNA AMPLIFICATION, QUAL - Abnormal; Notable for the following components:    SARS-CoV-2 RNA, Amplification, Qual Positive (*)     All other components within normal limits   C-REACTIVE PROTEIN - Abnormal; Notable for the following components:    .4 (*)     All other components within normal limits   LACTATE DEHYDROGENASE - Abnormal; Notable for the following components:     (*)     All other components within normal limits   B-TYPE NATRIURETIC PEPTIDE - Abnormal; Notable for the following components:     (*)     All other components within normal limits   URINALYSIS MICROSCOPIC - Abnormal; Notable for the following components:    Hyaline Casts, UA 12 (*)     All other components within normal limits    Narrative:     Specimen Source->Urine   CBC W/ AUTO DIFFERENTIAL - Abnormal; Notable for the following components:    WBC 14.20 (*)     MCH 26.4 (*)     MCHC 31.4 (*)     RDW 17.7 (*)     Gran # (ANC) 11.7 (*)     Immature Grans (Abs) 0.06 (*)     Lymph # 0.6 (*)     Mono # 1.8 (*)     Gran % 82.6 (*)     Lymph % 4.2 (*)     All other components  within normal limits   INFLUENZA A & B BY MOLECULAR   CULTURE, BLOOD    Narrative:     Collection has been rescheduled by [email or web address] at 12/14/2023 14:45 Reason:   Patient unavailable/in ct  Collection has been rescheduled by [email or web address] at 12/14/2023 14:45 Reason:   Patient unavailable/in ct   CULTURE, BLOOD    Narrative:     Collection has been rescheduled by [email or web address] at 12/14/2023 14:45 Reason:   Patient unavailable/in ct  Collection has been rescheduled by [email or web address] at 12/14/2023 14:45 Reason:   Patient unavailable/in ct   AMMONIA   DRUG SCREEN PANEL, URINE EMERGENCY    Narrative:     Specimen Source->Urine   LACTIC ACID, PLASMA   RSV ANTIGEN DETECTION   MAGNESIUM   FERRITIN   MAGNESIUM   B-TYPE NATRIURETIC PEPTIDE   TROPONIN I   PROCALCITONIN   POCT GLUCOSE MONITORING CONTINUOUS   POCT GLUCOSE MONITORING CONTINUOUS          Imaging Results              CT Cervical Spine Without Contrast (Final result)  Result time 12/14/23 16:23:16      Final result by Segun Schulte MD (12/14/23 16:23:16)                   Impression:      1. No acute abnormality  2. Mild degenerative changes.      Electronically signed by: Segun Schulte  Date:    12/14/2023  Time:    16:23               Narrative:    EXAMINATION:  CT CERVICAL SPINE WITHOUT CONTRAST    CLINICAL HISTORY:  Neck trauma (Age >= 65y);    TECHNIQUE:  Low dose axial CT images through the cervical spine, with sagittal and coronal reformations.  Contrast was not administered.    COMPARISON:  None    FINDINGS:  No acute fractures of the cervical spine.  Alignment is satisfactory.    Mild disc space narrowing at C5-6.    Central canal is adequately maintained.    Mild foraminal narrowing at C5-6 on the left.    Limited evaluation of the intraspinal contents demonstrates no hematoma or mass.Paraspinal soft tissues exhibit no acute abnormalities.                                        CT Head Without Contrast (Final result)  Result time 12/14/23 15:45:12      Final result by Segun Schulte  MD (12/14/23 15:45:12)                   Impression:      Small 8 mm hyperdense focus involving the posterior corpus callosum slightly left of midline.  There is a history of head trauma.  This could represent a minimal hemorrhagic contusion.  Small hemorrhagic lacunar infarction is not excluded.  Small hyperdense or hemorrhagic mass is felt to be less likely though not excluded.  Follow-up is recommended.  Recommend neuro consultation and follow-up.    This report was flagged in Epic as abnormal.      Electronically signed by: Segun Condon  Date:    12/14/2023  Time:    15:45               Narrative:    EXAMINATION:  CT HEAD WITHOUT CONTRAST    CLINICAL HISTORY:  Head trauma, minor (Age >= 65y);Mental status change, unknown cause;    TECHNIQUE:  Low dose axial CT images obtained throughout the head without intravenous contrast. Sagittal and coronal reconstructions were performed.    COMPARISON:  None.    FINDINGS:  Intracranial compartment:    Ventricles and sulci are normal in size for age without evidence of hydrocephalus.    Mild involutional changes and chronic microvascular ischemic changes in the periventricular white matter.    There is an ovoid 8 mm hyperdense focus involving the posterior corpus callosum slightly left of midline on axial 29 suggesting minimal hemorrhage, possibly minimal hemorrhagic contusion or infarction.  Small hyperdense or hemorrhagic mass is felt to be less likely though not excluded.  Neuro follow-up recommended.    No evidence of acute major vascular infarction.    Skull/extracranial contents (limited evaluation): No fracture. Mastoid air cells and paranasal sinuses are essentially clear.                                       X-Ray Chest AP Portable (Final result)  Result time 12/14/23 14:22:02      Final result by Reji Larry MD (12/14/23 14:22:02)                   Impression:      Mildly prominent interstitial markings could relate to pulmonary vascular  congestion/interstitial edema versus an infectious or noninfectious inflammatory etiology.    Electronically signed by resident: Reji Larry  Date:    12/14/2023  Time:    14:07    Electronically signed by: Reji Larry  Date:    12/14/2023  Time:    14:22               Narrative:    EXAMINATION:  XR CHEST AP PORTABLE    CLINICAL HISTORY:  Cough, unspecified    TECHNIQUE:  Single frontal view of the chest was performed.    COMPARISON:  Chest radiograph performed 01/10/2022    FINDINGS:  Monitoring leads overlie the chest.  Fibrillator pad.    Grossly unchanged cardiomediastinal contours.  Hiatal hernia is again suggested.    Prominent interstitial markings are noted bilaterally.    No definite focal airspace consolidation seen.    No definite pneumothorax or large volume pleural effusion.    No acute findings in the visualized abdomen.    Osseous and soft tissue structures appear without definite acute change.  Remote right-sided rib fractures again seen.                                       Medications   amiodarone 360 mg/200 mL (1.8 mg/mL) infusion (1 mg/min Intravenous New Bag 12/14/23 1942)   vancomycin (VANCOCIN) 1,750 mg in dextrose 5 % (D5W) 500 mL IVPB (1,750 mg Intravenous New Bag 12/14/23 1939)   tiotropium bromide 2.5 mcg/actuation inhaler 2 puff (has no administration in time range)   sodium chloride 0.9% flush 10 mL (has no administration in time range)   cefTRIAXone (ROCEPHIN) 1 g in dextrose 5 % in water (D5W) 100 mL IVPB (MB+) (has no administration in time range)   azithromycin tablet 500 mg (has no administration in time range)   isosorbide mononitrate 24 hr tablet 60 mg (has no administration in time range)   losartan tablet 50 mg (50 mg Oral Not Given 12/14/23 1845)   remdesivir 200 mg in sodium chloride 0.9% 250 mL infusion (has no administration in time range)     Followed by   remdesivir 100 mg in sodium chloride 0.9% 250 mL infusion (has no administration in time range)   0.9%  NaCl  infusion (0 mLs Intravenous Stopped 12/14/23 1350)   amiodarone in dextrose 150 mg/100 mL (1.5 mg/mL) loading dose (150 mg  New Bag 12/14/23 1355)   cefTRIAXone (ROCEPHIN) 1 g in dextrose 5 % in water (D5W) 100 mL IVPB (MB+) (0 g Intravenous Stopped 12/14/23 1846)     Medical Decision Making  See HPI     Amount and/or Complexity of Data Reviewed  Labs: ordered. Decision-making details documented in ED Course.  Radiology: ordered. Decision-making details documented in ED Course.    Risk  Prescription drug management.               ED Course as of 12/14/23 2015   Thu Dec 14, 2023   1252 WBC(!): 17.97  Reviewed by self - elevated  [LC]   1256 CPK(!): 2331  Reviewed by self - consistent with rhabdomyolysis  [LC]   1256 Creatinine(!): 2.1  Reviewed by self - elevated above baseline; consistent with JOSUE  [LC]   1257 AST(!): 97 [LC]   1257 ALT(!): 61 [LC]   1257 Ammonia: 30 [LC]   1257 Lactate, Doc: 1.1 [LC]   1304 Troponin I(!): 0.114 [LC]   1308 SARS-CoV-2 RNA, Amplification, Qual(!): Positive  Reviewed by self - abnormal  [LC]   1308 Influenza A, Molecular: Negative [LC]   1309 Influenza B, Molecular: Negative [LC]   1353 Pt with sustained wide complex tachycardia concerning for stable V-tach. Will initiate 150 mg amiodarone over 10 min followed by infusion. Pt denies any CP or feelings of palpitations.  [LC]   1426 X-Ray Chest AP Portable  Pulmonary congestion vs non specific infectious process per my independent interpretation; no focal consolidation.  [LC]   1427 BNP(!): 250 [LC]   1600 CT Head Without Contrast(!)  Small 8 mm hyperdense focus involving the posterior corpus callosum slightly left of midline.  There is a history of head trauma.  This could represent a minimal hemorrhagic contusion.  Small hemorrhagic lacunar infarction is not excluded.  Small hyperdense or hemorrhagic mass is felt to be less likely though not excluded.  Follow-up is recommended.  Recommend neuro consultation and follow-up. [LC]   1712  I touched base with the [facility] - unable to have the on-call neurosurgeon (Dr. Morrow) evaluate the patient's images 2/2 to being in the OR.  [LC]   1713 I did have Dr. Cline review the case and images. He recommended repeat CT head WO 6hours from the original CT head. I discussed the possibility of transfer with the patient who stated to me that she did not wish to be transferred to [facility]. I explained the risks vs benefits of staying here at [facility] vs being transferred and the patient verbalized understanding. She stated to me that she did not wish to be transferred despite knowing that we don't have an on-call neurosurgeon should complications arise. She stated that she would be ok with staying here at [facility].  [LC]   1731 Discussed possibility of pt needing intervention in light of possible intracranial abnormality and need for possible transfer as we do not have NSGY on call here. Pt stated to me that she did not wish to have any specific interventions done despite this.  [LC]   2013 Pt will be admitted to the [facility] IM service. She is comfortable with plan for admission and restated to me that she did not wish to be transferred. [LC]      ED Course User Index  [LC] Nathanael Hinson MD                 Medical Decision Making:   Initial Assessment:   See HPI  Labs, EKG, imaging   Differential Diagnosis:   Closed head injury, COVID 19, sepsis, septic shock, arrhythmia, influenza, community acquired pneumonia   Clinical Tests:   Lab Tests: Reviewed and Ordered  Radiological Study: Ordered and Reviewed  ED Management:  - will admit to the [facility] IM service for wide complex tachydysrhythmia, COVID 19, JOSUE, rhabdomyolysis; CT head was reviewed by Dr. Cline who recommends repeat CT head in 6 hours; please see his note for further details; pt updated; she is comfortable with plan for admission to the ICU at [facility]; she did not wish to be transferred to another facility; she demonstrates capacity to make her  own medical decisions   Other:   I have discussed this case with another health care provider.       <> Summary of the Discussion: Case discussed with various consultants:  - NSGY who recommended repeat CT head 6 hours following initial CT  - Cardiology who evaluated patient in light of HDS sustained V-tach  - [facility] IM who will admit patient to their service              Clinical Impression:  Final diagnoses:  [R41.82] Altered mental status  [R05.9] Cough  [I47.29] Ventricular tachycardia (paroxysmal)  [U07.1] COVID-19  [N17.9] Acute kidney injury  [T79.6XXA] Traumatic rhabdomyolysis, initial encounter          ED Disposition Condition    Observation Stable                Nathanael Hinson MD  12/14/23 2014       Nathanael Hinson MD  12/14/23 2015

## 2023-12-14 NOTE — TELEPHONE ENCOUNTER
----- Message from Rishi Esquivel sent at 12/14/2023  9:04 AM CST -----  Contact: Ms. Ying  Type: Requesting to speak with nurse        Who Called: MsChanel Deonna (power of )   Regarding: pt been on the floor for 3 days. Was found last night. Toe nails,finger nails, and lips was blue. PT still hallucinating. Very dehydrated. Denied urgent care/ER. Wanted to call 911 but patient refused. Seeking medical advice because patient is not doing well.  Would the patient rather a call back or a response via [facility]? Call back  Best Call Back Number: [phone]  Additional Information:

## 2023-12-14 NOTE — HPI
"HPI retrieved mostly from chart and MPOA 2/2 patient's clinical status. 83-year-old female with COPD, hyperglycemia, hyperlipidemia, hypertension and osteoporosis who presents to the ED from her independent living facility via EMS with complaint of fall 3 days ago- mechanical. Patient was on the floor without food or water for 3 days.  She states she got her feet "tripped up" causing her fall and hit her head.  Family went to check on patient and noted that she was still on the floor. Patient refused to come to the hospital last night. Patient was reportedly hallucinating with purple lips with protruding tongue. Hands, and feet were purple as well and she was grunting. Her MPOA reports patient told her over a week ago that she was ready to die and did not want any medical intervention. Patient was able to verbalize to me that she was not interested in any chest compressions, defibrillation, or intubation. She and her family verbalized understanding the meaning of DNR status. Palliative care discussion was introduced and they are agreeable to meet with Palliative medicine. Patient noted to have extensive VT in the ED and received an Amiodarone bolus and is presently on an Amio gtt at 1mg. Patient noted COVID + as well with significant JOSUE. Troponin elevated at 0.1. Currently AF RVR with rate in the 120s. Hands and feet noted cool to touch and cyanotic.  at this time. Palliative care consult placed.   "

## 2023-12-14 NOTE — ASSESSMENT & PLAN NOTE
Patient suffered a mechanical fall 3 days PTA and was down on the floor when family found her. Patient declined transport to ED last night and when noted to have persistent hallucinations, EMS was called.  Patient found cyanotic by family. Hands and feet remain cool and cyanotic  Noted extensive VT in the ED which resolved with Amiodarone, currently ST with FPACs on amiodarone gtt  Per patient and MPOA- she wishes to be a DNR and understands that no chest compressions, defibrillation, or intubation will be performed. They expressed desire for comfort measures without further testing as it would not change POC. They do not want escalation of care. Palliative consult placed.  Continue Amiodarone gtt until evaluation by Palliative Medicine   K+ 4.2, Mg 2.1   No further cardiac work up planned given patient and family wishes  Please place DNR order

## 2023-12-14 NOTE — PROGRESS NOTES
Neurosurgery consulted for intrapatenchymal hemorrhage following a minor trauma. Head CT reviewed showing posterior corpus callous 8mm hyperdense lesion. Possible contusion. Recommending admission icu. Hob 30. Npo. Hold anticoagulation or antiplatelet. BP Systolic goal <160.     Repeat head Ct in 6 hours.     Full consult to follow.     Miguel Ángel Cline MD  [phone]

## 2023-12-14 NOTE — CONSULTS
"[facility] - Emergency Dept  Cardiology  Consult Note    Patient Name: Shirley F Gilford  MRN: [id number]  Admission Date: 12/14/2023  Hospital Length of Stay: 0 days  Code Status: Prior   Attending Provider: Nathanael Hinson MD   Consulting Provider: Francisco Gipson NP  Primary Care Physician: Marc Sands MD  Principal Problem:<principal problem not specified>    Patient information was obtained from patient, relative(s), and ER records.     Inpatient consult to Cardiology  Consult performed by: Francisco Gipson NP  Consult ordered by: Nathanael Hinson MD        Subjective:     Chief Complaint:  VT     HPI:   HPI retrieved mostly from chart and MPOA 2/2 patient's clinical status. 83-year-old female with COPD, hyperglycemia, hyperlipidemia, hypertension and osteoporosis who presents to the ED from her independent living facility via EMS with complaint of fall 3 days ago- mechanical. Patient was on the floor without food or water for 3 days.  She states she got her feet "tripped up" causing her fall and hit her head.  Family went to check on patient and noted that she was still on the floor. Patient refused to come to the hospital last night. Patient was reportedly hallucinating with purple lips with protruding tongue. Hands, and feet were purple as well and she was grunting. Her MPOA reports patient told her over a week ago that she was ready to die and did not want any medical intervention. Patient was able to verbalize to me that she was not interested in any chest compressions, defibrillation, or intubation. She and her family verbalized understanding the meaning of DNR status. Palliative care discussion was introduced and they are agreeable to meet with Palliative medicine. Patient noted to have extensive VT in the ED and received an Amiodarone bolus and is presently on an Amio gtt at 1mg. Patient noted COVID + as well with significant JOSUE. Troponin elevated at 0.1. Currently AF RVR with rate in the " 120s. Hands and feet noted cool to touch and cyanotic.  at this time. Palliative care consult placed.     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    Hyperglycemia 1/26/2017    Hyperlipidemia     Hypertension     Osteoporosis     RLS (restless legs syndrome)        Past Surgical History:   Procedure Laterality Date    BLADDER SURGERY      cancer Mahamed Kidd    BREAST SURGERY      left benign tumor    COLONOSCOPY      EYE SURGERY      cataracts    TONSILLECTOMY         Review of patient's allergies indicates:   Allergen Reactions    Covid-19vacc,(astrazeneca)(pf)      Fever, headache, weak    Contrast media Swelling    Gabapentin Nausea And Vomiting       Current Facility-Administered Medications on File Prior to Encounter   Medication    sodium chloride 0.9% flush 10 mL     Current Outpatient Medications on File Prior to Encounter   Medication Sig    albuterol (PROVENTIL/VENTOLIN HFA) 90 mcg/actuation inhaler INHALE 2 PUFFS INTO THE LUNGS EVERY 6 HOURS AS NEEDED FOR WHEEZING    alendronate (FOSAMAX) 70 MG tablet TAKE 1 TABLET BY MOUTH EVERY 7 DAYS    amitriptyline (ELAVIL) 10 MG tablet Take 1 tablet (10 mg total) by mouth 2 (two) times a day. For sciatica    atorvastatin (LIPITOR) 10 MG tablet TAKE 1 TABLET BY MOUTH ONCE A DAY    calcium-vitamin D3 (OS-MIKE 500 + D3) 500 mg-5 mcg (200 unit) per tablet Take 1 tablet by mouth once daily.    cyanocobalamin 500 MCG tablet Take 500 mcg by mouth once daily.    fluticasone propionate (FLONASE) 50 mcg/actuation nasal spray 2 sprays (100 mcg total) by Each Nare route once daily.    fluticasone-umeclidin-vilanter (TRELEGY ELLIPTA) 100-62.5-25 mcg DsDv INHALE 1 PUFF BY MOUTH INTO THE LUNGS ONCE A DAY    furosemide (LASIX) 40 MG tablet Take 1 tablet (40 mg total) by mouth 2 (two) times a day.    gabapentin (NEURONTIN) 300 MG capsule Take 1 capsule (300 mg total) by mouth  every evening for 7 days, THEN 1 capsule (300 mg total) 2 (two) times daily for 7 days, THEN 1 capsule (300 mg total) 3 (three) times daily for 16 days.    HYDROcodone-acetaminophen (NORCO) 5-325 mg per tablet Take 1 tablet by mouth every 8 (eight) hours as needed for Pain.    HYDROcodone-acetaminophen (NORCO) 5-325 mg per tablet Take 1 tablet by mouth every 12 (twelve) hours as needed for Pain.    isosorbide mononitrate (IMDUR) 60 MG 24 hr tablet TAKE 1 TABLET BY MOUTH EVERY DAY FOR HEART AND BLOOD PRESSURE    loratadine (CLARITIN) 10 mg tablet Take 1 tablet (10 mg total) by mouth once daily.    losartan (COZAAR) 50 MG tablet TAKE 1 TABLET BY MOUTH ONCE A DAY FOR BLOOD PRESSURE    meloxicam (MOBIC) 7.5 MG tablet TAKE 1 TABLET BY MOUTH ONCE DAILY    meloxicam (MOBIC) 7.5 MG tablet Take 1 tablet (7.5 mg total) by mouth daily as needed for Pain.    methylPREDNISolone (MEDROL DOSEPACK) 4 mg tablet use as directed    montelukast (SINGULAIR) 10 mg tablet TAKE 1 TABLET BY MOUTH EVERY EVENING    MULTIVIT-IRON-MIN-FOLIC ACID 3,500-18-0.4 UNIT-MG-MG ORAL CHEW Take by mouth.    pantoprazole (PROTONIX) 40 MG tablet Take 1 tablet (40 mg total) by mouth once daily.    rOPINIRole (REQUIP) 3 MG tablet TAKE 1 TABLET BY MOUTH NIGHTLY    semaglutide (OZEMPIC) 0.25 mg or 0.5 mg(2 mg/1.5 mL) pen injector Inject 0.25 mg into the skin every 7 days.    SPIRIVA WITH HANDIHALER 18 mcg inhalation capsule INHALE THE CONTENTS OF 1 CAPSULE BY MOUTH INTO THE LUNGS ONCE A DAY    traMADoL (ULTRAM) 50 mg tablet Take 1 tablet (50 mg total) by mouth every 8 (eight) hours as needed for Pain.     Family History       Problem Relation (Age of Onset)    Cancer Daughter (40)    Colon cancer Daughter    Diabetes Mother    Heart disease Father    No Known Problems Daughter    Stroke Mother          Tobacco Use    Smoking status: Former     Passive exposure: Past    Smokeless tobacco: Never   Substance and Sexual Activity    Alcohol use: No    Drug use:  "Never    Sexual activity: Not on file     Review of Systems   Unable to perform ROS: Acuity of condition   Cardiovascular:  Negative for chest pain and palpitations.   Musculoskeletal:  Positive for falls.   Psychiatric/Behavioral:  Positive for altered mental status.      Objective:     Vital Signs (Most Recent):  Temp: 97.5 °F (36.4 °C) (12/14/23 1149)  Pulse: 99 (12/14/23 1351)  Resp: (!) 35 (12/14/23 1351)  BP: (!) 147/63 (12/14/23 1351)  SpO2: 100 % (12/14/23 1215) Vital Signs (24h Range):  Temp:  [97.5 °F (36.4 °C)] 97.5 °F (36.4 °C)  Pulse:  [89-99] 99  Resp:  [17-35] 35  SpO2:  [98 %-100 %] 100 %  BP: (134-177)/() 147/63        There is no height or weight on file to calculate BMI.    SpO2: 100 %         Intake/Output Summary (Last 24 hours) at 12/14/2023 1452  Last data filed at 12/14/2023 1350  Gross per 24 hour   Intake 500 ml   Output --   Net 500 ml       Lines/Drains/Airways       Peripheral Intravenous Line  Duration                  Peripheral IV - Single Lumen 20 G Anterior;Left;Proximal Forearm -- days                     Physical Exam  Constitutional:       Appearance: She is ill-appearing.   Cardiovascular:      Rate and Rhythm: Tachycardia present. Rhythm irregular.   Musculoskeletal:      Right lower leg: No edema.      Left lower leg: No edema.   Skin:     General: Skin is cool.      Coloration: Skin is cyanotic.          Significant Labs: BMP:   Recent Labs   Lab 12/14/23  1216   *      K 4.2      CO2 17*   BUN 56*   CREATININE 2.1*   CALCIUM 8.7   MG 2.1   , CMP   Recent Labs   Lab 12/14/23  1216      K 4.2      CO2 17*   *   BUN 56*   CREATININE 2.1*   CALCIUM 8.7   PROT 6.5   ALBUMIN 3.2*   BILITOT 0.4   ALKPHOS 74   AST 97*   ALT 61*   ANIONGAP 18*   , CBC   Recent Labs   Lab 12/14/23  1216   WBC 17.97*   HGB 13.5   HCT 43.5      , INR No results for input(s): "INR", "PROTIME" in the last 48 hours., Lipid Panel No results for input(s): " ""CHOL", "HDL", "LDLCALC", "TRIG", "CHOLHDL" in the last 48 hours., Troponin   Recent Labs   Lab 12/14/23  1216   TROPONINI 0.114*   , and All pertinent lab results from the last 24 hours have been reviewed.    Significant Imaging: Echocardiogram: Transthoracic echo (TTE) complete ([facility] Only):   Results for orders placed or performed during the hospital encounter of 01/19/22   Echo   Result Value Ref Range    BSA 2.07 m2    TDI SEPTAL 0.06 m/s    LV LATERAL E/E' RATIO 23.17 m/s    LV SEPTAL E/E' RATIO 23.17 m/s    LA WIDTH 4.87 cm    TDI LATERAL 0.06 m/s    PV PEAK VELOCITY 1.21 cm/s    LVIDd 5.16 3.5 - 6.0 cm    IVS 0.88 0.6 - 1.1 cm    Posterior Wall 1.04 0.6 - 1.1 cm    Ao root annulus 2.68 cm    LVIDs 3.13 2.1 - 4.0 cm    FS 39 28 - 44 %    LA volume 102.97 cm3    Sinus 2.36 cm    LV mass 181.57 g    LA size 3.98 cm    RVDD 2.85 cm    Left Ventricle Relative Wall Thickness 0.40 cm    AV mean gradient 8 mmHg    AV valve area 2.29 cm2    AV Velocity Ratio 0.64     AV index (prosthetic) 0.65     MV valve area p 1/2 method 3.52 cm2    MV valve area by continuity eq 1.80 cm2    E/A ratio 1.08     Mean e' 0.06 m/s    E wave deceleration time 215.42 msec    Pulm vein S/D ratio 0.94     LVOT diameter 2.11 cm    LVOT area 3.5 cm2    LVOT peak jose 1.25 m/s    LVOT peak VTI 22.41 cm    Ao peak jose 1.95 m/s    Ao VTI 34.26 cm    LVOT stroke volume 78.32 cm3    AV peak gradient 15 mmHg    MV peak gradient 9 mmHg    E/E' ratio 23.17 m/s    MV Peak E Jose 1.39 m/s    MV VTI 43.59 cm    MV stenosis pressure 1/2 time 62.47 ms    MV Peak A Jose 1.29 m/s    PV Peak S Jose 0.33 m/s    PV Peak D Jose 0.35 m/s    LV Systolic Volume 38.95 mL    LV Systolic Volume Index 19.7 mL/m2    LV Diastolic Volume 127.17 mL    LV Diastolic Volume Index 64.23 mL/m2    LA Volume Index 52.0 mL/m2    LV Mass Index 92 g/m2    RA Major Axis 5.60 cm    Left Atrium Minor Axis 6.22 cm    Left Atrium Major Axis 6.28 cm    LA Volume Index (Mod) 47.7 mL/m2    LA " volume (mod) 94.53 cm3    RA Width 4.58 cm    Right Atrial Pressure (from IVC) 3 mmHg    EF 65 %    TV resting pulmonary artery pressure 57 mmHg    TR Max Jose 3.66 m/s    Triscuspid Valve Regurgitation Peak Gradient 54 mmHg    Narrative    · The left ventricle is mildly enlarged with normal systolic function.  · The estimated ejection fraction is 65%.  · Grade II left ventricular diastolic dysfunction.  · Normal right ventricular size with normal right ventricular systolic   function.  · Severe left atrial enlargement.  · Mild-to-moderate mitral regurgitation.  · Mild to moderate tricuspid regurgitation.  · Mild pulmonic regurgitation.  · There is pulmonary hypertension.  · Normal central venous pressure (3 mmHg).  · The estimated PA systolic pressure is 57 mmHg.  · Mass noted in the right atrium at the superior portion of the   inter-atrial septum, measuring 2.7 cm in diameter.        Assessment and Plan:     Ventricular tachycardia  Patient suffered a mechanical fall 3 days PTA and was down on the floor when family found her. Patient declined transport to ED last night and when noted to have persistent hallucinations, EMS was called.  Patient found cyanotic by family. Hands and feet remain cool and cyanotic  Noted extensive VT in the ED which resolved with Amiodarone, currently ST with FPACs on amiodarone gtt  Per patient and MPOA- she wishes to be a DNR and understands that no chest compressions, defibrillation, or intubation will be performed. They expressed desire for comfort measures without further testing as it would not change POC. They do not want escalation of care. Palliative consult placed.  Continue Amiodarone gtt until evaluation by Palliative Medicine   K+ 4.2, Mg 2.1   No further cardiac work up planned given patient and family wishes  Please place DNR order            VTE Risk Mitigation (From admission, onward)      None            Thank you for your consult. I will sign off. Please contact us if  you have any additional questions.    Francisco Gipson NP  Cardiology   [facility] - Emergency Dept

## 2023-12-15 VITALS
SYSTOLIC BLOOD PRESSURE: 112 MMHG | OXYGEN SATURATION: 96 % | RESPIRATION RATE: 18 BRPM | WEIGHT: 188.06 LBS | DIASTOLIC BLOOD PRESSURE: 58 MMHG | HEART RATE: 85 BPM | BODY MASS INDEX: 33.32 KG/M2 | HEIGHT: 63 IN | TEMPERATURE: 98 F

## 2023-12-15 DIAGNOSIS — U07.1 COVID-19 VIRUS DETECTED: ICD-10-CM

## 2023-12-15 PROBLEM — Z51.5 PALLIATIVE CARE ENCOUNTER: Status: ACTIVE | Noted: 2023-12-15

## 2023-12-15 PROBLEM — S06.33AA INTRAPARENCHYMAL HEMATOMA OF BRAIN: Status: ACTIVE | Noted: 2023-12-15

## 2023-12-15 LAB
ALBUMIN SERPL BCP-MCNC: 2.7 G/DL (ref 3.5–5.2)
ALP SERPL-CCNC: 61 U/L (ref 55–135)
ALT SERPL W/O P-5'-P-CCNC: 52 U/L (ref 10–44)
ANION GAP SERPL CALC-SCNC: 13 MMOL/L (ref 8–16)
AST SERPL-CCNC: 73 U/L (ref 10–40)
BILIRUB SERPL-MCNC: 0.3 MG/DL (ref 0.1–1)
BUN SERPL-MCNC: 42 MG/DL (ref 8–23)
CALCIUM SERPL-MCNC: 8.1 MG/DL (ref 8.7–10.5)
CHLORIDE SERPL-SCNC: 106 MMOL/L (ref 95–110)
CO2 SERPL-SCNC: 20 MMOL/L (ref 23–29)
CREAT SERPL-MCNC: 1.3 MG/DL (ref 0.5–1.4)
EST. GFR  (NO RACE VARIABLE): 41 ML/MIN/1.73 M^2
GLUCOSE SERPL-MCNC: 122 MG/DL (ref 70–110)
MAGNESIUM SERPL-MCNC: 1.9 MG/DL (ref 1.6–2.6)
PHOSPHATE SERPL-MCNC: 2 MG/DL (ref 2.7–4.5)
POCT GLUCOSE: 125 MG/DL (ref 70–110)
POCT GLUCOSE: 132 MG/DL (ref 70–110)
POCT GLUCOSE: 63 MG/DL (ref 70–110)
POCT GLUCOSE: 97 MG/DL (ref 70–110)
POTASSIUM SERPL-SCNC: 3.3 MMOL/L (ref 3.5–5.1)
PROT SERPL-MCNC: 5.7 G/DL (ref 6–8.4)
SODIUM SERPL-SCNC: 139 MMOL/L (ref 136–145)

## 2023-12-15 PROCEDURE — 80053 COMPREHEN METABOLIC PANEL: CPT | Performed by: STUDENT IN AN ORGANIZED HEALTH CARE EDUCATION/TRAINING PROGRAM

## 2023-12-15 PROCEDURE — 99223 1ST HOSP IP/OBS HIGH 75: CPT | Mod: ,,, | Performed by: PHYSICIAN ASSISTANT

## 2023-12-15 PROCEDURE — 99900035 HC TECH TIME PER 15 MIN (STAT)

## 2023-12-15 PROCEDURE — 99497 ADVNCD CARE PLAN 30 MIN: CPT | Mod: 25,,,

## 2023-12-15 PROCEDURE — 83735 ASSAY OF MAGNESIUM: CPT | Performed by: STUDENT IN AN ORGANIZED HEALTH CARE EDUCATION/TRAINING PROGRAM

## 2023-12-15 PROCEDURE — 25000003 PHARM REV CODE 250: Performed by: INTERNAL MEDICINE

## 2023-12-15 PROCEDURE — 25000003 PHARM REV CODE 250: Performed by: STUDENT IN AN ORGANIZED HEALTH CARE EDUCATION/TRAINING PROGRAM

## 2023-12-15 PROCEDURE — 63700000 PHARM REV CODE 250 ALT 637 W/O HCPCS: Performed by: STUDENT IN AN ORGANIZED HEALTH CARE EDUCATION/TRAINING PROGRAM

## 2023-12-15 PROCEDURE — 84100 ASSAY OF PHOSPHORUS: CPT | Performed by: STUDENT IN AN ORGANIZED HEALTH CARE EDUCATION/TRAINING PROGRAM

## 2023-12-15 PROCEDURE — 36415 COLL VENOUS BLD VENIPUNCTURE: CPT | Performed by: STUDENT IN AN ORGANIZED HEALTH CARE EDUCATION/TRAINING PROGRAM

## 2023-12-15 PROCEDURE — 63600175 PHARM REV CODE 636 W HCPCS: Performed by: STUDENT IN AN ORGANIZED HEALTH CARE EDUCATION/TRAINING PROGRAM

## 2023-12-15 PROCEDURE — 99223 1ST HOSP IP/OBS HIGH 75: CPT | Mod: ,,,

## 2023-12-15 RX ORDER — AMIODARONE HYDROCHLORIDE 200 MG/1
200 TABLET ORAL DAILY
Status: DISCONTINUED | OUTPATIENT
Start: 2023-12-15 | End: 2023-12-15 | Stop reason: HOSPADM

## 2023-12-15 RX ORDER — AMIODARONE HYDROCHLORIDE 200 MG/1
200 TABLET ORAL DAILY
Qty: 30 TABLET | Refills: 11
Start: 2023-12-15 | End: 2023-12-19 | Stop reason: SDUPTHER

## 2023-12-15 RX ORDER — AMOXICILLIN AND CLAVULANATE POTASSIUM 875; 125 MG/1; MG/1
1 TABLET, FILM COATED ORAL 2 TIMES DAILY
Qty: 6 TABLET | Refills: 0 | Status: SHIPPED | OUTPATIENT
Start: 2023-12-16 | End: 2023-12-19

## 2023-12-15 RX ORDER — ISOSORBIDE MONONITRATE 30 MG/1
60 TABLET, EXTENDED RELEASE ORAL DAILY
Status: DISCONTINUED | OUTPATIENT
Start: 2023-12-15 | End: 2023-12-15 | Stop reason: HOSPADM

## 2023-12-15 RX ORDER — ACETAMINOPHEN 325 MG/1
650 TABLET ORAL EVERY 6 HOURS PRN
Status: DISCONTINUED | OUTPATIENT
Start: 2023-12-15 | End: 2023-12-15 | Stop reason: HOSPADM

## 2023-12-15 RX ORDER — AZITHROMYCIN 500 MG/1
500 TABLET, FILM COATED ORAL DAILY
Qty: 1 TABLET | Refills: 0 | Status: SHIPPED | OUTPATIENT
Start: 2023-12-16 | End: 2023-12-17

## 2023-12-15 RX ADMIN — DEXTROSE 25000 MG: 15 GEL ORAL at 02:12

## 2023-12-15 RX ADMIN — LOSARTAN POTASSIUM 50 MG: 50 TABLET, FILM COATED ORAL at 09:12

## 2023-12-15 RX ADMIN — ISOSORBIDE MONONITRATE 60 MG: 30 TABLET, EXTENDED RELEASE ORAL at 01:12

## 2023-12-15 RX ADMIN — REMDESIVIR 100 MG: 100 INJECTION, POWDER, LYOPHILIZED, FOR SOLUTION INTRAVENOUS at 10:12

## 2023-12-15 RX ADMIN — AZITHROMYCIN DIHYDRATE 500 MG: 250 TABLET ORAL at 09:12

## 2023-12-15 RX ADMIN — MUPIROCIN: 20 OINTMENT TOPICAL at 09:12

## 2023-12-15 RX ADMIN — CEFTRIAXONE SODIUM 1 G: 1 INJECTION, POWDER, FOR SOLUTION INTRAMUSCULAR; INTRAVENOUS at 09:12

## 2023-12-15 RX ADMIN — AMIODARONE HYDROCHLORIDE 1 MG/MIN: 1.8 INJECTION, SOLUTION INTRAVENOUS at 09:12

## 2023-12-15 RX ADMIN — AMIODARONE HYDROCHLORIDE 1 MG/MIN: 1.8 INJECTION, SOLUTION INTRAVENOUS at 02:12

## 2023-12-15 NOTE — PLAN OF CARE
[facility]  Department of Hospital Medicine  [address] [place] [address]  [phone] after hours  [phone] fax    HOSPICE  ORDERS    12/15/2023    Admit to Hospice:  Home Service     Diagnoses:   Active Hospital Problems    Diagnosis  POA    *Acute hypoxic respiratory failure [J96.01]  Yes    Intraparenchymal hematoma of brain [S06.33AA]  Unknown    Palliative care encounter [Z51.5]  Not Applicable    Ventricular tachycardia [I47.20]  Yes    Ventricular tachycardia (paroxysmal) [I47.29]  Yes    JOSUE (acute kidney injury) [N17.9]  Yes    COVID-19 [U07.1]  Yes    History of hyperlipidemia [Z86.39]  Yes    Restless leg syndrome [G25.81]  Yes    Chronic obstructive pulmonary disease [J44.9]  Yes    History of hypertension [Z86.79]  Not Applicable      Resolved Hospital Problems   No resolved problems to display.       Hospice Qualifying Diagnoses: Acute hypoxic respiratory failure        Patient has a life expectancy < 6 months due to:  Primary Hospice Diagnosis:  Acute hypoxemic respiratory failure   Comorbid Conditions Contributing to Decline:  COPD, HTN, CKD    Vital Signs: Routine per Hospice Protocol.    Code Status: DNR/DNI    Allergies:   Review of patient's allergies indicates:   Allergen Reactions    Covid-19vacc,(astrazeneca)(pf)      Fever, headache, weak    Contrast media Swelling    Gabapentin Nausea And Vomiting       Diet: Soft and Bite Sized    Activities: As tolerated    Goals of Care Treatment Preferences:  Code Status: DNR      Nursing: Per Hospice Routine.    Routine Skin for Bedridden Patients: Apply moisture barrier cream to all skin folds and wet areas in perineal area daily and after baths and all bowel movements.      Oxygen: 2L Nasal Cannula       Medications:        Medication List        START taking these medications      amiodarone 200 MG Tab  Commonly known as: PACERONE  Take 1 tablet (200 mg total) by mouth once daily.      amoxicillin-clavulanate 875-125mg 875-125 mg per tablet  Commonly known as: AUGMENTIN  Take 1 tablet by mouth 2 (two) times daily. for 3 days  Start taking on: December 16, 2023     azithromycin 500 MG tablet  Commonly known as: ZITHROMAX  Take 1 tablet (500 mg total) by mouth once daily. for 1 day  Start taking on: December 16, 2023            CHANGE how you take these medications      fluticasone-umeclidin-vilanter 100-62.5-25 mcg Dsdv  Commonly known as: TRELEGY ELLIPTA  INHALE 1 PUFF BY MOUTH INTO THE LUNGS ONCE A DAY  What changed:   how much to take  how to take this  when to take this  additional instructions     rOPINIRole 3 MG tablet  Commonly known as: REQUIP  TAKE 1 TABLET BY MOUTH NIGHTLY  What changed: when to take this     SPIRIVA WITH HANDIHALER 18 mcg inhalation capsule  Generic drug: tiotropium  INHALE THE CONTENTS OF 1 CAPSULE BY MOUTH INTO THE LUNGS ONCE A DAY  What changed: See the new instructions.            CONTINUE taking these medications      albuterol 90 mcg/actuation inhaler  Commonly known as: PROVENTIL/VENTOLIN HFA  INHALE 2 PUFFS INTO THE LUNGS EVERY 6 HOURS AS NEEDED FOR WHEEZING     calcium-vitamin D3 500 mg-5 mcg (200 unit) per tablet  Commonly known as: OS-MIKE 500 + D3  Take 1 tablet by mouth once daily.     cyanocobalamin 500 MCG tablet  Take 500 mcg by mouth once daily.     fluticasone propionate 50 mcg/actuation nasal spray  Commonly known as: FLONASE  2 sprays (100 mcg total) by Each Nare route once daily.     gabapentin 300 MG capsule  Commonly known as: NEURONTIN  Take 1 capsule (300 mg total) by mouth every evening for 7 days, THEN 1 capsule (300 mg total) 2 (two) times daily for 7 days, THEN 1 capsule (300 mg total) 3 (three) times daily for 16 days.  Start taking on: November 24, 2023     HYDROcodone-acetaminophen 5-325 mg per tablet  Commonly known as: NORCO  Take 1 tablet by mouth every 12 (twelve) hours as needed for Pain.     loratadine 10 mg tablet  Commonly known  "as: CLARITIN  Take 1 tablet (10 mg total) by mouth once daily.     montelukast 10 mg tablet  Commonly known as: SINGULAIR  TAKE 1 TABLET BY MOUTH EVERY EVENING     multivit-iron-min-folic acid 3,500-18-0.4 unit-mg-mg Chew  Take by mouth.     pantoprazole 40 MG tablet  Commonly known as: PROTONIX  Take 1 tablet (40 mg total) by mouth once daily.            STOP taking these medications      alendronate 70 MG tablet  Commonly known as: FOSAMAX     amitriptyline 10 MG tablet  Commonly known as: ELAVIL     atorvastatin 10 MG tablet  Commonly known as: LIPITOR     isosorbide mononitrate 60 MG 24 hr tablet  Commonly known as: IMDUR     losartan 50 MG tablet  Commonly known as: COZAAR     OZEMPIC 0.25 mg or 0.5 mg (2 mg/3 mL) pen injector  Generic drug: semaglutide     ULTICARE PEN NEEDLE 31 gauge x 5/16" Ndle  Generic drug: pen needle, diabetic              Future Orders:  Hospice Medical Director may dictate new orders for comfortable care measures & sign death certificate.        _________________________________  Carl Fuller MD  12/15/2023     "

## 2023-12-15 NOTE — NURSING
RAPID RESPONSE NURSE PROACTIVE ROUNDING NOTE           Admit Date: 2023  LOS: 0  Code Status: DNR   Date of Visit: 2023  : 1940  Age: 83 y.o.  Sex: female  Race: White  Bed: K462/K462 A:   MRN: [id number]  Was the patient discharged from an ICU this admission? No   Was the patient discharged from a PACU within last 24 hours? No   Did the patient receive conscious sedation/general anesthesia in last 24 hours? No   Was the patient in the ED within the past 24 hours? Yes   Was the patient on NIPPV within the past 24 hours? No   Attending Physician: Ayan Wills MD  Primary Service: Internal Medicine       SITUATION    Notified by charge RN during rounding  Reason for alert: tachycardia    Diagnosis: Acute hypoxic respiratory failure   has a past medical history of Breast cyst, COPD (chronic obstructive pulmonary disease), Eye disorder, Hyperglycemia, Hyperlipidemia, Hypertension, Osteoporosis, and RLS (restless legs syndrome).    Last Vitals:  Temp: 100.2 °F (37.9 °C) (2101)  Pulse: 100 (2101)  Resp: 21 (2101)  BP: 143/66 (2101)  SpO2: 93 % (2101)    24 Hour Vitals Range:  Temp:  [97.5 °F (36.4 °C)-100.2 °F (37.9 °C)]   Pulse:  []   Resp:  [17-39]   BP: (132-202)/()   SpO2:  [93 %-100 %]     Clinical Issues: Circulatory    ASSESSMENT/INTERVENTIONS    Chart review completed including telemetry, VS, labs, notes, and orders.       FOLLOW UP    Call back the Rapid Response NurseSophie at [phone] for additional questions or concerns.

## 2023-12-15 NOTE — NURSING
Spoke with Dr. Fuller. Expressed concern for elevated HR and RR and neurosurgery recommendations for ICU placement. Patient is adamant of DNR and no neurosurgical interventions. MD agreed to tele admit with DNR orders placed.

## 2023-12-15 NOTE — H&P
"[facility] Medicine H&P Note     Admitting Team: \[facility]\"" Hospitalist Team B  Attending Physician: Ayan Wills MD  Resident: Jonny  Intern: Pato      Date of Admit: 12/14/2023    Chief Complaint     Fall (Pt arrived via EMS from assisted living center fell  3 days ago , found by family  last night on floor  , pt states  she hit her head, but no LOC, pt states 3 days ago pt had possible covid exposure,  + n/v noted, GCS=15, family reports pt " talking out of her head" )   for 3 days     Subjective:      History of Present Illness:  Shirley F Gilford is a 83 y.o. female who has a past medical history of COPD, HLD, HTN, pre-DM, RLS and osteoporosis. The patient presented to [facility] on 12/14/2023 with a primary complaint of fall.     The patient was in their usual state of health until 3 days ago when she experienced a mechanical fall. At that time she reports that she did hit her, but she is unsure how long she was down for. She was found by her family on 12/14 down on the ground and confused. At time of admission patient is alert and oriented. She reports some shortness of breath, but she denies any fever, chills, nausea or vomiting. She reports pain in her rectum from a "tear" that is chronic. She denies any weakness of numbness in her arms or legs. She does reports recent exposure to COVID-19.     While in the ED, she experienced runs of Vtach. She was evaluated by Cardiology who recommend starting an amiodarone drip.     Past Medical History:  COPD, HLD, HTN, pre-DM, RLS and osteoporosis    Past Surgical History:  Past Surgical History:   Procedure Laterality Date    BLADDER SURGERY      cancer Mahamed Kidd    BREAST SURGERY      left benign tumor    COLONOSCOPY      EYE SURGERY      cataracts    TONSILLECTOMY         Allergies:  Review of patient's allergies indicates:   Allergen Reactions    Covid-19vacc,(astrazeneca)(pf)      Fever, headache, weak    Contrast media Swelling    Gabapentin " Nausea And Vomiting       Home Medications:  Prior to Admission medications    Medication Sig Start Date End Date Taking? Authorizing Provider   semaglutide (OZEMPIC) 0.25 mg or 0.5 mg(2 mg/1.5 mL) pen injector Inject 0.25 mg into the skin every 7 days. 7/17/23 12/14/23 Yes Marc Sands MD   albuterol (PROVENTIL/VENTOLIN HFA) 90 mcg/actuation inhaler INHALE 2 PUFFS INTO THE LUNGS EVERY 6 HOURS AS NEEDED FOR WHEEZING 5/13/22   Marc Sands MD   alendronate (FOSAMAX) 70 MG tablet TAKE 1 TABLET BY MOUTH EVERY 7 DAYS  Patient taking differently: Take 70 mg by mouth every 7 days. 8/1/23   Marc Sands MD   amitriptyline (ELAVIL) 10 MG tablet Take 1 tablet (10 mg total) by mouth 2 (two) times a day. For sciatica 10/25/23 10/24/24  Marc Sands MD   atorvastatin (LIPITOR) 10 MG tablet TAKE 1 TABLET BY MOUTH ONCE A DAY  Patient taking differently: Take 10 mg by mouth once daily. 2/20/23   Marc Sands MD   calcium-vitamin D3 (OS-MIKE 500 + D3) 500 mg-5 mcg (200 unit) per tablet Take 1 tablet by mouth once daily.    Provider, Historical   cyanocobalamin 500 MCG tablet Take 500 mcg by mouth once daily.    Provider, Historical   fluticasone propionate (FLONASE) 50 mcg/actuation nasal spray 2 sprays (100 mcg total) by Each Nare route once daily. 4/30/19   Marc Sands MD   fluticasone-umeclidin-vilanter (TRELEGY ELLIPTA) 100-62.5-25 mcg DsDv INHALE 1 PUFF BY MOUTH INTO THE LUNGS ONCE A DAY  Patient taking differently: Inhale 1 puff into the lungs Daily. 11/16/23   Marc Sands MD   gabapentin (NEURONTIN) 300 MG capsule Take 1 capsule (300 mg total) by mouth every evening for 7 days, THEN 1 capsule (300 mg total) 2 (two) times daily for 7 days, THEN 1 capsule (300 mg total) 3 (three) times daily for 16 days. 11/24/23 12/23/23  Abran Lloyd MD   HYDROcodone-acetaminophen (NORCO) 5-325 mg per tablet Take 1 tablet by mouth every 12 (twelve) hours as needed for Pain. 11/9/23   Marc Sands MD  "  isosorbide mononitrate (IMDUR) 60 MG 24 hr tablet TAKE 1 TABLET BY MOUTH EVERY DAY FOR HEART AND BLOOD PRESSURE  Patient taking differently: Take 60 mg by mouth once daily. 8/30/23   Marc Sands MD   loratadine (CLARITIN) 10 mg tablet Take 1 tablet (10 mg total) by mouth once daily. 4/30/19   Marc Sands MD   losartan (COZAAR) 50 MG tablet TAKE 1 TABLET BY MOUTH ONCE A DAY FOR BLOOD PRESSURE  Patient taking differently: Take 50 mg by mouth once daily. 3/4/23   Marc Sands MD   montelukast (SINGULAIR) 10 mg tablet TAKE 1 TABLET BY MOUTH EVERY EVENING  Patient taking differently: Take 10 mg by mouth every evening. 3/21/23   Marc Sands MD   MULTIVIT-IRON-MIN-FOLIC ACID 3,500-18-0.4 UNIT-MG-MG ORAL CHEW Take by mouth.    Provider, Historical   OZEMPIC 0.25 mg or 0.5 mg (2 mg/3 mL) pen injector Inject 0.25 mg into the skin every 7 days. 12/7/23   Provider, Historical   pantoprazole (PROTONIX) 40 MG tablet Take 1 tablet (40 mg total) by mouth once daily. 1/31/23   Marc Sands MD   rOPINIRole (REQUIP) 3 MG tablet TAKE 1 TABLET BY MOUTH NIGHTLY  Patient taking differently: Take 3 mg by mouth nightly. 2/11/23   Marc Sands MD   SPIRIVA WITH HANDIHALER 18 mcg inhalation capsule INHALE THE CONTENTS OF 1 CAPSULE BY MOUTH INTO THE LUNGS ONCE A DAY  Patient taking differently: Inhale 18 mcg into the lungs once daily. 10/5/23   Marc Sands MD   ULTICARE PEN NEEDLE 31 gauge x 5/16" Ndle  10/5/23   Provider, Historical   furosemide (LASIX) 40 MG tablet Take 1 tablet (40 mg total) by mouth 2 (two) times a day.  Patient not taking: Reported on 12/14/2023 12/7/20 12/14/23  Darren Trimble MD   HYDROcodone-acetaminophen (NORCO) 5-325 mg per tablet Take 1 tablet by mouth every 8 (eight) hours as needed for Pain. 11/4/23 12/14/23  Parminder Key MD       Family History:  Family History   Problem Relation Age of Onset    Cancer Daughter 40        colon    Colon cancer Daughter     No Known " Problems Daughter     Diabetes Mother     Stroke Mother     Heart disease Father        Social History:  Social History     Tobacco Use    Smoking status: Former     Passive exposure: Past    Smokeless tobacco: Never   Substance Use Topics    Alcohol use: No    Drug use: Never       Review of Systems:  Pertinent items are noted in HPI. All other systems are reviewed and are negative.    Health Maintaince :   Primary Care Physician: Marc Sands MD    Immunizations:   TDap Due    Flu Due    Pna UTD  COVID x1    Cancer Screening:  PAP: n/a  MMG: n/a  Colonoscopy: n/a     Objective:   Last 24 Hour Vital Signs:  BP  Min: 132/57  Max: 202/83  Temp  Av.5 °F (36.4 °C)  Min: 97.5 °F (36.4 °C)  Max: 97.5 °F (36.4 °C)  Pulse  Av.8  Min: 89  Max: 129  Resp  Av.8  Min: 17  Max: 39  SpO2  Av.9 %  Min: 93 %  Max: 100 %  Weight  Av.4 kg (206 lb)  Min: 93.4 kg (206 lb)  Max: 93.4 kg (206 lb)  Body mass index is 36.49 kg/m².  I/O last 3 completed shifts:  In: 600 [I.V.:500; IV Piggyback:100]  Out: -     Physical Examination:  General appearance: alert, appears stated age, and cooperative  Head: Normocephalic, without obvious abnormality, atraumatic  Back: symmetric, no curvature. ROM normal. No CVA tenderness.  Lungs: clear to auscultation bilaterally and on 2L NC  Heart: regular rate and rhythm and S1, S2 normal  Abdomen: normal findings: bowel sounds normal, soft, non-tender, and symmetric  Extremities: extremities normal, atraumatic, no cyanosis or edema  Skin: Skin color, texture, turgor normal. No rashes or lesions  Neurologic: Mental status: Alert, oriented, thought content appropriate  Cranial nerves: II: pupils equal, round, reactive to light and accommodation, III,IV,VI: extraocular muscles extra-ocular motions intact, V: facial light touch sensation normal bilaterally, VII: upper facial muscle function normal bilaterally, VII: lower facial muscle function normal bilaterally, VIII: hearing  normal, XI: trapezius strength normal bilaterally, XII: tongue strength normal   Sensory: intact to light touch in UE and LE BL  Motor:5/5 in UE and LE BL       Laboratory:  Trended Lab Data:  Recent Labs   Lab 12/14/23  1216 12/14/23  1835   WBC 17.97* 14.20*   HGB 13.5 13.3   HCT 43.5 42.4    233   MCV 85 84   RDW 17.7* 17.7*     --    K 4.2  --      --    CO2 17*  --    BUN 56*  --    CREATININE 2.1*  --    *  --    PROT 6.5  --    ALBUMIN 3.2*  --    BILITOT 0.4  --    AST 97*  --    ALKPHOS 74  --    ALT 61*  --    WBC Differential: 82.6 % N, 0.4 % Bands, 4.2 % L, 12.7 % M, 0 % Eo, 0.1 % Baso, zero additional cells seen    Trended Cardiac Data:  Recent Labs   Lab 12/14/23  1216 12/14/23  1312   TROPONINI 0.114*  --    BNP  --  250*       Microbiology Data:  COVID +  Blood Cx in process    Other Results:  EKG (my interpretation): LBBB, wide complex tachycardia.    Radiology: Reviewed        Assessment:     Shirley F Gilford is a 83 y.o. female who has a past medical history of COPD, HLD, HTN, pre-DM, RLS and osteoporosis. The patient presented to [facility] on 12/14/2023 with a primary complaint of fall. She is admitted to [facility] for acute hypoxemic respiratory failure due to COVID-19 complicated by a traumatic intrapatenchymal hemorrhage, wide complex tachycardia and JOSUE.     Plan:     Acute hypoxemic respiratory failure due to COVID-19  CAP  - O2 sat of <90% on RA with EMS; placed on 2L NC with improvement in hypoxia  - elevated WBC and tachycardic  - Procal ordered  - CXR concerning for edema vs infectious process  - start remdesivir   - Will also treat for overlying CAP with Rocephin/Azithro     Traumatic intrapatenchymal hemorrhage  - s/p mechanical fall 3 days ago  - NSGY following, will rpt CTH in 6 hours  - SBP goal <160  - Hold AC and antiplatelet   - Patient adamantly refusing transfer or neurosurgical intervention  - Since patient requesting no  surgical intervention, will keep on floor with q4 neuro checks since any change in CT findings would not affect management     Wide complex tachycardia  - Amiodarone drip started by Cardiology  - Will rpt trop, but in setting of patient and family requesting against further intervention, will hold from extensive ischemic work up   - Continue amio drip pending palliative conversations      JOSUE  Elevated CPK  - Cr 2.1 up from a baseline of 0.78  - Likely pre-renal and intrinsic etiology in the setting of decreased PO intake and mild rhabdo from being down for days   - s/p 500 cc of NS in ED  - will monitor with daily CMP and hold off on further fluids in the setting of COVID-19 with a CXR concerning for pulmonary edema     COPD  - Continue home Spiriva     HLD  - Hold home Lipitor until liver enzymes return to normal limits     Essential HTN  - Continue home Imdur and losartan  - Can add spot doses of Hydralazine vs Labetalol for BP goal in the setting of ICH    RLS  - Restart home ropinirole if needed     Code: Patient expressed desire to be DNR/DNI. She also is adamant in not receiving neurosurgical intervention. Will place palliative care consult for hospice evaluation as patient's goal most align with minimal interventions   DVT: held in the setting of ICH  Diet: NPO    Disposition: Pending palliative care talks    Carl Fuller MD  [facility] Internal Medicine HO-3    [facility] Medicine Hospitalist Pager numbers:   [facility] Hospitalist Medicine Team A (Sara/Heather): [phone]  [facility] Hospitalist Medicine Team B (Geovanni/Johann):  [phone]

## 2023-12-15 NOTE — PLAN OF CARE
12/14/23 2150   Admission   Initial VN Admission Questions Complete   Communication Issues? None   Shift   Pain Management Interventions quiet environment facilitated;relaxation techniques promoted   Virtual Nurse - Patient Verbalized Approval Of VN Rounding;Camera Use   Type of Frequent Check   Type Patient Rounds   Safety/Activity   Patient Rounds bed in low position;bed wheels locked;call light in patient/parent reach;clutter free environment maintained;ID band on   Safety Promotion/Fall Prevention assistive device/personal item within reach;bed alarm set;nonskid shoes/socks when out of bed;high risk medications identified;side rails raised x 2;instructed to call staff for mobility;room near unit station   Safety Precautions emergency equipment at bedside   Safety Bands on Patient Fall Risk Band   Activity Management Rolling - L1   Activity Assistance Provided assistance, stand-by   Positioning   Body Position position changed independently   Head of Bed (HOB) Positioning HOB elevated      VN cued into room to complete admit assessment. VIP model introduced; VN working alongside bedside treatment team.  Plan of care reviewed with patient. Patient informed of fall risk, fall precautions, call light within reach, side rails x2 elevated. Patient notified to ask staff for assistance. Patient verbalized complete understanding. Time allowed for questions. Will continue to monitor and intervene as needed.

## 2023-12-15 NOTE — ASSESSMENT & PLAN NOTE
Shirley F Gilford is a 83 y.o. female who has a past medical history of COPD, HLD, HTN, pre-DM, RLS and osteoporosis. The patient presented to [facility] on 12/14/2023 with a primary complaint of fall. She is admitted to [facility] for acute hypoxemic respiratory failure due to COVID-19 complicated by a traumatic intrapatenchymal hemorrhage, wide complex tachycardia and JOSUE.  Palliative care consulted for transition to comfort focused care.    -Informed that pt requesting to return home under the care of hospice.  Pt aware that she is nearing the end of her life and strongly desires to return home. Pt not agreeable to hospice house placement and is adamant about returning home. Expressed that there may be a small window of time in which the pt is stable enough to transport home. Would be able to transition to PO amiodarone from IV at time of discharge.   -Discussed home hospice options with pt and pts friend Louann Ying.  Louann in agreement to go and stay with pt in her home, aware that pt is covid positive and reports that she has been with pt for the past few days and understands risks.  Louann initially reports that [facility] would be able to stay with the pt.  -Cm and attending team notified of pts wishes.    -Pt signed consents with [facility].  -Louann spoke with pts Independent living center and reprots that pt can now only return with 24 hour sitters due to change in her clinical status.  Louann now also believes that she has covid as she is not feeling well and will no longer be able to stay with the pt. Pt has limited support resources at this time to meet this need. However, both parties remain in agreement that pt would like to enroll into the care of hospice services.   -CM and Attending team notified in change of plans.  CM sending referrals to hospice houses.   -Pt remains a DNR.     -

## 2023-12-15 NOTE — PLAN OF CARE
Spoke with friend Ms. Lew who informed me that the pt does not have anyone to care for her around the clock at home and is unable to pay for sitters. Referrals sent to [facility] at [facility] and [facility]. Spoke with Stephanie at [phone] who confirmed that referral was received and Spoke with Milvia Danielle who stated they are looking at referral to see if pt meets criteria for in pt hospice. Also awaiting call from [facility](located at [facility] in [place]).

## 2023-12-15 NOTE — CONSULTS
[facility]  Neurosurgery  History & Physical    Patient Name: Shirley F Gilford  MRN: [id number]  Admission Date: 12/14/2023  Attending Physician: Ayan Wills MD   Primary Care Provider: Marc Sands MD    Patient information was obtained from ER records and primary team.     Subjective:     Chief Complaint/Reason for Admission: Fall    History of Present Illness: History from chart review.  Patient did not give much history    Shirley F Gilford is a 83 y.o. female who has a past medical history of COPD, HLD, HTN, pre-DM, RLS and osteoporosis. The patient presented to [facility] on 12/14/2023 with a primary complaint of fall.      The patient was in their usual state of health until 3 days ago when she experienced a mechanical fall. At that time she reports that she did hit her head.  She was found by her family on 12/14 down on the ground and confused.     Patient currently denies headaches.  Is not talking much today but following commands.     While in the ED, she experienced runs of Vtach. She was evaluated by Cardiology who recommend starting an amiodarone drip.        PTA Medications   Medication Sig    albuterol (PROVENTIL/VENTOLIN HFA) 90 mcg/actuation inhaler INHALE 2 PUFFS INTO THE LUNGS EVERY 6 HOURS AS NEEDED FOR WHEEZING    calcium-vitamin D3 (OS-MIKE 500 + D3) 500 mg-5 mcg (200 unit) per tablet Take 1 tablet by mouth once daily.    cyanocobalamin 500 MCG tablet Take 500 mcg by mouth once daily.    fluticasone propionate (FLONASE) 50 mcg/actuation nasal spray 2 sprays (100 mcg total) by Each Nare route once daily.    fluticasone-umeclidin-vilanter (TRELEGY ELLIPTA) 100-62.5-25 mcg DsDv INHALE 1 PUFF BY MOUTH INTO THE LUNGS ONCE A DAY (Patient taking differently: Inhale 1 puff into the lungs Daily.)    gabapentin (NEURONTIN) 300 MG capsule Take 1 capsule (300 mg total) by mouth every evening for 7 days, THEN 1 capsule (300 mg total) 2 (two) times daily for 7 days, THEN 1  "capsule (300 mg total) 3 (three) times daily for 16 days.    HYDROcodone-acetaminophen (NORCO) 5-325 mg per tablet Take 1 tablet by mouth every 12 (twelve) hours as needed for Pain.    loratadine (CLARITIN) 10 mg tablet Take 1 tablet (10 mg total) by mouth once daily.    montelukast (SINGULAIR) 10 mg tablet TAKE 1 TABLET BY MOUTH EVERY EVENING (Patient taking differently: Take 10 mg by mouth every evening.)    MULTIVIT-IRON-MIN-FOLIC ACID 3,500-18-0.4 UNIT-MG-MG ORAL CHEW Take by mouth.    pantoprazole (PROTONIX) 40 MG tablet Take 1 tablet (40 mg total) by mouth once daily.    rOPINIRole (REQUIP) 3 MG tablet TAKE 1 TABLET BY MOUTH NIGHTLY (Patient taking differently: Take 3 mg by mouth nightly.)    SPIRIVA WITH HANDIHALER 18 mcg inhalation capsule INHALE THE CONTENTS OF 1 CAPSULE BY MOUTH INTO THE LUNGS ONCE A DAY (Patient taking differently: Inhale 18 mcg into the lungs once daily.)    [DISCONTINUED] alendronate (FOSAMAX) 70 MG tablet TAKE 1 TABLET BY MOUTH EVERY 7 DAYS (Patient taking differently: Take 70 mg by mouth every 7 days.)    [DISCONTINUED] amitriptyline (ELAVIL) 10 MG tablet Take 1 tablet (10 mg total) by mouth 2 (two) times a day. For sciatica    [DISCONTINUED] atorvastatin (LIPITOR) 10 MG tablet TAKE 1 TABLET BY MOUTH ONCE A DAY (Patient taking differently: Take 10 mg by mouth once daily.)    [DISCONTINUED] isosorbide mononitrate (IMDUR) 60 MG 24 hr tablet TAKE 1 TABLET BY MOUTH EVERY DAY FOR HEART AND BLOOD PRESSURE (Patient taking differently: Take 60 mg by mouth once daily.)    [DISCONTINUED] losartan (COZAAR) 50 MG tablet TAKE 1 TABLET BY MOUTH ONCE A DAY FOR BLOOD PRESSURE (Patient taking differently: Take 50 mg by mouth once daily.)    [DISCONTINUED] OZEMPIC 0.25 mg or 0.5 mg (2 mg/3 mL) pen injector Inject 0.25 mg into the skin every 7 days.    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    Hyperglycemia 1/26/2017    Hyperlipidemia     Hypertension     Osteoporosis     Palliative care encounter 12/15/2023    RLS (restless legs syndrome)      Past Surgical History:   Procedure Laterality Date    BLADDER SURGERY      cancer Mahamed Kidd    BREAST SURGERY      left benign tumor    COLONOSCOPY      EYE SURGERY      cataracts    TONSILLECTOMY       Family History       Problem Relation (Age of Onset)    Cancer Daughter (40)    Colon cancer Daughter    Diabetes Mother    Heart disease Father    No Known Problems Daughter    Stroke Mother          Tobacco Use    Smoking status: Former     Passive exposure: Past    Smokeless tobacco: Never   Substance and Sexual Activity    Alcohol use: No    Drug use: Never    Sexual activity: Not on file     Review of Systems  Objective:     Weight: 85.3 kg (188 lb 0.8 oz)  Body mass index is 33.31 kg/m².  Vital Signs (Most Recent):  Temp: 98 °F (36.7 °C) (12/15/23 1132)  Pulse: 85 (12/15/23 1132)  Resp: 18 (12/15/23 1132)  BP: (!) 112/58 (12/15/23 1132)  SpO2: 96 % (12/15/23 1132) Vital Signs (24h Range):  Temp:  [97 °F (36.1 °C)-100.4 °F (38 °C)] 98 °F (36.7 °C)  Pulse:  [] 85  Resp:  [18-39] 18  SpO2:  [92 %-100 %] 96 %  BP: (112-202)/() 112/58                              Urethral Catheter 12/14/23 1557 Silicone 16 Fr. (Active)   Site Assessment Clean;Intact 12/15/23 0752   Collection Container Standard drainage bag 12/15/23 0752   Securement Method secured to top of thigh w/ adhesive device 12/15/23 0752   Catheter Care Performed yes 12/15/23 0752   CAUTI Prevention Bundle Securement Device in place with 1" slack;Intact seal between catheter & drainage tubing;Drainage bag/urimeter off the " "floor;Sheeting clip in use;No dependent loops or kinks;Drainage bag/urimeter not overfilled (<2/3 full);Drainage bag/urimeter below bladder 12/14/23 2104   Output (mL) 1000 mL 12/15/23 0602       Neurosurgery Physical Exam    General: well developed, well nourished, no distress.   Head: normocephalic.  Neck: No tracheal deviation.   Neurologic: Alert and oriented. Thought content appropriate but not talking much.  GCS: Motor: 6/Verbal: 5/Eyes: 4 GCS Total: 15  Mental Status: Awake, Alert, Oriented x 3.  Sleeping when I entered room.  Had to arouse.  Language: No aphasia  Speech: No dysarthria  Cranial nerves: No facial droop, tongue midline, CN II-XII grossly intact.   Eyes: pupils equal, round, reactive to light with accomodation, EOMI.   Ears: No drainage.   Pulmonary: normal respirations, no signs of respiratory distress  Sensory: intact to light touch throughout  Motor Strength: Moves all extremities spontaneously with good tone. Full strength in the upper and lower extremities. No abnormal movements seen.   Pronator Drift: no drift noted  Finger-to-nose: Intact bilaterally  Vascular: No LE edema.   Skin: Skin is warm, dry and intact.      Significant Labs:  Recent Labs   Lab 12/14/23  1216 12/15/23  0402   * 122*    139   K 4.2 3.3*    106   CO2 17* 20*   BUN 56* 42*   CREATININE 2.1* 1.3   CALCIUM 8.7 8.1*   MG 2.1 1.9     Recent Labs   Lab 12/14/23  1216 12/14/23  1835   WBC 17.97* 14.20*   HGB 13.5 13.3   HCT 43.5 42.4    233     No results for input(s): "LABPT", "INR", "APTT" in the last 48 hours.  Microbiology Results (last 7 days)       Procedure Component Value Units Date/Time    Blood culture [[id number]] Collected: 12/14/23 1500    Order Status: Completed Specimen: Blood from Peripheral, Right Wrist Updated: 12/15/23 0315     Blood Culture, Routine No Growth to date    Narrative:      Collection has been rescheduled by ROBBIE at 12/14/2023 14:45 Reason:   Patient unavailable/in " ct  Collection has been rescheduled by [email or web address] at 12/14/2023 14:45 Reason:   Patient unavailable/in ct    Blood culture [[id number]] Collected: 12/14/23 1500    Order Status: Completed Specimen: Blood from Peripheral, Left Hand Updated: 12/15/23 0315     Blood Culture, Routine No Growth to date    Narrative:      Collection has been rescheduled by [email or web address] at 12/14/2023 14:45 Reason:   Patient unavailable/in ct  Collection has been rescheduled by [email or web address] at 12/14/2023 14:45 Reason:   Patient unavailable/in ct    Influenza A & B by Molecular [[id number]] Collected: 12/14/23 1229    Order Status: Completed Specimen: Nasopharyngeal Swab Updated: 12/14/23 1305     Influenza A, Molecular Negative     Influenza B, Molecular Negative     Flu A & B Source Nasal swab              Significant Diagnostics:  Impression:     No significant change from the earlier study.     Stable small 8 mm hyperdense focus involving the posterior corpus callosum slightly left of midline. There is a history of head trauma. This could represent a minimal hemorrhagic contusion. Small hemorrhagic lacunar infarction is not excluded. Small hemorrhagic mass is felt to be less likely though not excluded.  Early small neoplasm such as CNS lymphoma or GBM could appear similar.  MRI follow-up without and with contrast may better characterize.  Recommend neuro consultation and follow-up.     This report was flagged in Epic as abnormal.        Electronically signed by: Segun Condon  Date:                                            12/14/2023  Time:                                           22:45        Narrative & Impression  EXAMINATION:  CT HEAD WITHOUT CONTRAST     CLINICAL HISTORY:  Head trauma, minor (Age >= 65y);Mental status change, unknown cause;     TECHNIQUE:  Low dose axial CT images obtained throughout the head without intravenous contrast. Sagittal and coronal reconstructions were performed.     COMPARISON:  None.     FINDINGS:  Intracranial  compartment:     Ventricles and sulci are normal in size for age without evidence of hydrocephalus.     Mild involutional changes and chronic microvascular ischemic changes in the periventricular white matter.     There is an ovoid 8 mm hyperdense focus involving the posterior corpus callosum slightly left of midline on axial 29 suggesting minimal hemorrhage, possibly minimal hemorrhagic contusion or infarction.  Small hyperdense or hemorrhagic mass is felt to be less likely though not excluded.  Neuro follow-up recommended.     No evidence of acute major vascular infarction.     Skull/extracranial contents (limited evaluation): No fracture. Mastoid air cells and paranasal sinuses are essentially clear.     Impression:     Small 8 mm hyperdense focus involving the posterior corpus callosum slightly left of midline.  There is a history of head trauma.  This could represent a minimal hemorrhagic contusion.  Small hemorrhagic lacunar infarction is not excluded.  Small hyperdense or hemorrhagic mass is felt to be less likely though not excluded.  Follow-up is recommended.  Recommend neuro consultation and follow-up.     This report was flagged in Epic as abnormal.        Electronically signed by: Segun Condon  Date:                                            12/14/2023  Time:                                           15:45    Assessment/Plan:     Active Diagnoses:    Diagnosis Date Noted POA    PRINCIPAL PROBLEM:  Acute hypoxic respiratory failure [J96.01] 12/14/2023 Yes    Intraparenchymal hematoma of brain [S06.33AA] 12/15/2023 Unknown    Palliative care encounter [Z51.5] 12/15/2023 Not Applicable    Ventricular tachycardia [I47.20] 12/14/2023 Yes    Ventricular tachycardia (paroxysmal) [I47.29] 12/14/2023 Yes    JOSUE (acute kidney injury) [N17.9] 12/14/2023 Yes    COVID-19 [U07.1] 08/03/2020 Yes    History of hyperlipidemia [Z86.39] 01/26/2017 Yes    Restless leg syndrome [G25.81] 01/26/2017 Yes    Chronic  obstructive pulmonary disease [J44.9] 01/26/2017 Yes    History of hypertension [Z86.79] 01/04/2017 Not Applicable      Problems Resolved During this Admission:     Small 8 mm hyperdense focus involving the posterior corpus callosum slightly left of midline.    -Both CT heads stable  -patient neurologically stable  -Will monitor clinically.   -Consider MRI brain with and without contrast for further evaluation  -No neurosurgical intervention  -Call NS with any neurological changes  -will follow    All of the above discussed and reviewed with Dr. Cline.      Lashonda Nichols PA-C  Neurosurgery  [place] - Telemetry

## 2023-12-15 NOTE — PLAN OF CARE
Pt was accepted with [facility] inpatient hospice. Spoke with Rose Chamorro who stated after friend Ms Lew signs Hospice admit papers she will call me to set up transportation. Report information given to floor nurse.

## 2023-12-15 NOTE — HPI
"Per chart, "Shirley F Gilford is a 83 y.o. female who has a past medical history of COPD, HLD, HTN, pre-DM, RLS and osteoporosis. The patient presented to [facility] on 12/14/2023 with a primary complaint of fall.      The patient was in their usual state of health until 3 days ago when she experienced a mechanical fall. At that time she reports that she did hit her, but she is unsure how long she was down for. She was found by her family on 12/14 down on the ground and confused. At time of admission patient is alert and oriented. She reports some shortness of breath, but she denies any fever, chills, nausea or vomiting. She reports pain in her rectum from a "tear" that is chronic. She denies any weakness of numbness in her arms or legs. She does reports recent exposure to COVID-19.      While in the ED, she experienced runs of Vtach. She was evaluated by Cardiology who recommend starting an amiodarone drip. "  "

## 2023-12-15 NOTE — NURSING
AM BG resulted 63. Pt wide awake, denies any complaints, asymptomatic. Dextrose 40% gel given per prn. Pt tolerated well. Dr Linda also notified. On recheck after gel, BG resulted 97. MD made aware. No orders given. Will CTCM.

## 2023-12-15 NOTE — PLAN OF CARE
VN note: VN completed AVS and attachments and notified bedside nurseGloria. Will cont to be available and intervene prn.

## 2023-12-15 NOTE — PLAN OF CARE
Pt and friend Ms. Lew agreeable to in patient hospice with [facility]. Transportation arrange for  now. Nurse notified. Transport packet at nurse station   12/15/23 8794   Final Note   Assessment Type Final Discharge Note   Anticipated Discharge Disposition [facility]   What phone number can be called within the next 1-3 days to see how you are doing after discharge? [phone]   Post-Acute Status   Discharge Delays None known at this time

## 2023-12-15 NOTE — NURSING
BG 65. Asymptomatic. Dr Ferguson on unit floor, informed, also reminded of NPO status. See new order written. Bdg rechecked, resulted 71. Pt departed for STAT CT.

## 2023-12-15 NOTE — NURSING
Report received from HADLEY Bustamante. Amiodarone drip infusing at 33.33ml/hr per pump with filter noted  in place. Isolation covid roger observed on door. Telemtry monitor  at present. Informed pt of NPO status, pt voice complete understanding, stated she will comply. HOB up 30-45. In NAD at present time. Denies any complaints.

## 2023-12-15 NOTE — NURSING
Dr Ferguson read STAT CT unchanged from previous. No orders given. Pt denies any complaitns. VS remain stable. Will CTCM.

## 2023-12-15 NOTE — PHARMACY MED REC
"  [facility]           Pharmacy  Admission Medication History     The home medication history was taken by Flakita Glover.      Medication history obtained from Medications listed below were obtained from: [employer] software- [employer]    Based on information gathered for medication list, you may go to "Admission" then "Reconcile Home Medications" tabs to review and/or act upon those items.     The home medication list has been updated by the Pharmacy department.   Please read ALL comments highlighted in yellow.   Please address this information as you see fit.    Feel free to contact us if you have any questions or require assistance.    The medications listed below were removed from the home medication list.  Please reorder if appropriate:    Patient reports NOT TAKING the following medication(s):  Medrol dpk 4mg    Current Facility-Administered Medications on File Prior to Encounter   Medication Dose Route Frequency Provider Last Rate Last Admin    sodium chloride 0.9% flush 10 mL  10 mL Intravenous PRN Elodia Zepeda MD         Current Outpatient Medications on File Prior to Encounter   Medication Sig Dispense Refill    alendronate (FOSAMAX) 70 MG tablet TAKE 1 TABLET BY MOUTH EVERY 7 DAYS (Patient taking differently: Take 70 mg by mouth every 7 days.) 12 tablet 3    atorvastatin (LIPITOR) 10 MG tablet TAKE 1 TABLET BY MOUTH ONCE A DAY (Patient taking differently: Take 10 mg by mouth once daily.) 90 tablet 3    fluticasone-umeclidin-vilanter (TRELEGY ELLIPTA) 100-62.5-25 mcg DsDv INHALE 1 PUFF BY MOUTH INTO THE LUNGS ONCE A DAY (Patient taking differently: Inhale 1 puff into the lungs Daily.) 180 each 3    gabapentin (NEURONTIN) 300 MG capsule Take 1 capsule (300 mg total) by mouth every evening for 7 days, THEN 1 capsule (300 mg total) 2 (two) times daily for 7 days, THEN 1 capsule (300 mg total) 3 (three) times daily for 16 days. 69 capsule 0    isosorbide mononitrate (IMDUR) 60 MG 24 hr " tablet TAKE 1 TABLET BY MOUTH EVERY DAY FOR HEART AND BLOOD PRESSURE (Patient taking differently: Take 60 mg by mouth once daily.) 90 tablet 3    losartan (COZAAR) 50 MG tablet TAKE 1 TABLET BY MOUTH ONCE A DAY FOR BLOOD PRESSURE (Patient taking differently: Take 50 mg by mouth once daily.) 90 tablet 3    meloxicam (MOBIC) 7.5 MG tablet Take 1 tablet (7.5 mg total) by mouth daily as needed for Pain. 30 tablet 0    montelukast (SINGULAIR) 10 mg tablet TAKE 1 TABLET BY MOUTH EVERY EVENING (Patient taking differently: Take 10 mg by mouth every evening.) 90 tablet 3    OZEMPIC 0.25 mg or 0.5 mg (2 mg/3 mL) pen injector Inject 0.25 mg into the skin every 7 days.      pantoprazole (PROTONIX) 40 MG tablet Take 1 tablet (40 mg total) by mouth once daily. 30 tablet 11    rOPINIRole (REQUIP) 3 MG tablet TAKE 1 TABLET BY MOUTH NIGHTLY (Patient taking differently: Take 3 mg by mouth nightly.) 90 tablet 3    SPIRIVA WITH HANDIHALER 18 mcg inhalation capsule INHALE THE CONTENTS OF 1 CAPSULE BY MOUTH INTO THE LUNGS ONCE A DAY (Patient taking differently: Inhale 18 mcg into the lungs once daily.) 90 capsule 3    [DISCONTINUED] semaglutide (OZEMPIC) 0.25 mg or 0.5 mg(2 mg/1.5 mL) pen injector Inject 0.25 mg into the skin every 7 days. 1 each 5    albuterol (PROVENTIL/VENTOLIN HFA) 90 mcg/actuation inhaler INHALE 2 PUFFS INTO THE LUNGS EVERY 6 HOURS AS NEEDED FOR WHEEZING 20.1 g 11    amitriptyline (ELAVIL) 10 MG tablet Take 1 tablet (10 mg total) by mouth 2 (two) times a day. For sciatica 30 tablet 0    calcium-vitamin D3 (OS-MIKE 500 + D3) 500 mg-5 mcg (200 unit) per tablet Take 1 tablet by mouth once daily.      cyanocobalamin 500 MCG tablet Take 500 mcg by mouth once daily.      fluticasone propionate (FLONASE) 50 mcg/actuation nasal spray 2 sprays (100 mcg total) by Each Nare route once daily. 1 Bottle 12    FLUZONE HIGHDOSE QUAD 23-24  mcg/0.7 mL Syrg       furosemide (LASIX) 40 MG tablet Take 1 tablet (40 mg total) by  "mouth 2 (two) times a day. (Patient not taking: Reported on 12/14/2023) 60 tablet 3    HYDROcodone-acetaminophen (NORCO) 5-325 mg per tablet Take 1 tablet by mouth every 12 (twelve) hours as needed for Pain. 20 tablet 0    loratadine (CLARITIN) 10 mg tablet Take 1 tablet (10 mg total) by mouth once daily. 90 tablet 3    MULTIVIT-IRON-MIN-FOLIC ACID 3,500-18-0.4 UNIT-MG-MG ORAL CHEW Take by mouth.      traMADoL (ULTRAM) 50 mg tablet Take 1 tablet (50 mg total) by mouth every 8 (eight) hours as needed for Pain. 14 tablet 0    ULTICARE PEN NEEDLE 31 gauge x 5/16" Ndle          Please address this information as you see fit.  Feel free to contact us if you have any questions or require assistance.    Flakita Glover  [phone]                .          "

## 2023-12-15 NOTE — CONSULTS
[place] - Telemetry  Palliative Medicine  Consult Note    Patient Name: Shirley F Gilford  MRN: [id number]  Admission Date: 12/14/2023  Hospital Length of Stay: 1 days  Code Status: DNR   Attending Provider: Ayan Wills MD  Consulting Provider: Keri Claudio NP  Primary Care Physician: Marc Sands MD  Principal Problem:Acute hypoxic respiratory failure    Patient information was obtained from patient, relative(s), and primary team.      Inpatient consult to Palliative Care  Consult performed by: Keri Claudio NP  Consult ordered by: Carl Fuller MD  Reason for consult: Goals of care/ advanced care planning        Assessment/Plan:     Palliative Care  Palliative care encounter  Shirley F Gilford is a 83 y.o. female who has a past medical history of COPD, HLD, HTN, pre-DM, RLS and osteoporosis. The patient presented to [facility] on 12/14/2023 with a primary complaint of fall. She is admitted to [facility] for acute hypoxemic respiratory failure due to COVID-19 complicated by a traumatic intrapatenchymal hemorrhage, wide complex tachycardia and JOSUE.  Palliative care consulted for transition to comfort focused care.    -Informed that pt requesting to return home under the care of hospice.  Pt aware that she is nearing the end of her life and strongly desires to return home. Expressed that there may be a small window of time in which the pt is stable enough to transport home. Would be able to transition to PO amiodarone from IV at time of discharge. Pt also with increasing lethargy and some dyspnea.  Concern for ability to maintain PO Intake.  Hands and feet icy to touch, pt in terminal decline.   -CT Head finding of stable small 8 mm hyperdense focus involving the posterior corpus callosum slightly left of midline. There is a history of head trauma. This could represent a minimal hemorrhagic contusion - pt deferring additional work up.   -Discussed home hospice options with pt and  "pts friend Louann Ying.  Louann in agreement to go and stay with pt in her home, aware that pt is covid positive and reports that she has been with pt for the past few days and understands risks.  Louann initially reports that [facility] would be able to stay with the pt.  -Cm and attending team notified of pts wishes.    -Pt signed consents with [facility].  -Louann spoke with pts Independent living center and reprots that pt can now only return with 24 hour sitters due to change in her clinical status.  Louann now also believes that she has covid as she is not feeling well and will no longer be able to stay with the pt. Pt has limited support resources at this time to meet this need. However, both parties remain in agreement that pt would like to enroll into the care of hospice services.   -CM and Attending team notified in change of plans.  CM sending referrals to hospice houses.   -Pt remains a DNR.     -        Thank you for your consult.     Subjective:     HPI:   Per chart, "Shirley F Gilford is a 83 y.o. female who has a past medical history of COPD, HLD, HTN, pre-DM, RLS and osteoporosis. The patient presented to [facility] on 12/14/2023 with a primary complaint of fall.      The patient was in their usual state of health until 3 days ago when she experienced a mechanical fall. At that time she reports that she did hit her, but she is unsure how long she was down for. She was found by her family on 12/14 down on the ground and confused. At time of admission patient is alert and oriented. She reports some shortness of breath, but she denies any fever, chills, nausea or vomiting. She reports pain in her rectum from a "tear" that is chronic. She denies any weakness of numbness in her arms or legs. She does reports recent exposure to COVID-19.      While in the ED, she experienced runs of Vtach. She was evaluated by Cardiology who recommend starting an amiodarone drip. "      Interval History: " Pt reports feeling better, adamant that she would like to return home.  Working on inpatient hospice placement at this time.     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    Hyperglycemia 1/26/2017    Hyperlipidemia     Hypertension     Osteoporosis     Palliative care encounter 12/15/2023    RLS (restless legs syndrome)        Past Surgical History:   Procedure Laterality Date    BLADDER SURGERY      cancer Mahamed Kidd    BREAST SURGERY      left benign tumor    COLONOSCOPY      EYE SURGERY      cataracts    TONSILLECTOMY         Review of patient's allergies indicates:   Allergen Reactions    Covid-19vacc,(astrazeneca)(pf)      Fever, headache, weak    Contrast media Swelling    Gabapentin Nausea And Vomiting       Medications:  Continuous Infusions:  Scheduled Meds:   amiodarone  200 mg Oral Daily    azithromycin  500 mg Oral Daily    cefTRIAXone (ROCEPHIN) IVPB  1 g Intravenous Daily    isosorbide mononitrate  60 mg Oral Daily    losartan  50 mg Oral Daily    mupirocin   Nasal BID    remdesivir infusion  100 mg Intravenous Daily    tiotropium bromide  2 puff Inhalation Daily     PRN Meds:acetaminophen, dextrose, sodium chloride 0.9%    Family History       Problem Relation (Age of Onset)    Cancer Daughter (40)    Colon cancer Daughter    Diabetes Mother    Heart disease Father    No Known Problems Daughter    Stroke Mother          Tobacco Use    Smoking status: Former     Passive exposure: Past    Smokeless tobacco: Never   Substance and Sexual Activity    Alcohol use: No    Drug use: Never    Sexual activity: Not on file       Review of Systems   Constitutional:  Positive for activity change, appetite change, fatigue and fever.   HENT: Negative.     Respiratory:  Positive for shortness of breath.    Skin:  Positive for color change.   Neurological:  Positive for weakness.     Objective:     Vital Signs (Most  Recent):  Temp: 98 °F (36.7 °C) (12/15/23 1132)  Pulse: 85 (12/15/23 1132)  Resp: 18 (12/15/23 1132)  BP: (!) 112/58 (12/15/23 1132)  SpO2: 96 % (12/15/23 1132) Vital Signs (24h Range):  Temp:  [97 °F (36.1 °C)-100.4 °F (38 °C)] 98 °F (36.7 °C)  Pulse:  [] 85  Resp:  [18-39] 18  SpO2:  [92 %-99 %] 96 %  BP: (112-202)/() 112/58     Weight: 85.3 kg (188 lb 0.8 oz)  Body mass index is 33.31 kg/m².       Physical Exam  Vitals and nursing note reviewed.   Constitutional:       General: She is not in acute distress.     Appearance: She is ill-appearing.      Interventions: Nasal cannula in place.   HENT:      Head: Normocephalic.      Nose: Nose normal.   Cardiovascular:      Rate and Rhythm: Normal rate.   Skin:     Coloration: Skin is pale.   Neurological:      General: No focal deficit present.      Mental Status: She is easily aroused. She is lethargic.      Motor: Weakness present.   Psychiatric:         Behavior: Behavior is cooperative.            Review of Symptoms      Symptom Assessment (ESAS 0-10 Scale)  Pain:  0  Dyspnea:  2  Anxiety:  0  Nausea:  0  Depression:  0  Anorexia:  0  Fatigue:  7  Insomnia:  0  Restlessness:  0  Agitation:  0         Performance Status:  40    Living Arrangements:  Lives alone and Lives in assisted living    Psychosocial/Cultural:   See Palliative Psychosocial Note: No  Social Issues Identified: Coping deficit pt/family and New Diagnosis/Trauma  Bereavement Risk: No  Caregiver Needs Discussed. Caregiver Distress: Yes: Need for respite, Intensity of family caregiving, and Caregiver Burnout Risk  Cultural: none identified at this time.   **Primary  to Follow**  Palliative Care  Consult: No    Spiritual:  F - Tata and Belief:  Lutheran   I - Importance:  Moderate      Time-Based Charting:  Yes  Chart Review: 22 minutes  Face to Face: 13 minutes  Symptom Assessment: 10 minutes  Coordination of Care: 15 minutes  Discharge Planning: 15  minutes  Advance Care Planning: 10 minutes  Goals of Care: 12 minutes    Total Time Spent: 97 minutes        Advance Care Planning  Advance Directives:   Living Will: No    LaPOST: No    Do Not Resuscitate Status: Yes    Medical Power of : No      Decision Making:  Patient answered questions and Family answered questions  Goals of Care: The patient endorses that what is most important right now is to focus on spending time at home, avoiding the hospital, extending life as long as possible, even it it means sacrificing quality, improvement in condition but with limits to invasive therapies, and comfort and QOL     Accordingly, we have decided that the best plan to meet the patient's goals includes enrolling in hospice care         Significant Labs: All pertinent labs within the past 24 hours have been reviewed.  CBC:   Recent Labs   Lab 12/14/23  1835   WBC 14.20*   HGB 13.3   HCT 42.4   MCV 84        BMP:  Recent Labs   Lab 12/15/23  0402   *      K 3.3*      CO2 20*   BUN 42*   CREATININE 1.3   CALCIUM 8.1*   MG 1.9     LFT:  Lab Results   Component Value Date    AST 73 (H) 12/15/2023    ALKPHOS 61 12/15/2023    BILITOT 0.3 12/15/2023     Albumin:   Albumin   Date Value Ref Range Status   12/15/2023 2.7 (L) 3.5 - 5.2 g/dL Final     Protein:   Total Protein   Date Value Ref Range Status   12/15/2023 5.7 (L) 6.0 - 8.4 g/dL Final     Lactic acid:   Lab Results   Component Value Date    LACTATE 1.1 12/14/2023    LACTATE 1.0 08/03/2020       Significant Imaging: I have reviewed all pertinent imaging results/findings within the past 24 hours.      Keri Claudio NP  Palliative Medicine  [facility]

## 2023-12-15 NOTE — NURSING
/82  on tele monitor on return from CT scan. BG 82 and prn dextrose 40% was not given. Dr Linda on unit floor notified, VO ok to hold the 1845 missed dose from ER isosorbide 60mg, made aware ER nurse also held losartan dose at 1845. Will CTCM. Pt denies any complaints.

## 2023-12-15 NOTE — SUBJECTIVE & OBJECTIVE
Interval History: Pt reports feeling better, adamant that she would like to return home.  Working on inpatient hospice placement at this time.     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    Hyperglycemia 1/26/2017    Hyperlipidemia     Hypertension     Osteoporosis     Palliative care encounter 12/15/2023    RLS (restless legs syndrome)        Past Surgical History:   Procedure Laterality Date    BLADDER SURGERY      cancer Mahamed Kidd    BREAST SURGERY      left benign tumor    COLONOSCOPY      EYE SURGERY      cataracts    TONSILLECTOMY         Review of patient's allergies indicates:   Allergen Reactions    Covid-19vacc,(astrazeneca)(pf)      Fever, headache, weak    Contrast media Swelling    Gabapentin Nausea And Vomiting       Medications:  Continuous Infusions:  Scheduled Meds:   amiodarone  200 mg Oral Daily    azithromycin  500 mg Oral Daily    cefTRIAXone (ROCEPHIN) IVPB  1 g Intravenous Daily    isosorbide mononitrate  60 mg Oral Daily    losartan  50 mg Oral Daily    mupirocin   Nasal BID    remdesivir infusion  100 mg Intravenous Daily    tiotropium bromide  2 puff Inhalation Daily     PRN Meds:acetaminophen, dextrose, sodium chloride 0.9%    Family History       Problem Relation (Age of Onset)    Cancer Daughter (40)    Colon cancer Daughter    Diabetes Mother    Heart disease Father    No Known Problems Daughter    Stroke Mother          Tobacco Use    Smoking status: Former     Passive exposure: Past    Smokeless tobacco: Never   Substance and Sexual Activity    Alcohol use: No    Drug use: Never    Sexual activity: Not on file       Review of Systems   Constitutional:  Positive for activity change, appetite change, fatigue and fever.   HENT: Negative.     Respiratory:  Positive for shortness of breath.    Skin:  Positive for color change.   Neurological:  Positive for weakness.     Objective:      Vital Signs (Most Recent):  Temp: 98 °F (36.7 °C) (12/15/23 1132)  Pulse: 85 (12/15/23 1132)  Resp: 18 (12/15/23 1132)  BP: (!) 112/58 (12/15/23 1132)  SpO2: 96 % (12/15/23 1132) Vital Signs (24h Range):  Temp:  [97 °F (36.1 °C)-100.4 °F (38 °C)] 98 °F (36.7 °C)  Pulse:  [] 85  Resp:  [18-39] 18  SpO2:  [92 %-99 %] 96 %  BP: (112-202)/() 112/58     Weight: 85.3 kg (188 lb 0.8 oz)  Body mass index is 33.31 kg/m².       Physical Exam  Vitals and nursing note reviewed.   Constitutional:       General: She is not in acute distress.     Appearance: She is ill-appearing.      Interventions: Nasal cannula in place.   HENT:      Head: Normocephalic.      Nose: Nose normal.   Cardiovascular:      Rate and Rhythm: Normal rate.   Skin:     Coloration: Skin is pale.   Neurological:      General: No focal deficit present.      Mental Status: She is easily aroused. She is lethargic.      Motor: Weakness present.   Psychiatric:         Behavior: Behavior is cooperative.            Review of Symptoms      Symptom Assessment (ESAS 0-10 Scale)  Pain:  0  Dyspnea:  2  Anxiety:  0  Nausea:  0  Depression:  0  Anorexia:  0  Fatigue:  7  Insomnia:  0  Restlessness:  0  Agitation:  0         Performance Status:  40    Living Arrangements:  Lives alone and Lives in assisted living    Psychosocial/Cultural:   See Palliative Psychosocial Note: No  Social Issues Identified: Coping deficit pt/family and New Diagnosis/Trauma  Bereavement Risk: No  Caregiver Needs Discussed. Caregiver Distress: Yes: Need for respite, Intensity of family caregiving, and Caregiver Burnout Risk  Cultural: none identified at this time.   **Primary  to Follow**  Palliative Care  Consult: No    Spiritual:  F - Tata and Belief:  Christian   I - Importance:  Moderate      Time-Based Charting:  Yes  Chart Review: 22 minutes  Face to Face: 13 minutes  Symptom Assessment: 10 minutes  Coordination of Care: 15 minutes  Discharge  Planning: 15 minutes  Advance Care Planning: 10 minutes  Goals of Care: 12 minutes    Total Time Spent: 97 minutes        Advance Care Planning   Advance Directives:   Living Will: No    LaPOST: No    Do Not Resuscitate Status: Yes    Medical Power of : No      Decision Making:  Patient answered questions and Family answered questions  Goals of Care: The patient endorses that what is most important right now is to focus on spending time at home, avoiding the hospital, extending life as long as possible, even it it means sacrificing quality, improvement in condition but with limits to invasive therapies, and comfort and QOL     Accordingly, we have decided that the best plan to meet the patient's goals includes enrolling in hospice care         Significant Labs: All pertinent labs within the past 24 hours have been reviewed.  CBC:   Recent Labs   Lab 12/14/23  1835   WBC 14.20*   HGB 13.3   HCT 42.4   MCV 84        BMP:  Recent Labs   Lab 12/15/23  0402   *      K 3.3*      CO2 20*   BUN 42*   CREATININE 1.3   CALCIUM 8.1*   MG 1.9     LFT:  Lab Results   Component Value Date    AST 73 (H) 12/15/2023    ALKPHOS 61 12/15/2023    BILITOT 0.3 12/15/2023     Albumin:   Albumin   Date Value Ref Range Status   12/15/2023 2.7 (L) 3.5 - 5.2 g/dL Final     Protein:   Total Protein   Date Value Ref Range Status   12/15/2023 5.7 (L) 6.0 - 8.4 g/dL Final     Lactic acid:   Lab Results   Component Value Date    LACTATE 1.1 12/14/2023    LACTATE 1.0 08/03/2020       Significant Imaging: I have reviewed all pertinent imaging results/findings within the past 24 hours.

## 2023-12-15 NOTE — PLAN OF CARE
CM met with pt at bedside to discuss discharge planning  Dx: Fall/ COVID  Pt is independent with ADL's. Pt uses a RW and has home 02. She lives alone at an Independent [facility]ments called [facility]. Has a close friend Ms. Louann Ying [phone] to takes her to appointments. Presently has no  services. Upon discharge, Louann Ying (friend) [phone]  will provide transport home. CM  put name and number on whiteboard in room. Pt made aware to contact CM with any questions or concerns. CM will continue to follow and assist with any discharge needs.  Future Appointments   Date Time Provider Department Center   1/18/2024  3:30 PM Marc Sands MD [facility]      12/15/23 1052   Discharge Assessment   Assessment Type Discharge Planning Assessment   Confirmed/corrected address, phone number and insurance Yes   Confirmed Demographics Correct on Facesheet   Source of Information patient   When was your last doctors appointment? 12/08/23   Communicated DAVID with patient/caregiver Date not available/Unable to determine   Reason For Admission Fall/COVID   People in Home alone   Do you expect to return to your current living situation? Yes   Do you have help at home or someone to help you manage your care at home? Yes   Who are your caregiver(s) and their phone number(s)? Louann Ying (friend) [phone]   Prior to hospitilization cognitive status: Alert/Oriented   Current cognitive status: Alert/Oriented   Walking or Climbing Stairs Difficulty yes   Walking or Climbing Stairs ambulation difficulty, requires equipment   Mobility Management RW   Dressing/Bathing Difficulty no   Equipment Currently Used at Home walker, rolling;oxygen   Readmission within 30 days? No   Patient currently being followed by outpatient case management? No   Do you currently have service(s) that help you manage your care at home? No   Do you take prescription medications? Yes   Do you have prescription coverage? Yes   Coverage  [facility]   Do you have any problems affording any of your prescribed medications? No   Is the patient taking medications as prescribed? yes   Who is going to help you get home at discharge? Louann Ying (friend) [phone]   How do you get to doctors appointments? family or friend will provide   Are you on dialysis? No   Do you take coumadin? No   Discharge Plan A Home;Home with family;Home Health   DME Needed Upon Discharge  none   Discharge Plan discussed with: Patient   Transition of Care Barriers None   Physical Activity   On average, how many days per week do you engage in moderate to strenuous exercise (like a brisk walk)? 0 days   On average, how many minutes do you engage in exercise at this level? 0 min   Financial Resource Strain   How hard is it for you to pay for the very basics like food, housing, medical care, and heating? Not hard   Housing Stability   In the last 12 months, was there a time when you were not able to pay the mortgage or rent on time? N   In the last 12 months, how many places have you lived? 1   In the last 12 months, was there a time when you did not have a steady place to sleep or slept in a shelter (including now)? N   Transportation Needs   In the past 12 months, has lack of transportation kept you from medical appointments or from getting medications? no   In the past 12 months, has lack of transportation kept you from meetings, work, or from getting things needed for daily living? No   Food Insecurity   Within the past 12 months, you worried that your food would run out before you got the money to buy more. Never true   Within the past 12 months, the food you bought just didn't last and you didn't have money to get more. Never true   Stress   Do you feel stress - tense, restless, nervous, or anxious, or unable to sleep at night because your mind is troubled all the time - these days? Not at all   Social Connections   In a typical week, how many times do you talk on the  phone with family, friends, or neighbors? Twice a week   How often do you get together with friends or relatives? Twice   How often do you attend Lutheran or Anabaptist services? Never   Do you belong to any clubs or organizations such as Lutheran groups, unions, fraternal or athletic groups, or school groups? No   How often do you attend meetings of the clubs or organizations you belong to? Never   Are you , , , , never , or living with a partner?    Alcohol Use   Q1: How often do you have a drink containing alcohol? Never   Q2: How many drinks containing alcohol do you have on a typical day when you are drinking? None   Q3: How often do you have six or more drinks on one occasion? Never       11:33 AM. Pt has agreed to go home with Hospice today

## 2023-12-15 NOTE — PROGRESS NOTES
"[facility] Hospital Medicine Progress Note    Primary Team: [facility] Hospitalist Team B  Attending Physician: Ayan Wills MD  Resident: Jonny  Intern: Pato    Subjective:      Patient doing much better this AM. Very thirsty. She denies any worsening shortness of breath. She is expressing wishes to return home with hospice care.      Objective:     Last 24 Hour Vital Signs:  BP  Min: 132/57  Max: 202/83  Temp  Av.6 °F (37.6 °C)  Min: 97.5 °F (36.4 °C)  Max: 100.4 °F (38 °C)  Pulse  Av.6  Min: 88  Max: 129  Resp  Av  Min: 17  Max: 39  SpO2  Av.5 %  Min: 93 %  Max: 100 %  Height  Av' 3" (160 cm)  Min: 5' 3" (160 cm)  Max: 5' 3" (160 cm)  Weight  Av.4 kg (197 lb 0.4 oz)  Min: 85.3 kg (188 lb 0.8 oz)  Max: 93.4 kg (206 lb)  I/O last 3 completed shifts:  In: 830 [P.O.:30; I.V.:700; IV Piggyback:100]  Out: 1000 [Urine:1000]    Physical Examination:  General appearance: alert, appears stated age, and cooperative  Head: Normocephalic, without obvious abnormality, atraumatic  Lungs:  no respiratory distress, nasal cannula sitting on patient's forehead   Heart: regular rate and rhythm and S1, S2 normal  Abdomen: normal findings: soft, non-tender and symmetric  Extremities: extremities normal, atraumatic, no cyanosis or edema  Skin: Skin color, texture, turgor normal. No rashes or lesions  Neurologic: Mental status: Alert, oriented, thought content appropriate  Sensory: normal  Motor:grossly normal      Laboratory:  Laboratory Data Reviewed: yes  Pertinent Findings:  Recent Labs   Lab 23  1216 23  1835 12/15/23  0402   WBC 17.97* 14.20*  --    HGB 13.5 13.3  --    HCT 43.5 42.4  --     233  --      --  139   K 4.2  --  3.3*     --  106   CREATININE 2.1*  --  1.3   BUN 56*  --  42*   CO2 17*  --  20*   ALT 61*  --  52*   AST 97*  --  73*         Microbiology Data Reviewed: yes  Pertinent Findings:  Bcx NGTD  COVID Positive     Other Results:  EKG (my interpretation): " LBBB, wide complex tachycardia.     Radiology Data Reviewed: yes  Pertinent Findings:    Repeat CT Head Impression:     No significant change from the earlier study.     Stable small 8 mm hyperdense focus involving the posterior corpus callosum slightly left of midline. There is a history of head trauma. This could represent a minimal hemorrhagic contusion. Small hemorrhagic lacunar infarction is not excluded. Small hemorrhagic mass is felt to be less likely though not excluded.  Early small neoplasm such as CNS lymphoma or GBM could appear similar.  MRI follow-up without and with contrast may better characterize.  Recommend neuro consultation and follow-up.    Current Medications:     Infusions:   amiodarone in dextrose 5% 1 mg/min (12/15/23 0234)        Scheduled:   azithromycin  500 mg Oral Daily    cefTRIAXone (ROCEPHIN) IVPB  1 g Intravenous Daily    isosorbide mononitrate  60 mg Oral Daily    losartan  50 mg Oral Daily    mupirocin   Nasal BID    remdesivir infusion  100 mg Intravenous Daily    tiotropium bromide  2 puff Inhalation Daily        PRN:  acetaminophen, dextrose, sodium chloride 0.9%    Assessment:     Shirley F Gilford is a 83 y.o. female who has a past medical history of COPD, HLD, HTN, pre-DM, RLS and osteoporosis. The patient presented to [facility] on 12/14/2023 with a primary complaint of fall. She is admitted to [facility] for acute hypoxemic respiratory failure due to COVID-19 complicated by a traumatic intrapatenchymal hemorrhage, wide complex tachycardia and JOSUE.       Plan:     Acute hypoxemic respiratory failure due to COVID-19  CAP  - O2 sat of <90% on RA with EMS; placed on 2L NC with improvement in hypoxia  - elevated WBC and tachycardic  - Procal wnl  - CXR concerning for edema vs infectious process  - Continue remdesivir   - Continue CAP coverage with Rocephin/Azithro      Traumatic intrapatenchymal hemorrhage, stable  - s/p mechanical fall 3 days ago  - NSGY  following, will rpt CTH in 6 hours  - SBP goal <160  - Hold AC and antiplatelet   - Patient adamantly refusing transfer or any potential neurosurgical intervention  - Rpt CTH stable   - q4 neuro checks; follow up NSGY recs      Wide complex tachycardia  - Amiodarone drip started by Cardiology  - Will rpt trop, but in setting of patient and family requesting against further intervention, will hold from extensive ischemic work up   - Continue amio drip pending palliative conversations and Cardiology recs      JOSUE, improved   Elevated CPK  - Cr 2.1 up from a baseline of 0.78  - Likely pre-renal and intrinsic etiology in the setting of decreased PO intake and mild rhabdo from being down for days   - s/p 500 cc of NS in ED  - Cr closer to baseline this AM     COPD  - Continue home Spiriva      HLD  - Hold home Lipitor until liver enzymes return to normal limits      Essential HTN  - Continue home Imdur and losartan  - Can add spot doses of Hydralazine vs Labetalol for BP goal in the setting of ICH     RLS  - Restart home ropinirole if needed      Code: Patient expressed desire to be DNR/DNI. She also is adamant in not receiving neurosurgical intervention. Will place palliative care consult for hospice evaluation as patient's goal most align with minimal interventions   DVT: held in the setting of ICH  Diet: NPO pending slp eval    Disposition: Patient expressing the desire to return home and not return to the hospital. She has experience with hospice and she is comfortable with discahrge to home hospice. Will follow up with palliative/social work     Carl Fuller MD  [facility] Internal Medicine HO-3    [facility] Medicine Hospitalist Pager numbers:   [facility] Hospitalist Medicine Team A (Sara/Heather): [phone]  [facility] Hospitalist Medicine Team B (Geovanni/Johann):  [phone]

## 2023-12-18 NOTE — PHYSICIAN QUERY
PT Name: Shirley F Gilford  MR #: [id number]     DOCUMENTATION CLARIFICATION     CDS: Danica Enciso RN      Contact information:[email or web address]  This form is a permanent document in the medical record.     Query Date: December 18, 2023    By submitting this query, we are merely seeking further clarification of documentation.  Please utilize your independent clinical judgment when addressing the question(s) below.  The Medical Record contains the following   Indicators   Supporting Clinical Findings Location in Medical Record   x Documentation of Respiratory Failure, ARDS Acute hypoxemic respiratory failure     H&P   x Subjective Respiratory Signs/Symptoms She reports some shortness of breath     H&P   x Objective Respiratory Signs/Symptoms Dyspnea     12/15 Palliative Medicine Consult     x RR     O2 sat     O2 use RR  17 - 39        Per EMS, patient had an O2 sat of 94%  on room air which improved to 97% with 2 L of supplemental oxygen        O2 sat% on Room Air:      O2 sat% on 2L O2:  94, 96     VS Flowsheet        ED PN          12/14-12/15 RT PCS    12/15 RT PCS    Blood Gas (ABG or VBG)      Hypoxia/Hypercapnia      BiPAP/Intubation/Mechanical Ventilation      Home O2, Oxygen Dependence     x Acute/Chronic Illness Covid-19 CAP  Traumatic intrapatenchymal hemorrhage   Wide complex tachycardia   JOSUE  Elevated CPK  COPD  HLD  Essential HTN  RLS     H&P   x Treatment IV Vancomycin x 1  (12/14)  IV Rocephin x 2  (12/14-12/15)  IV Remdesivir x 2  (12/14-12/15)  PO Azithromycin x 1  (12/15)     MAR   x Other Negative for chest tightness and shortness of breath.   No acute distress.      No respiratory distress, nasal cannula sitting on patient's forehead     ED PN        12/15 Internal Med PN       The clinical guidelines noted are only a system guideline. It does not replace the providers clinical judgment.    [facility] Approved Diagnostic Criteria      Acute Respiratory Failure    Hypoxic: ABG  pO2<60 mmHg or O2 sat of <91% on RA   AND/OR   Hypercapnic: ABG pCO2>50 mmHg with pH <7.35   AND   Respiratory symptoms documented (Subjective: SOB; Objective: Tachypnea, respiratory distress, increased work of breathing, unable to speak in complete sentences, labored breathing, use of accessory muscles, RR>26, cyanosis, dyspnea, wheezing, stridor, lethargy)         Acute Respiratory Distress Syndrome (ARDS) - an acute, diffuse, inflammatory form of lung injury. Suspect with progressive dyspnea, hypoxemic respiratory failure, and bilateral alveolar infiltrates on chest imaging within 6 to 72 hours of an inciting event.   Acute Respiratory Distress - Generally describes less severe respiratory symptoms (tachypnea, in respiratory distress, increased work of breathing, unable to speak in complete sentences, labored breathing, use of accessory muscles, RR> 24, cyanosis, dyspnea, wheezing, stridor, lethargy) without sufficient measurements (pO2, SpO2, pH, and pCO2) to meet criteria for respiratory failure)   Acute Respiratory Insufficiency - Generally describes less severe respiratory symptoms and measurements (pO2, SpO2, pH, and pCO2) not meeting criteria for respiratory failure         Due to the conflicting clinical picture, please clinically validate the diagnosis of Acute Hypoxemic Respiratory Failure.    If validated, please provide additional clinical support for the diagnosis.     [    ] Acute Respiratory Failure with Hypoxia has been ruled out, other diagnosis ruled in:    [    ] Acute Respiratory Insufficiency     [    ] Hypoxia only    [    ] Other (please specify):_______________     [    yes] Acute Respiratory Failure with Hypoxia has been ruled out   [    ] Acute Respiratory Failure with Hypoxia diagnosis is confirmed and additional clinical support/decision-making indicators for the diagnosis include (please specify):_______________________________________________     [    ] Other clarification  (please specify): ___________________         Please document in your progress notes daily for the duration of treatment until resolved and include in your discharge summary.       Form No. [id number]

## 2023-12-18 NOTE — DISCHARGE SUMMARY
"[facility] Hospital Medicine Discharge Summary    Primary Team: [facility] Hospitalist Team B  Attending Physician: Dr. Wills  Resident: Jonny  Intern: Pato    Date of Admit: 12/14/2023  Date of Discharge: 12/15/2023    Discharge to: Inpatient hospice   Condition: guarded     Discharge Diagnoses     Patient Active Problem List   Diagnosis    Osteoporosis    History of hypertension    History of hyperlipidemia    Restless leg syndrome    Elevated LFTs    Chronic obstructive pulmonary disease    Fatty liver    Iron deficiency    Cellulitis of left lower extremity    Hyperlipidemia    Vitamin D deficiency    Pulmonary hypertension    COVID-19    Panniculitis    Chronic pain of right knee    Diastolic dysfunction    Left atrial enlargement    Cardiac mass    Hyperglycemia    BMI 40.0-44.9, adult    Protein-calorie malnutrition, unspecified severity    Morbid (severe) obesity due to excess calories    Advance directive in chart    Ventricular tachycardia    Ventricular tachycardia (paroxysmal)    Acute hypoxic respiratory failure    JOSUE (acute kidney injury)    Intraparenchymal hematoma of brain    Palliative care encounter       Brief History of Present Illness      Shirley F Gilford is a 83 y.o. female who has a past medical history of COPD, HLD, HTN, pre-DM, RLS and osteoporosis. The patient presented to [facility] on 12/14/2023 with a primary complaint of fall.      The patient was in their usual state of health until 3 days ago when she experienced a mechanical fall. At that time she reports that she did hit her, but she is unsure how long she was down for. She was found by her family on 12/14 down on the ground and confused. At time of admission patient is alert and oriented. She reports some shortness of breath, but she denies any fever, chills, nausea or vomiting. She reports pain in her rectum from a "tear" that is chronic. She denies any weakness of numbness in her arms or legs. She does reports " recent exposure to COVID-19.      While in the ED, she experienced runs of Vtach. She was evaluated by Cardiology who recommend starting an amiodarone drip which was later transitioned to oral.     For the full HPI please refer to the History & Physical from this admission.    While admitted, patient was very adamant about her desires for a comfort based care approach. She met with palliative as well, and the decision was made to pursue hospice care. Due to continued decline, and lack of care support at home, patient was deemed a candidate for inpatient hospice.     Discharge Medications        Medication List        START taking these medications      amiodarone 200 MG Tab  Commonly known as: PACERONE  Take 1 tablet (200 mg total) by mouth once daily.     amoxicillin-clavulanate 875-125mg 875-125 mg per tablet  Commonly known as: AUGMENTIN  Take 1 tablet by mouth 2 (two) times daily. for 3 days            CHANGE how you take these medications      fluticasone-umeclidin-vilanter 100-62.5-25 mcg Dsdv  Commonly known as: TRELEGY ELLIPTA  INHALE 1 PUFF BY MOUTH INTO THE LUNGS ONCE A DAY  What changed:   how much to take  how to take this  when to take this  additional instructions     rOPINIRole 3 MG tablet  Commonly known as: REQUIP  TAKE 1 TABLET BY MOUTH NIGHTLY  What changed: when to take this     SPIRIVA WITH HANDIHALER 18 mcg inhalation capsule  Generic drug: tiotropium  INHALE THE CONTENTS OF 1 CAPSULE BY MOUTH INTO THE LUNGS ONCE A DAY  What changed: See the new instructions.            CONTINUE taking these medications      albuterol 90 mcg/actuation inhaler  Commonly known as: PROVENTIL/VENTOLIN HFA  INHALE 2 PUFFS INTO THE LUNGS EVERY 6 HOURS AS NEEDED FOR WHEEZING     calcium-vitamin D3 500 mg-5 mcg (200 unit) per tablet  Commonly known as: OS-MIKE 500 + D3     cyanocobalamin 500 MCG tablet     fluticasone propionate 50 mcg/actuation nasal spray  Commonly known as: FLONASE  2 sprays (100 mcg total) by Each  "Nare route once daily.     gabapentin 300 MG capsule  Commonly known as: NEURONTIN  Take 1 capsule (300 mg total) by mouth every evening for 7 days, THEN 1 capsule (300 mg total) 2 (two) times daily for 7 days, THEN 1 capsule (300 mg total) 3 (three) times daily for 16 days.  Start taking on: November 24, 2023     HYDROcodone-acetaminophen 5-325 mg per tablet  Commonly known as: NORCO  Take 1 tablet by mouth every 12 (twelve) hours as needed for Pain.     loratadine 10 mg tablet  Commonly known as: CLARITIN  Take 1 tablet (10 mg total) by mouth once daily.     montelukast 10 mg tablet  Commonly known as: SINGULAIR  TAKE 1 TABLET BY MOUTH EVERY EVENING     multivit-iron-min-folic acid 3,500-18-0.4 unit-mg-mg Chew     pantoprazole 40 MG tablet  Commonly known as: PROTONIX  Take 1 tablet (40 mg total) by mouth once daily.            STOP taking these medications      alendronate 70 MG tablet  Commonly known as: FOSAMAX     amitriptyline 10 MG tablet  Commonly known as: ELAVIL     atorvastatin 10 MG tablet  Commonly known as: LIPITOR     isosorbide mononitrate 60 MG 24 hr tablet  Commonly known as: IMDUR     losartan 50 MG tablet  Commonly known as: COZAAR     OZEMPIC 0.25 mg or 0.5 mg (2 mg/3 mL) pen injector  Generic drug: semaglutide     ULTICARE PEN NEEDLE 31 gauge x 5/16" Ndle  Generic drug: pen needle, diabetic            ASK your doctor about these medications      azithromycin 500 MG tablet  Commonly known as: ZITHROMAX  Take 1 tablet (500 mg total) by mouth once daily. for 1 day  Ask about: Should I take this medication?               Where to Get Your Medications        These medications were sent to [facility] [address], [place] [address]      Hours: Mon-Fri, 8a-5:30p Phone: [phone]   amoxicillin-clavulanate 875-125mg 875-125 mg per tablet  azithromycin 500 MG tablet       Information about where to get these medications is not yet available    Ask your nurse or doctor about " these medications  amiodarone 200 MG Tab         Discharge Information:   Diet:  Regular     Physical Activity:  As tolerated              Carl Fuller MD  [facility], -3

## 2023-12-19 DIAGNOSIS — U07.1 COVID-19: Primary | ICD-10-CM

## 2023-12-19 LAB
BACTERIA BLD CULT: NORMAL
BACTERIA BLD CULT: NORMAL

## 2023-12-19 RX ORDER — AMIODARONE HYDROCHLORIDE 200 MG/1
200 TABLET ORAL DAILY
Qty: 30 TABLET | Refills: 3 | Status: SHIPPED | OUTPATIENT
Start: 2023-12-19 | End: 2024-12-18

## 2023-12-19 NOTE — PROGRESS NOTES
Patient under my care at [facility], inpatient hospice unit of [facility] 12/15/23 - 12/18/23 after recent discharge from [facility] for COVID infection/V tach/A fib.   Patients condition improved while on hospice care and she no longer met hospice criteria.  Patient also specifically requested to be discharged from hospice services.   Due to her illness related decline in functional status, management at her senior living apartment complex would not accept her return unless patient arranged 24 hour care, which was cost prohibitive   Hospice team was working with [facility] to obtain SNF authorization with goal of returning home to independent living apartment once she was physically rehabilitated.   However, patient left the hospice unit AMA after hours on 12/18/23 before team could achieve a safe discharge plan on her behalf.   She revoked her hospice benefit.   She returned to her apartment and at this time it is unclear what the repercussions will be.   I spoke the patients caregiver, Louann, via telephone this morning. Patient on speaker phone and participated in conversation.  Patient has rolling walker in the home and does not request any other DME.   She left hospice without prescriptions thus I will send her newly prescribed amiodarone to her preferred pharmacy for delivery.   3 month supply prescribed-  Cardiology team to determine long term plan after evaluation.    She is agreeable to home health with therapy services and this will be ordered.  She has a follow up appt with her PCP, Dr Snads, 1/18/24 and I have advised her to keep this appointment.  I have arranged Cardiology appt for 2/2/24 with Dr Sandy.  Home Health orders have been sent to [facility] for urgent processing.

## 2023-12-21 PROCEDURE — G0180 MD CERTIFICATION HHA PATIENT: HCPCS | Mod: ,,, | Performed by: INTERNAL MEDICINE

## 2023-12-27 ENCOUNTER — TELEPHONE (OUTPATIENT)
Dept: FAMILY MEDICINE | Facility: CLINIC | Age: 83
End: 2023-12-27
Payer: MEDICARE

## 2023-12-27 ENCOUNTER — TELEPHONE (OUTPATIENT)
Dept: PAIN MEDICINE | Facility: CLINIC | Age: 83
End: 2023-12-27
Payer: MEDICARE

## 2023-12-27 DIAGNOSIS — M54.9 MUSCULOSKELETAL BACK PAIN: Primary | ICD-10-CM

## 2023-12-27 DIAGNOSIS — J44.9 CHRONIC OBSTRUCTIVE PULMONARY DISEASE, UNSPECIFIED COPD TYPE: ICD-10-CM

## 2023-12-27 DIAGNOSIS — M19.90 ARTHRITIS: ICD-10-CM

## 2023-12-27 NOTE — TELEPHONE ENCOUNTER
Spoke to pt she is requesting a scooter. I informed pt that MD would have to put in orders to physical medicine.     ----- Message from Lala Bustamante sent at 12/27/2023  9:26 AM CST -----  Regarding: scooter/hospital  Contact: [phone]  Patient is requesting a call back regarding ordering a scooter. Pt was recently in the hospital and would like to discuss what happened.   Would the patient rather a call back or a response via [facility]?  Call   Best Call Back Number:  [phone]  Additional Information:  doesn't matter who she speaks with. Thanks

## 2023-12-27 NOTE — TELEPHONE ENCOUNTER
----- Message from Lala Bustamante sent at 12/27/2023  9:31 AM CST -----  Regarding: advice/procedure  Contact: [phone]  Patient is requesting a call back regarding she has not completed taking the gabapentin (NEURONTIN) 300 MG capsules since she was hospitalized. Pt would like to know if she should start over or will this affect her procedure date?   Would the patient rather a call back or a response via [facility]?  Call   Best Call Back Number:  [phone]

## 2024-01-02 ENCOUNTER — TELEPHONE (OUTPATIENT)
Dept: PAIN MEDICINE | Facility: CLINIC | Age: 84
End: 2024-01-02
Payer: MEDICARE

## 2024-01-05 ENCOUNTER — TELEPHONE (OUTPATIENT)
Dept: FAMILY MEDICINE | Facility: CLINIC | Age: 84
End: 2024-01-05
Payer: MEDICARE

## 2024-01-05 NOTE — PRE-PROCEDURE INSTRUCTIONS
1/5 The following pre-procedure instructions and arrival time have been reviewed with patient via phone and sent to patient portal for review.  Patient verbalized an understanding.      Scheduled for a procedure with Dr. Lloyd on 01/8/2024  Your scheduled arrival time is 07:00 am.  This arrival time is roughly 1 hour before your anticipated procedure time to allow sufficient time for pre-op..  Please wear comfortable clothes.  Most patients do not need to change into a gown.  Please do not wear a dress.  This procedure will take place at the [facility] at the corner of [address] [facility].  It is in the [place] next to [facility].  The address is:    [address] [place] [address].  [place] [address]    After entering the building, you will proceed to the second floor where you can check in with registration. You should take any medications that you routinely take for blood pressure, heart medications, thyroid, cholesterol, etc.     The fasting restrictions are dependent on whether or not you are receiving sedation.  Sedation is not available for all procedures.     Your fasting instructions are as follow:  IV sedation. You should not eat for 8 hours and can only drink clear liquids (water or black coffee without cream/sugar) up until 2 hours before your scheduled time.  You CANNOT drive yourself and must have a .    If you are on blood thinners, you need to follow the anticoagulation instructions that had been discussed previously.  You should only stop the blood thinners if it was approved by your primary care physician or your cardiologist.  In the event that you are not able to stop your blood thinners, a blood thinner was not listed on your medication list, or we were not able to get clearance from your cardiologist, then the procedure may have to be postponed/canceled.     IF you were told to stop your blood thinners, this is how long you should generally  hold some of the more common ones.  Remember that stopping blood thinners is only necessary for certain procedures. If you are unsure of your instructions, please call us.   Aspirin - 5 days  Plavix/Clopidogrel - 7 days  Warfarin / Coumadin - 5 days  Eliquis - 3 days  Pradaxa/Dabigatran - 4 days  Xarelto/Rivaroxaban - 3 days    If you are a diabetic, do not take your medication if you will be fasting, but bring it with you. Please plan on being here for roughly 2 hours.     Please call us if you have been sick (running fever, having any flu-like symptoms) or have been taking antibiotics in the past 2 weeks or had any outpatient procedures other than with us (colonoscopy, endoscopy, OBGYN, dental, etc.). If you have been previously COVID positive, you will need to hold off on your procedure until you are symptom free for 10 days. If you did not have any symptoms, you can have your procedure 10 days from your positive test result.      *HOLD ALL VITAMINS, MINERALS, HERBS (INCLUDING HERBAL TEAS) AND SUPPLEMENTS  *SHOWER WITH ANTIBACTERIAL SOAP (EX. DIAL) NIGHT BEFORE AND MORNING OF PROCEDURE  *DO NOT APPLY ANY LOTIONS, OILS, POWDERS, PERFUME/COLOGNE, OINTMENTS, GELS, CREAMS, MAKEUP OR DEODORANT TO YOUR SKIN MORNING OF PROCEDURE  *LEAVE JEWELRY AND ANY VALUABLES AT HOME  *WEAR LOOSE COMFORTABLE CLOTHING (PREFERABLY A BUTTON UP SHIRT)      Thank you,  [facility]  [place], LPN [facility]  Pre-Admit

## 2024-01-05 NOTE — TELEPHONE ENCOUNTER
----- Message from Joe Dsouza sent at 1/5/2024 10:07 AM CST -----  .Type:  Needs Medical Advice    Who Called: pt    Would the patient rather a call back or a response via [facility]? Call back  Best Call Back Number: [phone]  Additional Information:     Pt would like a call back to get a sooner appointment with an earlier time

## 2024-01-08 ENCOUNTER — HOSPITAL ENCOUNTER (OUTPATIENT)
Facility: HOSPITAL | Age: 84
Discharge: HOME | End: 2024-01-08
Attending: EMERGENCY MEDICINE | Admitting: EMERGENCY MEDICINE
Payer: MEDICARE

## 2024-01-08 VITALS
RESPIRATION RATE: 20 BRPM | WEIGHT: 188 LBS | OXYGEN SATURATION: 95 % | HEART RATE: 80 BPM | TEMPERATURE: 98 F | HEIGHT: 63 IN | DIASTOLIC BLOOD PRESSURE: 99 MMHG | SYSTOLIC BLOOD PRESSURE: 219 MMHG | BODY MASS INDEX: 33.31 KG/M2

## 2024-01-08 DIAGNOSIS — G89.29 CHRONIC PAIN: ICD-10-CM

## 2024-01-08 DIAGNOSIS — M51.37 DDD (DEGENERATIVE DISC DISEASE), LUMBOSACRAL: ICD-10-CM

## 2024-01-08 DIAGNOSIS — M54.17 LUMBOSACRAL RADICULOPATHY: Primary | ICD-10-CM

## 2024-01-08 PROCEDURE — 25000003 PHARM REV CODE 250: Performed by: EMERGENCY MEDICINE

## 2024-01-08 PROCEDURE — 99152 MOD SED SAME PHYS/QHP 5/>YRS: CPT | Mod: GW,,, | Performed by: EMERGENCY MEDICINE

## 2024-01-08 PROCEDURE — 63600175 PHARM REV CODE 636 W HCPCS: Performed by: EMERGENCY MEDICINE

## 2024-01-08 PROCEDURE — 99152 MOD SED SAME PHYS/QHP 5/>YRS: CPT | Performed by: EMERGENCY MEDICINE

## 2024-01-08 PROCEDURE — 64483 NJX AA&/STRD TFRM EPI L/S 1: CPT | Mod: GW,LT,, | Performed by: EMERGENCY MEDICINE

## 2024-01-08 PROCEDURE — 25500020 PHARM REV CODE 255: Performed by: EMERGENCY MEDICINE

## 2024-01-08 PROCEDURE — A9579 GAD-BASE MR CONTRAST NOS,1ML: HCPCS | Performed by: EMERGENCY MEDICINE

## 2024-01-08 PROCEDURE — 64484 NJX AA&/STRD TFRM EPI L/S EA: CPT | Mod: GW,LT,, | Performed by: EMERGENCY MEDICINE

## 2024-01-08 PROCEDURE — 64484 NJX AA&/STRD TFRM EPI L/S EA: CPT | Mod: LT | Performed by: EMERGENCY MEDICINE

## 2024-01-08 PROCEDURE — 64483 NJX AA&/STRD TFRM EPI L/S 1: CPT | Mod: LT | Performed by: EMERGENCY MEDICINE

## 2024-01-08 RX ORDER — MIDAZOLAM HYDROCHLORIDE 2 MG/2ML
INJECTION, SOLUTION INTRAMUSCULAR; INTRAVENOUS
Status: DISCONTINUED | OUTPATIENT
Start: 2024-01-08 | End: 2024-01-08 | Stop reason: HOSPADM

## 2024-01-08 RX ORDER — LIDOCAINE HYDROCHLORIDE 20 MG/ML
INJECTION, SOLUTION EPIDURAL; INFILTRATION; INTRACAUDAL; PERINEURAL
Status: DISCONTINUED | OUTPATIENT
Start: 2024-01-08 | End: 2024-01-08 | Stop reason: HOSPADM

## 2024-01-08 RX ORDER — DEXAMETHASONE SODIUM PHOSPHATE 10 MG/ML
INJECTION INTRAMUSCULAR; INTRAVENOUS
Status: DISCONTINUED | OUTPATIENT
Start: 2024-01-08 | End: 2024-01-08 | Stop reason: HOSPADM

## 2024-01-08 RX ORDER — LIDOCAINE HYDROCHLORIDE 10 MG/ML
INJECTION, SOLUTION EPIDURAL; INFILTRATION; INTRACAUDAL; PERINEURAL
Status: DISCONTINUED | OUTPATIENT
Start: 2024-01-08 | End: 2024-01-08 | Stop reason: HOSPADM

## 2024-01-08 NOTE — OP NOTE
Lumbar Transforaminal Epidural Steroid Injection under Fluoroscopic Guidance    The procedure, risks, benefits, and options were discussed with the patient. There are no contraindications to the procedure. The patent expressed understanding and agreed to the procedure. Informed written consent was obtained prior to the start of the procedure and can be found in the patient's chart.    PATIENT NAME: Shirley Gilford   MRN: [id number]     DATE OF PROCEDURE: 01/08/2024    PROCEDURE:  Left  L4/5 and L5/S1 Lumbar Transforaminal Epidural Steroid Injection under Fluoroscopic Guidance    PRE-OP DIAGNOSIS: Lumbosacral radiculopathy [M54.17] Lumbar radiculopathy [M54.16]    POST-OP DIAGNOSIS: Same    PHYSICIAN: Abran Lloyd MD    ASSISTANTS: GUANAKITO     MEDICATIONS INJECTED: Preservative-free Decadron 10mg with 5cc of Lidocaine 1% MPF     LOCAL ANESTHETIC INJECTED: Xylocaine 2%     SEDATION: Versed 1mg and Fentanyl 0mcg                                                                                                                                                                                     Conscious sedation ordered by M.ZACK. Patient re-evaluation prior to administration of conscious sedation. No changes noted in patient's status from initial evaluation. The patient's vital signs were monitored by RN and patient remained hemodynamically stable throughout the procedure.    Event Time In   Sedation Start 0823   Sedation End 0841       ESTIMATED BLOOD LOSS: None    COMPLICATIONS: None    TECHNIQUE: Time-out was performed to identify the patient and procedure to be performed. With the patient laying in a prone position, the surgical area was prepped and draped in the usual sterile fashion using ChloraPrep and a fenestrated drape.The levels were determined under fluoroscopy guidance. Skin anesthesia was achieved by injecting Lidocaine 2% over the injection sites. The transforaminal spaces were then approached with a 22 gauge, 5  inch spinal quinke needle that was introduced under fluoroscopic guidance in the AP and Lateral views. Once the needle tip was in the area of the transforaminal space, and there was no blood, CSF or paraesthesias, contrast dye Omnipaque (300mg/mL) was injected to confirm placement and there was no vascular runoff. Fluoroscopic imaging in the AP and lateral views revealed a clear outline of the spinal nerve with proximal spread of agent through the neural foramen into the epidural space. 3 mL of the medication mixture listed above was injected slowly at each site. Displacement of the radio opaque contrast after injection of the medication confirmed that the medication went into the area of the transforaminal spaces. The needles were removed and bleeding was nil. A sterile dressing was applied. No specimens collected. The patient tolerated the procedure well.   The patient was monitored after the procedure in the recovery area. They were given post-procedure and discharge instructions to follow at home. The patient was discharged in a stable condition.    Abran Lloyd MD

## 2024-01-08 NOTE — PROGRESS NOTES
01/08/24 0809   Vital Signs   Temp 98.2 °F (36.8 °C)   Temp Source Temporal   Pulse 86   Heart Rate Source Monitor   Resp 20   SpO2 96 %   BP (!) 216/95   MAP (mmHg) 137     Notified Dr. Lloyd of current BP. Pt states she has not taken her BP medication yet today. Per MD okay to proceed with procedure.

## 2024-01-08 NOTE — DISCHARGE INSTRUCTIONS
Home Care Instructions Pain Management:     1.  DIET:     You may resume your normal diet today.     2.  BATHING:     You may shower with luke warm water.     3.  DRESSING:     You may remove your bandage today.     4.  ACTIVITY LEVEL:       You may resume your normal activities 24 hours after your procedure.     5.  MEDICATIONS:     You may resume your normal medications today.     6.  SPECIAL INSTRUCTIONS:     No heat to the injection site for 24 hours including bath or shower, heating pad, moist heat or hot tubs.     Use an ice pack to the injection site for any pain or discomfort.  Apply ice packs for 20 minute intervals as needed.     If you have received any sedatives by mouth today, you can not drive for 12 hours.     If you have received sedation through an IV, you can not drive for 24 hours.     PLEASE CALL YOUR DOCTOR FOR THE FOLLOWIN.  Redness or swelling around the injection site.  2.  Fever of 101 degrees.  3.  Drainage (pus) from the injection site.  4.  For any continuous bleeding (some dried blood over the incision is normal.)     FOR EMERGENCIES:     If any unusual problems or difficulties occur during clinic hours, call [phone] or dial [phone].     Follow up with with your physician in 2-3 weeks.

## 2024-01-08 NOTE — PROGRESS NOTES
01/08/24 0843   Vital Signs   Pulse 80   Heart Rate Source Monitor   Resp 20   SpO2 95 %   Pulse Oximetry Type Continuous   Flow (L/min) 2   Device (Oxygen Therapy) nasal cannula   BP (!) 219/99   MAP (mmHg) 142   BP Location Left arm   BP Method Automatic   Pain/Comfort/Sleep   Preferred Pain Scale number (Numeric Rating Pain Scale)   Comfort/Acceptable Pain Level 6   Pain Rating (0-10): Rest 0   Pain Rating (0-10): Activity 0   Branden Postanesthesia Score   Activity 2-->moves 4 extremities voluntarily or on command   Respiration 2-->able to breathe and cough freely   Circulation 2-->BP within 20% of preanesthetic level   Consciousness 2-->fully awake   O2 Saturation 2-->able to maintain SaO2 above 92% on room air   Branden Score 10        Incision/Site 01/08/24 0735 Back   Date First Assessed/Time First Assessed: 01/08/24 0735   Location: Back   Incision WDL ex   Dressing Appearance Dry;Intact;Clean   Drainage Amount None   Appearance Dressing in place, unable to visualize   Dressing Bandaid           Patient's BP remains elevated at 219/99, denies any chest pain or SOB.  Dr. Lloyd at bedside and informed the patient to contact her PCP ASAP due to her elevated BP.  Patient in agreement.  States she will take her BP meds when she gets home.

## 2024-01-08 NOTE — H&P
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    Hyperglycemia 1/26/2017    Hyperlipidemia     Hypertension     Osteoporosis     Palliative care encounter 12/15/2023    RLS (restless legs syndrome)      Past Surgical History:   Procedure Laterality Date    BLADDER SURGERY      cancer [facility]    BREAST SURGERY      left benign tumor    COLONOSCOPY      EYE SURGERY      cataracts    TONSILLECTOMY       Review of patient's allergies indicates:   Allergen Reactions    Covid-19vacc,(astrazeneca)(pf)      Fever, headache, weak    Contrast media Swelling    Gabapentin Nausea And Vomiting      No current facility-administered medications for this encounter.     Facility-Administered Medications Ordered in Other Encounters   Medication    sodium chloride 0.9% flush 10 mL       PMHx, PSHx, Allergies, Medications reviewed in epic    ROS negative except pain complaints in HPI    OBJECTIVE:    Breastfeeding No     PHYSICAL EXAMINATION:    GENERAL: Well appearing, in no acute distress, alert and oriented x3.  PSYCH:  Mood and affect appropriate.  SKIN: Skin color, texture, turgor normal, no rashes or lesions which will impact the procedure.  CV: RRR with palpation of the radial artery.  PULM: No evidence of respiratory difficulty, symmetric chest rise. Clear to auscultation.  NEURO: Cranial nerves grossly intact.    Plan:  Patient's elevated BP was noted, but patient was asymptomatic before the procedure. We discussed the risks / benefits given her BP, but patient felt that this was her best chance to get pain relief.   Proceed with procedure as planned Procedure(s) (LRB):  LT L4-5 and L5-S1 TFESI (Left)    Abran Lloyd  01/08/2024

## 2024-01-08 NOTE — DISCHARGE SUMMARY
Discharge Note  Short Stay      SUMMARY     Admit Date: 1/8/2024    Attending Physician: Abran Lloyd      Discharge Physician: Abran Lloyd      Discharge Date: 1/8/2024 8:44 AM    Procedure(s) (LRB):  LT L4-5 and L5-S1 TFESI (Left)    Final Diagnosis: Lumbosacral radiculopathy [M54.17]    Disposition: Home or self care    Patient Instructions:   Current Discharge Medication List        CONTINUE these medications which have NOT CHANGED    Details   amiodarone (PACERONE) 200 MG Tab Take 1 tablet (200 mg total) by mouth once daily.  Qty: 30 tablet, Refills: 3    Comments: Please deliver to her home.      calcium-vitamin D3 (OS-MIKE 500 + D3) 500 mg-5 mcg (200 unit) per tablet Take 1 tablet by mouth once daily.      cyanocobalamin 500 MCG tablet Take 500 mcg by mouth once daily.      fluticasone-umeclidin-vilanter (TRELEGY ELLIPTA) 100-62.5-25 mcg DsDv INHALE 1 PUFF BY MOUTH INTO THE LUNGS ONCE A DAY  Qty: 180 each, Refills: 3      gabapentin (NEURONTIN) 300 MG capsule Take 1 capsule (300 mg total) by mouth every evening for 7 days, THEN 1 capsule (300 mg total) 2 (two) times daily for 7 days, THEN 1 capsule (300 mg total) 3 (three) times daily for 16 days.  Qty: 69 capsule, Refills: 0      MULTIVIT-IRON-MIN-FOLIC ACID 3,500-18-0.4 UNIT-MG-MG ORAL CHEW Take by mouth.      pantoprazole (PROTONIX) 40 MG tablet Take 1 tablet (40 mg total) by mouth once daily.  Qty: 30 tablet, Refills: 11      rOPINIRole (REQUIP) 3 MG tablet TAKE 1 TABLET BY MOUTH NIGHTLY  Qty: 90 tablet, Refills: 3    Associated Diagnoses: Restless legs syndrome      semaglutide (OZEMPIC SUBQ) Inject into the skin.      SPIRIVA WITH HANDIHALER 18 mcg inhalation capsule INHALE THE CONTENTS OF 1 CAPSULE BY MOUTH INTO THE LUNGS ONCE A DAY  Qty: 90 capsule, Refills: 3      albuterol (PROVENTIL/VENTOLIN HFA) 90 mcg/actuation inhaler INHALE 2 PUFFS INTO THE LUNGS EVERY 6 HOURS AS NEEDED FOR WHEEZING  Qty: 20.1 g, Refills: 11      fluticasone propionate  (FLONASE) 50 mcg/actuation nasal spray 2 sprays (100 mcg total) by Each Nare route once daily.  Qty: 1 Bottle, Refills: 12      HYDROcodone-acetaminophen (NORCO) 5-325 mg per tablet Take 1 tablet by mouth every 12 (twelve) hours as needed for Pain.  Qty: 20 tablet, Refills: 0    Comments: Quantity prescribed more than 7 day supply? Yes, quantity medically necessary      loratadine (CLARITIN) 10 mg tablet Take 1 tablet (10 mg total) by mouth once daily.  Qty: 90 tablet, Refills: 3      montelukast (SINGULAIR) 10 mg tablet TAKE 1 TABLET BY MOUTH EVERY EVENING  Qty: 90 tablet, Refills: 3                 Discharge Diagnosis: Lumbosacral radiculopathy [M54.17]  Condition on Discharge: Stable with no complications to procedure   Diet on Discharge: Same as before.  Activity: as per instruction sheet.  Discharge to: Home with a responsible adult.  Follow up: 2-4 weeks       Please call my office or pager at [phone] if experienced any weakness or loss of sensation, fever > 101.5, pain uncontrolled with oral medications, persistent nausea/vomiting/or diarrhea, redness or drainage from the incisions, or any other worrisome concerns. If physician on call was not reached or could not communicate with our office for any reason please go to the nearest emergency department

## 2024-01-25 ENCOUNTER — PATIENT OUTREACH (OUTPATIENT)
Dept: ADMINISTRATIVE | Facility: HOSPITAL | Age: 84
End: 2024-01-25
Payer: MEDICARE

## 2024-01-26 ENCOUNTER — TELEPHONE (OUTPATIENT)
Dept: FAMILY MEDICINE | Facility: CLINIC | Age: 84
End: 2024-01-26
Payer: MEDICARE

## 2024-01-26 ENCOUNTER — OFFICE VISIT (OUTPATIENT)
Dept: HOME HEALTH SERVICES | Facility: CLINIC | Age: 84
End: 2024-01-26
Payer: MEDICARE

## 2024-01-26 VITALS
BODY MASS INDEX: 33.3 KG/M2 | HEART RATE: 72 BPM | DIASTOLIC BLOOD PRESSURE: 81 MMHG | SYSTOLIC BLOOD PRESSURE: 155 MMHG | WEIGHT: 188 LBS

## 2024-01-26 DIAGNOSIS — J44.9 CHRONIC OBSTRUCTIVE PULMONARY DISEASE, UNSPECIFIED COPD TYPE: ICD-10-CM

## 2024-01-26 DIAGNOSIS — M54.17 LUMBOSACRAL RADICULOPATHY: ICD-10-CM

## 2024-01-26 DIAGNOSIS — E46 PROTEIN-CALORIE MALNUTRITION, UNSPECIFIED SEVERITY: ICD-10-CM

## 2024-01-26 DIAGNOSIS — K21.9 GASTROESOPHAGEAL REFLUX DISEASE, UNSPECIFIED WHETHER ESOPHAGITIS PRESENT: ICD-10-CM

## 2024-01-26 DIAGNOSIS — Z00.00 ENCOUNTER FOR PREVENTIVE HEALTH EXAMINATION: Primary | ICD-10-CM

## 2024-01-26 DIAGNOSIS — I27.20 PULMONARY HYPERTENSION: ICD-10-CM

## 2024-01-26 DIAGNOSIS — I47.20 VENTRICULAR TACHYCARDIA, UNSPECIFIED: ICD-10-CM

## 2024-01-26 DIAGNOSIS — I71.40 ABDOMINAL ANEURYSM: ICD-10-CM

## 2024-01-26 DIAGNOSIS — G25.81 RESTLESS LEG SYNDROME: ICD-10-CM

## 2024-01-26 PROCEDURE — G0439 PPPS, SUBSEQ VISIT: HCPCS | Mod: GW,S$GLB,, | Performed by: NURSE PRACTITIONER

## 2024-01-26 NOTE — TELEPHONE ENCOUNTER
Spoke to Arcadio with the [facility]. Ozempic is not covered under insurance unless the pt has a Dx of DM type 2. Pt last A1c was 5.7. [facility] will not fill the Rx.     Please send in a different medication for pt if needed to [facility].

## 2024-01-30 PROBLEM — E66.01 MORBID (SEVERE) OBESITY DUE TO EXCESS CALORIES: Status: RESOLVED | Noted: 2023-07-17 | Resolved: 2024-01-30

## 2024-01-30 PROBLEM — I71.40 ABDOMINAL ANEURYSM: Status: ACTIVE | Noted: 2024-01-30

## 2024-01-30 NOTE — PROGRESS NOTES
Shirley Gilford presented for a follow-up Medicare AWV today. The following components were reviewed and updated:    Medical history  Family History  Social history  Allergies and Current Medications  Health Risk Assessment  Health Maintenance  Care Team    **See Completed Assessments for Annual Wellness visit with in the encounter summary    The following assessments were completed:  Depression Screening  Timed Get Up Test  Whisper Test      Opioid documentation:      Patient does have a current opioid prescription.      Patient accepted further discussion regarding opioid medication use.      Patient is currently taking hydrocodone narcotic for leg pain.        Pain level today is 0/10.       In addition to narcotic pain medications, patient is also using (no other oral, topical, or alternative treatments) for pain control.       Patient is followed by a specialist currently for their pain and will not be referred today.       Patient's opioid risk potential based on ORT-OUD tool:       Bayron each box that applies   No   Yes     Family history of substance abuse   Alcohol [x] []   Illegal drugs [x] []   Rx drugs [x] []     Personal history of substance abuse   Alcohol [x] []   Illegal drugs [x] []   Rx drugs [x] []     Age between 16-45 years   [x]   []     Patient with ADD, OCD, Bipolar disorder, schizoprenia   [x]   []     Patient with depression   [x]   []                         Scoring total                                                                 Non-opioid treatment options have been discussed today and added to the patient's after visit summary.          Vitals:    01/26/24 0939   BP: (!) 155/81   Pulse: 72   Weight: 85.3 kg (188 lb)     Body mass index is 33.3 kg/m².       Physical Exam  HENT:      Head: Normocephalic.      Nose: Nose normal.   Eyes:      Pupils: Pupils are equal, round, and reactive to light.   Cardiovascular:      Pulses: Normal pulses.      Heart sounds: Normal heart sounds.    Pulmonary:      Effort: Pulmonary effort is normal.      Breath sounds: Normal breath sounds.   Abdominal:      General: Bowel sounds are normal.   Musculoskeletal:         General: Normal range of motion.      Cervical back: Normal range of motion.      Right lower leg: No edema.      Left lower leg: No edema.   Skin:     General: Skin is warm and dry.   Neurological:      Mental Status: She is alert and oriented to person, place, and time.      Motor: Weakness present.      Gait: Gait abnormal (walker).           Diagnoses and health risks identified today and associated recommendations/orders:  1. Encounter for preventive health examination  - Above assessments completed. Preventive measures and health maintenance reviewed with patient.  -discussed overdue vaccines, can have done at any pharmacy    2. Ventricular tachycardia, unspecified  Stable, followed by PCP and Cardiology  -on amiodarone    3. Pulmonary hypertension  Stable, followed by PCP and Cardiology    4. Chronic obstructive pulmonary disease, unspecified COPD type  Stable, followed by PCP  -inhalers    5. Abdominal aneurysm  Stable, followed by PCP    6. Protein-calorie malnutrition, unspecified severity  Stable, followed by PCP  -pt reports weight loss and decreased appetite secondary to semaglutide.  -weight loss of 19# since 11/2023  -encouraged increasing protein and supplementing meals with boost/ensure    7. Restless leg syndrome  Stable, followed by PCP  -on ropinirole    8. Lumbosacral radiculopathy  Stable, followed by Pain Medicine  -on norco prn    9. Body mass index (BMI) of 33.0 to 33.9 in adult    10. Gastroesophageal reflux disease, unspecified whether esophagitis present  Stable, followed by PCP  -on PPI      Provided Angelique with a 5-10 year written screening schedule and personal prevention plan. Recommendations were developed using the USPSTF age appropriate recommendations. Education, counseling, and referrals were provided as  needed.  After Visit Summary printed and given to patient which includes a list of additional screenings\tests needed.    Follow up in about 1 year (around 1/26/2025) for your next annual wellness visit.      Meggan Auguste NP  I offered to discuss advanced care planning, including how to pick a person who would make decisions for you if you were unable to make them for yourself, called a health care power of , and what kind of decisions you might make such as use of life sustaining treatments such as ventilators and tube feeding when faced with a life limiting illness recorded on a living will that they will need to know. (How you want to be cared for as you near the end of your natural life)     X  Patient has advanced directives on file, which we reviewed, and they do not wish to make changes.

## 2024-01-30 NOTE — PATIENT INSTRUCTIONS
Counseling and Referral of Other Preventative  (Italic type indicates deductible and co-insurance are waived)    Patient Name: Shirley Gilford  Today's Date: 1/30/2024    Health Maintenance       Date Due Completion Date    RSV Vaccine (Age 60+ and Pregnant patients) (1 - 1-dose 60+ series) Never done ---    DEXA Scan 06/26/2018 6/26/2015    COVID-19 Vaccine (2 - 2023-24 season) 09/01/2023 1/8/2021    Hemoglobin A1c (Prediabetes) 05/31/2024 5/31/2023    Colonoscopy 06/21/2026 6/21/2016    TETANUS VACCINE 01/04/2027 1/4/2017 (Done)    Override on 1/4/2017: Done    Lipid Panel 07/17/2028 7/17/2023    Override on 1/4/2017: Done        No orders of the defined types were placed in this encounter.    The following information is provided to all patients.  This information is to help you find resources for any of the problems found today that may be affecting your health:                  Living healthy guide: www.[facility].[place].gov      Understanding Diabetes: www.diabetes.org      Eating healthy: www.cdc.gov/healthyweight      CDC home safety checklist: www.cdc.gov/steadi/patient.html      Agency on Aging: www.goea.[place].gov      Alcoholics anonymous (AA): www.aa.org      Physical Activity: www.gildardo.nih.gov/ij5utoh      Tobacco use: www.quitwithusla.org

## 2024-02-02 ENCOUNTER — EXTERNAL HOME HEALTH (OUTPATIENT)
Dept: HOME HEALTH SERVICES | Facility: HOSPITAL | Age: 84
End: 2024-02-02
Payer: MEDICARE

## 2024-02-05 ENCOUNTER — TELEPHONE (OUTPATIENT)
Dept: FAMILY MEDICINE | Facility: CLINIC | Age: 84
End: 2024-02-05
Payer: MEDICARE

## 2024-02-05 NOTE — TELEPHONE ENCOUNTER
Per [facility]: Pt's insurance company will not cover Ozempic without the patient having a diagnosis of DM-2. Please consider sending a different med to [facility].

## 2024-02-05 NOTE — TELEPHONE ENCOUNTER
----- Message from Racheal Langford sent at 2/5/2024  9:20 AM CST -----  Type:  ozempic approval    Who Called:pt   Does the patient know what this is regarding?:would like to speak with nurse regarding getting her ozempic approved by the doctor  Would the patient rather a call back or a response via [facility]? Call   Best Call Back Number:  [phone]  Additional Information:

## 2024-02-15 ENCOUNTER — LAB VISIT (OUTPATIENT)
Dept: LAB | Facility: HOSPITAL | Age: 84
End: 2024-02-15
Attending: FAMILY MEDICINE
Payer: MEDICARE

## 2024-02-15 ENCOUNTER — OFFICE VISIT (OUTPATIENT)
Dept: FAMILY MEDICINE | Facility: CLINIC | Age: 84
End: 2024-02-15
Payer: MEDICARE

## 2024-02-15 VITALS
SYSTOLIC BLOOD PRESSURE: 150 MMHG | BODY MASS INDEX: 34.55 KG/M2 | HEART RATE: 78 BPM | OXYGEN SATURATION: 89 % | DIASTOLIC BLOOD PRESSURE: 74 MMHG | WEIGHT: 195 LBS | TEMPERATURE: 98 F | HEIGHT: 63 IN

## 2024-02-15 DIAGNOSIS — I47.29 VENTRICULAR TACHYCARDIA (PAROXYSMAL): ICD-10-CM

## 2024-02-15 DIAGNOSIS — R73.9 HYPERGLYCEMIA: ICD-10-CM

## 2024-02-15 DIAGNOSIS — I27.20 PULMONARY HYPERTENSION: ICD-10-CM

## 2024-02-15 DIAGNOSIS — Z87.891 EX-SMOKER: ICD-10-CM

## 2024-02-15 DIAGNOSIS — M54.32 SCIATICA OF LEFT SIDE: ICD-10-CM

## 2024-02-15 DIAGNOSIS — M19.90 ARTHRITIS: ICD-10-CM

## 2024-02-15 DIAGNOSIS — E78.5 HYPERLIPIDEMIA, UNSPECIFIED HYPERLIPIDEMIA TYPE: ICD-10-CM

## 2024-02-15 DIAGNOSIS — J44.9 CHRONIC OBSTRUCTIVE PULMONARY DISEASE, UNSPECIFIED COPD TYPE: ICD-10-CM

## 2024-02-15 DIAGNOSIS — J44.9 CHRONIC OBSTRUCTIVE PULMONARY DISEASE, UNSPECIFIED COPD TYPE: Primary | ICD-10-CM

## 2024-02-15 DIAGNOSIS — Z78.9 ADVANCE DIRECTIVE IN CHART: ICD-10-CM

## 2024-02-15 LAB
ALBUMIN SERPL BCP-MCNC: 3.7 G/DL (ref 3.5–5.2)
ALP SERPL-CCNC: 101 U/L (ref 38–126)
ALT SERPL W/O P-5'-P-CCNC: 15 U/L (ref 10–44)
ANION GAP SERPL CALC-SCNC: 5 MMOL/L (ref 8–16)
AST SERPL-CCNC: 25 U/L (ref 15–46)
BASOPHILS # BLD AUTO: 0.06 K/UL (ref 0–0.2)
BASOPHILS NFR BLD: 0.6 % (ref 0–1.9)
BILIRUB SERPL-MCNC: 0.4 MG/DL (ref 0.1–1)
CALCIUM SERPL-MCNC: 8.9 MG/DL (ref 8.7–10.5)
CHLORIDE SERPL-SCNC: 109 MMOL/L (ref 95–110)
CO2 SERPL-SCNC: 28 MMOL/L (ref 23–29)
CREAT SERPL-MCNC: 0.88 MG/DL (ref 0.5–1.4)
DIFFERENTIAL METHOD BLD: ABNORMAL
EOSINOPHIL # BLD AUTO: 0.2 K/UL (ref 0–0.5)
EOSINOPHIL NFR BLD: 2.2 % (ref 0–8)
ERYTHROCYTE [DISTWIDTH] IN BLOOD BY AUTOMATED COUNT: 15.5 % (ref 11.5–14.5)
EST. GFR  (NO RACE VARIABLE): >60 ML/MIN/1.73 M^2
ESTIMATED AVG GLUCOSE: 103 MG/DL (ref 68–131)
GLUCOSE SERPL-MCNC: 98 MG/DL (ref 70–110)
HBA1C MFR BLD: 5.2 % (ref 4–5.6)
HCT VFR BLD AUTO: 38.7 % (ref 37–48.5)
HGB BLD-MCNC: 11.4 G/DL (ref 12–16)
IMM GRANULOCYTES # BLD AUTO: 0.04 K/UL (ref 0–0.04)
IMM GRANULOCYTES NFR BLD AUTO: 0.4 % (ref 0–0.5)
LYMPHOCYTES # BLD AUTO: 1.6 K/UL (ref 1–4.8)
LYMPHOCYTES NFR BLD: 15.4 % (ref 18–48)
MCH RBC QN AUTO: 26.5 PG (ref 27–31)
MCHC RBC AUTO-ENTMCNC: 29.5 G/DL (ref 32–36)
MCV RBC AUTO: 90 FL (ref 82–98)
MONOCYTES # BLD AUTO: 0.7 K/UL (ref 0.3–1)
MONOCYTES NFR BLD: 7.4 % (ref 4–15)
NEUTROPHILS # BLD AUTO: 7.4 K/UL (ref 1.8–7.7)
NEUTROPHILS NFR BLD: 74 % (ref 38–73)
NRBC BLD-RTO: 0 /100 WBC
NT-PROBNP SERPL-MCNC: 980 PG/ML (ref 5–1800)
PLATELET # BLD AUTO: 273 K/UL (ref 150–450)
PMV BLD AUTO: 10.3 FL (ref 9.2–12.9)
POTASSIUM SERPL-SCNC: 4.4 MMOL/L (ref 3.5–5.1)
PROT SERPL-MCNC: 6.8 G/DL (ref 6–8.4)
RBC # BLD AUTO: 4.3 M/UL (ref 4–5.4)
SODIUM SERPL-SCNC: 142 MMOL/L (ref 136–145)
T4 FREE SERPL-MCNC: 0.83 NG/DL (ref 0.71–1.51)
TSH SERPL DL<=0.005 MIU/L-ACNC: 8.24 UIU/ML (ref 0.4–4)
UUN UR-MCNC: 18 MG/DL (ref 7–17)
WBC # BLD AUTO: 10.05 K/UL (ref 3.9–12.7)

## 2024-02-15 PROCEDURE — 80053 COMPREHEN METABOLIC PANEL: CPT | Mod: PN | Performed by: FAMILY MEDICINE

## 2024-02-15 PROCEDURE — 84443 ASSAY THYROID STIM HORMONE: CPT | Mod: PN | Performed by: FAMILY MEDICINE

## 2024-02-15 PROCEDURE — 83036 HEMOGLOBIN GLYCOSYLATED A1C: CPT | Performed by: FAMILY MEDICINE

## 2024-02-15 PROCEDURE — 99214 OFFICE O/P EST MOD 30 MIN: CPT | Mod: GW,S$GLB,, | Performed by: FAMILY MEDICINE

## 2024-02-15 PROCEDURE — 85025 COMPLETE CBC W/AUTO DIFF WBC: CPT | Mod: PN | Performed by: FAMILY MEDICINE

## 2024-02-15 PROCEDURE — 82306 VITAMIN D 25 HYDROXY: CPT | Mod: PN | Performed by: FAMILY MEDICINE

## 2024-02-15 PROCEDURE — 84439 ASSAY OF FREE THYROXINE: CPT | Performed by: FAMILY MEDICINE

## 2024-02-15 PROCEDURE — 83880 ASSAY OF NATRIURETIC PEPTIDE: CPT | Mod: PN | Performed by: FAMILY MEDICINE

## 2024-02-15 PROCEDURE — 36415 COLL VENOUS BLD VENIPUNCTURE: CPT | Mod: PN | Performed by: FAMILY MEDICINE

## 2024-02-15 RX ORDER — TIOTROPIUM BROMIDE 18 UG/1
18 CAPSULE ORAL; RESPIRATORY (INHALATION) NIGHTLY
Qty: 60 CAPSULE | Refills: 3 | Status: SHIPPED | OUTPATIENT
Start: 2024-02-15

## 2024-02-15 RX ORDER — GABAPENTIN 300 MG/1
300 CAPSULE ORAL NIGHTLY
Qty: 90 CAPSULE | Refills: 3 | Status: SHIPPED | OUTPATIENT
Start: 2024-02-15

## 2024-02-15 NOTE — PROGRESS NOTES
"Subjective:      Patient ID: Shirley F Gilford is a 83 y.o. female.    Chief Complaint: Follow-up and Annual Exam      Vitals:    02/15/24 0807   BP: (!) 154/72   Pulse: 78   Temp: 97.7 °F (36.5 °C)   SpO2: (!) 89%   Weight: 88.5 kg (195 lb)   Height: 5' 3" (1.6 m)        HPI our first visit since July; was in pt in December and placed on hospice; had V tach  Check up; tired, weak, sciatica; at [facility]; spent 3 days on the floor; needs emergency button  Off  Shot helped back pain with sciatica  Has a friend that helps her  Pt doesn't drive  Has home health now  Lost 15 pounds since our last visit in July      Problem List  Patient Active Problem List   Diagnosis    Osteoporosis    History of hypertension    History of hyperlipidemia    Body mass index (BMI) of 33.0 to 33.9 in adult    Restless leg syndrome    Elevated LFTs    Chronic obstructive pulmonary disease    Fatty liver    Iron deficiency    Cellulitis of left lower extremity    Hyperlipidemia    Vitamin D deficiency    Pulmonary hypertension    COVID-19    Panniculitis    Chronic pain of right knee    Diastolic dysfunction    Left atrial enlargement    Cardiac mass    Hyperglycemia    Protein-calorie malnutrition, unspecified severity    Advance directive in chart    Ventricular tachycardia    Ventricular tachycardia (paroxysmal)    Acute hypoxic respiratory failure    JOSUE (acute kidney injury)    Intraparenchymal hematoma of brain    Palliative care encounter    Abdominal aneurysm    Sciatica of left side        ALLERGIES:   Review of patient's allergies indicates:   Allergen Reactions    Covid-19vacc,(astrazeneca)(pf)      Fever, headache, weak    Contrast media Swelling    Gabapentin Nausea And Vomiting       MEDS:   Current Outpatient Medications:     albuterol (PROVENTIL/VENTOLIN HFA) 90 mcg/actuation inhaler, INHALE 2 PUFFS INTO THE LUNGS EVERY 6 HOURS AS NEEDED FOR WHEEZING, Disp: 20.1 g, Rfl: 11    amiodarone (PACERONE) 200 MG Tab, Take 1 " tablet (200 mg total) by mouth once daily., Disp: 30 tablet, Rfl: 3    calcium-vitamin D3 (OS-MIKE 500 + D3) 500 mg-5 mcg (200 unit) per tablet, Take 1 tablet by mouth once daily., Disp: , Rfl:     cyanocobalamin 500 MCG tablet, Take 500 mcg by mouth once daily., Disp: , Rfl:     fluticasone propionate (FLONASE) 50 mcg/actuation nasal spray, 2 sprays (100 mcg total) by Each Nare route once daily., Disp: 1 Bottle, Rfl: 12    fluticasone-umeclidin-vilanter (TRELEGY ELLIPTA) 100-62.5-25 mcg DsDv, INHALE 1 PUFF BY MOUTH INTO THE LUNGS ONCE A DAY (Patient taking differently: Inhale 1 puff into the lungs Daily.), Disp: 180 each, Rfl: 3    loratadine (CLARITIN) 10 mg tablet, Take 1 tablet (10 mg total) by mouth once daily., Disp: 90 tablet, Rfl: 3    montelukast (SINGULAIR) 10 mg tablet, TAKE 1 TABLET BY MOUTH EVERY EVENING (Patient taking differently: Take 10 mg by mouth every evening.), Disp: 90 tablet, Rfl: 3    MULTIVIT-IRON-MIN-FOLIC ACID 3,500-18-0.4 UNIT-MG-MG ORAL CHEW, Take by mouth., Disp: , Rfl:     pantoprazole (PROTONIX) 40 MG tablet, Take 1 tablet (40 mg total) by mouth once daily., Disp: 30 tablet, Rfl: 11    rOPINIRole (REQUIP) 3 MG tablet, TAKE 1 TABLET BY MOUTH NIGHTLY (Patient taking differently: Take 3 mg by mouth nightly.), Disp: 90 tablet, Rfl: 3    gabapentin (NEURONTIN) 300 MG capsule, Take 1 capsule (300 mg total) by mouth every evening. For sciatic nerve pain, Disp: 90 capsule, Rfl: 3    tiotropium (SPIRIVA WITH HANDIHALER) 18 mcg inhalation capsule, Inhale 1 capsule (18 mcg total) into the lungs nightly. Controller, Disp: 60 capsule, Rfl: 3  No current facility-administered medications for this visit.      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    Hyperglycemia 01/26/2017    Hyperlipidemia     Hypertension     Morbid (severe) obesity due to excess calories 07/17/2023    Osteoporosis     Palliative care encounter 12/15/2023    RLS (restless legs syndrome)      Past Surgical History:   Procedure Laterality Date    BLADDER SURGERY      cancer Mahamed Kidd    BREAST SURGERY      left benign tumor    COLONOSCOPY      EPIDURAL STEROID INJECTION INTO LUMBAR SPINE Left 1/8/2024    Procedure: LT L4-5 and L5-S1 TFESI;  Surgeon: Abran Lloyd MD;  Location: [facility];  Service: Pain Management;  Laterality: Left;  30 mins    EYE SURGERY      cataracts    TONSILLECTOMY       Social History     Tobacco Use    Smoking status: Former     Passive exposure: Past    Smokeless tobacco: Never   Substance Use Topics    Alcohol use: No    Drug use: Never         Review of Systems   Constitutional: Negative.    HENT: Negative.     Respiratory:  Positive for shortness of breath.    Cardiovascular: Negative.    Gastrointestinal: Negative.    Endocrine: Negative.    Genitourinary: Negative.    Musculoskeletal:  Positive for back pain and gait problem.   Neurological:  Positive for weakness.   Psychiatric/Behavioral: Negative.     All other systems reviewed and are negative.    Objective:     Physical Exam  Vitals and nursing note reviewed.   Constitutional:       Appearance: She is well-developed.   HENT:      Head: Normocephalic.   Eyes:      Conjunctiva/sclera: Conjunctivae normal.      Pupils: Pupils are equal, round, and reactive to light.   Cardiovascular:      Rate and Rhythm: Normal rate and regular rhythm.      Heart sounds: Murmur heard.   Pulmonary:      Effort: Pulmonary effort  is normal.      Breath sounds: Rales present.   Musculoskeletal:         General: Normal range of motion.      Cervical back: Normal range of motion and neck supple.   Skin:     General: Skin is warm and dry.   Neurological:      Mental Status: She is alert and oriented to person, place, and time.      Deep Tendon Reflexes: Reflexes are normal and symmetric.   Psychiatric:         Behavior: Behavior normal.         Thought Content: Thought content normal.         Judgment: Judgment normal.             Assessment:     1. Chronic obstructive pulmonary disease, unspecified COPD type    2. Ventricular tachycardia (paroxysmal)    3. Advance directive in chart    4. Sciatica of left side    5. Arthritis    6. Pulmonary hypertension    7. Hyperglycemia    8. Ex-smoker    9. Hyperlipidemia, unspecified hyperlipidemia type      Plan:        Medication List            Accurate as of February 15, 2024  8:51 AM. If you have any questions, ask your nurse or doctor.                CHANGE how you take these medications      fluticasone-umeclidin-vilanter 100-62.5-25 mcg Dsdv  Commonly known as: TRELEGY ELLIPTA  INHALE 1 PUFF BY MOUTH INTO THE LUNGS ONCE A DAY  What changed:   how much to take  how to take this  when to take this  additional instructions     gabapentin 300 MG capsule  Commonly known as: NEURONTIN  Take 1 capsule (300 mg total) by mouth every evening. For sciatic nerve pain  What changed: See the new instructions.  Changed by: Marc Sands MD     rOPINIRole 3 MG tablet  Commonly known as: REQUIP  TAKE 1 TABLET BY MOUTH NIGHTLY  What changed: when to take this     tiotropium 18 mcg inhalation capsule  Commonly known as: SPIRIVA WITH HANDIHALER  Inhale 1 capsule (18 mcg total) into the lungs nightly. Controller  What changed: See the new instructions.  Changed by: Marc Sands MD            CONTINUE taking these medications      albuterol 90 mcg/actuation inhaler  Commonly known as: PROVENTIL/VENTOLIN  HFA  INHALE 2 PUFFS INTO THE LUNGS EVERY 6 HOURS AS NEEDED FOR WHEEZING     amiodarone 200 MG Tab  Commonly known as: PACERONE  Take 1 tablet (200 mg total) by mouth once daily.     calcium-vitamin D3 500 mg-5 mcg (200 unit) per tablet  Commonly known as: OS-MIKE 500 + D3     cyanocobalamin 500 MCG tablet     fluticasone propionate 50 mcg/actuation nasal spray  Commonly known as: FLONASE  2 sprays (100 mcg total) by Each Nare route once daily.     loratadine 10 mg tablet  Commonly known as: CLARITIN  Take 1 tablet (10 mg total) by mouth once daily.     montelukast 10 mg tablet  Commonly known as: SINGULAIR  TAKE 1 TABLET BY MOUTH EVERY EVENING     multivit-iron-min-folic acid 3,500-18-0.4 unit-mg-mg Chew     pantoprazole 40 MG tablet  Commonly known as: PROTONIX  Take 1 tablet (40 mg total) by mouth once daily.            STOP taking these medications      HYDROcodone-acetaminophen 5-325 mg per tablet  Commonly known as: NORCO  Stopped by: Marc Sands MD     OZEMPIC SUBQ  Stopped by: Marc Sands MD               Where to Get Your Medications        These medications were sent to [id number] [place], [place]  [address] [place] [id number]      Phone: [phone]   gabapentin 300 MG capsule  tiotropium 18 mcg inhalation capsule       Chronic obstructive pulmonary disease, unspecified COPD type  -     gabapentin (NEURONTIN) 300 MG capsule; Take 1 capsule (300 mg total) by mouth every evening. For sciatic nerve pain  Dispense: 90 capsule; Refill: 3  -     tiotropium (SPIRIVA WITH HANDIHALER) 18 mcg inhalation capsule; Inhale 1 capsule (18 mcg total) into the lungs nightly. Controller  Dispense: 60 capsule; Refill: 3  -     CBC Auto Differential; Future; Expected date: 02/15/2024  -     Comprehensive Metabolic Panel; Future; Expected date: 02/15/2024  -     BNP; Future; Expected date: 02/15/2024  -     Hemoglobin A1C; Future  -     TSH; Future  -     Urinalysis;  Future  -     Vitamin D; Future    Ventricular tachycardia (paroxysmal)  -     gabapentin (NEURONTIN) 300 MG capsule; Take 1 capsule (300 mg total) by mouth every evening. For sciatic nerve pain  Dispense: 90 capsule; Refill: 3  -     tiotropium (SPIRIVA WITH HANDIHALER) 18 mcg inhalation capsule; Inhale 1 capsule (18 mcg total) into the lungs nightly. Controller  Dispense: 60 capsule; Refill: 3  -     Ambulatory referral/consult to Cardiology; Future; Expected date: 02/22/2024  -     CBC Auto Differential; Future; Expected date: 02/15/2024  -     Comprehensive Metabolic Panel; Future; Expected date: 02/15/2024  -     BNP; Future; Expected date: 02/15/2024  -     Hemoglobin A1C; Future  -     TSH; Future  -     Urinalysis; Future  -     Vitamin D; Future    Advance directive in chart  -     gabapentin (NEURONTIN) 300 MG capsule; Take 1 capsule (300 mg total) by mouth every evening. For sciatic nerve pain  Dispense: 90 capsule; Refill: 3  -     tiotropium (SPIRIVA WITH HANDIHALER) 18 mcg inhalation capsule; Inhale 1 capsule (18 mcg total) into the lungs nightly. Controller  Dispense: 60 capsule; Refill: 3  -     CBC Auto Differential; Future; Expected date: 02/15/2024  -     Comprehensive Metabolic Panel; Future; Expected date: 02/15/2024  -     BNP; Future; Expected date: 02/15/2024  -     Hemoglobin A1C; Future  -     TSH; Future  -     Urinalysis; Future  -     Vitamin D; Future    Sciatica of left side  -     gabapentin (NEURONTIN) 300 MG capsule; Take 1 capsule (300 mg total) by mouth every evening. For sciatic nerve pain  Dispense: 90 capsule; Refill: 3  -     tiotropium (SPIRIVA WITH HANDIHALER) 18 mcg inhalation capsule; Inhale 1 capsule (18 mcg total) into the lungs nightly. Controller  Dispense: 60 capsule; Refill: 3  -     CBC Auto Differential; Future; Expected date: 02/15/2024  -     Comprehensive Metabolic Panel; Future; Expected date: 02/15/2024  -     BNP; Future; Expected date: 02/15/2024  -      Hemoglobin A1C; Future  -     TSH; Future  -     Urinalysis; Future  -     Vitamin D; Future    Arthritis  -     gabapentin (NEURONTIN) 300 MG capsule; Take 1 capsule (300 mg total) by mouth every evening. For sciatic nerve pain  Dispense: 90 capsule; Refill: 3  -     tiotropium (SPIRIVA WITH HANDIHALER) 18 mcg inhalation capsule; Inhale 1 capsule (18 mcg total) into the lungs nightly. Controller  Dispense: 60 capsule; Refill: 3  -     CBC Auto Differential; Future; Expected date: 02/15/2024  -     Comprehensive Metabolic Panel; Future; Expected date: 02/15/2024  -     BNP; Future; Expected date: 02/15/2024  -     Hemoglobin A1C; Future  -     TSH; Future  -     Urinalysis; Future  -     Vitamin D; Future    Pulmonary hypertension  -     gabapentin (NEURONTIN) 300 MG capsule; Take 1 capsule (300 mg total) by mouth every evening. For sciatic nerve pain  Dispense: 90 capsule; Refill: 3  -     tiotropium (SPIRIVA WITH HANDIHALER) 18 mcg inhalation capsule; Inhale 1 capsule (18 mcg total) into the lungs nightly. Controller  Dispense: 60 capsule; Refill: 3  -     Ambulatory referral/consult to Cardiology; Future; Expected date: 02/22/2024  -     CBC Auto Differential; Future; Expected date: 02/15/2024  -     Comprehensive Metabolic Panel; Future; Expected date: 02/15/2024  -     BNP; Future; Expected date: 02/15/2024  -     Hemoglobin A1C; Future  -     TSH; Future  -     Urinalysis; Future  -     Vitamin D; Future    Hyperglycemia  -     gabapentin (NEURONTIN) 300 MG capsule; Take 1 capsule (300 mg total) by mouth every evening. For sciatic nerve pain  Dispense: 90 capsule; Refill: 3  -     tiotropium (SPIRIVA WITH HANDIHALER) 18 mcg inhalation capsule; Inhale 1 capsule (18 mcg total) into the lungs nightly. Controller  Dispense: 60 capsule; Refill: 3  -     CBC Auto Differential; Future; Expected date: 02/15/2024  -     Comprehensive Metabolic Panel; Future; Expected date: 02/15/2024  -     BNP; Future; Expected date:  02/15/2024  -     Hemoglobin A1C; Future  -     TSH; Future  -     Urinalysis; Future  -     Vitamin D; Future    Ex-smoker  -     gabapentin (NEURONTIN) 300 MG capsule; Take 1 capsule (300 mg total) by mouth every evening. For sciatic nerve pain  Dispense: 90 capsule; Refill: 3  -     tiotropium (SPIRIVA WITH HANDIHALER) 18 mcg inhalation capsule; Inhale 1 capsule (18 mcg total) into the lungs nightly. Controller  Dispense: 60 capsule; Refill: 3  -     CBC Auto Differential; Future; Expected date: 02/15/2024  -     Comprehensive Metabolic Panel; Future; Expected date: 02/15/2024  -     BNP; Future; Expected date: 02/15/2024  -     Hemoglobin A1C; Future  -     TSH; Future  -     Urinalysis; Future  -     Vitamin D; Future    Hyperlipidemia, unspecified hyperlipidemia type  -     gabapentin (NEURONTIN) 300 MG capsule; Take 1 capsule (300 mg total) by mouth every evening. For sciatic nerve pain  Dispense: 90 capsule; Refill: 3  -     tiotropium (SPIRIVA WITH HANDIHALER) 18 mcg inhalation capsule; Inhale 1 capsule (18 mcg total) into the lungs nightly. Controller  Dispense: 60 capsule; Refill: 3  -     CBC Auto Differential; Future; Expected date: 02/15/2024  -     Comprehensive Metabolic Panel; Future; Expected date: 02/15/2024  -     BNP; Future; Expected date: 02/15/2024  -     Hemoglobin A1C; Future  -     TSH; Future  -     Urinalysis; Future  -     Vitamin D; Future      GET LABS  To card  [place] button  Keep seeing pain MD  Resume gabapentin hs 300 mg

## 2024-02-16 LAB — 25(OH)D3+25(OH)D2 SERPL-MCNC: 36 NG/ML (ref 30–96)

## 2024-02-19 DIAGNOSIS — E03.9 HYPOTHYROIDISM, UNSPECIFIED TYPE: Primary | ICD-10-CM

## 2024-02-19 PROCEDURE — G0179 MD RECERTIFICATION HHA PT: HCPCS | Mod: GW,,, | Performed by: FAMILY MEDICINE

## 2024-02-21 ENCOUNTER — TELEPHONE (OUTPATIENT)
Dept: FAMILY MEDICINE | Facility: CLINIC | Age: 84
End: 2024-02-21
Payer: MEDICARE

## 2024-02-21 NOTE — TELEPHONE ENCOUNTER
We need to provide pt with the info for [facility]  [phone]    I attempted to reach the pt - no answer and unable to LM

## 2024-02-21 NOTE — TELEPHONE ENCOUNTER
----- Message from Marc Sands MD sent at 2/19/2024  5:53 AM CST -----  Please contact the patient and let them know that their results were fine and do not require any change in treatment.

## 2024-02-21 NOTE — TELEPHONE ENCOUNTER
Marc Sands MD P Bailey Colin Staff  TSH high, suggests underactive thyroid, probably due to amiodarone    Repeat TSH one month

## 2024-02-21 NOTE — TELEPHONE ENCOUNTER
----- Message from Zuri Hernandez sent at 2/21/2024 12:31 PM CST -----  Type:  Patient Returning Call    Who Called:pt   Who Left Message for Patient:office   Does the patient know what this is regarding?: returning a missed call   Would the patient rather a call back or a response via [facility]?  Call back   Best Call Back Number:[phone]  Additional Information:

## 2024-02-21 NOTE — TELEPHONE ENCOUNTER
Attempted pt. LVM to call the office back to discuss.   Writer spoke with patient.  Patient verbalized understanding. No further questions at this time. Patient appreciative of services provided.

## 2024-02-21 NOTE — TELEPHONE ENCOUNTER
Lashonda Sharpe Staff  Caller: Unspecified (Today, 12:13 PM)  Type:  Patient Returning Call    Who Called:Pt  Who Left Message for Patient:Mary  Does the patient know what this is regarding?:  Would the patient rather a call back or a response via [employer]?   Best Call Back Number:[phone]  Additional Information: call back after 2:30

## 2024-02-27 DIAGNOSIS — G25.81 RESTLESS LEGS SYNDROME: ICD-10-CM

## 2024-02-27 RX ORDER — ROPINIROLE 3 MG/1
TABLET, FILM COATED ORAL
Qty: 90 TABLET | Refills: 3 | Status: SHIPPED | OUTPATIENT
Start: 2024-02-27

## 2024-02-27 RX ORDER — PANTOPRAZOLE SODIUM 40 MG/1
40 TABLET, DELAYED RELEASE ORAL
Qty: 90 TABLET | Refills: 3 | Status: SHIPPED | OUTPATIENT
Start: 2024-02-27

## 2024-02-27 NOTE — TELEPHONE ENCOUNTER
No care due was identified.  [facility] Embedded Care Due Messages. Reference number: [id number].   2/27/2024 1:41:48 PM CST

## 2024-02-27 NOTE — TELEPHONE ENCOUNTER
Refill Decision Note   Shirley Gilford  is requesting a refill authorization.  Brief Assessment and Rationale for Refill:  Approve     Medication Therapy Plan:         Comments:     Note composed:1:58 PM 02/27/2024

## 2024-03-06 ENCOUNTER — TELEPHONE (OUTPATIENT)
Dept: FAMILY MEDICINE | Facility: CLINIC | Age: 84
End: 2024-03-06
Payer: MEDICARE

## 2024-03-06 NOTE — TELEPHONE ENCOUNTER
----- Message from Lashonda Sharpe sent at 3/6/2024  2:53 PM CST -----  Type:  Patient Returning Call    Who Called:Pt   Would the patient rather a call back or a response via [facility]? Call back    Best Call Back Number:[phone]  Additional Information: pt wants to reschedule her appt for tomorrow and the next available is in Sept.. magaly like a sooner appt          For information on Fall & Injury Prevention, visit: [email or web address] OR  [email or web address] OR  https://www.cdc.gov/steadi/patient.html

## 2024-03-12 DIAGNOSIS — E61.1 IRON DEFICIENCY: ICD-10-CM

## 2024-03-12 DIAGNOSIS — R73.9 HYPERGLYCEMIA: ICD-10-CM

## 2024-03-12 DIAGNOSIS — E78.5 HYPERLIPIDEMIA, UNSPECIFIED HYPERLIPIDEMIA TYPE: ICD-10-CM

## 2024-03-12 DIAGNOSIS — E55.9 VITAMIN D DEFICIENCY: ICD-10-CM

## 2024-03-12 RX ORDER — ATORVASTATIN CALCIUM 10 MG/1
TABLET, FILM COATED ORAL
Qty: 90 TABLET | Refills: 3 | OUTPATIENT
Start: 2024-03-12

## 2024-03-12 NOTE — TELEPHONE ENCOUNTER
No care due was identified.  [facility] Embedded Care Due Messages. Reference number: [id number].   3/12/2024 12:12:15 PM CDT

## 2024-03-12 NOTE — TELEPHONE ENCOUNTER
Refill Decision Note   Shirley Gilford  is requesting a refill authorization.  Brief Assessment and Rationale for Refill:  Quick Discontinue     Medication Therapy Plan:  Patient stopped taking at discharge; [facility]      Comments:     Note composed:2:13 PM 03/12/2024

## 2024-03-18 DIAGNOSIS — R73.9 HYPERGLYCEMIA: ICD-10-CM

## 2024-03-18 DIAGNOSIS — E78.5 HYPERLIPIDEMIA, UNSPECIFIED HYPERLIPIDEMIA TYPE: ICD-10-CM

## 2024-03-18 DIAGNOSIS — E61.1 IRON DEFICIENCY: ICD-10-CM

## 2024-03-18 DIAGNOSIS — E55.9 VITAMIN D DEFICIENCY: ICD-10-CM

## 2024-03-18 PROBLEM — N17.9 AKI (ACUTE KIDNEY INJURY): Status: RESOLVED | Noted: 2023-12-14 | Resolved: 2024-03-18

## 2024-03-18 PROBLEM — J96.01 ACUTE HYPOXIC RESPIRATORY FAILURE: Status: RESOLVED | Noted: 2023-12-14 | Resolved: 2024-03-18

## 2024-03-19 ENCOUNTER — EXTERNAL HOME HEALTH (OUTPATIENT)
Dept: HOME HEALTH SERVICES | Facility: HOSPITAL | Age: 84
End: 2024-03-19
Payer: MEDICARE

## 2024-03-19 RX ORDER — ATORVASTATIN CALCIUM 10 MG/1
TABLET, FILM COATED ORAL
Qty: 90 TABLET | Refills: 3 | OUTPATIENT
Start: 2024-03-19

## 2024-03-19 RX ORDER — LOSARTAN POTASSIUM 50 MG/1
TABLET ORAL
Qty: 90 TABLET | Refills: 3 | OUTPATIENT
Start: 2024-03-19

## 2024-03-19 NOTE — TELEPHONE ENCOUNTER
No care due was identified.  [facility] Embedded Care Due Messages. Reference number: [id number].   3/18/2024 8:44:35 PM CDT

## 2024-03-25 ENCOUNTER — TELEPHONE (OUTPATIENT)
Dept: FAMILY MEDICINE | Facility: CLINIC | Age: 84
End: 2024-03-25
Payer: MEDICARE

## 2024-04-01 ENCOUNTER — OFFICE VISIT (OUTPATIENT)
Dept: FAMILY MEDICINE | Facility: CLINIC | Age: 84
End: 2024-04-01
Payer: MEDICARE

## 2024-04-01 VITALS
SYSTOLIC BLOOD PRESSURE: 124 MMHG | OXYGEN SATURATION: 93 % | HEIGHT: 63 IN | HEART RATE: 80 BPM | TEMPERATURE: 98 F | DIASTOLIC BLOOD PRESSURE: 62 MMHG | WEIGHT: 190.13 LBS | BODY MASS INDEX: 33.69 KG/M2

## 2024-04-01 DIAGNOSIS — R41.3 OTHER AMNESIA: ICD-10-CM

## 2024-04-01 DIAGNOSIS — F32.A DEPRESSION, UNSPECIFIED DEPRESSION TYPE: Primary | ICD-10-CM

## 2024-04-01 PROCEDURE — 1160F RVW MEDS BY RX/DR IN RCRD: CPT | Mod: CPTII,S$GLB,, | Performed by: FAMILY MEDICINE

## 2024-04-01 PROCEDURE — 3288F FALL RISK ASSESSMENT DOCD: CPT | Mod: CPTII,S$GLB,, | Performed by: FAMILY MEDICINE

## 2024-04-01 PROCEDURE — 3078F DIAST BP <80 MM HG: CPT | Mod: CPTII,S$GLB,, | Performed by: FAMILY MEDICINE

## 2024-04-01 PROCEDURE — 1159F MED LIST DOCD IN RCRD: CPT | Mod: CPTII,S$GLB,, | Performed by: FAMILY MEDICINE

## 2024-04-01 PROCEDURE — 1126F AMNT PAIN NOTED NONE PRSNT: CPT | Mod: CPTII,S$GLB,, | Performed by: FAMILY MEDICINE

## 2024-04-01 PROCEDURE — 3074F SYST BP LT 130 MM HG: CPT | Mod: CPTII,S$GLB,, | Performed by: FAMILY MEDICINE

## 2024-04-01 PROCEDURE — 1100F PTFALLS ASSESS-DOCD GE2>/YR: CPT | Mod: CPTII,S$GLB,, | Performed by: FAMILY MEDICINE

## 2024-04-01 PROCEDURE — 99214 OFFICE O/P EST MOD 30 MIN: CPT | Mod: GW,S$GLB,, | Performed by: FAMILY MEDICINE

## 2024-04-01 RX ORDER — ESCITALOPRAM OXALATE 5 MG/1
5 TABLET ORAL DAILY
Qty: 90 TABLET | Refills: 3 | Status: SHIPPED | OUTPATIENT
Start: 2024-04-01 | End: 2025-04-01

## 2024-04-01 NOTE — PROGRESS NOTES
"Subjective:      Patient ID: Shirley F Gilford is a 83 y.o. female.    Chief Complaint: Follow-up      Vitals:    04/01/24 1523   BP: 124/62   Pulse: 80   Temp: 97.8 °F (36.6 °C)   TempSrc: Oral   SpO2: (!) 93%   Weight: 86.3 kg (190 lb 2.4 oz)   Height: 5' 3" (1.6 m)        HPI   Check up of below / c/o memory problems and she is a seamstress; used to be easy and now making simple curtains was a chore;  No tyree in sewing  Losing objects, throwing out objects  Frustrated with great grand baby and her sewing      Problem List  Patient Active Problem List   Diagnosis    Osteoporosis    History of hypertension    History of hyperlipidemia    Body mass index (BMI) of 33.0 to 33.9 in adult    Restless leg syndrome    Elevated LFTs    Chronic obstructive pulmonary disease    Fatty liver    Iron deficiency    Cellulitis of left lower extremity    Hyperlipidemia    Vitamin D deficiency    Pulmonary hypertension    COVID-19    Panniculitis    Chronic pain of right knee    Diastolic dysfunction    Left atrial enlargement    Cardiac mass    Hyperglycemia    Protein-calorie malnutrition, unspecified severity    Advance directive in chart    Ventricular tachycardia    Ventricular tachycardia (paroxysmal)    Intraparenchymal hematoma of brain    Palliative care encounter    Abdominal aneurysm    Sciatica of left side    Depression    Other amnesia        ALLERGIES:   Review of patient's allergies indicates:   Allergen Reactions    Covid-19vacc,(astrazeneca)(pf)      Fever, headache, weak    Contrast media Swelling    Gabapentin Nausea And Vomiting       MEDS:   Current Outpatient Medications:     albuterol (PROVENTIL/VENTOLIN HFA) 90 mcg/actuation inhaler, INHALE 2 PUFFS INTO THE LUNGS EVERY 6 HOURS AS NEEDED FOR WHEEZING, Disp: 20.1 g, Rfl: 3    amiodarone (PACERONE) 200 MG Tab, Take 1 tablet (200 mg total) by mouth once daily., Disp: 30 tablet, Rfl: 3    calcium-vitamin D3 (OS-MIKE 500 + D3) 500 mg-5 mcg (200 unit) per tablet, " Take 1 tablet by mouth once daily., Disp: , Rfl:     cyanocobalamin 500 MCG tablet, Take 500 mcg by mouth once daily., Disp: , Rfl:     fluticasone propionate (FLONASE) 50 mcg/actuation nasal spray, 2 sprays (100 mcg total) by Each Nare route once daily., Disp: 1 Bottle, Rfl: 12    fluticasone-umeclidin-vilanter (TRELEGY ELLIPTA) 100-62.5-25 mcg DsDv, INHALE 1 PUFF BY MOUTH INTO THE LUNGS ONCE A DAY (Patient taking differently: Inhale 1 puff into the lungs Daily.), Disp: 180 each, Rfl: 3    gabapentin (NEURONTIN) 300 MG capsule, Take 1 capsule (300 mg total) by mouth every evening. For sciatic nerve pain, Disp: 90 capsule, Rfl: 3    loratadine (CLARITIN) 10 mg tablet, Take 1 tablet (10 mg total) by mouth once daily., Disp: 90 tablet, Rfl: 3    MULTIVIT-IRON-MIN-FOLIC ACID 3,500-18-0.4 UNIT-MG-MG ORAL CHEW, Take by mouth., Disp: , Rfl:     pantoprazole (PROTONIX) 40 MG tablet, TAKE 1 TABLET BY MOUTH EVERY DAY, Disp: 90 tablet, Rfl: 3    rOPINIRole (REQUIP) 3 MG tablet, TAKE 1 TABLET BY MOUTH NIGHTLY, Disp: 90 tablet, Rfl: 3    tiotropium (SPIRIVA WITH HANDIHALER) 18 mcg inhalation capsule, Inhale 1 capsule (18 mcg total) into the lungs nightly. Controller, Disp: 60 capsule, Rfl: 3    EScitalopram oxalate (LEXAPRO) 5 MG Tab, Take 1 tablet (5 mg total) by mouth once daily., Disp: 90 tablet, Rfl: 3    montelukast (SINGULAIR) 10 mg tablet, TAKE 1 TABLET BY MOUTH EVERY EVENING, Disp: 90 tablet, Rfl: 3      History:  Current Providers as of 4/1/2024  PCP: Marc Sands MD  Care Team Provider: Isabel Griffin MD  Care Team Provider: Malu Camarillo LPN  Care Team Provider: Abran Lloyd MD  Encounter Provider: Marc Sands MD, starting on Mon Apr 1, 2024 12:00 AM  Referring Provider: not found, starting on Mon Apr 1, 2024 12:00 AM  Consulting Physician: Marc Sands MD, starting on Wed Mar 6, 2024  3:04 PM (Active)   Past Medical History:   Diagnosis Date    Breast cyst     COPD (chronic obstructive  pulmonary disease)     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    Hyperglycemia 01/26/2017    Hyperlipidemia     Hypertension     Morbid (severe) obesity due to excess calories 07/17/2023    Osteoporosis     Palliative care encounter 12/15/2023    RLS (restless legs syndrome)      Past Surgical History:   Procedure Laterality Date    BLADDER SURGERY      cancer Mahamed Kidd    BREAST SURGERY      left benign tumor    COLONOSCOPY      EPIDURAL STEROID INJECTION INTO LUMBAR SPINE Left 1/8/2024    Procedure: LT L4-5 and L5-S1 TFESI;  Surgeon: Abran Lloyd MD;  Location: [facility];  Service: Pain Management;  Laterality: Left;  30 mins    EYE SURGERY      cataracts    TONSILLECTOMY       Social History     Tobacco Use    Smoking status: Former     Passive exposure: Past    Smokeless tobacco: Never   Substance Use Topics    Alcohol use: No    Drug use: Never         Review of Systems   Constitutional: Negative.    HENT: Negative.     Respiratory: Negative.     Cardiovascular: Negative.    Gastrointestinal: Negative.    Endocrine: Negative.    Genitourinary: Negative.    Musculoskeletal: Negative.    Psychiatric/Behavioral:  Positive for decreased concentration.    All other systems reviewed and are negative.    Objective:     Physical Exam  Vitals and nursing note reviewed.   Constitutional:       General: She is not in acute distress.     Appearance: She is well-developed. She is obese. She is not ill-appearing, toxic-appearing or diaphoretic.   HENT:      Head: Normocephalic.   Eyes:      Conjunctiva/sclera: Conjunctivae normal.      Pupils: Pupils are equal, round, and reactive to light.   Cardiovascular:      Rate and Rhythm: Normal rate and regular rhythm.      Heart sounds: Normal heart sounds.   Pulmonary:      Effort: Pulmonary effort is normal.      Breath sounds: Normal breath sounds.   Musculoskeletal:         General: Normal range of motion.      Cervical back: Normal range of motion and neck  supple.   Skin:     General: Skin is warm and dry.   Neurological:      General: No focal deficit present.      Mental Status: She is alert and oriented to person, place, and time. Mental status is at baseline.      Deep Tendon Reflexes: Reflexes are normal and symmetric.   Psychiatric:         Mood and Affect: Mood normal.         Behavior: Behavior normal.         Thought Content: Thought content normal.         Judgment: Judgment normal.             Assessment:     1. Depression, unspecified depression type    2. Other amnesia      Plan:        Medication List            Accurate as of April 1, 2024 11:59 PM. If you have any questions, ask your nurse or doctor.                START taking these medications      EScitalopram oxalate 5 MG Tab  Commonly known as: LEXAPRO  Take 1 tablet (5 mg total) by mouth once daily.  Started by: Marc Sands MD            CHANGE how you take these medications      fluticasone-umeclidin-vilanter 100-62.5-25 mcg Dsdv  Commonly known as: TRELEGY ELLIPTA  INHALE 1 PUFF BY MOUTH INTO THE LUNGS ONCE A DAY  What changed:   how much to take  how to take this  when to take this  additional instructions            CONTINUE taking these medications      albuterol 90 mcg/actuation inhaler  Commonly known as: PROVENTIL/VENTOLIN HFA  INHALE 2 PUFFS INTO THE LUNGS EVERY 6 HOURS AS NEEDED FOR WHEEZING     amiodarone 200 MG Tab  Commonly known as: PACERONE  Take 1 tablet (200 mg total) by mouth once daily.     calcium-vitamin D3 500 mg-5 mcg (200 unit) per tablet  Commonly known as: OS-MIKE 500 + D3     cyanocobalamin 500 MCG tablet     fluticasone propionate 50 mcg/actuation nasal spray  Commonly known as: FLONASE  2 sprays (100 mcg total) by Each Nare route once daily.     gabapentin 300 MG capsule  Commonly known as: NEURONTIN  Take 1 capsule (300 mg total) by mouth every evening. For sciatic nerve pain     loratadine 10 mg tablet  Commonly known as: CLARITIN  Take 1 tablet (10 mg total) by  mouth once daily.     montelukast 10 mg tablet  Commonly known as: SINGULAIR  TAKE 1 TABLET BY MOUTH EVERY EVENING     multivit-iron-min-folic acid 3,500-18-0.4 unit-mg-mg Chew     pantoprazole 40 MG tablet  Commonly known as: PROTONIX  TAKE 1 TABLET BY MOUTH EVERY DAY     rOPINIRole 3 MG tablet  Commonly known as: REQUIP  TAKE 1 TABLET BY MOUTH NIGHTLY     tiotropium 18 mcg inhalation capsule  Commonly known as: SPIRIVA WITH HANDIHALER  Inhale 1 capsule (18 mcg total) into the lungs nightly. Controller               Where to Get Your Medications        These medications were sent to [id number] - [place], [place]  [address] [place] [id number]      Phone: [phone]   EScitalopram oxalate 5 MG Tab       Depression, unspecified depression type    Other amnesia  -     Ambulatory referral/consult to Neurology; Future; Expected date: 04/08/2024  -     MRI Brain Without Contrast; Future; Expected date: 04/01/2024    Other orders  -     EScitalopram oxalate (LEXAPRO) 5 MG Tab; Take 1 tablet (5 mg total) by mouth once daily.  Dispense: 90 tablet; Refill: 3

## 2024-04-02 ENCOUNTER — LAB VISIT (OUTPATIENT)
Dept: LAB | Facility: HOSPITAL | Age: 84
End: 2024-04-02
Attending: FAMILY MEDICINE
Payer: MEDICARE

## 2024-04-02 DIAGNOSIS — E03.9 HYPOTHYROIDISM, UNSPECIFIED TYPE: ICD-10-CM

## 2024-04-02 LAB
T4 FREE SERPL-MCNC: 0.83 NG/DL (ref 0.71–1.51)
TSH SERPL DL<=0.005 MIU/L-ACNC: 5.53 UIU/ML (ref 0.4–4)

## 2024-04-02 PROCEDURE — 36415 COLL VENOUS BLD VENIPUNCTURE: CPT | Mod: PN | Performed by: FAMILY MEDICINE

## 2024-04-02 PROCEDURE — 84439 ASSAY OF FREE THYROXINE: CPT | Performed by: FAMILY MEDICINE

## 2024-04-02 PROCEDURE — 84443 ASSAY THYROID STIM HORMONE: CPT | Mod: PN | Performed by: FAMILY MEDICINE

## 2024-04-05 NOTE — TELEPHONE ENCOUNTER
No care due was identified.  [facility] Embedded Care Due Messages. Reference number: [id number].   4/05/2024 8:44:10 AM CDT

## 2024-04-06 RX ORDER — MONTELUKAST SODIUM 10 MG/1
TABLET ORAL
Qty: 90 TABLET | Refills: 3 | Status: SHIPPED | OUTPATIENT
Start: 2024-04-06

## 2024-04-06 RX ORDER — LOSARTAN POTASSIUM 50 MG/1
TABLET ORAL
Qty: 90 TABLET | Refills: 3 | OUTPATIENT
Start: 2024-04-06

## 2024-04-06 NOTE — TELEPHONE ENCOUNTER
Refill Decision Note   Angelique Mckeonjolene  is requesting a refill authorization.  Brief Assessment and Rationale for Refill:  Approve  Quick Discontinue     Medication Therapy Plan: Losartan 50 mg was discontinued on 12/15/2023 by Carl Fuller MD for the following reason: Stop Taking at Discharge.      Comments:     Note composed:10:10 AM 04/06/2024

## 2024-04-08 ENCOUNTER — TELEPHONE (OUTPATIENT)
Dept: FAMILY MEDICINE | Facility: CLINIC | Age: 84
End: 2024-04-08
Payer: MEDICARE

## 2024-04-08 DIAGNOSIS — R79.89 HIGH SERUM THYROID STIMULATING HORMONE (TSH): Primary | ICD-10-CM

## 2024-04-08 NOTE — TELEPHONE ENCOUNTER
I have notified pt of lab results. Scheduled pt in July for repeat thyroid lab    ----- Message from Marc Sands MD sent at 4/8/2024  8:33 AM CDT -----  Thyroid is better; recheck TSH 3 months

## 2024-04-14 RX ORDER — LOSARTAN POTASSIUM 50 MG/1
TABLET ORAL
Qty: 90 TABLET | Refills: 3 | OUTPATIENT
Start: 2024-04-14

## 2024-04-14 NOTE — TELEPHONE ENCOUNTER
No care due was identified.  [facility] Embedded Care Due Messages. Reference number: [id number].   4/14/2024 1:55:09 AM CDT

## 2024-04-15 ENCOUNTER — HOSPITAL ENCOUNTER (OUTPATIENT)
Dept: RADIOLOGY | Facility: HOSPITAL | Age: 84
Discharge: HOME | End: 2024-04-15
Attending: FAMILY MEDICINE
Payer: MEDICARE

## 2024-04-15 DIAGNOSIS — R41.3 OTHER AMNESIA: ICD-10-CM

## 2024-04-15 PROCEDURE — 70551 MRI BRAIN STEM W/O DYE: CPT | Mod: 26,GW,, | Performed by: RADIOLOGY

## 2024-04-15 PROCEDURE — 70551 MRI BRAIN STEM W/O DYE: CPT | Mod: TC,PN

## 2024-04-15 NOTE — TELEPHONE ENCOUNTER
Refill Decision Note   Shirley Gilford  is requesting a refill authorization.  Brief Assessment and Rationale for Refill:  Quick Discontinue     Medication Therapy Plan: Losartan dc'd on 12/15/23      Comments:     Note composed:7:18 PM 04/14/2024

## 2024-04-16 RX ORDER — LOSARTAN POTASSIUM 50 MG/1
TABLET ORAL
Qty: 90 TABLET | Refills: 3 | OUTPATIENT
Start: 2024-04-16

## 2024-04-16 NOTE — TELEPHONE ENCOUNTER
No care due was identified.  [facility] Embedded Care Due Messages. Reference number: [id number].   4/15/2024 11:58:03 PM CDT

## 2024-04-16 NOTE — TELEPHONE ENCOUNTER
Refill Decision Note   Angelique Mckeonjolene  is requesting a refill authorization.  Brief Assessment and Rationale for Refill:  Quick Discontinue     Medication Therapy Plan:  Pharmacy requesting medication that has been discontinued; [facility]      Comments:     Note composed:10:16 AM 04/16/2024

## 2024-04-19 ENCOUNTER — TELEPHONE (OUTPATIENT)
Dept: FAMILY MEDICINE | Facility: CLINIC | Age: 84
End: 2024-04-19

## 2024-04-19 ENCOUNTER — OFFICE VISIT (OUTPATIENT)
Dept: FAMILY MEDICINE | Facility: CLINIC | Age: 84
End: 2024-04-19
Payer: MEDICARE

## 2024-04-19 ENCOUNTER — HOSPITAL ENCOUNTER (OUTPATIENT)
Dept: RADIOLOGY | Facility: HOSPITAL | Age: 84
Discharge: HOME | End: 2024-04-19
Attending: PHYSICIAN ASSISTANT
Payer: MEDICARE

## 2024-04-19 VITALS
HEART RATE: 92 BPM | BODY MASS INDEX: 32.5 KG/M2 | HEIGHT: 63 IN | OXYGEN SATURATION: 79 % | SYSTOLIC BLOOD PRESSURE: 124 MMHG | TEMPERATURE: 98 F | DIASTOLIC BLOOD PRESSURE: 62 MMHG | WEIGHT: 183.44 LBS

## 2024-04-19 DIAGNOSIS — J44.1 COPD EXACERBATION: Primary | ICD-10-CM

## 2024-04-19 DIAGNOSIS — R07.9 CHEST PAIN, UNSPECIFIED TYPE: ICD-10-CM

## 2024-04-19 DIAGNOSIS — R60.0 BILATERAL LOWER EXTREMITY EDEMA: ICD-10-CM

## 2024-04-19 PROCEDURE — 1125F AMNT PAIN NOTED PAIN PRSNT: CPT | Mod: CPTII,S$GLB,, | Performed by: PHYSICIAN ASSISTANT

## 2024-04-19 PROCEDURE — 71046 X-RAY EXAM CHEST 2 VIEWS: CPT | Mod: 26,GW,, | Performed by: RADIOLOGY

## 2024-04-19 PROCEDURE — 3288F FALL RISK ASSESSMENT DOCD: CPT | Mod: CPTII,S$GLB,, | Performed by: PHYSICIAN ASSISTANT

## 2024-04-19 PROCEDURE — 71046 X-RAY EXAM CHEST 2 VIEWS: CPT | Mod: TC,FY,PN

## 2024-04-19 PROCEDURE — G0179 MD RECERTIFICATION HHA PT: HCPCS | Mod: ,,, | Performed by: FAMILY MEDICINE

## 2024-04-19 PROCEDURE — 99214 OFFICE O/P EST MOD 30 MIN: CPT | Mod: GW,S$GLB,, | Performed by: PHYSICIAN ASSISTANT

## 2024-04-19 PROCEDURE — 3078F DIAST BP <80 MM HG: CPT | Mod: CPTII,S$GLB,, | Performed by: PHYSICIAN ASSISTANT

## 2024-04-19 PROCEDURE — 1160F RVW MEDS BY RX/DR IN RCRD: CPT | Mod: CPTII,S$GLB,, | Performed by: PHYSICIAN ASSISTANT

## 2024-04-19 PROCEDURE — 1159F MED LIST DOCD IN RCRD: CPT | Mod: CPTII,S$GLB,, | Performed by: PHYSICIAN ASSISTANT

## 2024-04-19 PROCEDURE — 1101F PT FALLS ASSESS-DOCD LE1/YR: CPT | Mod: CPTII,S$GLB,, | Performed by: PHYSICIAN ASSISTANT

## 2024-04-19 PROCEDURE — 3074F SYST BP LT 130 MM HG: CPT | Mod: CPTII,S$GLB,, | Performed by: PHYSICIAN ASSISTANT

## 2024-04-19 RX ORDER — AMOXICILLIN AND CLAVULANATE POTASSIUM 875; 125 MG/1; MG/1
1 TABLET, FILM COATED ORAL EVERY 12 HOURS
Qty: 14 TABLET | Refills: 0 | Status: SHIPPED | OUTPATIENT
Start: 2024-04-19 | End: 2024-05-13

## 2024-04-19 RX ORDER — AZITHROMYCIN 250 MG/1
TABLET, FILM COATED ORAL
Qty: 6 TABLET | Refills: 0 | Status: SHIPPED | OUTPATIENT
Start: 2024-04-19 | End: 2024-05-13

## 2024-04-19 RX ORDER — PREDNISONE 10 MG/1
TABLET ORAL
Qty: 30 TABLET | Refills: 0 | Status: SHIPPED | OUTPATIENT
Start: 2024-04-19

## 2024-04-19 RX ORDER — FUROSEMIDE 20 MG/1
20 TABLET ORAL 2 TIMES DAILY
Qty: 60 TABLET | Refills: 11 | Status: SHIPPED | OUTPATIENT
Start: 2024-04-19 | End: 2024-04-25

## 2024-04-19 NOTE — PROGRESS NOTES
Patient ID: Shirley F Gilford is a 83 y.o. female.     Chief Complaint: Chest Pain    83 year old female with history of COPD presents to [facility] with complaints of cough for 1 month as well as right lower rib pain beginning 2 days ago. States is more painful with deep inspiration and movement. Denies fever, aches, worsening SOB, orthopnea, N/V/D. Has tried no OTC therapies.         Review of Systems  Review of Systems   Constitutional:  Negative for fever.   HENT:  Negative for ear pain and sinus pain.    Eyes:  Negative for discharge.   Respiratory:  Negative for cough and wheezing.    Cardiovascular:  Positive for chest pain and leg swelling.   Gastrointestinal:  Negative for diarrhea, nausea and vomiting.   Genitourinary:  Negative for urgency.   Musculoskeletal:  Negative for myalgias.   Skin:  Negative for rash.   Neurological:  Negative for weakness and headaches.   Psychiatric/Behavioral:  Negative for depression.        Currently Medications  Current Outpatient Medications on File Prior to Visit   Medication Sig Dispense Refill    albuterol (PROVENTIL/VENTOLIN HFA) 90 mcg/actuation inhaler INHALE 2 PUFFS INTO THE LUNGS EVERY 6 HOURS AS NEEDED FOR WHEEZING 20.1 g 3    amiodarone (PACERONE) 200 MG Tab Take 1 tablet (200 mg total) by mouth once daily. 30 tablet 3    calcium-vitamin D3 (OS-MIKE 500 + D3) 500 mg-5 mcg (200 unit) per tablet Take 1 tablet by mouth once daily.      cyanocobalamin 500 MCG tablet Take 500 mcg by mouth once daily.      EScitalopram oxalate (LEXAPRO) 5 MG Tab Take 1 tablet (5 mg total) by mouth once daily. 90 tablet 3    fluticasone propionate (FLONASE) 50 mcg/actuation nasal spray 2 sprays (100 mcg total) by Each Nare route once daily. 1 Bottle 12    fluticasone-umeclidin-vilanter (TRELEGY ELLIPTA) 100-62.5-25 mcg DsDv INHALE 1 PUFF BY MOUTH INTO THE LUNGS ONCE A  each 3    gabapentin (NEURONTIN) 300 MG capsule Take 1 capsule (300 mg total) by mouth every evening. For sciatic  "nerve pain 90 capsule 3    loratadine (CLARITIN) 10 mg tablet Take 1 tablet (10 mg total) by mouth once daily. 90 tablet 3    montelukast (SINGULAIR) 10 mg tablet TAKE 1 TABLET BY MOUTH EVERY EVENING 90 tablet 3    MULTIVIT-IRON-MIN-FOLIC ACID 3,500-18-0.4 UNIT-MG-MG ORAL CHEW Take by mouth.      pantoprazole (PROTONIX) 40 MG tablet TAKE 1 TABLET BY MOUTH EVERY DAY 90 tablet 3    rOPINIRole (REQUIP) 3 MG tablet TAKE 1 TABLET BY MOUTH NIGHTLY 90 tablet 3    tiotropium (SPIRIVA WITH HANDIHALER) 18 mcg inhalation capsule Inhale 1 capsule (18 mcg total) into the lungs nightly. Controller 60 capsule 3     No current facility-administered medications on file prior to visit.       Physical  Exam  Vitals:    04/19/24 1501   BP: 124/62   BP Location: Right arm   Patient Position: Sitting   Pulse: 92   Temp: 98 °F (36.7 °C)   SpO2: (!) 79%   Weight: 83.2 kg (183 lb 6.8 oz)   Height: 5' 3" (1.6 m)      Body mass index is 32.49 kg/m².  Wt Readings from Last 3 Encounters:   04/19/24 83.2 kg (183 lb 6.8 oz)   04/01/24 86.3 kg (190 lb 2.4 oz)   02/15/24 88.5 kg (195 lb)       Physical Exam  Vitals and nursing note reviewed.   Constitutional:       General: She is not in acute distress.     Appearance: She is obese. She is not ill-appearing.   HENT:      Head: Normocephalic and atraumatic.      Right Ear: External ear normal.      Left Ear: External ear normal.      Nose: Nose normal.      Mouth/Throat:      Mouth: Mucous membranes are moist.   Eyes:      Extraocular Movements: Extraocular movements intact.      Conjunctiva/sclera: Conjunctivae normal.   Cardiovascular:      Rate and Rhythm: Normal rate and regular rhythm.      Pulses:           Dorsalis pedis pulses are 1+ on the right side and 1+ on the left side.        Posterior tibial pulses are 1+ on the right side and 1+ on the left side.      Heart sounds: No murmur heard.  Pulmonary:      Effort: Pulmonary effort is normal. No respiratory distress.      Breath sounds: No " wheezing.   Chest:       Abdominal:      General: There is no distension.      Palpations: Abdomen is soft. There is no mass.      Tenderness: There is no abdominal tenderness.   Musculoskeletal:         General: No swelling.      Cervical back: Normal range of motion.      Thoracic back: Deformity (kyphosis) present.      Right lower le+ Pitting Edema present.      Left lower le+ Pitting Edema present.   Skin:     Coloration: Skin is not jaundiced.      Findings: No rash.   Neurological:      General: No focal deficit present.      Mental Status: She is alert and oriented to person, place, and time.   Psychiatric:         Mood and Affect: Mood normal.         Thought Content: Thought content normal.         Labs:    Complete Blood Count  Lab Results   Component Value Date    RBC 4.30 02/15/2024    HGB 11.4 (L) 02/15/2024    HCT 38.7 02/15/2024    MCV 90 02/15/2024    MCH 26.5 (L) 02/15/2024    MCHC 29.5 (L) 02/15/2024    RDW 15.5 (H) 02/15/2024     02/15/2024    MPV 10.3 02/15/2024    GRAN 7.4 02/15/2024    GRAN 74.0 (H) 02/15/2024    LYMPH 1.6 02/15/2024    LYMPH 15.4 (L) 02/15/2024    MONO 0.7 02/15/2024    MONO 7.4 02/15/2024    EOS 0.2 02/15/2024    BASO 0.06 02/15/2024    EOSINOPHIL 2.2 02/15/2024    BASOPHIL 0.6 02/15/2024    DIFFMETHOD Automated 02/15/2024       Comprehensive Metabolic Panel  Lab Results   Component Value Date    GLU 98 02/15/2024    BUN 18 (H) 02/15/2024    CREATININE 0.88 02/15/2024     02/15/2024    K 4.4 02/15/2024     02/15/2024    PROT 6.8 02/15/2024    ALBUMIN 3.7 02/15/2024    BILITOT 0.4 02/15/2024    AST 25 02/15/2024    ALKPHOS 101 02/15/2024    CO2 28 02/15/2024    ALT 15 02/15/2024    ANIONGAP 5 (L) 02/15/2024       TSH  Lab Results   Component Value Date    TSH 5.530 (H) 2024       Imaging:  MRI Brain Without Contrast  Narrative: EXAMINATION:  MRI BRAIN WITHOUT CONTRAST    CLINICAL HISTORY:  Memory loss;.  Other  amnesia    TECHNIQUE:  Multiplanar multisequence MR imaging of the brain was performed without contrast.    COMPARISON:  Head CT 12/14/2023    FINDINGS:  Intracranial compartment:    Ventricles and sulci are normal in size for age without evidence of hydrocephalus. No extra-axial blood or fluid collections.  The cerebellar tonsils are in expected location.  The visualized portions of the sella appear within normal limits.    The brain parenchyma appears normal. Tiny focus of encephalomalacia within the left cerebellum.  The brain parenchyma otherwise demonstrates scattered areas of periventricular and subcortical T2/FLAIR signal hyperintensity, which are nonspecific but most suggestive of mild-to-moderate chronic microvascular ischemic change.  No parenchymal edema, mass or mass effect.  No abnormal gradient susceptibility artifact.    Normal vascular flow voids are preserved.    Skull/extracranial contents (limited evaluation): Paranasal sinuses and mastoid air cells are clear.  Postsurgical changes of both lenses.  Globes and orbits otherwise appear within normal limits.    Marrow signal is within normal limits.  Impression: No acute intracranial abnormality.    Tiny remote left cerebellar infarct.    Mild to moderate chronic microvascular ischemic changes.    Electronically signed by: Arcadio Hernadez  Date:    04/15/2024  Time:    10:16      Assessment/Plan:    1. COPD exacerbation  Comments:  - Personally reviewed CXR shows right lower lung infiltrate  - Start all medication as prescribed  - Follow 1 week  Orders:  -     azithromycin (Z-RENÉ) 250 MG tablet; Take 2 tablets by mouth on day 1; Take 1 tablet by mouth on days 2-5  Dispense: 6 tablet; Refill: 0  -     amoxicillin-clavulanate 875-125mg (AUGMENTIN) 875-125 mg per tablet; Take 1 tablet by mouth every 12 (twelve) hours.  Dispense: 14 tablet; Refill: 0  -     predniSONE (DELTASONE) 10 MG tablet; Start 3 tablets for 3 days, then 2 tablets for 3 days, then 1  tablet for 3 days, then stop.  Dispense: 30 tablet; Refill: 0    2. Chest pain, unspecified type  -     X-Ray Chest PA And Lateral; Future; Expected date: 04/19/2024  -     SCHEDULED EKG 12-LEAD (to Muse); Future    3. Bilateral lower extremity edema  -     furosemide (LASIX) 20 MG tablet; Take 1 tablet (20 mg total) by mouth 2 (two) times daily.  Dispense: 60 tablet; Refill: 11         Discussed how to stay healthy including: diet, exercise, refraining from smoking and discussed screening exams / tests needed for age, sex and family Hx.    RTC 1 week    Chelsea Fernandez PA-C

## 2024-04-19 NOTE — TELEPHONE ENCOUNTER
Severe pain in right rib area since this am. Pain is very intense and worse with taking deep breaths. Appt has been scheduled for eval of pain.

## 2024-04-19 NOTE — TELEPHONE ENCOUNTER
----- Message from Raquel Montilla sent at 4/19/2024  4:16 PM CDT -----  Type:  Patient Returning Call    Who Called:pt  Who Left Message for Patient:office  Does the patient know what this is regarding?   Would the patient rather a call back or a response via [facility]? call  Best Call Back Number:[phone]  Additional Information:

## 2024-04-19 NOTE — TELEPHONE ENCOUNTER
----- Message from Maureen Lyons sent at 4/19/2024 12:45 PM CDT -----  Type:  Needs Medical Advice    Who Called: Pt   Symptoms (please be specific):  pain in side   How long has patient had these symptoms:  this morning   Would the patient rather a call back or a response via [facility]? Callback   Best Call Back Number:  [phone]  Additional Information:

## 2024-04-19 NOTE — TELEPHONE ENCOUNTER
Spoke with patient; discussed chest Xray results(pneumonia). Advised patient Rx sent to [facility] to start treatment. Patient verbalized understanding

## 2024-04-19 NOTE — Clinical Note
Can we please try to reach Ms. Merino? I sent her treatment for probable pneumonia/COPD exacerbation. Thanks

## 2024-04-23 ENCOUNTER — TELEPHONE (OUTPATIENT)
Dept: FAMILY MEDICINE | Facility: CLINIC | Age: 84
End: 2024-04-23
Payer: MEDICARE

## 2024-04-23 NOTE — TELEPHONE ENCOUNTER
----- Message from Chelsea Fernandez PA-C sent at 4/22/2024  8:04 AM CDT -----  RLL pneumonia. Medication sent

## 2024-04-24 ENCOUNTER — TELEPHONE (OUTPATIENT)
Dept: FAMILY MEDICINE | Facility: CLINIC | Age: 84
End: 2024-04-24
Payer: MEDICARE

## 2024-04-24 NOTE — TELEPHONE ENCOUNTER
----- Message from Dianna Shaw sent at 4/23/2024  5:46 PM CDT -----  Type:  Needs Medical Advice    Who Called: pt  Would the patient rather a call back or a response via [facility]? call  Best Call Back Number:  [phone]  Additional Information: pt would like to speak regarding medication being called in for her pneumonia would like to know what's going on

## 2024-04-25 ENCOUNTER — OFFICE VISIT (OUTPATIENT)
Dept: FAMILY MEDICINE | Facility: CLINIC | Age: 84
End: 2024-04-25
Payer: MEDICARE

## 2024-04-25 DIAGNOSIS — R60.0 BILATERAL LOWER EXTREMITY EDEMA: ICD-10-CM

## 2024-04-25 DIAGNOSIS — J44.9 CHRONIC OBSTRUCTIVE PULMONARY DISEASE, UNSPECIFIED COPD TYPE: Primary | ICD-10-CM

## 2024-04-25 DIAGNOSIS — R09.02 HYPOXEMIA: ICD-10-CM

## 2024-04-25 PROCEDURE — 3074F SYST BP LT 130 MM HG: CPT | Mod: CPTII,S$GLB,, | Performed by: PHYSICIAN ASSISTANT

## 2024-04-25 PROCEDURE — 99214 OFFICE O/P EST MOD 30 MIN: CPT | Mod: GW,S$GLB,, | Performed by: PHYSICIAN ASSISTANT

## 2024-04-25 PROCEDURE — 3288F FALL RISK ASSESSMENT DOCD: CPT | Mod: CPTII,S$GLB,, | Performed by: PHYSICIAN ASSISTANT

## 2024-04-25 PROCEDURE — 3078F DIAST BP <80 MM HG: CPT | Mod: CPTII,S$GLB,, | Performed by: PHYSICIAN ASSISTANT

## 2024-04-25 PROCEDURE — 1159F MED LIST DOCD IN RCRD: CPT | Mod: CPTII,S$GLB,, | Performed by: PHYSICIAN ASSISTANT

## 2024-04-25 PROCEDURE — 1101F PT FALLS ASSESS-DOCD LE1/YR: CPT | Mod: CPTII,S$GLB,, | Performed by: PHYSICIAN ASSISTANT

## 2024-04-25 PROCEDURE — 1126F AMNT PAIN NOTED NONE PRSNT: CPT | Mod: CPTII,S$GLB,, | Performed by: PHYSICIAN ASSISTANT

## 2024-04-25 PROCEDURE — 1160F RVW MEDS BY RX/DR IN RCRD: CPT | Mod: CPTII,S$GLB,, | Performed by: PHYSICIAN ASSISTANT

## 2024-04-25 RX ORDER — FUROSEMIDE 20 MG/1
TABLET ORAL
Qty: 60 TABLET | Refills: 11 | Status: SHIPPED | OUTPATIENT
Start: 2024-04-25

## 2024-04-25 NOTE — PATIENT INSTRUCTIONS
Continue antibiotics until gone  Continue prednisone as directed until gone  START lasix (furosemide) 2 tablets in the morning and 1 in the evening

## 2024-04-29 VITALS
DIASTOLIC BLOOD PRESSURE: 62 MMHG | TEMPERATURE: 98 F | BODY MASS INDEX: 32.43 KG/M2 | WEIGHT: 183 LBS | HEART RATE: 82 BPM | HEIGHT: 63 IN | OXYGEN SATURATION: 93 % | SYSTOLIC BLOOD PRESSURE: 122 MMHG

## 2024-04-29 NOTE — PROGRESS NOTES
Patient ID: Shirley F Gilford is a 83 y.o. female.     Chief Complaint: Follow-up (1 week)    83 year old female presents to clinic for 1 week follow up of pneumonia. Patient had previously complained of cough and right-sided rib pain for >1 month. She was prescribed antibiotics, steroids, and inhalers. She admits to improvement in wheezing, pain, and SOB. States she is almost finished her antibiotics and steroids. Denies fever, aches, headache, N/V/D.         Review of Systems  Review of Systems   Constitutional:  Negative for fever.   HENT:  Negative for ear pain and sinus pain.    Eyes:  Negative for discharge.   Respiratory:  Negative for cough and wheezing.    Cardiovascular:  Negative for chest pain and leg swelling.   Gastrointestinal:  Negative for diarrhea, nausea and vomiting.   Genitourinary:  Negative for urgency.   Musculoskeletal:  Negative for myalgias.   Skin:  Negative for rash.   Neurological:  Negative for weakness and headaches.   Psychiatric/Behavioral:  Negative for depression.        Currently Medications  Current Outpatient Medications on File Prior to Visit   Medication Sig Dispense Refill    albuterol (PROVENTIL/VENTOLIN HFA) 90 mcg/actuation inhaler INHALE 2 PUFFS INTO THE LUNGS EVERY 6 HOURS AS NEEDED FOR WHEEZING 20.1 g 3    amiodarone (PACERONE) 200 MG Tab Take 1 tablet (200 mg total) by mouth once daily. 30 tablet 3    amoxicillin-clavulanate 875-125mg (AUGMENTIN) 875-125 mg per tablet Take 1 tablet by mouth every 12 (twelve) hours. 14 tablet 0    calcium-vitamin D3 (OS-MIKE 500 + D3) 500 mg-5 mcg (200 unit) per tablet Take 1 tablet by mouth once daily.      cyanocobalamin 500 MCG tablet Take 500 mcg by mouth once daily.      EScitalopram oxalate (LEXAPRO) 5 MG Tab Take 1 tablet (5 mg total) by mouth once daily. 90 tablet 3    fluticasone propionate (FLONASE) 50 mcg/actuation nasal spray 2 sprays (100 mcg total) by Each Nare route once daily. 1 Bottle 12     "fluticasone-umeclidin-vilanter (TRELEGY ELLIPTA) 100-62.5-25 mcg DsDv INHALE 1 PUFF BY MOUTH INTO THE LUNGS ONCE A  each 3    gabapentin (NEURONTIN) 300 MG capsule Take 1 capsule (300 mg total) by mouth every evening. For sciatic nerve pain 90 capsule 3    loratadine (CLARITIN) 10 mg tablet Take 1 tablet (10 mg total) by mouth once daily. 90 tablet 3    montelukast (SINGULAIR) 10 mg tablet TAKE 1 TABLET BY MOUTH EVERY EVENING 90 tablet 3    MULTIVIT-IRON-MIN-FOLIC ACID 3,500-18-0.4 UNIT-MG-MG ORAL CHEW Take by mouth.      pantoprazole (PROTONIX) 40 MG tablet TAKE 1 TABLET BY MOUTH EVERY DAY 90 tablet 3    predniSONE (DELTASONE) 10 MG tablet Start 3 tablets for 3 days, then 2 tablets for 3 days, then 1 tablet for 3 days, then stop. 30 tablet 0    rOPINIRole (REQUIP) 3 MG tablet TAKE 1 TABLET BY MOUTH NIGHTLY 90 tablet 3    tiotropium (SPIRIVA WITH HANDIHALER) 18 mcg inhalation capsule Inhale 1 capsule (18 mcg total) into the lungs nightly. Controller 60 capsule 3     No current facility-administered medications on file prior to visit.       Physical  Exam  Vitals:    04/25/24 1114 04/29/24 1012   BP: 122/62    BP Location: Right arm    Patient Position: Sitting    Pulse: 82    Temp: 97.9 °F (36.6 °C)    SpO2: (!) 85% (!) 93%   Weight: 83 kg (182 lb 15.7 oz)    Height: 5' 3" (1.6 m)       Body mass index is 32.41 kg/m².  Wt Readings from Last 3 Encounters:   04/25/24 83 kg (182 lb 15.7 oz)   04/19/24 83.2 kg (183 lb 6.8 oz)   04/01/24 86.3 kg (190 lb 2.4 oz)       Physical Exam  Vitals and nursing note reviewed.   Constitutional:       General: She is not in acute distress.     Appearance: She is not ill-appearing.   HENT:      Head: Normocephalic and atraumatic.      Right Ear: External ear normal.      Left Ear: External ear normal.      Nose: Nose normal.      Mouth/Throat:      Mouth: Mucous membranes are moist.   Eyes:      Extraocular Movements: Extraocular movements intact.      Conjunctiva/sclera: " Conjunctivae normal.   Cardiovascular:      Rate and Rhythm: Normal rate and regular rhythm.      Pulses: Normal pulses.      Heart sounds: No murmur heard.  Pulmonary:      Effort: Pulmonary effort is normal. No respiratory distress.      Breath sounds: No wheezing.   Abdominal:      General: There is no distension.      Palpations: Abdomen is soft. There is no mass.      Tenderness: There is no abdominal tenderness.   Musculoskeletal:         General: No swelling.      Cervical back: Normal range of motion.   Skin:     Coloration: Skin is not jaundiced.      Findings: No rash.   Neurological:      General: No focal deficit present.      Mental Status: She is alert and oriented to person, place, and time.   Psychiatric:         Mood and Affect: Mood normal.         Thought Content: Thought content normal.         Labs:    Complete Blood Count  Lab Results   Component Value Date    RBC 4.30 02/15/2024    HGB 11.4 (L) 02/15/2024    HCT 38.7 02/15/2024    MCV 90 02/15/2024    MCH 26.5 (L) 02/15/2024    MCHC 29.5 (L) 02/15/2024    RDW 15.5 (H) 02/15/2024     02/15/2024    MPV 10.3 02/15/2024    GRAN 7.4 02/15/2024    GRAN 74.0 (H) 02/15/2024    LYMPH 1.6 02/15/2024    LYMPH 15.4 (L) 02/15/2024    MONO 0.7 02/15/2024    MONO 7.4 02/15/2024    EOS 0.2 02/15/2024    BASO 0.06 02/15/2024    EOSINOPHIL 2.2 02/15/2024    BASOPHIL 0.6 02/15/2024    DIFFMETHOD Automated 02/15/2024       Comprehensive Metabolic Panel  Lab Results   Component Value Date    GLU 98 02/15/2024    BUN 18 (H) 02/15/2024    CREATININE 0.88 02/15/2024     02/15/2024    K 4.4 02/15/2024     02/15/2024    PROT 6.8 02/15/2024    ALBUMIN 3.7 02/15/2024    BILITOT 0.4 02/15/2024    AST 25 02/15/2024    ALKPHOS 101 02/15/2024    CO2 28 02/15/2024    ALT 15 02/15/2024    ANIONGAP 5 (L) 02/15/2024       TSH  Lab Results   Component Value Date    TSH 5.530 (H) 04/02/2024       Imaging:  X-Ray Chest PA And Lateral  Narrative: EXAMINATION:  XR  CHEST PA AND LATERAL    CLINICAL HISTORY:  Chest pain, unspecified    TECHNIQUE:  PA and lateral views of the chest were performed.    COMPARISON:  12/14/2023    FINDINGS:  Pulmonary vascular congestion with mild interstitial edema.  Focal opacification right costophrenic angle suspicious for mild infiltrate.  No pleural effusion or pneumothorax.  Unchanged mild cardiomegaly.  Moderate to large hiatal hernia.  Bones appear osteopenic.  Impression: Pulmonary vascular congestion with mild interstitial edema. Focal opacification right costophrenic angle suspicious for mild infiltrate.  No pleural effusion or pneumothorax. Unchanged mild cardiomegaly.  Moderate to large hiatal hernia.    Electronically signed by: Juanito Mclean  Date:    04/19/2024  Time:    16:29      Assessment/Plan:    1. Chronic obstructive pulmonary disease, unspecified COPD type  Comments:  - Continue inhalers as directed  - Follow with PCP    2. Bilateral lower extremity edema  Comments:  - Increase lasix to 2 in the morning and 1 at night  - Follow with PCP in 1 month  Orders:  -     furosemide (LASIX) 20 MG tablet; 2 tablets in the morning and 1 at night  Dispense: 60 tablet; Refill: 11  -     Basic Metabolic Panel; Future    3. Hypoxemia  Comments:  - Continue to take inhalers and lasix as directed  - If worsening SOB within month, follow with ED         Discussed how to stay healthy including: diet, exercise, refraining from smoking and discussed screening exams / tests needed for age, sex and family Hx.    RTC 1 month with PCP    Chelsea Fernandez PA-C

## 2024-04-30 ENCOUNTER — TELEPHONE (OUTPATIENT)
Dept: FAMILY MEDICINE | Facility: CLINIC | Age: 84
End: 2024-04-30
Payer: MEDICARE

## 2024-04-30 NOTE — TELEPHONE ENCOUNTER
----- Message from Marc Sands MD sent at 4/28/2024 10:39 AM CDT -----  Old small stroke left side back of brain

## 2024-05-01 NOTE — TELEPHONE ENCOUNTER
Racheal Langford Staff  Caller: Unspecified (Today, 12:45 PM)  Type:  Patient Returning Call    Who Called:pt  Who Left Message for Patient:jamir  Does the patient know what this is regarding?:returning a from yesterday  Would the patient rather a call back or a response via [facility]? call  Best Call Back Number:[phone]  Additional Information:

## 2024-05-01 NOTE — TELEPHONE ENCOUNTER
Spoke to pt. Pt verbally understood the message below.     Pt asked if we could find out how long ago the stroke was and if this could be what is causing her to have issues with her memory.

## 2024-05-03 NOTE — TELEPHONE ENCOUNTER
I have notified pt. She stated is it possible if she could have another stroke. I stated I could give this message to MD. She stated do not worry about it, that she is scheduled for in office visit on 5/13/24. That she will discuss at that visit.

## 2024-05-13 ENCOUNTER — OFFICE VISIT (OUTPATIENT)
Dept: FAMILY MEDICINE | Facility: CLINIC | Age: 84
End: 2024-05-13
Payer: MEDICARE

## 2024-05-13 VITALS
OXYGEN SATURATION: 96 % | DIASTOLIC BLOOD PRESSURE: 82 MMHG | SYSTOLIC BLOOD PRESSURE: 152 MMHG | WEIGHT: 186.06 LBS | TEMPERATURE: 98 F | HEIGHT: 63 IN | HEART RATE: 71 BPM | BODY MASS INDEX: 32.97 KG/M2

## 2024-05-13 DIAGNOSIS — Z86.73 CEREBELLAR CEREBROVASCULAR ACCIDENT (CVA) WITHOUT LATE EFFECT: Primary | ICD-10-CM

## 2024-05-13 DIAGNOSIS — I67.81 ACUTE CEREBROVASCULAR INSUFFICIENCY: ICD-10-CM

## 2024-05-13 PROCEDURE — 3077F SYST BP >= 140 MM HG: CPT | Mod: CPTII,S$GLB,, | Performed by: FAMILY MEDICINE

## 2024-05-13 PROCEDURE — 99214 OFFICE O/P EST MOD 30 MIN: CPT | Mod: GW,S$GLB,, | Performed by: FAMILY MEDICINE

## 2024-05-13 PROCEDURE — 3288F FALL RISK ASSESSMENT DOCD: CPT | Mod: CPTII,S$GLB,, | Performed by: FAMILY MEDICINE

## 2024-05-13 PROCEDURE — 1159F MED LIST DOCD IN RCRD: CPT | Mod: CPTII,S$GLB,, | Performed by: FAMILY MEDICINE

## 2024-05-13 PROCEDURE — 1160F RVW MEDS BY RX/DR IN RCRD: CPT | Mod: CPTII,S$GLB,, | Performed by: FAMILY MEDICINE

## 2024-05-13 PROCEDURE — 1126F AMNT PAIN NOTED NONE PRSNT: CPT | Mod: CPTII,S$GLB,, | Performed by: FAMILY MEDICINE

## 2024-05-13 PROCEDURE — 1101F PT FALLS ASSESS-DOCD LE1/YR: CPT | Mod: CPTII,S$GLB,, | Performed by: FAMILY MEDICINE

## 2024-05-13 PROCEDURE — 3079F DIAST BP 80-89 MM HG: CPT | Mod: CPTII,S$GLB,, | Performed by: FAMILY MEDICINE

## 2024-05-13 RX ORDER — ROSUVASTATIN CALCIUM 5 MG/1
5 TABLET, COATED ORAL NIGHTLY
Qty: 90 TABLET | Refills: 3 | Status: SHIPPED | OUTPATIENT
Start: 2024-05-13 | End: 2025-05-13

## 2024-05-13 RX ORDER — DONEPEZIL HYDROCHLORIDE 5 MG/1
5 TABLET, FILM COATED ORAL NIGHTLY
Qty: 30 TABLET | Refills: 11 | Status: SHIPPED | OUTPATIENT
Start: 2024-05-13 | End: 2025-05-13

## 2024-05-13 NOTE — PROGRESS NOTES
"Subjective:      Patient ID: Shirley F Gilford is a 83 y.o. female.    Chief Complaint: Follow-up (6 wk)      Vitals:    05/13/24 0940   BP: (!) 152/82   Pulse: 71   Temp: 97.8 °F (36.6 °C)   TempSrc: Oral   SpO2: 96%   Weight: 84.4 kg (186 lb 1.1 oz)   Height: 5' 3" (1.6 m)        HPI   Follow up MRI, small cerebellar infarct, remote  She had a fall hung up in her rocker, on floor for 3 days, hallucinating  Had CT then, Christmas 2023  Pt bp up, says she takes Rx, none on her list  She is worried about her memory  Had V Tach in Dec  Needs to go to cardiologym has appt June 17 with Dr Porter  On amiodarone, TSH high in April, repeat in Jul;y  Starrt aricept for memor  Add crstor to prevent more CVA  Get carotid u/s  To cardiologyh        Problem List  Patient Active Problem List   Diagnosis    Osteoporosis    History of hypertension    History of hyperlipidemia    Body mass index (BMI) of 33.0 to 33.9 in adult    Restless leg syndrome    Elevated LFTs    Chronic obstructive pulmonary disease    Fatty liver    Iron deficiency    Cellulitis of left lower extremity    Hyperlipidemia    Vitamin D deficiency    Pulmonary hypertension    COVID-19    Panniculitis    Chronic pain of right knee    Diastolic dysfunction    Left atrial enlargement    Cardiac mass    Hyperglycemia    Protein-calorie malnutrition, unspecified severity    Advance directive in chart    Ventricular tachycardia    Ventricular tachycardia (paroxysmal)    Intraparenchymal hematoma of brain    Palliative care encounter    Abdominal aneurysm    Sciatica of left side    Depression    Other amnesia        ALLERGIES:   Review of patient's allergies indicates:   Allergen Reactions    Covid-19vacc,(astrazeneca)(pf)      Fever, headache, weak    Contrast media Swelling    Gabapentin Nausea And Vomiting       MEDS:   Current Outpatient Medications:     albuterol (PROVENTIL/VENTOLIN HFA) 90 mcg/actuation inhaler, INHALE 2 PUFFS INTO THE LUNGS EVERY 6 HOURS AS " NEEDED FOR WHEEZING, Disp: 20.1 g, Rfl: 3    amiodarone (PACERONE) 200 MG Tab, Take 1 tablet (200 mg total) by mouth once daily., Disp: 30 tablet, Rfl: 3    calcium-vitamin D3 (OS-MIKE 500 + D3) 500 mg-5 mcg (200 unit) per tablet, Take 1 tablet by mouth once daily., Disp: , Rfl:     cyanocobalamin 500 MCG tablet, Take 500 mcg by mouth once daily., Disp: , Rfl:     EScitalopram oxalate (LEXAPRO) 5 MG Tab, Take 1 tablet (5 mg total) by mouth once daily., Disp: 90 tablet, Rfl: 3    fluticasone propionate (FLONASE) 50 mcg/actuation nasal spray, 2 sprays (100 mcg total) by Each Nare route once daily., Disp: 1 Bottle, Rfl: 12    fluticasone-umeclidin-vilanter (TRELEGY ELLIPTA) 100-62.5-25 mcg DsDv, INHALE 1 PUFF BY MOUTH INTO THE LUNGS ONCE A DAY, Disp: 180 each, Rfl: 3    furosemide (LASIX) 20 MG tablet, 2 tablets in the morning and 1 at night, Disp: 60 tablet, Rfl: 11    gabapentin (NEURONTIN) 300 MG capsule, Take 1 capsule (300 mg total) by mouth every evening. For sciatic nerve pain, Disp: 90 capsule, Rfl: 3    loratadine (CLARITIN) 10 mg tablet, Take 1 tablet (10 mg total) by mouth once daily., Disp: 90 tablet, Rfl: 3    montelukast (SINGULAIR) 10 mg tablet, TAKE 1 TABLET BY MOUTH EVERY EVENING, Disp: 90 tablet, Rfl: 3    MULTIVIT-IRON-MIN-FOLIC ACID 3,500-18-0.4 UNIT-MG-MG ORAL CHEW, Take by mouth., Disp: , Rfl:     pantoprazole (PROTONIX) 40 MG tablet, TAKE 1 TABLET BY MOUTH EVERY DAY, Disp: 90 tablet, Rfl: 3    predniSONE (DELTASONE) 10 MG tablet, Start 3 tablets for 3 days, then 2 tablets for 3 days, then 1 tablet for 3 days, then stop., Disp: 30 tablet, Rfl: 0    rOPINIRole (REQUIP) 3 MG tablet, TAKE 1 TABLET BY MOUTH NIGHTLY, Disp: 90 tablet, Rfl: 3    tiotropium (SPIRIVA WITH HANDIHALER) 18 mcg inhalation capsule, Inhale 1 capsule (18 mcg total) into the lungs nightly. Controller, Disp: 60 capsule, Rfl: 3    donepeziL (ARICEPT) 5 MG tablet, Take 1 tablet (5 mg total) by mouth every evening. For memory, Disp: 30  tablet, Rfl: 11    rosuvastatin (CRESTOR) 5 MG tablet, Take 1 tablet (5 mg total) by mouth every evening. For cholesterol and stroke pevention, Disp: 90 tablet, Rfl: 3      History:  Current Providers as of 5/13/2024  PCP: Marc Sands MD  Care Team Provider: Isabel Griffin MD  Care Team Provider: Malu Camarillo LPN  Care Team Provider: Abran Lloyd MD  Encounter Provider: Marc Sands MD, starting on Mon May 13, 2024 12:00 AM  Referring Provider: not found, starting on Mon May 13, 2024 12:00 AM  Consulting Physician: Marc Sands MD, starting on Mon May 13, 2024  9:38 AM, ending on Sun May 19, 2024  8:00 PM (Inactive)   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    Hyperglycemia 01/26/2017    Hyperlipidemia     Hypertension     Morbid (severe) obesity due to excess calories 07/17/2023    Osteoporosis     Palliative care encounter 12/15/2023    RLS (restless legs syndrome)      Past Surgical History:   Procedure Laterality Date    BLADDER SURGERY      cancer Mahamed Kidd    BREAST SURGERY      left benign tumor    COLONOSCOPY      EPIDURAL STEROID INJECTION INTO LUMBAR SPINE Left 1/8/2024    Procedure: LT L4-5 and L5-S1 TFESI;  Surgeon: Abran Lloyd MD;  Location: [facility];  Service: Pain Management;  Laterality: Left;  30 mins    EYE SURGERY      cataracts    TONSILLECTOMY       Social History     Tobacco Use    Smoking status: Former     Passive exposure: Past    Smokeless tobacco: Never   Substance Use Topics    Alcohol use: No    Drug use: Never         Review of Systems   Constitutional: Negative.    HENT: Negative.     Respiratory: Negative.     Cardiovascular: Negative.    Gastrointestinal: Negative.    Endocrine: Negative.    Genitourinary: Negative.    Musculoskeletal: Negative.    Neurological:         Memoryh and balance issues   Psychiatric/Behavioral: Negative.     All  other systems reviewed and are negative.    Objective:     Physical Exam  Vitals and nursing note reviewed.   Constitutional:       Appearance: She is well-developed.   HENT:      Head: Normocephalic.   Eyes:      Conjunctiva/sclera: Conjunctivae normal.      Pupils: Pupils are equal, round, and reactive to light.   Cardiovascular:      Rate and Rhythm: Normal rate and regular rhythm.      Heart sounds: Normal heart sounds.   Pulmonary:      Effort: Pulmonary effort is normal.      Breath sounds: Normal breath sounds.   Musculoskeletal:         General: Normal range of motion.      Cervical back: Normal range of motion and neck supple.   Skin:     General: Skin is warm and dry.   Neurological:      Mental Status: She is alert and oriented to person, place, and time.      Deep Tendon Reflexes: Reflexes are normal and symmetric.   Psychiatric:         Behavior: Behavior normal.         Thought Content: Thought content normal.         Judgment: Judgment normal.             Assessment:     1. Cerebellar cerebrovascular accident (CVA) without late effect    2. Acute cerebrovascular insufficiency      Plan:        Medication List            Accurate as of May 13, 2024 11:59 PM. If you have any questions, ask your nurse or doctor.                START taking these medications      donepeziL 5 MG tablet  Commonly known as: ARICEPT  Take 1 tablet (5 mg total) by mouth every evening. For memory  Started by: Marc Sands MD     rosuvastatin 5 MG tablet  Commonly known as: CRESTOR  Take 1 tablet (5 mg total) by mouth every evening. For cholesterol and stroke pevention  Started by: Marc Sands MD            CONTINUE taking these medications      albuterol 90 mcg/actuation inhaler  Commonly known as: PROVENTIL/VENTOLIN HFA  INHALE 2 PUFFS INTO THE LUNGS EVERY 6 HOURS AS NEEDED FOR WHEEZING     amiodarone 200 MG Tab  Commonly known as: PACERONE  Take 1 tablet (200 mg total) by mouth once daily.     calcium-vitamin D3 500  mg-5 mcg (200 unit) per tablet  Commonly known as: OS-MIKE 500 + D3     cyanocobalamin 500 MCG tablet     EScitalopram oxalate 5 MG Tab  Commonly known as: LEXAPRO  Take 1 tablet (5 mg total) by mouth once daily.     fluticasone propionate 50 mcg/actuation nasal spray  Commonly known as: FLONASE  2 sprays (100 mcg total) by Each Nare route once daily.     furosemide 20 MG tablet  Commonly known as: LASIX  2 tablets in the morning and 1 at night     gabapentin 300 MG capsule  Commonly known as: NEURONTIN  Take 1 capsule (300 mg total) by mouth every evening. For sciatic nerve pain     loratadine 10 mg tablet  Commonly known as: CLARITIN  Take 1 tablet (10 mg total) by mouth once daily.     montelukast 10 mg tablet  Commonly known as: SINGULAIR  TAKE 1 TABLET BY MOUTH EVERY EVENING     multivit-iron-min-folic acid 3,500-18-0.4 unit-mg-mg Chew     pantoprazole 40 MG tablet  Commonly known as: PROTONIX  TAKE 1 TABLET BY MOUTH EVERY DAY     predniSONE 10 MG tablet  Commonly known as: DELTASONE  Start 3 tablets for 3 days, then 2 tablets for 3 days, then 1 tablet for 3 days, then stop.     rOPINIRole 3 MG tablet  Commonly known as: REQUIP  TAKE 1 TABLET BY MOUTH NIGHTLY     tiotropium 18 mcg inhalation capsule  Commonly known as: SPIRIVA WITH HANDIHALER  Inhale 1 capsule (18 mcg total) into the lungs nightly. Controller     TRELEGY ELLIPTA 100-62.5-25 mcg Dsdv  Generic drug: fluticasone-umeclidin-vilanter  INHALE 1 PUFF BY MOUTH INTO THE LUNGS ONCE A DAY            STOP taking these medications      amoxicillin-clavulanate 875-125mg 875-125 mg per tablet  Commonly known as: AUGMENTIN  Stopped by: Marc Sands MD     azithromycin 250 MG tablet  Commonly known as: Z-RENÉ  Stopped by: Marc Sands MD               Where to Get Your Medications        [id number] [place]  [address] [place], [place] [address] [address]      Phone: [phone]   donepeziL 5  MG tablet  rosuvastatin 5 MG tablet       Cerebellar cerebrovascular accident (CVA) without late effect  -     US Carotid Bilateral; Future; Expected date: 05/13/2024    Acute cerebrovascular insufficiency  -     US Carotid Bilateral; Future; Expected date: 05/13/2024    Other orders  -     rosuvastatin (CRESTOR) 5 MG tablet; Take 1 tablet (5 mg total) by mouth every evening. For cholesterol and stroke pevention  Dispense: 90 tablet; Refill: 3  -     donepeziL (ARICEPT) 5 MG tablet; Take 1 tablet (5 mg total) by mouth every evening. For memory  Dispense: 30 tablet; Refill: 11

## 2024-05-14 ENCOUNTER — HOSPITAL ENCOUNTER (OUTPATIENT)
Dept: RADIOLOGY | Facility: HOSPITAL | Age: 84
Discharge: HOME | End: 2024-05-14
Attending: FAMILY MEDICINE
Payer: MEDICARE

## 2024-05-14 DIAGNOSIS — I67.81 ACUTE CEREBROVASCULAR INSUFFICIENCY: ICD-10-CM

## 2024-05-14 DIAGNOSIS — Z86.73 CEREBELLAR CEREBROVASCULAR ACCIDENT (CVA) WITHOUT LATE EFFECT: ICD-10-CM

## 2024-05-14 PROCEDURE — 93880 EXTRACRANIAL BILAT STUDY: CPT | Mod: 26,GW,, | Performed by: RADIOLOGY

## 2024-05-14 PROCEDURE — 93880 EXTRACRANIAL BILAT STUDY: CPT | Mod: TC,PN

## 2024-05-21 DIAGNOSIS — Z78.0 MENOPAUSE: ICD-10-CM

## 2024-05-27 ENCOUNTER — CLINICAL SUPPORT (OUTPATIENT)
Dept: FAMILY MEDICINE | Facility: CLINIC | Age: 84
End: 2024-05-27
Payer: MEDICARE

## 2024-05-27 ENCOUNTER — TELEPHONE (OUTPATIENT)
Dept: FAMILY MEDICINE | Facility: CLINIC | Age: 84
End: 2024-05-27

## 2024-05-27 VITALS — DIASTOLIC BLOOD PRESSURE: 70 MMHG | SYSTOLIC BLOOD PRESSURE: 136 MMHG | OXYGEN SATURATION: 94 % | HEART RATE: 77 BPM

## 2024-05-27 DIAGNOSIS — Z01.30 BP CHECK: Primary | ICD-10-CM

## 2024-05-27 NOTE — PROGRESS NOTES
Shirley F Gilford 83 y.o. female is here today for Blood Pressure check.   History of HTN yes.    Review of patient's allergies indicates:   Allergen Reactions    Covid-19vacc,(astrazeneca)(pf)      Fever, headache, weak    Contrast media Swelling    Gabapentin Nausea And Vomiting     Creatinine   Date Value Ref Range Status   02/15/2024 0.88 0.50 - 1.40 mg/dL Final     Sodium   Date Value Ref Range Status   02/15/2024 142 136 - 145 mmol/L Final     Potassium   Date Value Ref Range Status   02/15/2024 4.4 3.5 - 5.1 mmol/L Final   ]  Patient verifies taking blood pressure medications on a regular basis at the same time of the day.     Current Outpatient Medications:     albuterol (PROVENTIL/VENTOLIN HFA) 90 mcg/actuation inhaler, INHALE 2 PUFFS INTO THE LUNGS EVERY 6 HOURS AS NEEDED FOR WHEEZING, Disp: 20.1 g, Rfl: 3    amiodarone (PACERONE) 200 MG Tab, Take 1 tablet (200 mg total) by mouth once daily., Disp: 30 tablet, Rfl: 3    calcium-vitamin D3 (OS-MIKE 500 + D3) 500 mg-5 mcg (200 unit) per tablet, Take 1 tablet by mouth once daily., Disp: , Rfl:     cyanocobalamin 500 MCG tablet, Take 500 mcg by mouth once daily., Disp: , Rfl:     donepeziL (ARICEPT) 5 MG tablet, Take 1 tablet (5 mg total) by mouth every evening. For memory, Disp: 30 tablet, Rfl: 11    EScitalopram oxalate (LEXAPRO) 5 MG Tab, Take 1 tablet (5 mg total) by mouth once daily., Disp: 90 tablet, Rfl: 3    fluticasone propionate (FLONASE) 50 mcg/actuation nasal spray, 2 sprays (100 mcg total) by Each Nare route once daily., Disp: 1 Bottle, Rfl: 12    fluticasone-umeclidin-vilanter (TRELEGY ELLIPTA) 100-62.5-25 mcg DsDv, INHALE 1 PUFF BY MOUTH INTO THE LUNGS ONCE A DAY, Disp: 180 each, Rfl: 3    furosemide (LASIX) 20 MG tablet, 2 tablets in the morning and 1 at night, Disp: 60 tablet, Rfl: 11    gabapentin (NEURONTIN) 300 MG capsule, Take 1 capsule (300 mg total) by mouth every evening. For sciatic nerve pain, Disp: 90 capsule, Rfl: 3    loratadine  (CLARITIN) 10 mg tablet, Take 1 tablet (10 mg total) by mouth once daily., Disp: 90 tablet, Rfl: 3    montelukast (SINGULAIR) 10 mg tablet, TAKE 1 TABLET BY MOUTH EVERY EVENING, Disp: 90 tablet, Rfl: 3    MULTIVIT-IRON-MIN-FOLIC ACID 3,500-18-0.4 UNIT-MG-MG ORAL CHEW, Take by mouth., Disp: , Rfl:     pantoprazole (PROTONIX) 40 MG tablet, TAKE 1 TABLET BY MOUTH EVERY DAY, Disp: 90 tablet, Rfl: 3    predniSONE (DELTASONE) 10 MG tablet, Start 3 tablets for 3 days, then 2 tablets for 3 days, then 1 tablet for 3 days, then stop., Disp: 30 tablet, Rfl: 0    rOPINIRole (REQUIP) 3 MG tablet, TAKE 1 TABLET BY MOUTH NIGHTLY, Disp: 90 tablet, Rfl: 3    rosuvastatin (CRESTOR) 5 MG tablet, Take 1 tablet (5 mg total) by mouth every evening. For cholesterol and stroke pevention, Disp: 90 tablet, Rfl: 3    tiotropium (SPIRIVA WITH HANDIHALER) 18 mcg inhalation capsule, Inhale 1 capsule (18 mcg total) into the lungs nightly. Controller, Disp: 60 capsule, Rfl: 3  Does patient have record of home blood pressure readings no.   Last dose of blood pressure medication was taken at 7 am.  Patient is asymptomatic.   Complain.    BP: 136/70 , Pulse: 77 .    Dr. Sands notified.

## 2024-05-27 NOTE — TELEPHONE ENCOUNTER
Pt came in for BP check.     BP: 136/70 P: 77 O2: 94%    Pt is asymptomatic.    Pt is scheduled to see you again in October.

## 2024-05-31 ENCOUNTER — EXTERNAL HOME HEALTH (OUTPATIENT)
Dept: HOME HEALTH SERVICES | Facility: HOSPITAL | Age: 84
End: 2024-05-31
Payer: MEDICARE

## 2024-06-03 ENCOUNTER — TELEPHONE (OUTPATIENT)
Dept: FAMILY MEDICINE | Facility: CLINIC | Age: 84
End: 2024-06-03
Payer: MEDICARE

## 2024-06-03 NOTE — TELEPHONE ENCOUNTER
----- Message from Joe Dsouza sent at 6/3/2024  2:35 PM CDT -----  .Type:  Needs Medical Advice    Who Called: Paz with [facility]    Would the patient rather a call back or a response via [facility]? Call back  Best Call Back Number:   Additional Information:     Pt was discharged from [facility] b/c she has a new insurance and they are not in network with it and she would need another referral to another in network [facility] facility

## 2024-06-04 ENCOUNTER — TELEPHONE (OUTPATIENT)
Dept: FAMILY MEDICINE | Facility: CLINIC | Age: 84
End: 2024-06-04
Payer: MEDICARE

## 2024-06-04 DIAGNOSIS — J44.9 CHRONIC OBSTRUCTIVE PULMONARY DISEASE, UNSPECIFIED COPD TYPE: ICD-10-CM

## 2024-06-04 DIAGNOSIS — Z86.73 CEREBELLAR CEREBROVASCULAR ACCIDENT (CVA) WITHOUT LATE EFFECT: Primary | ICD-10-CM

## 2024-06-04 NOTE — TELEPHONE ENCOUNTER
Spoke with pt her new insurance is [employer] she will call [employer] and find out what home health facilities are in network and she will call office back

## 2024-06-04 NOTE — TELEPHONE ENCOUNTER
----- Message from Loc Johnson sent at 6/4/2024 10:50 AM CDT -----  Type:  Patient Returning Call    Who Called:pt  Who Left Message for Patient:Raquel Dupont  Does the patient know what this is regarding?:insurance   Would the patient rather a call back or a response via [facility]? call  Best Call Back Number: [phone]  Additional Information: [facility] [phone]  [facility] [phone]

## 2024-06-04 NOTE — TELEPHONE ENCOUNTER
Pt is requesting a referral for HH to be sent to [facility], as previous HH agency is not in network with her current insurance provider.

## 2024-06-05 ENCOUNTER — TELEPHONE (OUTPATIENT)
Dept: FAMILY MEDICINE | Facility: CLINIC | Age: 84
End: 2024-06-05
Payer: MEDICARE

## 2024-06-05 NOTE — TELEPHONE ENCOUNTER
HOME HEALTH  Received: Today  Christal Barrera Staff; Stephie Grady MA  Phone Number: [phone]     WHAT HOME HEALTH DISCIPLINES ARE NEEDED? THE ORDER DOESNT SPECIFY

## 2024-06-06 ENCOUNTER — TELEPHONE (OUTPATIENT)
Dept: FAMILY MEDICINE | Facility: CLINIC | Age: 84
End: 2024-06-06
Payer: MEDICARE

## 2024-06-10 DIAGNOSIS — I47.20 VENTRICULAR TACHYCARDIA: Primary | ICD-10-CM

## 2024-06-10 DIAGNOSIS — E78.5 HYPERLIPIDEMIA, UNSPECIFIED HYPERLIPIDEMIA TYPE: ICD-10-CM

## 2024-06-17 ENCOUNTER — OFFICE VISIT (OUTPATIENT)
Dept: CARDIOLOGY | Facility: CLINIC | Age: 84
End: 2024-06-17
Payer: MEDICARE

## 2024-06-17 VITALS
OXYGEN SATURATION: 95 % | DIASTOLIC BLOOD PRESSURE: 79 MMHG | HEIGHT: 62 IN | BODY MASS INDEX: 36.07 KG/M2 | SYSTOLIC BLOOD PRESSURE: 153 MMHG | HEART RATE: 84 BPM | WEIGHT: 196 LBS

## 2024-06-17 DIAGNOSIS — I47.29 VENTRICULAR TACHYCARDIA (PAROXYSMAL): ICD-10-CM

## 2024-06-17 DIAGNOSIS — J44.9 CHRONIC OBSTRUCTIVE PULMONARY DISEASE, UNSPECIFIED COPD TYPE: ICD-10-CM

## 2024-06-17 DIAGNOSIS — I51.89 DIASTOLIC DYSFUNCTION: ICD-10-CM

## 2024-06-17 DIAGNOSIS — I47.20 VENTRICULAR TACHYCARDIA: Primary | ICD-10-CM

## 2024-06-17 DIAGNOSIS — Z86.79 HISTORY OF HYPERTENSION: ICD-10-CM

## 2024-06-17 DIAGNOSIS — R07.9 CHEST PAIN, UNSPECIFIED TYPE: ICD-10-CM

## 2024-06-17 DIAGNOSIS — Z86.39 HISTORY OF HYPERLIPIDEMIA: ICD-10-CM

## 2024-06-17 DIAGNOSIS — I27.20 PULMONARY HYPERTENSION: ICD-10-CM

## 2024-06-17 DIAGNOSIS — R07.89 OTHER CHEST PAIN: ICD-10-CM

## 2024-06-17 DIAGNOSIS — I20.89 OTHER FORMS OF ANGINA PECTORIS: ICD-10-CM

## 2024-06-17 DIAGNOSIS — E78.5 HYPERLIPIDEMIA, UNSPECIFIED HYPERLIPIDEMIA TYPE: ICD-10-CM

## 2024-06-17 DIAGNOSIS — E66.01 SEVERE OBESITY (BMI 35.0-39.9) WITH COMORBIDITY: ICD-10-CM

## 2024-06-17 PROCEDURE — 99999 PR PBB SHADOW E&M-EST. PATIENT-LVL IV: CPT | Mod: PBBFAC,HCNC,, | Performed by: INTERNAL MEDICINE

## 2024-06-17 NOTE — PROGRESS NOTES
Subjective:   Patient ID:  Shirley F Gilford is a 83 y.o. female who has been referred by Dr Trimble for an abnormal echocardiogram    HPI:   2024: Initial visit with me:  Former patient of Dr. Zepeda.  Saw Dr. Lehman in the past for right atrial mass that believed to be fat.  Presented to the hospital in 2023 with monomorphic VT and COVID.  She had rhabdomyolysis( mg and K normal).  She was given amiodarone with the improvement in her arrhythmia.  During that admission  palliative care consulted and she was discharged on hospice.  During follow up she no longer met hospice criteria and was discharged from hospice.  Overall she is doing well today.  She is completely independent in her ADLs.  Ambulate without any assistance.  He denies any palpitation or chest pain.  She does her own cooking and laundry without any issues.  Tolerating amiodarone well    EKG 2024.  Sinus rhythm without any ischemic change    EKG 2023 wide complex tachycardia likely monomorphic VT        PMH significant for COPD, HLD, HTN, pre-DM, RLS and osteoporosis, history of CVA      SH Tobacco Quit   FH No premature CAD    Historically:  h/o HTN, HLD, COPD    EK/3/20: SR 70s, normal axis. Normal EKG. QTc 428    Cardiac mass  - Echo 2022: Mild LVE. LVEF 65%, DD2. Normal RVSF. Severe LAE. Mild-mod MR, TR. Mild CT. PASP 57, CVP 3. Mass noted in the right atrium at the superior portion of the inter-atrial septum, measuring 2.7 cm in diameter.  -  Dr. Zepeda Discussed with Dr Beltran- noted unchanged from prior echo, with prior CT fat density    Prior positive stress test  - Regadenoson nuclear stress test 2019: There is a small reversible perfusion defect in the apicolateral wall of the left ventricle.  This is characteristic of ischemia.  - Angiogram was deferred at the time due to resolution of ALMONTE; with plans to proceed if recurrent / worsening symptoms    - Chest CT 2018: There is partial collapse of the  right middle lobe. There is an ill-defined area of increased density in the inferior medial aspect of the lingula.  If additional imaging evaluation is clinically indicated, I recommend consideration of a PET-CT examination. PET scan 2018: No evidence of active malignancy.  No suspicious lung activity seen.        Furosemide for occ leg swelling    HLD    Patient Active Problem List    Diagnosis Date Noted    Depression 04/01/2024    Other amnesia 04/01/2024    Sciatica of left side 02/15/2024    Abdominal aneurysm 01/30/2024    Intraparenchymal hematoma of brain 12/15/2023    Palliative care encounter 12/15/2023    Ventricular tachycardia 12/14/2023    Ventricular tachycardia (paroxysmal) 12/14/2023    Protein-calorie malnutrition, unspecified severity 07/17/2023    Advance directive in chart 07/17/2023     in media, [facility]      Cardiac mass 01/26/2022           Diastolic dysfunction 01/23/2022    Left atrial enlargement 01/23/2022    Chronic pain of right knee 04/07/2021    Panniculitis 10/23/2020    COVID-19 08/03/2020    Pulmonary hypertension 04/30/2019    Hyperlipidemia 11/10/2018    Vitamin D deficiency 11/10/2018    Cellulitis of left lower extremity 11/02/2017    Iron deficiency 06/29/2017    Fatty liver 03/21/2017    History of hyperlipidemia 01/26/2017    Body mass index (BMI) of 33.0 to 33.9 in adult 01/26/2017    Restless leg syndrome 01/26/2017    Elevated LFTs 01/26/2017    Chronic obstructive pulmonary disease 01/26/2017    Hyperglycemia 01/26/2017    Osteoporosis 01/04/2017    History of hypertension 01/04/2017       Patient's Medications   New Prescriptions    No medications on file   Previous Medications    ALBUTEROL (PROVENTIL/VENTOLIN HFA) 90 MCG/ACTUATION INHALER    INHALE 2 PUFFS INTO THE LUNGS EVERY 6 HOURS AS NEEDED FOR WHEEZING    AMIODARONE (PACERONE) 200 MG TAB    Take 1 tablet (200 mg total) by mouth once daily.    CALCIUM-VITAMIN D3 (OS-MIKE 500 + D3) 500 MG-5 MCG (200 UNIT) PER TABLET     Take 1 tablet by mouth once daily.    CYANOCOBALAMIN 500 MCG TABLET    Take 500 mcg by mouth once daily.    DONEPEZIL (ARICEPT) 5 MG TABLET    Take 1 tablet (5 mg total) by mouth every evening. For memory    ESCITALOPRAM OXALATE (LEXAPRO) 5 MG TAB    Take 1 tablet (5 mg total) by mouth once daily.    FLUTICASONE PROPIONATE (FLONASE) 50 MCG/ACTUATION NASAL SPRAY    2 sprays (100 mcg total) by Each Nare route once daily.    FLUTICASONE-UMECLIDIN-VILANTER (TRELEGY ELLIPTA) 100-62.5-25 MCG DSDV    INHALE 1 PUFF BY MOUTH INTO THE LUNGS ONCE A DAY    FUROSEMIDE (LASIX) 20 MG TABLET    2 tablets in the morning and 1 at night    GABAPENTIN (NEURONTIN) 300 MG CAPSULE    Take 1 capsule (300 mg total) by mouth every evening. For sciatic nerve pain    LORATADINE (CLARITIN) 10 MG TABLET    Take 1 tablet (10 mg total) by mouth once daily.    MONTELUKAST (SINGULAIR) 10 MG TABLET    TAKE 1 TABLET BY MOUTH EVERY EVENING    MULTIVIT-IRON-MIN-FOLIC ACID 3,500-18-0.4 UNIT-MG-MG ORAL CHEW    Take by mouth.    PANTOPRAZOLE (PROTONIX) 40 MG TABLET    TAKE 1 TABLET BY MOUTH EVERY DAY    PREDNISONE (DELTASONE) 10 MG TABLET    Start 3 tablets for 3 days, then 2 tablets for 3 days, then 1 tablet for 3 days, then stop.    ROPINIROLE (REQUIP) 3 MG TABLET    TAKE 1 TABLET BY MOUTH NIGHTLY    ROSUVASTATIN (CRESTOR) 5 MG TABLET    Take 1 tablet (5 mg total) by mouth every evening. For cholesterol and stroke pevention    TIOTROPIUM (SPIRIVA WITH HANDIHALER) 18 MCG INHALATION CAPSULE    Inhale 1 capsule (18 mcg total) into the lungs nightly. Controller   Modified Medications    No medications on file   Discontinued Medications    No medications on file        Review of Systems   Constitutional: Positive for malaise/fatigue. Negative for chills and fever.   HENT:  Negative for hearing loss and nosebleeds.    Eyes:  Negative for blurred vision.   Cardiovascular:         As HPI   Respiratory:  Negative for hemoptysis and shortness of breath.   "  Hematologic/Lymphatic: Negative for bleeding problem.   Skin:  Negative for itching.   Musculoskeletal:  Negative for falls.   Gastrointestinal:  Negative for abdominal pain and hematochezia.   Genitourinary:  Negative for hematuria.   Neurological:  Negative for dizziness and loss of balance.   Psychiatric/Behavioral:  Negative for altered mental status and depression.          Objective:   Vitals  Vitals:    24 0915   BP: (!) 153/79   Pulse: 84   SpO2: 95%   Weight: 88.9 kg (196 lb)   Height: 5' 2.4" (1.585 m)       Right Arm BP - Sittin/79  Left Arm BP - Sittin/71    Physical Exam  Constitutional:       General: She is not in acute distress.     Appearance: She is well-developed. She is not diaphoretic.   HENT:      Head: Normocephalic.   Neck:      Vascular: No JVD.   Cardiovascular:      Rate and Rhythm: Normal rate and regular rhythm.      Heart sounds: No murmur heard.     No friction rub. No gallop.   Pulmonary:      Effort: Pulmonary effort is normal. No respiratory distress.      Breath sounds: Normal breath sounds.   Abdominal:      Palpations: Abdomen is soft.      Tenderness: There is no abdominal tenderness.   Musculoskeletal:         General: No swelling.      Cervical back: Normal range of motion.   Skin:     General: Skin is warm.   Neurological:      Mental Status: She is alert.   Psychiatric:         Mood and Affect: Mood normal.             Assessment:     1. Ventricular tachycardia    2. Diastolic dysfunction    3. History of hyperlipidemia    4. History of hypertension    5. Hyperlipidemia, unspecified hyperlipidemia type    6. Ventricular tachycardia (paroxysmal)    7. Pulmonary hypertension    8. Chronic obstructive pulmonary disease, unspecified COPD type    9. Other chest pain    10. Other forms of angina pectoris          Plan:   Have reviewed her chart  Evidence of monomorphic VT in the setting of rhabdomyolysis.  No reported syncope.  Around the time of the event the " patient tripped and stayed on the floor for 3 days.  She hit her head.  CT with concerning for hemorrhage?.  Recent MRI with evidence of remote CVA.  Overall patient recovered very well from that illness    Prior Lexiscan 5 years ago was concerning for ischemia.  Have discussed with her the stress test and in light of the ventricular tachycardia I recommend coronary angiogram.  Patient would like to stay away from any invasive aggressive intervention if possible.  Hence we will go ahead and repeat another Lexiscan stress test, if significant ischemia noted then we will rediscuss coronary angiogram.   Continue amiodarone.  Recent TSH noted elevated however the free T4 is normal.  LFTs normal.  We will need PFT in the future    Repeat echo    We will consider adding beta blockers next visit    Agree with the statin  We will recommend aspirin.  If she has okay with   BP goal less than 130/80.  Monitor at home and keep log.  May consider adding Coreg to help with BP and VT        Continue with current medical plan and lifestyle changes.    Orders Placed This Encounter   Procedures    NM Myocardial Perfusion Spect Multi Pharmacologic     Standing Status:   Future     Standing Expiration Date:   6/17/2025     Order Specific Question:   May the Radiologist modify the order per protocol to meet the clinical needs of the patient?     Answer:   Yes     Order Specific Question:   Stress Medication to use:     Answer:   Regadenoson     Order Specific Question:   Diabetes?     Answer:   No    Nuclear Stress Test     Standing Status:   Future     Standing Expiration Date:   6/17/2025     Order Specific Question:   Which stress agent will be used?     Answer:   Pharm     Order Specific Question:   Which medicaton for the stress procedure?     Answer:   Regadenoson     Order Specific Question:   Release to patient     Answer:   Immediate    Echo     Standing Status:   Future     Standing Expiration Date:   6/17/2025     Order  Specific Question:   Release to patient     Answer:   Immediate       Follow up as scheduled  Return sooner for concerns or questions. If symptoms persist go to the ED    She expressed verbal understanding and agreed with the plan

## 2024-06-18 LAB
OHS QRS DURATION: 66 MS
OHS QTC CALCULATION: 449 MS

## 2024-06-19 ENCOUNTER — TELEPHONE (OUTPATIENT)
Dept: FAMILY MEDICINE | Facility: CLINIC | Age: 84
End: 2024-06-19
Payer: MEDICARE

## 2024-06-19 RX ORDER — ISOSORBIDE MONONITRATE 60 MG/1
TABLET, EXTENDED RELEASE ORAL
Qty: 90 TABLET | Refills: 3 | OUTPATIENT
Start: 2024-06-19

## 2024-06-19 NOTE — TELEPHONE ENCOUNTER
Care Due:                  Date            Visit Type   Department     Provider  --------------------------------------------------------------------------------                                EP -                              PRIMARY      [facility] FAMILY  Last Visit: 05-      CARE ([facility])   MEDICINE       Marc Sands  Next Visit: None Scheduled  None         None Found                                                            Last  Test          Frequency    Reason                     Performed    Due Date  --------------------------------------------------------------------------------    Lipid Panel.  12 months..  rosuvastatin.............  07- 07-    Health [facility] Embedded Care Due Messages. Reference number: [id number].   6/19/2024 2:28:25 AM CDT

## 2024-06-19 NOTE — TELEPHONE ENCOUNTER
Provider Staff:  Action required for this patient    Requires labs      Please see care gap opportunities below in Care Due Message.    Thanks!  [facility]     Appointments      Date Provider   Last Visit   5/13/2024 Marc Sands MD   Next Visit   10/18/2024 Marc Sands MD     Refill Decision Note   Shirley Gilford  is requesting a refill authorization.  Brief Assessment and Rationale for Refill:  Quick Discontinue     Medication Therapy Plan:  The original prescription was discontinued on 12/15/2023 by Carl Fuller MD-Stopped taking at discharge      Comments:     Note composed:6:42 AM 06/19/2024

## 2024-06-19 NOTE — TELEPHONE ENCOUNTER
----- Message from Chelsea Fernandez PA-C sent at 6/19/2024  8:22 AM CDT -----  Normal sinus rhythm; no acute ST-T changes

## 2024-06-26 ENCOUNTER — DOCUMENT SCAN (OUTPATIENT)
Dept: HOME HEALTH SERVICES | Facility: HOSPITAL | Age: 84
End: 2024-06-26
Payer: MEDICARE

## 2024-07-03 ENCOUNTER — EXTERNAL HOME HEALTH (OUTPATIENT)
Dept: HOME HEALTH SERVICES | Facility: HOSPITAL | Age: 84
End: 2024-07-03
Payer: MEDICARE

## 2024-07-08 ENCOUNTER — LAB VISIT (OUTPATIENT)
Dept: LAB | Facility: HOSPITAL | Age: 84
End: 2024-07-08
Attending: FAMILY MEDICINE
Payer: MEDICARE

## 2024-07-08 DIAGNOSIS — R79.89 HIGH SERUM THYROID STIMULATING HORMONE (TSH): ICD-10-CM

## 2024-07-08 LAB — TSH SERPL DL<=0.005 MIU/L-ACNC: 2.92 UIU/ML (ref 0.4–4)

## 2024-07-08 PROCEDURE — 36415 COLL VENOUS BLD VENIPUNCTURE: CPT | Mod: HCNC,PN | Performed by: FAMILY MEDICINE

## 2024-07-08 PROCEDURE — 84443 ASSAY THYROID STIM HORMONE: CPT | Mod: HCNC,PN | Performed by: FAMILY MEDICINE

## 2024-07-17 ENCOUNTER — HOSPITAL ENCOUNTER (OUTPATIENT)
Dept: RADIOLOGY | Facility: HOSPITAL | Age: 84
Discharge: HOME | End: 2024-07-17
Attending: INTERNAL MEDICINE
Payer: MEDICARE

## 2024-07-17 ENCOUNTER — HOSPITAL ENCOUNTER (OUTPATIENT)
Dept: CARDIOLOGY | Facility: HOSPITAL | Age: 84
Discharge: HOME | End: 2024-07-17
Attending: INTERNAL MEDICINE
Payer: MEDICARE

## 2024-07-17 VITALS — BODY MASS INDEX: 36.07 KG/M2 | WEIGHT: 196 LBS | HEIGHT: 62 IN

## 2024-07-17 DIAGNOSIS — I20.89 OTHER FORMS OF ANGINA PECTORIS: ICD-10-CM

## 2024-07-17 DIAGNOSIS — I47.20 VENTRICULAR TACHYCARDIA: ICD-10-CM

## 2024-07-17 DIAGNOSIS — R07.89 OTHER CHEST PAIN: ICD-10-CM

## 2024-07-17 LAB
APICAL FOUR CHAMBER EJECTION FRACTION: 42 %
APICAL TWO CHAMBER EJECTION FRACTION: 71 %
ASCENDING AORTA: 3.09 CM
AV INDEX (PROSTH): 0.71
AV MEAN GRADIENT: 11 MMHG
AV PEAK GRADIENT: 21 MMHG
AV VALVE AREA BY VELOCITY RATIO: 1.71 CM²
AV VALVE AREA: 1.97 CM²
AV VELOCITY RATIO: 0.62
BSA FOR ECHO PROCEDURE: 1.97 M2
CV ECHO LV RWT: 0.85 CM
DOP CALC AO PEAK VEL: 2.27 M/S
DOP CALC AO VTI: 49.2 CM
DOP CALC LVOT AREA: 2.8 CM2
DOP CALC LVOT DIAMETER: 1.88 CM
DOP CALC LVOT PEAK VEL: 1.4 M/S
DOP CALC LVOT STROKE VOLUME: 97.11 CM3
DOP CALC MV VTI: 54.8 CM
DOP CALCLVOT PEAK VEL VTI: 35 CM
E WAVE DECELERATION TIME: 208.4 MSEC
E/A RATIO: 1.24
E/E' RATIO: 24.43 M/S
ECHO LV POSTERIOR WALL: 1.83 CM (ref 0.6–1.1)
FRACTIONAL SHORTENING: 47 % (ref 28–44)
INTERVENTRICULAR SEPTUM: 1.26 CM (ref 0.6–1.1)
IVC DIAMETER: 1.75 CM
LA MAJOR: 6.57 CM
LA MINOR: 6.86 CM
LA WIDTH: 4.5 CM
LEFT ATRIUM AREA SYSTOLIC (APICAL 2 CHAMBER): 28.67 CM2
LEFT ATRIUM AREA SYSTOLIC (APICAL 4 CHAMBER): 27.05 CM2
LEFT ATRIUM SIZE: 4.24 CM
LEFT ATRIUM VOLUME INDEX MOD: 49.1 ML/M2
LEFT ATRIUM VOLUME INDEX: 57.3 ML/M2
LEFT ATRIUM VOLUME MOD: 93.22 CM3
LEFT ATRIUM VOLUME: 108.85 CM3
LEFT INTERNAL DIMENSION IN SYSTOLE: 2.26 CM (ref 2.1–4)
LEFT VENTRICLE DIASTOLIC VOLUME INDEX: 43.82 ML/M2
LEFT VENTRICLE DIASTOLIC VOLUME: 83.26 ML
LEFT VENTRICLE END DIASTOLIC VOLUME APICAL 2 CHAMBER: 46.41 ML
LEFT VENTRICLE END DIASTOLIC VOLUME APICAL 4 CHAMBER: 39.62 ML
LEFT VENTRICLE END SYSTOLIC VOLUME APICAL 2 CHAMBER: 101.3 ML
LEFT VENTRICLE END SYSTOLIC VOLUME APICAL 4 CHAMBER: 83.67 ML
LEFT VENTRICLE MASS INDEX: 142 G/M2
LEFT VENTRICLE SYSTOLIC VOLUME INDEX: 9.1 ML/M2
LEFT VENTRICLE SYSTOLIC VOLUME: 17.34 ML
LEFT VENTRICULAR INTERNAL DIMENSION IN DIASTOLE: 4.3 CM (ref 3.5–6)
LEFT VENTRICULAR MASS: 270.23 G
LV LATERAL E/E' RATIO: 21.38 M/S
LV SEPTAL E/E' RATIO: 28.5 M/S
LVED V (TEICH): 83.26 ML
LVES V (TEICH): 17.34 ML
LVOT MG: 4.09 MMHG
LVOT MV: 0.97 CM/S
MV MEAN GRADIENT: 5 MMHG
MV PEAK A VEL: 1.38 M/S
MV PEAK E VEL: 1.71 M/S
MV PEAK GRADIENT: 14 MMHG
MV VALVE AREA BY CONTINUITY EQUATION: 1.77 CM2
OHS LV EJECTION FRACTION SIMPSONS BIPLANE MOD: 60 %
PISA MRMAX VEL: 6 M/S
PISA TR MAX VEL: 3.62 M/S
PULM VEIN S/D RATIO: 0.76
PV MV: 0.85 M/S
PV PEAK D VEL: 0.79 M/S
PV PEAK GRADIENT: 6 MMHG
PV PEAK S VEL: 0.6 M/S
PV PEAK VELOCITY: 1.24 M/S
RA MAJOR: 5.7 CM
RA PRESSURE ESTIMATED: 3 MMHG
RA WIDTH: 3.89 CM
RIGHT VENTRICLE DIASTOLIC BASEL DIMENSION: 3.3 CM
RV TB RVSP: 7 MMHG
RV TISSUE DOPPLER FREE WALL SYSTOLIC VELOCITY 1 (APICAL 4 CHAMBER VIEW): 14.05 CM/S
SINUS: 2.83 CM
STJ: 2.02 CM
TDI LATERAL: 0.08 M/S
TDI SEPTAL: 0.06 M/S
TDI: 0.07 M/S
TR MAX PG: 52 MMHG
TRICUSPID ANNULAR PLANE SYSTOLIC EXCURSION: 2.6 CM
TV REST PULMONARY ARTERY PRESSURE: 55 MMHG
Z-SCORE OF LEFT VENTRICULAR DIMENSION IN END DIASTOLE: -2.15
Z-SCORE OF LEFT VENTRICULAR DIMENSION IN END SYSTOLE: -2.96

## 2024-07-17 PROCEDURE — 25500020 PHARM REV CODE 255: Mod: HCNC | Performed by: INTERNAL MEDICINE

## 2024-07-17 PROCEDURE — A9502 TC99M TETROFOSMIN: HCPCS | Mod: HCNC | Performed by: INTERNAL MEDICINE

## 2024-07-17 PROCEDURE — C8929 TTE W OR WO FOL WCON,DOPPLER: HCPCS | Mod: HCNC

## 2024-07-17 PROCEDURE — 93306 TTE W/DOPPLER COMPLETE: CPT | Mod: 26,HCNC,, | Performed by: INTERNAL MEDICINE

## 2024-07-17 RX ORDER — REGADENOSON 0.08 MG/ML
0.4 INJECTION, SOLUTION INTRAVENOUS ONCE
Status: DISCONTINUED | OUTPATIENT
Start: 2024-07-17 | End: 2024-07-25 | Stop reason: HOSPADM

## 2024-07-17 RX ADMIN — TETROFOSMIN 10 MILLICURIE: 1.38 INJECTION, POWDER, LYOPHILIZED, FOR SOLUTION INTRAVENOUS at 09:07

## 2024-07-17 RX ADMIN — HUMAN ALBUMIN MICROSPHERES AND PERFLUTREN 0.11 MG: 10; .22 INJECTION, SOLUTION INTRAVENOUS at 10:07

## 2024-07-18 ENCOUNTER — TELEPHONE (OUTPATIENT)
Dept: CARDIOLOGY | Facility: CLINIC | Age: 84
End: 2024-07-18
Payer: MEDICARE

## 2024-07-18 NOTE — TELEPHONE ENCOUNTER
Results given to patient via voicemail per Dr Porter (see below).    James Porter MD [facility] Staff  Please call the patient with the results.  Normal heart pumping function.  Mild tightness in one of the heart valve and mild to moderate leakage of another valve.  Overall no intervention needed at this time and no major changes as compared to old echo.  We will discuss the results in detail during next appointment    Sincerely,  James Porter MD.  Interventional Cardiologist  [facility] [place]

## 2024-07-18 NOTE — PROGRESS NOTES
Please call the patient with the results.  Normal heart pumping function.  Mild tightness in one of the heart valve and mild to moderate leakage of another valve.  Overall no intervention needed at this time and no major changes as compared to old echo.  We will discuss the results in detail during next appointment    Sincerely,  James Porter MD.   Interventional Cardiologist  [facility] [place]

## 2024-07-18 NOTE — TELEPHONE ENCOUNTER
----- Message from James Porter MD sent at 7/18/2024  1:21 PM CDT -----  Please call the patient with the results.  Normal heart pumping function.  Mild tightness in one of the heart valve and mild to moderate leakage of another valve.  Overall no intervention needed at this time and no major changes as compared to old ec  ho.  We will discuss the results in detail during next appointment    Sincerely,  James Porter MD.   Interventional Cardiologist  [facility] [place]

## 2024-08-19 ENCOUNTER — HOSPITAL ENCOUNTER (OUTPATIENT)
Dept: CARDIOLOGY | Facility: HOSPITAL | Age: 84
Discharge: HOME | End: 2024-08-19
Attending: INTERNAL MEDICINE
Payer: MEDICARE

## 2024-08-19 ENCOUNTER — TELEPHONE (OUTPATIENT)
Dept: CARDIOLOGY | Facility: CLINIC | Age: 84
End: 2024-08-19

## 2024-08-19 ENCOUNTER — HOSPITAL ENCOUNTER (OUTPATIENT)
Dept: RADIOLOGY | Facility: HOSPITAL | Age: 84
Discharge: HOME | End: 2024-08-19
Attending: INTERNAL MEDICINE
Payer: MEDICARE

## 2024-08-19 VITALS — BODY MASS INDEX: 36.07 KG/M2 | HEIGHT: 62 IN | WEIGHT: 196 LBS

## 2024-08-19 PROCEDURE — 93018 CV STRESS TEST I&R ONLY: CPT | Mod: HCNC,,, | Performed by: INTERNAL MEDICINE

## 2024-08-19 PROCEDURE — 63600175 PHARM REV CODE 636 W HCPCS: Mod: HCNC | Performed by: INTERNAL MEDICINE

## 2024-08-19 PROCEDURE — A9502 TC99M TETROFOSMIN: HCPCS | Mod: HCNC | Performed by: INTERNAL MEDICINE

## 2024-08-19 PROCEDURE — 93017 CV STRESS TEST TRACING ONLY: CPT | Mod: HCNC

## 2024-08-19 PROCEDURE — 78452 HT MUSCLE IMAGE SPECT MULT: CPT | Mod: TC,HCNC

## 2024-08-19 PROCEDURE — 78452 HT MUSCLE IMAGE SPECT MULT: CPT | Mod: 26,HCNC,, | Performed by: STUDENT IN AN ORGANIZED HEALTH CARE EDUCATION/TRAINING PROGRAM

## 2024-08-19 PROCEDURE — 93016 CV STRESS TEST SUPVJ ONLY: CPT | Mod: HCNC,,, | Performed by: INTERNAL MEDICINE

## 2024-08-19 RX ORDER — REGADENOSON 0.08 MG/ML
0.4 INJECTION, SOLUTION INTRAVENOUS ONCE
Status: COMPLETED | OUTPATIENT
Start: 2024-08-19 | End: 2024-08-19

## 2024-08-19 RX ADMIN — TETROFOSMIN 10.44 MILLICURIE: 1.38 INJECTION, POWDER, LYOPHILIZED, FOR SOLUTION INTRAVENOUS at 10:08

## 2024-08-19 RX ADMIN — TETROFOSMIN 29.5 MILLICURIE: 1.38 INJECTION, POWDER, LYOPHILIZED, FOR SOLUTION INTRAVENOUS at 12:08

## 2024-08-19 RX ADMIN — REGADENOSON 0.4 MG: 0.08 INJECTION, SOLUTION INTRAVENOUS at 12:08

## 2024-08-19 NOTE — TELEPHONE ENCOUNTER
Left message to patient. Read Dr. Porter result note for stress test. Advice patient to call back if she needed results note repeated.     James Chowdhury MD    Stress test result is good. No major blockage to interfere with the blood flow to the heart muscle    Sincerely,  James Porter MD.  Interventional Cardiologist  [facility] [place]

## 2024-08-23 ENCOUNTER — TELEPHONE (OUTPATIENT)
Dept: FAMILY MEDICINE | Facility: CLINIC | Age: 84
End: 2024-08-23
Payer: MEDICARE

## 2024-08-23 NOTE — TELEPHONE ENCOUNTER
----- Message from Mikki Barron sent at 8/23/2024 10:08 AM CDT -----  Type:  Patient Returning Call    Who Called:pt  Who Left Message for Patient:jamir  Does the patient know what this is regarding?:rinvoq medication   Would the patient rather a call back or a response via [facility]? Call   Best Call Back Number:[phone]  Additional Information:

## 2024-08-23 NOTE — TELEPHONE ENCOUNTER
Pt was requesting a new medication be sent in for her sciatica pain.     The name is rinvoq medication

## 2024-08-23 NOTE — TELEPHONE ENCOUNTER
Spoke to pt. Pt wanted an appt to go over her recent test results. I explained that the Cardiologist attempted to call her in regards to those results. I provided the results to her, pt verbally understood.     Pt was requesting a new medication be sent in for her sciatica pain. Pt could not recall the name of the medication and will call back.

## 2024-08-23 NOTE — TELEPHONE ENCOUNTER
----- Message from Ema Fragoso sent at 8/23/2024  9:55 AM CDT -----  Type:  Sooner Apoointment Request    Caller is requesting a sooner appointment.  Caller declined first available appointment listed below.  Caller will not accept being placed on the waitlist and is requesting a message be sent to doctor.  Name of Caller: Pt   When is the first available appointment? 10/18  Symptoms: f/u for test results   Would the patient rather a call back or a response via [facility]? Call   Best Call Back Number: [phone]   Additional Information:

## 2024-08-26 NOTE — TELEPHONE ENCOUNTER
Marc Sands MD  You13 hours ago (9:17 PM)       It's not for sciatica; it's for rheumatoid arthritis; she would need to see a rheumatologist

## 2024-08-26 NOTE — TELEPHONE ENCOUNTER
I have notified pt. She is interested in seeing rheumatologist.    Pt would like referral for [facility].

## 2024-08-27 DIAGNOSIS — M81.0 OSTEOPOROSIS, UNSPECIFIED OSTEOPOROSIS TYPE, UNSPECIFIED PATHOLOGICAL FRACTURE PRESENCE: ICD-10-CM

## 2024-08-27 NOTE — TELEPHONE ENCOUNTER
Care Due:                  Date            Visit Type   Department     Provider  --------------------------------------------------------------------------------                                EP -                              PRIMARY      [facility] FAMILY  Last Visit: 05-      CARE ([facility])   BYRON Sands                              EP -                              PRIMARY      [facility]  Next Visit: 10-      CARE ([facility])   BYRON Sands                                                            Last  Test          Frequency    Reason                     Performed    Due Date  --------------------------------------------------------------------------------    Lipid Panel.  12 months..  rosuvastatin.............  07- 07-    Health [facility] Embedded Care Due Messages. Reference number: [id number].   8/27/2024 9:39:11 AM CDT

## 2024-08-28 ENCOUNTER — TELEPHONE (OUTPATIENT)
Dept: FAMILY MEDICINE | Facility: CLINIC | Age: 84
End: 2024-08-28
Payer: MEDICARE

## 2024-08-28 RX ORDER — ALENDRONATE SODIUM 70 MG/1
TABLET ORAL
Qty: 12 TABLET | Refills: 3 | Status: SHIPPED | OUTPATIENT
Start: 2024-08-28

## 2024-08-28 NOTE — TELEPHONE ENCOUNTER
----- Message from Ema Fragoso sent at 8/28/2024  4:21 PM CDT -----  Type:  Patient Returning Call    Who Called: Pt   Who Left Message for Patient: Nelly   Does the patient know what this is regarding?: returning missed call   Would the patient rather a call back or a response via [facility]? Call   Best Call Back Number: [phone]   Additional Information:

## 2024-09-06 ENCOUNTER — TELEPHONE (OUTPATIENT)
Dept: FAMILY MEDICINE | Facility: CLINIC | Age: 84
End: 2024-09-06
Payer: MEDICARE

## 2024-09-06 NOTE — TELEPHONE ENCOUNTER
Patient has appt scheduled for 10/18/2024; appt reminder letter mailed    ----- Message from Nelly Martinez sent at 9/6/2024  3:13 PM CDT -----  Hello Team,    Pt needs a follow up appt with Dr. Sands.      Thank you,  Nelly

## 2024-09-10 ENCOUNTER — TELEPHONE (OUTPATIENT)
Dept: FAMILY MEDICINE | Facility: CLINIC | Age: 84
End: 2024-09-10
Payer: MEDICARE

## 2024-09-10 NOTE — TELEPHONE ENCOUNTER
----- Message from Ema Fragoso sent at 9/10/2024  4:36 PM CDT -----  Type:  Patient Requesting Call    Who Called: Pt   Does the patient know what this is regarding?: personal   Would the patient rather a call back or a response via [facility]? Call   Best Call Back Number: [phone]   Additional Information:

## 2024-09-11 NOTE — TELEPHONE ENCOUNTER
I left message for the pt to call back when the office opens if she still has any questions or concerns

## 2024-09-11 NOTE — TELEPHONE ENCOUNTER
Message  Received: Yesterday   Pt Advice  Audra Mcdermott Staff  Caller: Unspecified (Yesterday,  4:56 PM)  Type:  Patient Returning Call    Who Called:pt  Who Left Message for Patient:Britany Terry  Does the patient know what this is regarding?:returning call  Would the patient rather a call back or a response via [facility]? call  Best Call Back Number: [phone]  Additional Information:

## 2024-10-04 RX ORDER — ISOSORBIDE MONONITRATE 60 MG/1
TABLET, EXTENDED RELEASE ORAL
Qty: 90 TABLET | Refills: 3 | Status: SHIPPED | OUTPATIENT
Start: 2024-10-04

## 2024-10-04 NOTE — TELEPHONE ENCOUNTER
Care Due:                  Date            Visit Type   Department     Provider  --------------------------------------------------------------------------------                                EP -                              PRIMARY      [facility] FAMILY  Last Visit: 05-      CARE ([facility])   BYRON Sands                               -                              PRIMARY      [facility]  Next Visit: 10-      CARE ([facility])   BYRON Sands                                                            Last  Test          Frequency    Reason                     Performed    Due Date  --------------------------------------------------------------------------------    Mg Level....  12 months..  alendronate..............  12-   12-    Phosphate...  12 months..  alendronate..............  12-   12-    Health [facility] Embedded Care Due Messages. Reference number: [id number].   10/04/2024 1:33:15 AM CDT

## 2024-10-04 NOTE — TELEPHONE ENCOUNTER
Refill Routing Note   Medication(s) are not appropriate for processing by [facility] for the following reason(s):        No active prescription written by provider    [facility] action(s):  Defer     Requires labs : Yes             Appointments  past 12m or future 3m with PCP    Date Provider   Last Visit   5/13/2024 Marc Sands MD   Next Visit   10/18/2024 Marc Sands MD   ED visits in past 90 days: 0        Note composed:11:32 AM 10/04/2024

## 2024-10-18 ENCOUNTER — OFFICE VISIT (OUTPATIENT)
Dept: FAMILY MEDICINE | Facility: CLINIC | Age: 84
End: 2024-10-18
Payer: MEDICARE

## 2024-10-18 VITALS
OXYGEN SATURATION: 92 % | SYSTOLIC BLOOD PRESSURE: 110 MMHG | BODY MASS INDEX: 36.71 KG/M2 | HEART RATE: 73 BPM | TEMPERATURE: 98 F | HEIGHT: 62 IN | WEIGHT: 199.5 LBS | DIASTOLIC BLOOD PRESSURE: 62 MMHG

## 2024-10-18 DIAGNOSIS — J44.9 CHRONIC OBSTRUCTIVE PULMONARY DISEASE, UNSPECIFIED COPD TYPE: ICD-10-CM

## 2024-10-18 DIAGNOSIS — G25.81 RESTLESS LEGS SYNDROME: ICD-10-CM

## 2024-10-18 DIAGNOSIS — E66.01 SEVERE OBESITY (BMI 35.0-39.9) WITH COMORBIDITY: ICD-10-CM

## 2024-10-18 DIAGNOSIS — F32.A DEPRESSION, UNSPECIFIED DEPRESSION TYPE: ICD-10-CM

## 2024-10-18 DIAGNOSIS — M19.90 ARTHRITIS: ICD-10-CM

## 2024-10-18 DIAGNOSIS — R73.9 HYPERGLYCEMIA: ICD-10-CM

## 2024-10-18 DIAGNOSIS — R41.3 OTHER AMNESIA: ICD-10-CM

## 2024-10-18 DIAGNOSIS — M54.32 SCIATICA OF LEFT SIDE: ICD-10-CM

## 2024-10-18 DIAGNOSIS — M25.561 CHRONIC PAIN OF RIGHT KNEE: ICD-10-CM

## 2024-10-18 DIAGNOSIS — I27.20 PULMONARY HYPERTENSION: ICD-10-CM

## 2024-10-18 DIAGNOSIS — E78.5 HYPERLIPIDEMIA, UNSPECIFIED HYPERLIPIDEMIA TYPE: ICD-10-CM

## 2024-10-18 DIAGNOSIS — Z78.9 ADVANCE DIRECTIVE IN CHART: ICD-10-CM

## 2024-10-18 DIAGNOSIS — I47.29 VENTRICULAR TACHYCARDIA (PAROXYSMAL): ICD-10-CM

## 2024-10-18 DIAGNOSIS — G25.81 RESTLESS LEG SYNDROME: ICD-10-CM

## 2024-10-18 DIAGNOSIS — G89.29 CHRONIC PAIN OF RIGHT KNEE: ICD-10-CM

## 2024-10-18 DIAGNOSIS — Z23 NEED FOR INFLUENZA VACCINATION: Primary | ICD-10-CM

## 2024-10-18 DIAGNOSIS — Z87.891 EX-SMOKER: ICD-10-CM

## 2024-10-18 DIAGNOSIS — Z86.79 HISTORY OF HYPERTENSION: ICD-10-CM

## 2024-10-18 DIAGNOSIS — I51.89 DIASTOLIC DYSFUNCTION: ICD-10-CM

## 2024-10-18 PROBLEM — M79.3 PANNICULITIS: Status: RESOLVED | Noted: 2020-10-23 | Resolved: 2024-10-18

## 2024-10-18 PROBLEM — R79.89 ELEVATED LFTS: Status: RESOLVED | Noted: 2017-01-26 | Resolved: 2024-10-18

## 2024-10-18 PROBLEM — E46 PROTEIN-CALORIE MALNUTRITION, UNSPECIFIED SEVERITY: Status: RESOLVED | Noted: 2023-07-17 | Resolved: 2024-10-18

## 2024-10-18 RX ORDER — ROPINIROLE 4 MG/1
4 TABLET, FILM COATED ORAL NIGHTLY
Qty: 90 TABLET | Refills: 3 | Status: SHIPPED | OUTPATIENT
Start: 2024-10-18

## 2024-10-18 RX ORDER — TIRZEPATIDE 2.5 MG/.5ML
2.5 INJECTION, SOLUTION SUBCUTANEOUS
Qty: 4 PEN | Refills: 11 | Status: SHIPPED | OUTPATIENT
Start: 2024-10-18

## 2024-10-18 RX ORDER — GABAPENTIN 300 MG/1
600 CAPSULE ORAL NIGHTLY
Qty: 180 CAPSULE | Refills: 3 | Status: SHIPPED | OUTPATIENT
Start: 2024-10-18

## 2024-10-18 NOTE — PROGRESS NOTES
"Subjective:      Patient ID: Shirley F Gilford is a 84 y.o. female.    Chief Complaint: Annual Exam      Vitals:    10/18/24 1039   BP: 110/62   Pulse: 73   Temp: 97.7 °F (36.5 °C)   TempSrc: Oral   SpO2: (!) 92%   Weight: 90.5 kg (199 lb 8.3 oz)   Height: 5' 2" (1.575 m)        HPI   Touble sleeing, RLS, sciatica left, muscle spasms, spends time in her recliner\  Lotion therworks helps  Muscle spasms  Has COPD  ALMONTE  Coughs, taking all meds  Will stop crewstor due to leg muscle pain  Increase RLS to 4 mg  Increase to 600 mg gabap[entin      Problem List  Patient Active Problem List   Diagnosis    Osteoporosis    History of hypertension    History of hyperlipidemia    Body mass index (BMI) of 33.0 to 33.9 in adult    Restless leg syndrome    Chronic obstructive pulmonary disease    Fatty liver    Iron deficiency    Cellulitis of left lower extremity    Hyperlipidemia    Vitamin D deficiency    Pulmonary hypertension    COVID-19    Chronic pain of right knee    Diastolic dysfunction    Left atrial enlargement    Cardiac mass    Advance directive in chart    Ventricular tachycardia    Ventricular tachycardia (paroxysmal)    Intraparenchymal hematoma of brain    Palliative care encounter    Abdominal aneurysm    Sciatica of left side    Depression    Other amnesia    Severe obesity (BMI 35.0-39.9) with comorbidity        ALLERGIES:   Review of patient's allergies indicates:   Allergen Reactions    Covid-19vacc,(astrazeneca)(pf)      Fever, headache, weak    Contrast media Swelling       MEDS:   Current Outpatient Medications:     albuterol (PROVENTIL/VENTOLIN HFA) 90 mcg/actuation inhaler, INHALE 2 PUFFS INTO THE LUNGS EVERY 6 HOURS AS NEEDED FOR WHEEZING, Disp: 20.1 g, Rfl: 3    alendronate (FOSAMAX) 70 MG tablet, TAKE 1 TABLET BY MOUTH EVERY 7 DAYS, Disp: 12 tablet, Rfl: 3    amiodarone (PACERONE) 200 MG Tab, Take 1 tablet (200 mg total) by mouth once daily., Disp: 30 tablet, Rfl: 3    calcium-vitamin D3 (OS-MIKE 500 + " D3) 500 mg-5 mcg (200 unit) per tablet, Take 1 tablet by mouth once daily., Disp: , Rfl:     cyanocobalamin 500 MCG tablet, Take 500 mcg by mouth once daily., Disp: , Rfl:     donepeziL (ARICEPT) 5 MG tablet, Take 1 tablet (5 mg total) by mouth every evening. For memory, Disp: 30 tablet, Rfl: 11    EScitalopram oxalate (LEXAPRO) 5 MG Tab, Take 1 tablet (5 mg total) by mouth once daily., Disp: 90 tablet, Rfl: 3    fluticasone propionate (FLONASE) 50 mcg/actuation nasal spray, 2 sprays (100 mcg total) by Each Nare route once daily., Disp: 1 Bottle, Rfl: 12    fluticasone-umeclidin-vilanter (TRELEGY ELLIPTA) 100-62.5-25 mcg DsDv, INHALE 1 PUFF BY MOUTH INTO THE LUNGS ONCE A DAY, Disp: 180 each, Rfl: 3    furosemide (LASIX) 20 MG tablet, 2 tablets in the morning and 1 at night, Disp: 60 tablet, Rfl: 11    isosorbide mononitrate (IMDUR) 60 MG 24 hr tablet, TAKE 1 TABLET BY MOUTH EVERY DAY FOR HEART AND BLOOD PRESSURE, Disp: 90 tablet, Rfl: 3    loratadine (CLARITIN) 10 mg tablet, Take 1 tablet (10 mg total) by mouth once daily., Disp: 90 tablet, Rfl: 3    montelukast (SINGULAIR) 10 mg tablet, TAKE 1 TABLET BY MOUTH EVERY EVENING, Disp: 90 tablet, Rfl: 3    MULTIVIT-IRON-MIN-FOLIC ACID 3,500-18-0.4 UNIT-MG-MG ORAL CHEW, Take by mouth., Disp: , Rfl:     pantoprazole (PROTONIX) 40 MG tablet, TAKE 1 TABLET BY MOUTH EVERY DAY, Disp: 90 tablet, Rfl: 3    predniSONE (DELTASONE) 10 MG tablet, Start 3 tablets for 3 days, then 2 tablets for 3 days, then 1 tablet for 3 days, then stop., Disp: 30 tablet, Rfl: 0    tiotropium (SPIRIVA WITH HANDIHALER) 18 mcg inhalation capsule, Inhale 1 capsule (18 mcg total) into the lungs nightly. Controller, Disp: 60 capsule, Rfl: 3    gabapentin (NEURONTIN) 300 MG capsule, Take 2 capsules (600 mg total) by mouth every evening. For sciatic nerve pain, Disp: 180 capsule, Rfl: 3    rOPINIRole (REQUIP) 4 MG tablet, Take 1 tablet (4 mg total) by mouth every evening., Disp: 90 tablet, Rfl: 3  No  current facility-administered medications for this visit.      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    Hyperglycemia 01/26/2017    Hyperlipidemia     Hypertension     Morbid (severe) obesity due to excess calories 07/17/2023    Osteoporosis     Palliative care encounter 12/15/2023    RLS (restless legs syndrome)      Past Surgical History:   Procedure Laterality Date    BLADDER SURGERY      cancer Mahamed Kidd    BREAST SURGERY      left benign tumor    COLONOSCOPY      EPIDURAL STEROID INJECTION INTO LUMBAR SPINE Left 1/8/2024    Procedure: LT L4-5 and L5-S1 TFESI;  Surgeon: Abran Lloyd MD;  Location: [facility];  Service: Pain Management;  Laterality: Left;  30 mins    EYE SURGERY      cataracts    TONSILLECTOMY       Social History     Tobacco Use    Smoking status: Former     Passive exposure: Past    Smokeless tobacco: Never   Substance Use Topics    Alcohol use: No    Drug use: Never         Review of Systems  Objective:     Physical Exam        Assessment:     1. Need for influenza vaccination    2. Chronic obstructive pulmonary disease, unspecified COPD type    3. Ventricular tachycardia (paroxysmal)    4. Advance directive in chart    5. Sciatica of left side    6. Arthritis    7. Pulmonary hypertension    8. Hyperglycemia    9. Ex-smoker    10. Hyperlipidemia, unspecified hyperlipidemia type    11. Restless legs syndrome    12. Restless leg syndrome    13.  Other amnesia    14. Depression, unspecified depression type    15. Diastolic dysfunction    16. History of hypertension    17. Chronic pain of right knee      Plan:        Medication List            Accurate as of October 18, 2024 11:30 AM. If you have any questions, ask your nurse or doctor.                CHANGE how you take these medications      gabapentin 300 MG capsule  Commonly known as: NEURONTIN  Take 2 capsules (600 mg total) by mouth every evening. For sciatic nerve pain  What changed: how much to take  Changed by: Marc Sands MD     rOPINIRole 4 MG tablet  Commonly known as: REQUIP  Take 1 tablet (4 mg total) by mouth every evening.  What changed:   medication strength  how much to take  Changed by: Marc Sands MD            CONTINUE taking these medications      albuterol 90 mcg/actuation inhaler  Commonly known as: PROVENTIL/VENTOLIN HFA  INHALE 2 PUFFS INTO THE LUNGS EVERY 6 HOURS AS NEEDED FOR WHEEZING     alendronate 70 MG tablet  Commonly known as: FOSAMAX  TAKE 1 TABLET BY MOUTH EVERY 7 DAYS     amiodarone 200 MG Tab  Commonly known as: PACERONE  Take 1 tablet (200 mg total) by mouth once daily.     calcium-vitamin D3 500 mg-5 mcg (200 unit) per tablet  Commonly known as: OS-MIKE 500 + D3     cyanocobalamin 500 MCG tablet     donepeziL 5 MG tablet  Commonly known as: ARICEPT  Take 1 tablet (5 mg total) by mouth every evening. For memory     EScitalopram oxalate 5 MG Tab  Commonly known as: LEXAPRO  Take 1 tablet (5 mg total) by mouth once daily.     fluticasone propionate 50 mcg/actuation nasal spray  Commonly known as: FLONASE  2 sprays (100 mcg total) by Each Nare route once daily.     furosemide 20 MG tablet  Commonly known as: LASIX  2 tablets in the morning and 1 at night     isosorbide mononitrate 60 MG 24 hr tablet  Commonly known as: IMDUR  TAKE 1 TABLET BY MOUTH EVERY DAY FOR HEART AND BLOOD PRESSURE     loratadine 10 mg tablet  Commonly known as: CLARITIN  Take 1 tablet (10 mg  total) by mouth once daily.     montelukast 10 mg tablet  Commonly known as: SINGULAIR  TAKE 1 TABLET BY MOUTH EVERY EVENING     multivit-iron-min-folic acid 3,500-18-0.4 unit-mg-mg Chew     pantoprazole 40 MG tablet  Commonly known as: PROTONIX  TAKE 1 TABLET BY MOUTH EVERY DAY     predniSONE 10 MG tablet  Commonly known as: DELTASONE  Start 3 tablets for 3 days, then 2 tablets for 3 days, then 1 tablet for 3 days, then stop.     tiotropium 18 mcg inhalation capsule  Commonly known as: SPIRIVA WITH HANDIHALER  Inhale 1 capsule (18 mcg total) into the lungs nightly. Controller     TRELEGY ELLIPTA 100-62.5-25 mcg Dsdv  Generic drug: fluticasone-umeclidin-vilanter  INHALE 1 PUFF BY MOUTH INTO THE LUNGS ONCE A DAY            STOP taking these medications      rosuvastatin 5 MG tablet  Commonly known as: CRESTOR  Stopped by: Marc Sands MD               Where to Get Your Medications        These medications were sent to [id number] [address] [address]      Phone: [phone]   gabapentin 300 MG capsule  rOPINIRole 4 MG tablet       Need for influenza vaccination  -     influenza (adjuvanted) (Fluad) 45 mcg/0.5 mL IM vaccine (> or = 66 yo) 0.5 mL    Chronic obstructive pulmonary disease, unspecified COPD type  -     gabapentin (NEURONTIN) 300 MG capsule; Take 2 capsules (600 mg total) by mouth every evening. For sciatic nerve pain  Dispense: 180 capsule; Refill: 3    Ventricular tachycardia (paroxysmal)  -     gabapentin (NEURONTIN) 300 MG capsule; Take 2 capsules (600 mg total) by mouth every evening. For sciatic nerve pain  Dispense: 180 capsule; Refill: 3    Advance directive in chart  -     gabapentin (NEURONTIN) 300 MG capsule; Take 2 capsules (600 mg total) by mouth every evening. For sciatic nerve pain  Dispense: 180 capsule; Refill: 3    Sciatica of left side  -     gabapentin (NEURONTIN) 300 MG capsule; Take 2 capsules (600 mg total) by  mouth every evening. For sciatic nerve pain  Dispense: 180 capsule; Refill: 3    Arthritis  -     gabapentin (NEURONTIN) 300 MG capsule; Take 2 capsules (600 mg total) by mouth every evening. For sciatic nerve pain  Dispense: 180 capsule; Refill: 3    Pulmonary hypertension  -     gabapentin (NEURONTIN) 300 MG capsule; Take 2 capsules (600 mg total) by mouth every evening. For sciatic nerve pain  Dispense: 180 capsule; Refill: 3    Hyperglycemia  -     gabapentin (NEURONTIN) 300 MG capsule; Take 2 capsules (600 mg total) by mouth every evening. For sciatic nerve pain  Dispense: 180 capsule; Refill: 3    Ex-smoker  -     gabapentin (NEURONTIN) 300 MG capsule; Take 2 capsules (600 mg total) by mouth every evening. For sciatic nerve pain  Dispense: 180 capsule; Refill: 3    Hyperlipidemia, unspecified hyperlipidemia type  -     gabapentin (NEURONTIN) 300 MG capsule; Take 2 capsules (600 mg total) by mouth every evening. For sciatic nerve pain  Dispense: 180 capsule; Refill: 3    Restless legs syndrome  -     rOPINIRole (REQUIP) 4 MG tablet; Take 1 tablet (4 mg total) by mouth every evening.  Dispense: 90 tablet; Refill: 3    Restless leg syndrome    Other amnesia    Depression, unspecified depression type    Diastolic dysfunction    History of hypertension    Chronic pain of right knee      Wants injection to lose weight  Rtc 3 weeks

## 2024-11-25 NOTE — PLAN OF CARE
Our mutual patient is scheduled for a procedure: EGD    On 12/22/24    With: Dr. Stevenson    He/She is taking the following blood thinner: Eliquis    Can this be stopped 2 Days prior to the procedure?    Physician approving Clearance: LUZ MARIA Denton    Last dose will be:  12/08/24    Discharge instructions provided to the patient. Patient verbalized understanding of the instructions.  IV removed, cath tip intact.  VSS.   No concerns voiced. Escorted patient via wheelchair to family member awaiting in private vehicle.

## 2024-12-24 NOTE — TELEPHONE ENCOUNTER
VIOLET Patient in office for BP check today  Patient is asymptomatic.   Patient BP was elevated at her last visit due to stress and worry concerning her friend.      Blood pressure reading after 15 minutes was 138/86, Pulse 68.     Yes

## 2024-12-26 ENCOUNTER — TELEPHONE (OUTPATIENT)
Dept: FAMILY MEDICINE | Facility: CLINIC | Age: 84
End: 2024-12-26
Payer: MEDICARE

## 2024-12-26 DIAGNOSIS — J44.9 CHRONIC OBSTRUCTIVE PULMONARY DISEASE, UNSPECIFIED COPD TYPE: Primary | ICD-10-CM

## 2024-12-26 NOTE — TELEPHONE ENCOUNTER
----- Message from Rishi sent at 12/26/2024  9:11 AM CST -----  Contact: pt  Type: Requesting to speak with nurse        Who Called: PT  Regarding: need medicine for cough and throat pain   Would the patient rather a call back or a response via [facility]? Call back  Best Call Back Number: [phone]   Additional Information:  [id number]   Phone: [phone]  Fax: [phone]

## 2024-12-27 RX ORDER — CODEINE PHOSPHATE AND GUAIFENESIN 10; 100 MG/5ML; MG/5ML
10 SOLUTION ORAL EVERY 4 HOURS PRN
Qty: 240 ML | Refills: 0 | Status: SHIPPED | OUTPATIENT
Start: 2024-12-27 | End: 2025-01-06

## 2025-02-25 ENCOUNTER — TELEPHONE (OUTPATIENT)
Dept: FAMILY MEDICINE | Facility: CLINIC | Age: 85
End: 2025-02-25
Payer: MEDICARE

## 2025-02-25 NOTE — TELEPHONE ENCOUNTER
Patient refused appt today with ivette    Only wants to see Dr Sands   He is OOO today    Apt scheduled to see pt tomorrow

## 2025-02-25 NOTE — TELEPHONE ENCOUNTER
----- Message from Racheal sent at 2/25/2025 10:36 AM CST -----  Type:  Needs Medical AdviceWho Called: ptSymptoms (please be specific): Symptom: Leg Swelling - Not From InjuryOutcome: Schedule an appointment to be seen within 24 hours.Reason: Caller denied all higher acuity questions How long has patient had these symptoms:  3 daysWould the patient rather a call back or a response via [facility]? Call Best Call Back Number:  [phone] Information: Please be advised, pt is scheduled on 02/27 with Dr Trimble on 02/27 however, pt stated she wants to see her dr and that's too long to wait until ThursdayPt stated it hurts to touch leg/ feet due to extreme swelling

## 2025-02-26 ENCOUNTER — OFFICE VISIT (OUTPATIENT)
Dept: FAMILY MEDICINE | Facility: CLINIC | Age: 85
End: 2025-02-26
Payer: MEDICARE

## 2025-02-26 VITALS
HEART RATE: 79 BPM | HEIGHT: 62 IN | DIASTOLIC BLOOD PRESSURE: 74 MMHG | BODY MASS INDEX: 35.94 KG/M2 | SYSTOLIC BLOOD PRESSURE: 136 MMHG | WEIGHT: 195.31 LBS | OXYGEN SATURATION: 95 % | TEMPERATURE: 98 F

## 2025-02-26 DIAGNOSIS — J44.9 CHRONIC OBSTRUCTIVE PULMONARY DISEASE, UNSPECIFIED COPD TYPE: ICD-10-CM

## 2025-02-26 DIAGNOSIS — I47.29 VENTRICULAR TACHYCARDIA (PAROXYSMAL): ICD-10-CM

## 2025-02-26 DIAGNOSIS — E66.01 SEVERE OBESITY (BMI 35.0-39.9) WITH COMORBIDITY: ICD-10-CM

## 2025-02-26 DIAGNOSIS — I27.20 PULMONARY HYPERTENSION: ICD-10-CM

## 2025-02-26 DIAGNOSIS — L03.119 CELLULITIS OF LOWER EXTREMITY, UNSPECIFIED LATERALITY: Primary | ICD-10-CM

## 2025-02-26 RX ORDER — CEPHALEXIN 500 MG/1
500 CAPSULE ORAL 2 TIMES DAILY
Qty: 20 CAPSULE | Refills: 0 | Status: SHIPPED | OUTPATIENT
Start: 2025-02-26

## 2025-02-26 RX ORDER — BUMETANIDE 2 MG/1
2 TABLET ORAL 2 TIMES DAILY PRN
Qty: 30 TABLET | Refills: 11 | Status: SHIPPED | OUTPATIENT
Start: 2025-02-26 | End: 2026-02-26

## 2025-02-26 NOTE — PROGRESS NOTES
"Subjective:      Patient ID: Shirley F Gilford is a 84 y.o. female.    Chief Complaint: Leg Pain (swelling)      Vitals:    02/26/25 1609   BP: 136/74   Pulse: 79   Temp: 97.7 °F (36.5 °C)   TempSrc: Oral   SpO2: 95%   Weight: 88.6 kg (195 lb 5.2 oz)   Height: 5' 2" (1.575 m)        HPI   Legs red, swollne and painful since Friday; no fever; lips chapped, has been elevating them;    Problem List  Problem List[1]     ALLERGIES:   Review of patient's allergies indicates:   Allergen Reactions    Covid-19vacc,(astrazeneca)(pf)      Fever, headache, weak    Contrast media Swelling       MEDS: Current Medications[2]      History:  Current Providers as of 2/26/2025  PCP: Marc Sands MD  Care Team Provider: Isabel Griffin MD  Care Team Provider: Malu Camarillo LPN  Care Team Provider: Abran Lloyd MD  Encounter Provider: Marc Sands MD, starting on Wed Feb 26, 2025 12:00 AM  Referring Provider: not found, starting on Wed Feb 26, 2025 12:00 AM  Consulting Physician: Marc Sands MD, starting on Wed Feb 26, 2025  4:02 PM (Active)   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    Hyperglycemia 01/26/2017    Hyperlipidemia     Hypertension     Morbid (severe) obesity due to excess calories 07/17/2023    Osteoporosis     Palliative care encounter 12/15/2023    RLS (restless legs syndrome)      Past Surgical History:   Procedure Laterality Date    BLADDER SURGERY      cancer Mahamed Kidd    BREAST SURGERY      left benign tumor    COLONOSCOPY      EPIDURAL STEROID INJECTION INTO LUMBAR SPINE Left 1/8/2024    Procedure: LT L4-5 and L5-S1 TFESI;  Surgeon: Abran Lloyd MD;  Location: [facility] beds at the [facility]IN [facility];  Service: Pain Management;  Laterality: Left;  30 mins    EYE SURGERY      cataracts    TONSILLECTOMY       Social History[3]      Review of Systems   Constitutional:  Positive for appetite change. Negative " for fever.   HENT:  Positive for mouth sores.    Cardiovascular:  Positive for leg swelling.   All other systems reviewed and are negative.    Objective:     Physical Exam  Vitals and nursing note reviewed.   Constitutional:       Appearance: She is well-developed.   HENT:      Head: Normocephalic.   Eyes:      Conjunctiva/sclera: Conjunctivae normal.      Pupils: Pupils are equal, round, and reactive to light.   Cardiovascular:      Rate and Rhythm: Normal rate and regular rhythm.      Heart sounds: Normal heart sounds.   Pulmonary:      Effort: Pulmonary effort is normal.      Breath sounds: Normal breath sounds.   Musculoskeletal:         General: Normal range of motion.      Cervical back: Normal range of motion and neck supple.   Skin:     General: Skin is warm and dry.      Findings: Erythema and rash present.   Neurological:      General: No focal deficit present.      Mental Status: She is alert and oriented to person, place, and time. Mental status is at baseline.      Deep Tendon Reflexes: Reflexes are normal and symmetric.   Psychiatric:         Mood and Affect: Mood normal.         Behavior: Behavior normal.         Thought Content: Thought content normal.         Judgment: Judgment normal.             Assessment:     1. Cellulitis of lower extremity, unspecified laterality    2. Severe obesity (BMI 35.0-39.9) with comorbidity    3. Pulmonary hypertension    4. Ventricular tachycardia (paroxysmal)    5. Chronic obstructive pulmonary disease, unspecified COPD type      Plan:        Medication List            Accurate as of February 26, 2025 11:59 PM. If you have any questions, ask your nurse or doctor.                START taking these medications      bumetanide 2 MG tablet  Commonly known as: BUMEX  Take 1 tablet (2 mg total) by mouth 2 (two) times daily as needed.  Started by: Marc Sands MD     cephALEXin 500 MG capsule  Commonly known as: KEFLEX  Take 1 capsule (500 mg total) by mouth 2 (two)  times daily. For infection  Started by: Marc Sands MD            CONTINUE taking these medications      albuterol 90 mcg/actuation inhaler  Commonly known as: PROVENTIL/VENTOLIN HFA  INHALE 2 PUFFS INTO THE LUNGS EVERY 6 HOURS AS NEEDED FOR WHEEZING     alendronate 70 MG tablet  Commonly known as: FOSAMAX  TAKE 1 TABLET BY MOUTH EVERY 7 DAYS     amiodarone 200 MG Tab  Commonly known as: PACERONE  Take 1 tablet (200 mg total) by mouth once daily.     calcium-vitamin D3 500 mg-5 mcg (200 unit) per tablet  Commonly known as: OS-MIKE 500 + D3     cyanocobalamin 500 MCG tablet     donepeziL 5 MG tablet  Commonly known as: ARICEPT  Take 1 tablet (5 mg total) by mouth every evening. For memory     EScitalopram oxalate 5 MG Tab  Commonly known as: LEXAPRO  Take 1 tablet (5 mg total) by mouth once daily.     fluticasone propionate 50 mcg/actuation nasal spray  Commonly known as: FLONASE  2 sprays (100 mcg total) by Each Nare route once daily.     gabapentin 300 MG capsule  Commonly known as: NEURONTIN  Take 2 capsules (600 mg total) by mouth every evening. For sciatic nerve pain     isosorbide mononitrate 60 MG 24 hr tablet  Commonly known as: IMDUR  TAKE 1 TABLET BY MOUTH EVERY DAY FOR HEART AND BLOOD PRESSURE     loratadine 10 mg tablet  Commonly known as: CLARITIN  Take 1 tablet (10 mg total) by mouth once daily.     montelukast 10 mg tablet  Commonly known as: SINGULAIR  TAKE 1 TABLET BY MOUTH EVERY EVENING     multivit-iron-min-folic acid 3,500-18-0.4 unit-mg-mg Chew     pantoprazole 40 MG tablet  Commonly known as: PROTONIX  TAKE 1 TABLET BY MOUTH EVERY DAY     predniSONE 10 MG tablet  Commonly known as: DELTASONE  Start 3 tablets for 3 days, then 2 tablets for 3 days, then 1 tablet for 3 days, then stop.     rOPINIRole 4 MG tablet  Commonly known as: REQUIP  Take 1 tablet (4 mg total) by mouth every evening.     tiotropium 18 mcg inhalation capsule  Commonly known as: SPIRIVA WITH HANDIHALER  Inhale 1 capsule (18  mcg total) into the lungs nightly. Controller     TRELEGY ELLIPTA 100-62.5-25 mcg Dsdv  Generic drug: fluticasone-umeclidin-vilanter  INHALE 1 PUFF BY MOUTH INTO THE LUNGS ONCE A DAY     ZEPBOUND 2.5 mg/0.5 mL Pnij  Generic drug: tirzepatide (weight loss)  Inject 2.5 mg into the skin every 7 days.            STOP taking these medications      furosemide 20 MG tablet  Commonly known as: LASIX  Stopped by: Marc Sands MD               Where to Get Your Medications        These medications were sent to [id number] - [place], [place] - [address]  [address] [address]      Phone: [phone]   bumetanide 2 MG tablet  cephALEXin 500 MG capsule       Cellulitis of lower extremity, unspecified laterality    Severe obesity (BMI 35.0-39.9) with comorbidity  -     CBC Auto Differential; Future; Expected date: 02/26/2025  -     Comprehensive Metabolic Panel; Future; Expected date: 02/26/2025  -     Sedimentation rate; Future  -     Urinalysis, Reflex to Urine Culture Urine, Clean Catch; Future; Expected date: 02/26/2025    Pulmonary hypertension  -     CBC Auto Differential; Future; Expected date: 02/26/2025  -     Comprehensive Metabolic Panel; Future; Expected date: 02/26/2025  -     Sedimentation rate; Future  -     Urinalysis, Reflex to Urine Culture Urine, Clean Catch; Future; Expected date: 02/26/2025    Ventricular tachycardia (paroxysmal)  -     CBC Auto Differential; Future; Expected date: 02/26/2025  -     Comprehensive Metabolic Panel; Future; Expected date: 02/26/2025  -     Sedimentation rate; Future  -     Urinalysis, Reflex to Urine Culture Urine, Clean Catch; Future; Expected date: 02/26/2025    Chronic obstructive pulmonary disease, unspecified COPD type  -     CBC Auto Differential; Future; Expected date: 02/26/2025  -     Comprehensive Metabolic Panel; Future; Expected date: 02/26/2025  -     Sedimentation rate; Future  -     Urinalysis, Reflex to Urine Culture  Urine, Clean Catch; Future; Expected date: 02/26/2025    Other orders  -     cephALEXin (KEFLEX) 500 MG capsule; Take 1 capsule (500 mg total) by mouth 2 (two) times daily. For infection  Dispense: 20 capsule; Refill: 0  -     bumetanide (BUMEX) 2 MG tablet; Take 1 tablet (2 mg total) by mouth 2 (two) times daily as needed.  Dispense: 30 tablet; Refill: 11             [1]   Patient Active Problem List  Diagnosis    Osteoporosis    History of hypertension    History of hyperlipidemia    Body mass index (BMI) of 33.0 to 33.9 in adult    Restless leg syndrome    Chronic obstructive pulmonary disease    Fatty liver    Iron deficiency    Cellulitis of left lower extremity    Hyperlipidemia    Vitamin D deficiency    Pulmonary hypertension    COVID-19    Chronic pain of right knee    Diastolic dysfunction    Left atrial enlargement    Cardiac mass    Advance directive in chart    Ventricular tachycardia    Ventricular tachycardia (paroxysmal)    Intraparenchymal hematoma of brain    Palliative care encounter    Abdominal aneurysm    Sciatica of left side    Depression    Other amnesia    Severe obesity (BMI 35.0-39.9) with comorbidity   [2]   Current Outpatient Medications:     albuterol (PROVENTIL/VENTOLIN HFA) 90 mcg/actuation inhaler, INHALE 2 PUFFS INTO THE LUNGS EVERY 6 HOURS AS NEEDED FOR WHEEZING, Disp: 20.1 g, Rfl: 3    alendronate (FOSAMAX) 70 MG tablet, TAKE 1 TABLET BY MOUTH EVERY 7 DAYS, Disp: 12 tablet, Rfl: 3    amiodarone (PACERONE) 200 MG Tab, Take 1 tablet (200 mg total) by mouth once daily., Disp: 30 tablet, Rfl: 3    calcium-vitamin D3 (OS-MIKE 500 + D3) 500 mg-5 mcg (200 unit) per tablet, Take 1 tablet by mouth once daily., Disp: , Rfl:     cyanocobalamin 500 MCG tablet, Take 500 mcg by mouth once daily., Disp: , Rfl:     donepeziL (ARICEPT) 5 MG tablet, Take 1 tablet (5 mg total) by mouth every evening. For memory, Disp: 30 tablet, Rfl: 11    EScitalopram oxalate (LEXAPRO) 5 MG Tab, Take 1 tablet (5 mg  total) by mouth once daily., Disp: 90 tablet, Rfl: 3    fluticasone propionate (FLONASE) 50 mcg/actuation nasal spray, 2 sprays (100 mcg total) by Each Nare route once daily., Disp: 1 Bottle, Rfl: 12    fluticasone-umeclidin-vilanter (TRELEGY ELLIPTA) 100-62.5-25 mcg DsDv, INHALE 1 PUFF BY MOUTH INTO THE LUNGS ONCE A DAY, Disp: 180 each, Rfl: 3    gabapentin (NEURONTIN) 300 MG capsule, Take 2 capsules (600 mg total) by mouth every evening. For sciatic nerve pain, Disp: 180 capsule, Rfl: 3    isosorbide mononitrate (IMDUR) 60 MG 24 hr tablet, TAKE 1 TABLET BY MOUTH EVERY DAY FOR HEART AND BLOOD PRESSURE, Disp: 90 tablet, Rfl: 3    loratadine (CLARITIN) 10 mg tablet, Take 1 tablet (10 mg total) by mouth once daily., Disp: 90 tablet, Rfl: 3    montelukast (SINGULAIR) 10 mg tablet, TAKE 1 TABLET BY MOUTH EVERY EVENING, Disp: 90 tablet, Rfl: 3    MULTIVIT-IRON-MIN-FOLIC ACID 3,500-18-0.4 UNIT-MG-MG ORAL CHEW, Take by mouth., Disp: , Rfl:     pantoprazole (PROTONIX) 40 MG tablet, TAKE 1 TABLET BY MOUTH EVERY DAY, Disp: 90 tablet, Rfl: 3    predniSONE (DELTASONE) 10 MG tablet, Start 3 tablets for 3 days, then 2 tablets for 3 days, then 1 tablet for 3 days, then stop., Disp: 30 tablet, Rfl: 0    rOPINIRole (REQUIP) 4 MG tablet, Take 1 tablet (4 mg total) by mouth every evening., Disp: 90 tablet, Rfl: 3    tiotropium (SPIRIVA WITH HANDIHALER) 18 mcg inhalation capsule, Inhale 1 capsule (18 mcg total) into the lungs nightly. Controller, Disp: 60 capsule, Rfl: 3    tirzepatide, weight loss, (ZEPBOUND) 2.5 mg/0.5 mL PnIj, Inject 2.5 mg into the skin every 7 days., Disp: 4 Pen, Rfl: 11    bumetanide (BUMEX) 2 MG tablet, Take 1 tablet (2 mg total) by mouth 2 (two) times daily as needed., Disp: 30 tablet, Rfl: 11    cephALEXin (KEFLEX) 500 MG capsule, Take 1 capsule (500 mg total) by mouth 2 (two) times daily. For infection, Disp: 20 capsule, Rfl: 0  [3]   Social History  Tobacco Use    Smoking status: Former     Passive exposure:  Past    Smokeless tobacco: Never   Substance Use Topics    Alcohol use: No    Drug use: Never

## 2025-03-12 ENCOUNTER — LAB VISIT (OUTPATIENT)
Dept: LAB | Facility: HOSPITAL | Age: 85
End: 2025-03-12
Attending: FAMILY MEDICINE
Payer: MEDICARE

## 2025-03-12 DIAGNOSIS — I47.29 VENTRICULAR TACHYCARDIA (PAROXYSMAL): ICD-10-CM

## 2025-03-12 DIAGNOSIS — J44.9 CHRONIC OBSTRUCTIVE PULMONARY DISEASE, UNSPECIFIED COPD TYPE: ICD-10-CM

## 2025-03-12 DIAGNOSIS — I27.20 PULMONARY HYPERTENSION: ICD-10-CM

## 2025-03-12 DIAGNOSIS — E66.01 SEVERE OBESITY (BMI 35.0-39.9) WITH COMORBIDITY: ICD-10-CM

## 2025-03-12 LAB
BILIRUB UR QL STRIP: NEGATIVE
CLARITY UR REFRACT.AUTO: CLEAR
COLOR UR AUTO: YELLOW
GLUCOSE UR QL STRIP: NEGATIVE
HGB UR QL STRIP: NEGATIVE
KETONES UR QL STRIP: NEGATIVE
LEUKOCYTE ESTERASE UR QL STRIP: NEGATIVE
NITRITE UR QL STRIP: NEGATIVE
PH UR STRIP: 6 [PH] (ref 5–8)
PROT UR QL STRIP: NEGATIVE
SP GR UR STRIP: 1.02 (ref 1–1.03)
URN SPEC COLLECT METH UR: NORMAL
UROBILINOGEN UR STRIP-ACNC: NEGATIVE EU/DL

## 2025-03-12 PROCEDURE — 81003 URINALYSIS AUTO W/O SCOPE: CPT | Mod: PN | Performed by: FAMILY MEDICINE

## 2025-03-13 ENCOUNTER — OFFICE VISIT (OUTPATIENT)
Dept: FAMILY MEDICINE | Facility: CLINIC | Age: 85
End: 2025-03-13
Payer: MEDICARE

## 2025-03-13 VITALS
HEIGHT: 62 IN | DIASTOLIC BLOOD PRESSURE: 62 MMHG | HEART RATE: 82 BPM | SYSTOLIC BLOOD PRESSURE: 136 MMHG | OXYGEN SATURATION: 95 % | BODY MASS INDEX: 35.06 KG/M2 | TEMPERATURE: 98 F | WEIGHT: 190.5 LBS

## 2025-03-13 DIAGNOSIS — K92.2 ACUTE UPPER GI BLEED: Primary | ICD-10-CM

## 2025-03-13 DIAGNOSIS — D50.9 MICROCYTIC ANEMIA: ICD-10-CM

## 2025-03-13 RX ORDER — PANTOPRAZOLE SODIUM 40 MG/1
40 TABLET, DELAYED RELEASE ORAL 2 TIMES DAILY
Qty: 60 TABLET | Refills: 2 | Status: SHIPPED | OUTPATIENT
Start: 2025-03-13

## 2025-03-13 NOTE — PROGRESS NOTES
"Subjective:      Patient ID: Shirley F Gilford is a 84 y.o. female.    Chief Complaint: Follow-up (2 wk)      Vitals:    03/13/25 0955   BP: 136/62   Pulse: 82   Temp: 98 °F (36.7 °C)   TempSrc: Oral   SpO2: 95%   Weight: 86.4 kg (190 lb 7.6 oz)   Height: 5' 2" (1.575 m)        HPI   2 wweeks follow up; lost 5 ounds; c/o left ring finger pain due to trigger finger  C/o swollen painful feet  Has fever blister; no more mouth sores  I sweitched her to bumex form lasix[facility], rx kflex and had labs  Lsot 5 pounds  Feels good; not bad at all  Labs; urine clear, renal stable, anemia is worse, down to 7.9/31.1 from 11.4/38.7 one months agoan d normal at [place]  No recent admits, no obvious blood loss source  No abd pain; BM normal but dark  Not anticoagulated  No hx of EGD, no hx of GI bleed  Was taking advil; has been takin aleve    Problem List  Problem List[1]     ALLERGIES:   Review of patient's allergies indicates:   Allergen Reactions    Covid-19vacc,(astrazeneca)(pf)      Fever, headache, weak    Contrast media Swelling       MEDS: Current Medications[2]      History:  Current Providers as of 3/13/2025  PCP: Marc Sands MD  Care Team Provider: Isabel Griffin MD  Care Team Provider: Malu Camarillo LPN  Care Team Provider: Abran Lloyd MD  Encounter Provider: Marc Sands MD, starting on Thu Mar 13, 2025 12:00 AM  Referring Provider: not found, starting on Thu Mar 13, 2025 12:00 AM  Consulting Physician: Marc Sands MD, starting on Wed Feb 26, 2025  4:53 PM (Active)   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    Hyperglycemia 01/26/2017    Hyperlipidemia     Hypertension     Morbid (severe) obesity due to excess calories 07/17/2023    Osteoporosis     Palliative care encounter 12/15/2023    RLS (restless legs syndrome)      Past Surgical History:   Procedure Laterality Date    BLADDER SURGERY      " cancer Mahamed Kidd    BREAST SURGERY      left benign tumor    COLONOSCOPY      EPIDURAL STEROID INJECTION INTO LUMBAR SPINE Left 1/8/2024    Procedure: LT L4-5 and L5-S1 TFESI;  Surgeon: Abran Lloyd MD;  [facility];  Service: Pain Management;  Laterality: Left;  30 mins    EYE SURGERY      cataracts    TONSILLECTOMY       Social History[3]      Review of Systems   Constitutional: Negative.    HENT: Negative.     Respiratory: Negative.     Cardiovascular: Negative.    Gastrointestinal: Negative.    Endocrine: Negative.    Genitourinary: Negative.    Musculoskeletal: Negative.    Psychiatric/Behavioral: Negative.     All other systems reviewed and are negative.    Objective:     Physical Exam  Vitals and nursing note reviewed.   Constitutional:       Appearance: She is well-developed. She is obese.   HENT:      Head: Normocephalic.   Eyes:      Conjunctiva/sclera: Conjunctivae normal.      Pupils: Pupils are equal, round, and reactive to light.   Cardiovascular:      Rate and Rhythm: Normal rate and regular rhythm.      Heart sounds: Normal heart sounds.   Pulmonary:      Effort: Pulmonary effort is normal.      Breath sounds: Normal breath sounds.   Musculoskeletal:         General: Normal range of motion.      Cervical back: Normal range of motion and neck supple.   Skin:     General: Skin is warm and dry.      Coloration: Skin is pale.   Neurological:      General: No focal deficit present.      Mental Status: She is alert and oriented to person, place, and time. Mental status is at baseline.      Deep Tendon Reflexes: Reflexes are normal and symmetric.   Psychiatric:         Mood and Affect: Mood normal.         Behavior: Behavior normal.         Thought Content: Thought content normal.         Judgment: Judgment normal.             Assessment:     1. Acute upper GI bleed    2. Microcytic anemia      Plan:        Medication List            Accurate as of March 13, 2025 10:52 AM. If you  have any questions, ask your nurse or doctor.                CONTINUE taking these medications      albuterol 90 mcg/actuation inhaler  Commonly known as: PROVENTIL/VENTOLIN HFA  INHALE 2 PUFFS INTO THE LUNGS EVERY 6 HOURS AS NEEDED FOR WHEEZING     alendronate 70 MG tablet  Commonly known as: FOSAMAX  TAKE 1 TABLET BY MOUTH EVERY 7 DAYS     amiodarone 200 MG Tab  Commonly known as: PACERONE  Take 1 tablet (200 mg total) by mouth once daily.     bumetanide 2 MG tablet  Commonly known as: BUMEX  Take 1 tablet (2 mg total) by mouth 2 (two) times daily as needed.     calcium-vitamin D3 500 mg-5 mcg (200 unit) per tablet  Commonly known as: OS-MIKE 500 + D3     cyanocobalamin 500 MCG tablet     donepeziL 5 MG tablet  Commonly known as: ARICEPT  Take 1 tablet (5 mg total) by mouth every evening. For memory     EScitalopram oxalate 5 MG Tab  Commonly known as: LEXAPRO  Take 1 tablet (5 mg total) by mouth once daily.     fluticasone propionate 50 mcg/actuation nasal spray  Commonly known as: FLONASE  2 sprays (100 mcg total) by Each Nare route once daily.     gabapentin 300 MG capsule  Commonly known as: NEURONTIN  Take 2 capsules (600 mg total) by mouth every evening. For sciatic nerve pain     isosorbide mononitrate 60 MG 24 hr tablet  Commonly known as: IMDUR  TAKE 1 TABLET BY MOUTH EVERY DAY FOR HEART AND BLOOD PRESSURE     loratadine 10 mg tablet  Commonly known as: CLARITIN  Take 1 tablet (10 mg total) by mouth once daily.     montelukast 10 mg tablet  Commonly known as: SINGULAIR  TAKE 1 TABLET BY MOUTH EVERY EVENING     multivit-iron-min-folic acid 3,500-18-0.4 unit-mg-mg Chew     pantoprazole 40 MG tablet  Commonly known as: PROTONIX  TAKE 1 TABLET BY MOUTH EVERY DAY     rOPINIRole 4 MG tablet  Commonly known as: REQUIP  Take 1 tablet (4 mg total) by mouth every evening.     tiotropium 18 mcg inhalation capsule  Commonly known as: SPIRIVA WITH HANDIHALER  Inhale 1 capsule (18 mcg total) into the lungs nightly.  Controller     TRELEGY ELLIPTA 100-62.5-25 mcg Dsdv  Generic drug: fluticasone-umeclidin-vilanter  INHALE 1 PUFF BY MOUTH INTO THE LUNGS ONCE A DAY     ZEPBOUND 2.5 mg/0.5 mL Pnij  Generic drug: tirzepatide (weight loss)  Inject 2.5 mg into the skin every 7 days.            Acute upper GI bleed    Microcytic anemia    Worsening, new microcytic anemia; to GI for EGD/colon  No NSAIDS  Cont bumex           [1]   Patient Active Problem List  Diagnosis    Osteoporosis    History of hypertension    History of hyperlipidemia    Body mass index (BMI) of 33.0 to 33.9 in adult    Restless leg syndrome    Chronic obstructive pulmonary disease    Fatty liver    Iron deficiency    Cellulitis of left lower extremity    Hyperlipidemia    Vitamin D deficiency    Pulmonary hypertension    COVID-19    Chronic pain of right knee    Diastolic dysfunction    Left atrial enlargement    Cardiac mass    Advance directive in chart    Ventricular tachycardia    Ventricular tachycardia (paroxysmal)    Intraparenchymal hematoma of brain    Palliative care encounter    Abdominal aneurysm    Sciatica of left side    Depression    Other amnesia    Severe obesity (BMI 35.0-39.9) with comorbidity   [2]   Current Outpatient Medications:     albuterol (PROVENTIL/VENTOLIN HFA) 90 mcg/actuation inhaler, INHALE 2 PUFFS INTO THE LUNGS EVERY 6 HOURS AS NEEDED FOR WHEEZING, Disp: 20.1 g, Rfl: 3    alendronate (FOSAMAX) 70 MG tablet, TAKE 1 TABLET BY MOUTH EVERY 7 DAYS, Disp: 12 tablet, Rfl: 3    amiodarone (PACERONE) 200 MG Tab, Take 1 tablet (200 mg total) by mouth once daily., Disp: 30 tablet, Rfl: 3    bumetanide (BUMEX) 2 MG tablet, Take 1 tablet (2 mg total) by mouth 2 (two) times daily as needed., Disp: 30 tablet, Rfl: 11    calcium-vitamin D3 (OS-MIKE 500 + D3) 500 mg-5 mcg (200 unit) per tablet, Take 1 tablet by mouth once daily., Disp: , Rfl:     cyanocobalamin 500 MCG tablet, Take 500 mcg by mouth once daily., Disp: , Rfl:     donepeziL (ARICEPT)  5 MG tablet, Take 1 tablet (5 mg total) by mouth every evening. For memory, Disp: 30 tablet, Rfl: 11    EScitalopram oxalate (LEXAPRO) 5 MG Tab, Take 1 tablet (5 mg total) by mouth once daily., Disp: 90 tablet, Rfl: 3    fluticasone propionate (FLONASE) 50 mcg/actuation nasal spray, 2 sprays (100 mcg total) by Each Nare route once daily., Disp: 1 Bottle, Rfl: 12    fluticasone-umeclidin-vilanter (TRELEGY ELLIPTA) 100-62.5-25 mcg DsDv, INHALE 1 PUFF BY MOUTH INTO THE LUNGS ONCE A DAY, Disp: 180 each, Rfl: 3    gabapentin (NEURONTIN) 300 MG capsule, Take 2 capsules (600 mg total) by mouth every evening. For sciatic nerve pain, Disp: 180 capsule, Rfl: 3    isosorbide mononitrate (IMDUR) 60 MG 24 hr tablet, TAKE 1 TABLET BY MOUTH EVERY DAY FOR HEART AND BLOOD PRESSURE, Disp: 90 tablet, Rfl: 3    loratadine (CLARITIN) 10 mg tablet, Take 1 tablet (10 mg total) by mouth once daily., Disp: 90 tablet, Rfl: 3    montelukast (SINGULAIR) 10 mg tablet, TAKE 1 TABLET BY MOUTH EVERY EVENING, Disp: 90 tablet, Rfl: 3    MULTIVIT-IRON-MIN-FOLIC ACID 3,500-18-0.4 UNIT-MG-MG ORAL CHEW, Take by mouth., Disp: , Rfl:     pantoprazole (PROTONIX) 40 MG tablet, TAKE 1 TABLET BY MOUTH EVERY DAY, Disp: 90 tablet, Rfl: 3    rOPINIRole (REQUIP) 4 MG tablet, Take 1 tablet (4 mg total) by mouth every evening., Disp: 90 tablet, Rfl: 3    tiotropium (SPIRIVA WITH HANDIHALER) 18 mcg inhalation capsule, Inhale 1 capsule (18 mcg total) into the lungs nightly. Controller, Disp: 60 capsule, Rfl: 3    tirzepatide, weight loss, (ZEPBOUND) 2.5 mg/0.5 mL PnIj, Inject 2.5 mg into the skin every 7 days., Disp: 4 Pen, Rfl: 11  [3]   Social History  Tobacco Use    Smoking status: Former     Passive exposure: Past    Smokeless tobacco: Never   Substance Use Topics    Alcohol use: No    Drug use: Never

## 2025-03-17 ENCOUNTER — TELEPHONE (OUTPATIENT)
Dept: FAMILY MEDICINE | Facility: CLINIC | Age: 85
End: 2025-03-17
Payer: MEDICARE

## 2025-03-17 ENCOUNTER — OFFICE VISIT (OUTPATIENT)
Dept: GASTROENTEROLOGY | Facility: CLINIC | Age: 85
End: 2025-03-17
Payer: MEDICARE

## 2025-03-17 VITALS — WEIGHT: 185.63 LBS | BODY MASS INDEX: 33.95 KG/M2

## 2025-03-17 DIAGNOSIS — D50.9 MICROCYTIC ANEMIA: ICD-10-CM

## 2025-03-17 PROCEDURE — 3288F FALL RISK ASSESSMENT DOCD: CPT | Mod: CPTII,GW,S$GLB, | Performed by: NURSE PRACTITIONER

## 2025-03-17 PROCEDURE — 1101F PT FALLS ASSESS-DOCD LE1/YR: CPT | Mod: CPTII,GW,S$GLB, | Performed by: NURSE PRACTITIONER

## 2025-03-17 PROCEDURE — 99999 PR PBB SHADOW E&M-EST. PATIENT-LVL III: CPT | Mod: PBBFAC,,, | Performed by: NURSE PRACTITIONER

## 2025-03-17 PROCEDURE — 99214 OFFICE O/P EST MOD 30 MIN: CPT | Mod: GW,S$GLB,, | Performed by: NURSE PRACTITIONER

## 2025-03-17 PROCEDURE — 1160F RVW MEDS BY RX/DR IN RCRD: CPT | Mod: CPTII,GW,S$GLB, | Performed by: NURSE PRACTITIONER

## 2025-03-17 PROCEDURE — 1126F AMNT PAIN NOTED NONE PRSNT: CPT | Mod: CPTII,GW,S$GLB, | Performed by: NURSE PRACTITIONER

## 2025-03-17 PROCEDURE — 1159F MED LIST DOCD IN RCRD: CPT | Mod: CPTII,GW,S$GLB, | Performed by: NURSE PRACTITIONER

## 2025-03-17 NOTE — PROGRESS NOTES
GASTROENTEROLOGY CLINIC NOTE    Chief Complaint: The encounter diagnosis was Microcytic anemia.  Referring provider/PCP: Marc Sands MD    HPI  Shirley F Gilford is a 84 y.o. female who is a new patient to me who presents to clinic for evaluation of anemia.   Anemia which is microscopic in nature recently noted on routine CBC. Hgb 7.9 HCT 31.1. This is a decrease from previous CBC 2/2024; Hgb 11.4 HCT.   She was referred to GI for further workup.   Has hx of H.pylori in 2017; I do not see where eradication was confirmed in her chart.   Also with previous hx of anemia in 2017 that responded to PO iron.     Reflux: No. Controlled with BID Protonix  Dysphagia/Odynophagia: No  Nausea/Vomiting: No  Appetite Changes: No  Abdominal Pain: No  Bowel Habits: regular bowel movements. No change in bowel habits.  GI Bleeding: Denies hematochezia, hematemesis, melena, BRBPR, Black/tarry stool, coffee ground emesis  Unexplained Weight Loss: No       GLP-1s: tirzepatide  NSAIDs: No  Anticoagulation or Antiplatelet: No      History of H.pylori: yes, 2017  H.pylori Treatment:  Prior Upper Endoscopy: no  Prior Colonoscopy: 2016  Diverticulosis; internal hemorrhoids  Family h/o Colon Cancer: Daughter  Family h/o Crohn's Disease or Ulcerative Colitis: No  Family h/o Celiac Sprue: No  Abdominal Surgeries: no      Review of Systems   Constitutional:  Negative for weight loss.   HENT:  Negative for sore throat.    Eyes:  Negative for blurred vision.   Respiratory:  Negative for cough.    Cardiovascular:  Negative for chest pain.   Gastrointestinal:  Negative for abdominal pain, blood in stool, constipation, diarrhea, heartburn, melena, nausea and vomiting.   Genitourinary:  Negative for dysuria.   Musculoskeletal:  Negative for myalgias.   Skin:  Negative for rash.   Neurological:  Negative for headaches.   Endo/Heme/Allergies:  Negative for environmental allergies.   Psychiatric/Behavioral:  Negative for suicidal ideas. The  patient is not nervous/anxious.        Past Medical History: has a past medical history of Breast cyst, COPD (chronic obstructive pulmonary disease), Eye disorder, Hyperglycemia, Hyperlipidemia, Hypertension, Morbid (severe) obesity due to excess calories, Osteoporosis, Palliative care encounter, and RLS (restless legs syndrome).    Past Surgical History: has a past surgical history that includes Bladder surgery; Breast surgery; Tonsillectomy; Colonoscopy; Eye surgery; and Epidural steroid injection into lumbar spine (Left, 1/8/2024).    Family History:family history includes Cancer (age of onset: 40) in her daughter; Colon cancer in her daughter; Diabetes in her mother; Heart disease in her father; No Known Problems in her daughter; Stroke in her mother.    Allergies:   Review of patient's allergies indicates:   Allergen Reactions    Covid-19vacc,(astrazeneca)(pf)      Fever, headache, weak    Contrast media Swelling       Social History: reports that she has quit smoking. She has been exposed to tobacco smoke. She has never used smokeless tobacco. She reports that she does not drink alcohol and does not use drugs.    Home medications: Medications Ordered Prior to Encounter[1]    Vital signs:  Wt 84.2 kg (185 lb 10 oz)   BMI 33.95 kg/m²     Physical Exam  Vitals reviewed.   Constitutional:       General: She is not in acute distress.     Appearance: Normal appearance. She is not ill-appearing.   HENT:      Head: Normocephalic.   Cardiovascular:      Rate and Rhythm: Normal rate and regular rhythm.      Heart sounds: Normal heart sounds. No murmur heard.  Pulmonary:      Effort: Pulmonary effort is normal. No respiratory distress.      Breath sounds: Normal breath sounds.   Chest:      Chest wall: No tenderness.   Abdominal:      General: Bowel sounds are normal. There is no distension.      Palpations: Abdomen is soft.      Tenderness: There is no abdominal tenderness. Negative signs include Mace's sign.       "Hernia: No hernia is present.   Skin:     General: Skin is warm.   Neurological:      Mental Status: She is alert and oriented to person, place, and time.   Psychiatric:         Mood and Affect: Mood normal.         Behavior: Behavior normal.         Routine labs:  Lab Results   Component Value Date    WBC 9.70 03/12/2025    HGB 7.9 (L) 03/12/2025    HCT 31.1 (L) 03/12/2025    MCV 72 (L) 03/12/2025     03/12/2025     No results found for: "INR"  Lab Results   Component Value Date    IRON 83 04/08/2021    FERRITIN 164 12/14/2023     Lab Results   Component Value Date     03/12/2025    K 4.3 03/12/2025     03/12/2025    CO2 29 03/12/2025    BUN 18 (H) 03/12/2025    CREATININE 0.75 03/12/2025     Lab Results   Component Value Date    ALBUMIN 3.9 03/12/2025    ALT 15 03/12/2025    AST 25 03/12/2025    ALKPHOS 91 03/12/2025    BILITOT 0.5 03/12/2025     No results found for: "GLUCOSE"  Lab Results   Component Value Date    TSH 2.920 07/08/2024     Lab Results   Component Value Date    CALCIUM 9.4 03/12/2025    PHOS 2.0 (L) 12/15/2023       Imaging:      I have reviewed prior labs, imaging, and notes.      Assessment:  1. Microcytic anemia      Microcytic anemia.   2017 iron deficiency anemia that responded to PO iron; also with H.pylori at that time. Unsure if eradication has been confirmed.     Plan:       Continue Protonix as previously prescribed.   Had a thorough discussion with patient regarding anemia diagnosis and workup. Patient feels she does not have "anything wrong" because she is not having symptoms. Explained to patient that it is common that symptoms do not occur until the blood counts drop too low and at that point she would require a blood transfusion.     We discussed options for further workup. Option 1 would be to do EGD and colonoscopy to further evaluate anemia and look for any signs of blood loss in the GI tract. Option 2 start PO iron supplement, recheck blood counts with iron " levels and check stool for FOBT. If counts do not improve or FOBT is positive, procedures will be recommended.   Patient is adamant about not pursuing further workup for anemia.   She has refused EGD, Colonoscopy, as well as other treatment options.     I do recommend she be tested for H.pylori to confirm it has been treated samia since it can contribute to anemia. She will have to hold PPI x 2 weeks prior to submitting stool sample.     Will message patient's PCP and provide update.     Plan of care discussed with patient who is in agreement and verbalized understanding.     I have explained the planned procedures to the patient.The risks, benefits and alternatives of the procedure were also explained in detail. Patient verbalized understanding, all questions were answered. The patient agrees to proceed as planned    Follow Up: SHELL Licea, BRIAN,FNP-BC  [facility]    (Portions of this note were dictated using voice recognition software and may contain dictation related errors in spelling/grammar/syntax not found on text review)         [1]   Current Outpatient Medications on File Prior to Visit   Medication Sig Dispense Refill    albuterol (PROVENTIL/VENTOLIN HFA) 90 mcg/actuation inhaler INHALE 2 PUFFS INTO THE LUNGS EVERY 6 HOURS AS NEEDED FOR WHEEZING 20.1 g 3    alendronate (FOSAMAX) 70 MG tablet TAKE 1 TABLET BY MOUTH EVERY 7 DAYS 12 tablet 3    amiodarone (PACERONE) 200 MG Tab Take 1 tablet (200 mg total) by mouth once daily. 30 tablet 3    bumetanide (BUMEX) 2 MG tablet Take 1 tablet (2 mg total) by mouth 2 (two) times daily as needed. 30 tablet 11    calcium-vitamin D3 (OS-MIKE 500 + D3) 500 mg-5 mcg (200 unit) per tablet Take 1 tablet by mouth once daily.      cyanocobalamin 500 MCG tablet Take 500 mcg by mouth once daily.      donepeziL (ARICEPT) 5 MG tablet Take 1 tablet (5 mg total) by mouth every evening. For memory 30 tablet 11    EScitalopram  oxalate (LEXAPRO) 5 MG Tab Take 1 tablet (5 mg total) by mouth once daily. 90 tablet 3    fluticasone propionate (FLONASE) 50 mcg/actuation nasal spray 2 sprays (100 mcg total) by Each Nare route once daily. 1 Bottle 12    fluticasone-umeclidin-vilanter (TRELEGY ELLIPTA) 100-62.5-25 mcg DsDv INHALE 1 PUFF BY MOUTH INTO THE LUNGS ONCE A  each 3    gabapentin (NEURONTIN) 300 MG capsule Take 2 capsules (600 mg total) by mouth every evening. For sciatic nerve pain 180 capsule 3    isosorbide mononitrate (IMDUR) 60 MG 24 hr tablet TAKE 1 TABLET BY MOUTH EVERY DAY FOR HEART AND BLOOD PRESSURE 90 tablet 3    loratadine (CLARITIN) 10 mg tablet Take 1 tablet (10 mg total) by mouth once daily. 90 tablet 3    montelukast (SINGULAIR) 10 mg tablet TAKE 1 TABLET BY MOUTH EVERY EVENING 90 tablet 3    MULTIVIT-IRON-MIN-FOLIC ACID 3,500-18-0.4 UNIT-MG-MG ORAL CHEW Take by mouth.      pantoprazole (PROTONIX) 40 MG tablet Take 1 tablet (40 mg total) by mouth 2 (two) times daily. For ulcers 60 tablet 2    rOPINIRole (REQUIP) 4 MG tablet Take 1 tablet (4 mg total) by mouth every evening. 90 tablet 3    tiotropium (SPIRIVA WITH HANDIHALER) 18 mcg inhalation capsule Inhale 1 capsule (18 mcg total) into the lungs nightly. Controller 60 capsule 3    tirzepatide, weight loss, (ZEPBOUND) 2.5 mg/0.5 mL PnIj Inject 2.5 mg into the skin every 7 days. 4 Pen 11     No current facility-administered medications on file prior to visit.

## 2025-03-17 NOTE — TELEPHONE ENCOUNTER
I spoke with pt and confirmed with her that appt is scheduled for today with GI. The patient initially thought she would be having a scope done today. I informed her that she will need to be evaluated by GI to have that determined.

## 2025-03-17 NOTE — TELEPHONE ENCOUNTER
----- Message from Rishi sent at 3/17/2025  8:09 AM CDT -----  Contact: pt  Type: Requesting to speak with nurseWho Called: PTRegarding: gi referral Would the patient rather a call back or a response via [facility]? Call [facility] Call Back Number: [phone] Additional Information:

## 2025-03-17 NOTE — Clinical Note
Dexter Sands. I saw Ms. Merino today for anemia workup. She has refused EGD and colonoscopy. I spent a lot of time discussing the workup and explaining why I recommend EGD and Colonoscopy although she is not having symptoms. Also discussed, starting oral iron and repeating labs as well as checking for h.pylori eradication since she does have a previous history. She has refused this as well. I also explained it is highly likely she will need a blood transfusion if we do not identify the cause of anemia.   Thanks,  Darshana

## 2025-03-24 DIAGNOSIS — Z00.00 ENCOUNTER FOR MEDICARE ANNUAL WELLNESS EXAM: ICD-10-CM

## 2025-04-07 RX ORDER — MONTELUKAST SODIUM 10 MG/1
10 TABLET ORAL NIGHTLY
Qty: 90 TABLET | Refills: 3 | Status: SHIPPED | OUTPATIENT
Start: 2025-04-07

## 2025-04-07 NOTE — TELEPHONE ENCOUNTER
Care Due:                  Date            Visit Type   Department     Provider  --------------------------------------------------------------------------------                                EP -                              PRIMARY      [facility]  Last Visit: 03-      CARE ([facility])   BYRON Sands                               -                              [facility]  Next Visit: 05-      CARE ([facility])   BYRON Sands                                                            Last  Test          Frequency    Reason                     Performed    Due Date  --------------------------------------------------------------------------------    HBA1C.......  6 months...  tirzepatide,.............  02-   08-    Mg Level....  12 months..  alendronate..............  12-   12-    Phosphate...  12 months..  alendronate..............  12-   12-    [facility] Embedded Care Due Messages. Reference number: [id number].   4/07/2025 10:09:26 AM CDT

## 2025-04-08 NOTE — TELEPHONE ENCOUNTER
Provider Staff:  Action required for this patient    Requires labs      Please see care gap opportunities below in Care Due Message.    Thanks!  [facility]     Appointments      Date Provider   Last Visit   3/13/2025 Marc Sands MD   Next Visit   5/28/2025 Marc Sands MD     Refill Decision Note   Shirley Gilford  is requesting a refill authorization.  Brief Assessment and Rationale for Refill:  Approve     Medication Therapy Plan:        Comments:     Note composed:9:19 PM 04/07/2025

## 2025-04-17 NOTE — TELEPHONE ENCOUNTER
Refill Routing Note   Medication(s) are not appropriate for processing by [facility] for the following reason(s):     [facility] not previously overridden by current provider--after initial override, the Refill Center will be able to continue overrides      Drug-drug interaction: amiodarone, donepezil and EScitalopram oxalate     [facility] action(s):  Defer           Pharmacist review requested: Yes     Appointments  past 12m or future 3m with PCP    Date Provider   Last Visit   3/13/2025 Marc Sands MD   Next Visit   5/28/2025 Marc Sands MD   ED visits in past 90 days: 0        Note composed:8:28 PM 04/16/2025

## 2025-04-17 NOTE — TELEPHONE ENCOUNTER
No care due was identified.  [facility] Embedded Care Due Messages. Reference number: [id number].   4/16/2025 8:10:03 PM CDT

## 2025-04-17 NOTE — TELEPHONE ENCOUNTER
Refill Routing Note   Medication(s) are not appropriate for processing by [facility] for the following reason(s):        Drug-drug interaction: Drug-drug interaction: amiodarone, donepezil and EScitalopram oxalate     [facility] action(s):  Defer             Pharmacist review requested: Yes   Extended chart review required: Yes     Appointments  past 12m or future 3m with PCP    Date Provider   Last Visit   3/13/2025 Marc Sands MD   Next Visit   5/28/2025 Marc Sands MD   ED visits in past 90 days: 0        Note composed:10:15 PM 04/16/2025

## 2025-04-18 RX ORDER — ESCITALOPRAM OXALATE 5 MG/1
5 TABLET ORAL
Qty: 90 TABLET | Refills: 3 | Status: SHIPPED | OUTPATIENT
Start: 2025-04-18

## 2025-05-08 RX ORDER — FLUTICASONE FUROATE, UMECLIDINIUM BROMIDE AND VILANTEROL TRIFENATATE 100; 62.5; 25 UG/1; UG/1; UG/1
POWDER RESPIRATORY (INHALATION)
Qty: 180 EACH | Refills: 3 | Status: SHIPPED | OUTPATIENT
Start: 2025-05-08

## 2025-05-08 NOTE — TELEPHONE ENCOUNTER
Refill Routing Note   Medication(s) are not appropriate for processing by [facility] for the following reason(s):        Drug-drug interaction    [facility] action(s):  Defer        Medication Therapy Plan: Duplicate Therapy: tiotropium, TRELEGY ELLIPTA    Pharmacist review requested: Yes     Appointments  past 12m or future 3m with PCP    Date Provider   Last Visit   3/13/2025 Marc Sands MD   Next Visit   5/28/2025 Marc Sands MD   ED visits in past 90 days: 0        Note composed:4:47 PM 05/08/2025

## 2025-05-08 NOTE — TELEPHONE ENCOUNTER
No care due was identified.  [facility] Embedded Care Due Messages. Reference number: [id number].   5/08/2025 4:24:16 PM CDT

## 2025-05-09 NOTE — TELEPHONE ENCOUNTER
Refill Decision Note   Angelique Gilford  is requesting a refill authorization.  Brief Assessment and Rationale for Refill:  Approve     Medication Therapy Plan:        Pharmacist review requested: Yes   Extended chart review required: Yes   Comments:     Note composed:9:04 PM 05/08/2025

## 2025-05-28 ENCOUNTER — OFFICE VISIT (OUTPATIENT)
Dept: FAMILY MEDICINE | Facility: CLINIC | Age: 85
End: 2025-05-28
Payer: MEDICARE

## 2025-05-28 VITALS
OXYGEN SATURATION: 92 % | BODY MASS INDEX: 34.22 KG/M2 | HEART RATE: 79 BPM | HEIGHT: 62 IN | WEIGHT: 185.94 LBS | TEMPERATURE: 98 F | SYSTOLIC BLOOD PRESSURE: 162 MMHG | DIASTOLIC BLOOD PRESSURE: 82 MMHG

## 2025-05-28 DIAGNOSIS — Z78.0 MENOPAUSE: ICD-10-CM

## 2025-05-28 DIAGNOSIS — D50.9 MICROCYTIC ANEMIA: ICD-10-CM

## 2025-05-28 DIAGNOSIS — M62.831 CHARLEYHORSE: ICD-10-CM

## 2025-05-28 DIAGNOSIS — Z86.79 HISTORY OF HYPERTENSION: ICD-10-CM

## 2025-05-28 DIAGNOSIS — L29.9 PRURITUS: Primary | ICD-10-CM

## 2025-05-28 DIAGNOSIS — J44.9 CHRONIC OBSTRUCTIVE PULMONARY DISEASE, UNSPECIFIED COPD TYPE: ICD-10-CM

## 2025-05-28 PROCEDURE — 1159F MED LIST DOCD IN RCRD: CPT | Mod: CPTII,S$GLB,, | Performed by: FAMILY MEDICINE

## 2025-05-28 PROCEDURE — 3288F FALL RISK ASSESSMENT DOCD: CPT | Mod: CPTII,S$GLB,, | Performed by: FAMILY MEDICINE

## 2025-05-28 PROCEDURE — 3079F DIAST BP 80-89 MM HG: CPT | Mod: CPTII,S$GLB,, | Performed by: FAMILY MEDICINE

## 2025-05-28 PROCEDURE — 1160F RVW MEDS BY RX/DR IN RCRD: CPT | Mod: CPTII,S$GLB,, | Performed by: FAMILY MEDICINE

## 2025-05-28 PROCEDURE — 3077F SYST BP >= 140 MM HG: CPT | Mod: CPTII,S$GLB,, | Performed by: FAMILY MEDICINE

## 2025-05-28 PROCEDURE — 99215 OFFICE O/P EST HI 40 MIN: CPT | Mod: 25,S$GLB,, | Performed by: FAMILY MEDICINE

## 2025-05-28 PROCEDURE — 1101F PT FALLS ASSESS-DOCD LE1/YR: CPT | Mod: CPTII,S$GLB,, | Performed by: FAMILY MEDICINE

## 2025-05-28 PROCEDURE — 1126F AMNT PAIN NOTED NONE PRSNT: CPT | Mod: CPTII,S$GLB,, | Performed by: FAMILY MEDICINE

## 2025-05-28 PROCEDURE — 96372 THER/PROPH/DIAG INJ SC/IM: CPT | Mod: S$GLB,,, | Performed by: FAMILY MEDICINE

## 2025-05-28 PROCEDURE — G2211 COMPLEX E/M VISIT ADD ON: HCPCS | Mod: S$GLB,,, | Performed by: FAMILY MEDICINE

## 2025-05-28 RX ORDER — ROSUVASTATIN CALCIUM 5 MG/1
5 TABLET, COATED ORAL NIGHTLY
COMMUNITY
Start: 2025-01-21 | End: 2025-05-28

## 2025-05-28 RX ORDER — TRIAMCINOLONE ACETONIDE 40 MG/ML
80 INJECTION, SUSPENSION INTRA-ARTICULAR; INTRAMUSCULAR ONCE
Status: COMPLETED | OUTPATIENT
Start: 2025-05-28 | End: 2025-05-28

## 2025-05-28 RX ADMIN — TRIAMCINOLONE ACETONIDE 80 MG: 40 INJECTION, SUSPENSION INTRA-ARTICULAR; INTRAMUSCULAR at 02:05

## 2025-06-02 ENCOUNTER — TELEPHONE (OUTPATIENT)
Dept: FAMILY MEDICINE | Facility: CLINIC | Age: 85
End: 2025-06-02
Payer: MEDICARE

## 2025-06-02 ENCOUNTER — HOSPITAL ENCOUNTER (OUTPATIENT)
Dept: RADIOLOGY | Facility: HOSPITAL | Age: 85
Discharge: HOME | End: 2025-06-02
Attending: FAMILY MEDICINE
Payer: MEDICARE

## 2025-06-02 DIAGNOSIS — J44.9 CHRONIC OBSTRUCTIVE PULMONARY DISEASE, UNSPECIFIED COPD TYPE: ICD-10-CM

## 2025-06-02 PROCEDURE — 71046 X-RAY EXAM CHEST 2 VIEWS: CPT | Mod: TC,FY,PN

## 2025-06-12 RX ORDER — DONEPEZIL HYDROCHLORIDE 5 MG/1
TABLET, FILM COATED ORAL
Qty: 90 TABLET | Refills: 3 | Status: SHIPPED | OUTPATIENT
Start: 2025-06-12

## 2025-06-20 ENCOUNTER — RESULTS FOLLOW-UP (OUTPATIENT)
Dept: FAMILY MEDICINE | Facility: CLINIC | Age: 85
End: 2025-06-20

## 2025-06-20 ENCOUNTER — TELEPHONE (OUTPATIENT)
Dept: FAMILY MEDICINE | Facility: CLINIC | Age: 85
End: 2025-06-20
Payer: MEDICARE

## 2025-06-20 NOTE — TELEPHONE ENCOUNTER
I spoke with the pt and advised -  Severe iron deficiency anemia, RTC soon   He can't come due to transportation next week  She is scheduled July 2 - will that be ok?    Please rtn to staff to notify the pt if July 2 is ok

## 2025-06-20 NOTE — TELEPHONE ENCOUNTER
----- Message from Marc Sands MD sent at 6/20/2025  8:06 AM CDT -----  Severe iron deficiency anemia, RTC soon  ----- Message -----  From: Lab, Background User  Sent: 6/2/2025  12:11 PM CDT  To: Marc Sands MD

## 2025-08-04 ENCOUNTER — TELEPHONE (OUTPATIENT)
Dept: FAMILY MEDICINE | Facility: CLINIC | Age: 85
End: 2025-08-04

## 2025-08-04 ENCOUNTER — OFFICE VISIT (OUTPATIENT)
Dept: FAMILY MEDICINE | Facility: CLINIC | Age: 85
End: 2025-08-04
Payer: MEDICARE

## 2025-08-04 VITALS
DIASTOLIC BLOOD PRESSURE: 86 MMHG | TEMPERATURE: 98 F | HEART RATE: 80 BPM | OXYGEN SATURATION: 93 % | WEIGHT: 180.75 LBS | SYSTOLIC BLOOD PRESSURE: 126 MMHG | HEIGHT: 62 IN | BODY MASS INDEX: 33.26 KG/M2

## 2025-08-04 DIAGNOSIS — D50.9 IRON DEFICIENCY ANEMIA, UNSPECIFIED IRON DEFICIENCY ANEMIA TYPE: ICD-10-CM

## 2025-08-04 DIAGNOSIS — G25.81 RESTLESS LEG SYNDROME: ICD-10-CM

## 2025-08-04 DIAGNOSIS — G47.01 INSOMNIA DUE TO MEDICAL CONDITION: ICD-10-CM

## 2025-08-04 DIAGNOSIS — L29.9 PRURITUS: Primary | ICD-10-CM

## 2025-08-04 RX ORDER — HYDROXYZINE HYDROCHLORIDE 25 MG/1
25 TABLET, FILM COATED ORAL 3 TIMES DAILY PRN
Qty: 60 TABLET | Refills: 3 | Status: SHIPPED | OUTPATIENT
Start: 2025-08-04

## 2025-08-04 RX ORDER — FERROUS SULFATE 325(65) MG
325 TABLET ORAL
Qty: 90 TABLET | Refills: 3 | Status: SHIPPED | OUTPATIENT
Start: 2025-08-04

## 2025-08-04 RX ORDER — TRIAMCINOLONE ACETONIDE 1 MG/G
CREAM TOPICAL 2 TIMES DAILY
Qty: 80 G | Refills: 1 | Status: SHIPPED | OUTPATIENT
Start: 2025-08-04

## 2025-08-04 NOTE — PROGRESS NOTES
" Patient ID: Shirley F Gilford is a 84 y.o. female.     Chief Complaint: Itching    Itching  Pertinent negatives include no chest pain, coughing, fever, headaches, myalgias, nausea, rash, vomiting or weakness.     History of Present Illness    Angelique presents today for generalized itching all over the body.    She reports generalized itching for one month that has progressively worsened. She describes itching as occurring between skin folds and all over body, with sensation initially feeling like "little electrical pins" that has intensified. The itching significantly impacts sleep and she has not slept in two nights. She scratches to the point of causing skin changes, which provides momentary relief but does not resolve the underlying issue. She denies recent travel, recent medication changes, or known exposure to potential irritants.    She experiences restless legs at night with associated leg itching. She also describes muscle cramping in legs occurring on one occasion.    She has attempted multiple interventions for relief including changing laundry detergent, switching shower wash, checking bed for bed bugs, using cold showers, and applying alcohol and lotion for temporary relief which provides 2-3 hours of calm. She previously received a steroid injection from another provider which did not improve symptoms. She currently manages symptoms by taking cold showers, applying alcohol, and using lotion, but relief is short-lived.    She has a history of chronic anemia with current iron level of 9, which is significantly below the expected range of 60-70. She acknowledges consuming liver approximately once monthly as part of her diet and verbalizes long-standing awareness of having low blood counts. She is open to iron supplementation to address the deficiency.    She lives in assisted living and utilizes shared laundry facilities.         Review of Systems  Review of Systems   Constitutional:  Negative for fever. " "  HENT:  Negative for ear pain and sinus pain.    Eyes:  Negative for discharge.   Respiratory:  Negative for cough and shortness of breath.    Cardiovascular:  Negative for chest pain and leg swelling.   Gastrointestinal:  Negative for diarrhea, nausea and vomiting.   Genitourinary:  Negative for urgency.   Musculoskeletal:  Negative for myalgias.   Skin:  Positive for itching. Negative for rash.   Neurological:  Negative for weakness and headaches.   Psychiatric/Behavioral:  Negative for depression.    All other systems reviewed and are negative.      Currently Medications  Medications Ordered Prior to Encounter[1]    Physical  Exam  Vitals:    08/04/25 1318   BP: 126/86   BP Location: Left arm   Patient Position: Sitting   Pulse: 80   Temp: 97.8 °F (36.6 °C)   SpO2: (!) 93%   Weight: 82 kg (180 lb 12.4 oz)   Height: 5' 2" (1.575 m)      Body mass index is 33.06 kg/m².  Wt Readings from Last 3 Encounters:   08/04/25 82 kg (180 lb 12.4 oz)   05/28/25 84.4 kg (185 lb 15.3 oz)   03/17/25 84.2 kg (185 lb 10 oz)       Physical Exam  Vitals and nursing note reviewed.   Constitutional:       General: She is not in acute distress.     Appearance: She is not ill-appearing.   HENT:      Head: Normocephalic and atraumatic.      Right Ear: External ear normal.      Left Ear: External ear normal.      Nose: Nose normal.      Mouth/Throat:      Mouth: Mucous membranes are moist.   Eyes:      Extraocular Movements: Extraocular movements intact.      Conjunctiva/sclera: Conjunctivae normal.   Cardiovascular:      Rate and Rhythm: Normal rate and regular rhythm.      Pulses: Normal pulses.      Heart sounds: No murmur heard.  Pulmonary:      Effort: Pulmonary effort is normal. No respiratory distress.      Breath sounds: No wheezing.   Abdominal:      General: There is no distension.      Palpations: Abdomen is soft. There is no mass.      Tenderness: There is no abdominal tenderness.   Musculoskeletal:         General: No " "swelling.      Cervical back: Normal range of motion.   Skin:     Coloration: Skin is not jaundiced.      Findings: No rash.   Neurological:      General: No focal deficit present.      Mental Status: She is alert and oriented to person, place, and time.   Psychiatric:         Mood and Affect: Mood normal.         Thought Content: Thought content normal.         Labs:    Complete Blood Count  Lab Results   Component Value Date    RBC 4.46 06/02/2025    HGB 8.2 (L) 06/02/2025    HCT 31.0 (L) 06/02/2025    MCV 70 (L) 06/02/2025    MCH 18.4 (L) 06/02/2025    MCHC 26.5 (L) 06/02/2025    RDW 21.2 (H) 06/02/2025     06/02/2025    MPV  06/02/2025      Comment:      Result Not Available    GRAN 6.8 03/12/2025    GRAN 69.8 03/12/2025    LYMPH 12.0 (L) 06/02/2025    LYMPH 1.61 06/02/2025    MONO 8.6 06/02/2025    MONO 1.16 (H) 06/02/2025    EOS 1.3 06/02/2025    EOS 0.17 06/02/2025    BASO 0.09 03/12/2025    EOSINOPHIL 3.5 03/12/2025    BASOPHIL 1.0 06/02/2025    BASOPHIL 0.13 06/02/2025    DIFFMETHOD Automated 03/12/2025       Comprehensive Metabolic Panel  Lab Results   Component Value Date     (H) 03/12/2025    BUN 18 (H) 03/12/2025    CREATININE 0.75 03/12/2025     03/12/2025    K 4.3 03/12/2025     03/12/2025    PROT 6.7 03/12/2025    ALBUMIN 3.9 03/12/2025    BILITOT 0.5 03/12/2025    AST 25 03/12/2025    ALKPHOS 91 03/12/2025    CO2 29 03/12/2025    ALT 15 03/12/2025    ANIONGAP 8 03/12/2025       TSH  No results found for: "TSH"    Imaging:  X-Ray Chest PA And Lateral  Narrative: EXAMINATION:  XR CHEST PA AND LATERAL    CLINICAL HISTORY:  Chronic obstructive pulmonary disease, unspecified    COMPARISON:  04/19/2024    FINDINGS:  There is moderate cardiomegaly.  There is a mild amount of interstitial and alveolar opacities seen in both lungs.  There is a moderate sized hiatal hernia.  There is no pneumothorax or pleural effusion..  Impression: 1. Persistent moderate cardiomegaly with mild " pulmonary edema  2. Persistent moderate sized hiatal hernia  .    Electronically signed by: Ciro Johnson MD  Date:    06/02/2025  Time:    10:32      Assessment/Plan:  Assessment & Plan    Considered multiple potential causes for generalized itching, including allergies, nerves, and underlying medical conditions.  Ruled out recent medication changes, hotel stays, and bed bugs as potential causes.  Noted low iron levels from recent bloodwork, which may be contributing to symptoms like restless legs.  Recommend iron supplementation to address anemia, while acknowledging need to investigate underlying cause of iron deficiency.  Considered possibility of internal bleeding as cause of anemia, but patient declined further invasive testing at this time.  Considered scabies as potential diagnosis given shared laundry facilities, but deferred to dermatology for definitive diagnosis.  Opted for conservative management with oral antihistamines and topical steroids while awaiting dermatology evaluation.    ## PRURITUS:  - Monitored patient's severe itching all over that has worsened over a month, causing inability to sleep and spots from scratching.  - Angelique reports temporary relief from cold showers and rubbing alcohol with lotion.  - Previous steroid injection was ineffective.  - Advised to take cold showers instead of hot showers for itch relief.  - Prescribed hydroxyzine up to 3 times daily as needed and topical steroid cream for itchy areas.  - Referred to dermatologist Dr. Mace in [place]    ## IRON DEFICIENCY ANEMIA:  - Monitored patient's critically low iron levels at 9 (should be in 60s-70s), with associated symptoms of restless legs and muscle cramps.  - Discussed potential causes including possible internal bleeding and consideration of endoscopy.  - Recommend OTCiron supplement, 1 tablet daily with stool softeners (2 tablets daily) to prevent constipation.  - Advised patient about potential side effects  including constipation and black stool.  - Ordered follow-up labs in 2 weeks.    ## RESTLESS LEGS SYNDROME:  - Angelique experiencing restless legs at night, directly associated with low iron levels.  - Iron supplementation prescribed as noted above to address this condition.    ## INSOMNIA:  - Angelique unable to sleep due to combination of severe pruritus and restless legs.  - Hydroxyzine prescribed for pruritus may also help improve sleep quality.          1. Pruritus  -     hydrOXYzine HCL (ATARAX) 25 MG tablet; Take 1 tablet (25 mg total) by mouth 3 (three) times daily as needed for Itching.  Dispense: 60 tablet; Refill: 3  -     Ambulatory referral/consult to Dermatology; Future; Expected date: 08/11/2025  -     TSH; Future; Expected date: 08/04/2025  -     Cancel: THYROGLOBULIN; Future; Expected date: 08/04/2025  -     Thydoid Peroxidase Antibody; Future; Expected date: 08/04/2025  -     triamcinolone acetonide 0.1% (KENALOG) 0.1 % cream; Apply topically 2 (two) times daily.  Dispense: 80 g; Refill: 1    2. Iron deficiency anemia, unspecified iron deficiency anemia type  -     ferrous sulfate (FEOSOL) 325 mg (65 mg iron) Tab tablet; Take 1 tablet (325 mg total) by mouth daily with breakfast.  Dispense: 90 tablet; Refill: 3  -     CBC Auto Differential; Future  -     Iron and TIBC; Future  -     Ferritin; Future  -     Cancel: Hematopath Consult, Peripheral Smear  -     Hematopath Consult, Peripheral Smear; Future; Expected date: 08/04/2025  -     Peripheral Smear Review for Hemolysis or Low Platelets; Future; Expected date: 08/04/2025    3. Restless leg syndrome    4. Insomnia due to medical condition         Discussed how to stay healthy including: diet, exercise, refraining from smoking and discussed screening exams / tests needed for age, sex and family Hx.    RTC 2 weeks    Darren Trimble MD    This note was generated with the assistance of ambient listening technology. Verbal consent was obtained by the patient  and accompanying visitor(s) for the recording of patient appointment to facilitate this note. I attest to having reviewed and edited the generated note for accuracy, though some syntax or spelling errors may persist. Please contact the author of this note for any clarification.         [1]   Current Outpatient Medications on File Prior to Visit   Medication Sig Dispense Refill    albuterol (PROVENTIL/VENTOLIN HFA) 90 mcg/actuation inhaler INHALE 2 PUFFS INTO THE LUNGS EVERY 6 HOURS AS NEEDED FOR WHEEZING 20.1 g 3    alendronate (FOSAMAX) 70 MG tablet TAKE 1 TABLET BY MOUTH EVERY 7 DAYS 12 tablet 3    amiodarone (PACERONE) 200 MG Tab Take 1 tablet (200 mg total) by mouth once daily. 30 tablet 3    bumetanide (BUMEX) 2 MG tablet Take 1 tablet (2 mg total) by mouth 2 (two) times daily as needed. 30 tablet 11    calcium-vitamin D3 (OS-MIKE 500 + D3) 500 mg-5 mcg (200 unit) per tablet Take 1 tablet by mouth once daily.      cyanocobalamin 500 MCG tablet Take 500 mcg by mouth once daily.      donepeziL (ARICEPT) 5 MG tablet TAKE 1 TABLET BY MOUTH EVERY EVENING FOR MEMORY 90 tablet 3    EScitalopram oxalate (LEXAPRO) 5 MG Tab TAKE 1 TABLET BY MOUTH ONCE A DAY 90 tablet 3    fluticasone propionate (FLONASE) 50 mcg/actuation nasal spray 2 sprays (100 mcg total) by Each Nare route once daily. 1 Bottle 12    gabapentin (NEURONTIN) 300 MG capsule Take 2 capsules (600 mg total) by mouth every evening. For sciatic nerve pain 180 capsule 3    isosorbide mononitrate (IMDUR) 60 MG 24 hr tablet TAKE 1 TABLET BY MOUTH EVERY DAY FOR HEART AND BLOOD PRESSURE 90 tablet 3    loratadine (CLARITIN) 10 mg tablet Take 1 tablet (10 mg total) by mouth once daily. 90 tablet 3    montelukast (SINGULAIR) 10 mg tablet TAKE 1 TABLET BY MOUTH EVERY EVENING 90 tablet 3    MULTIVIT-IRON-MIN-FOLIC ACID 3,500-18-0.4 UNIT-MG-MG ORAL CHEW Take by mouth.      pantoprazole (PROTONIX) 40 MG tablet Take 1 tablet (40 mg total) by mouth 2 (two) times daily. For  ulcers 60 tablet 2    rOPINIRole (REQUIP) 4 MG tablet Take 1 tablet (4 mg total) by mouth every evening. 90 tablet 3    TRELEGY ELLIPTA 100-62.5-25 mcg DsDv INHALE 1 PUFF BY MOUTH INTO THE LUNGS ONCE A  each 3     No current facility-administered medications on file prior to visit.

## 2025-08-04 NOTE — TELEPHONE ENCOUNTER
Copied from CRM #[id number]. Topic: General Inquiry - Return Call  >> Aug 4, 2025  3:05 PM Audrey wrote:  Type: Patient Call Back    Who called:pt    What is the request in detail: pt is requesting to reschedule the appt for 8/20/25, unable to pull up any availability    Can the clinic reply by [facility]? no    Would the patient rather a call back or a response via [facility]? call    Best call back [phone]    Additional Information: pt is available for 8/18/2025

## 2025-08-04 NOTE — TELEPHONE ENCOUNTER
Copied from CRM #[id number]. Topic: General Inquiry - Return Call  >> Aug 4, 2025  3:33 PM Rishi wrote:  Type:  Patient Returning Call    Who Called:pt  Who Left Message for Patient:STACI BOYKIN   Does the patient know what this is regarding?: yes  Would the patient rather a call back or a response via [facility]? Call   Best Call Back Number: [phone] (M)

## 2025-08-08 ENCOUNTER — TELEPHONE (OUTPATIENT)
Dept: FAMILY MEDICINE | Facility: CLINIC | Age: 85
End: 2025-08-08
Payer: MEDICARE

## 2025-08-08 NOTE — TELEPHONE ENCOUNTER
Copied from CRM #[id number]. Topic: General Inquiry - Return Call  >> Aug 8, 2025  9:11 AM Rishi wrote:  Type:  Patient Returning Call    Who Called:pt   Who Left Message for Patient:Lillian   Does the patient know what this is regarding?: no  Would the patient rather a call back or a response via [facility]? call  Best Call Back Number:[phone] (M)    Additional Information:

## 2025-08-13 ENCOUNTER — LAB VISIT (OUTPATIENT)
Dept: LAB | Facility: HOSPITAL | Age: 85
End: 2025-08-13
Attending: STUDENT IN AN ORGANIZED HEALTH CARE EDUCATION/TRAINING PROGRAM
Payer: MEDICARE

## 2025-08-13 DIAGNOSIS — L29.9 PRURITUS: ICD-10-CM

## 2025-08-13 DIAGNOSIS — D50.9 IRON DEFICIENCY ANEMIA, UNSPECIFIED IRON DEFICIENCY ANEMIA TYPE: ICD-10-CM

## 2025-08-13 LAB
ABSOLUTE EOSINOPHIL (OHS): 0.3 K/UL
ABSOLUTE MONOCYTE (OHS): 1.03 K/UL (ref 0.3–1)
ABSOLUTE NEUTROPHIL COUNT (OHS): 7.88 K/UL (ref 1.8–7.7)
ANISOCYTOSIS BLD QL SMEAR: SLIGHT
BASOPHILS # BLD AUTO: 0.09 K/UL
BASOPHILS NFR BLD AUTO: 0.8 %
DACRYOCYTES BLD QL SMEAR: NORMAL
ERYTHROCYTE [DISTWIDTH] IN BLOOD BY AUTOMATED COUNT: 23.4 % (ref 11.5–14.5)
FERRITIN SERPL-MCNC: 45.2 NG/ML (ref 20–300)
HCT VFR BLD AUTO: 37.2 % (ref 37–48.5)
HGB BLD-MCNC: 10.2 GM/DL (ref 12–16)
HYPOCHROMIA BLD QL SMEAR: NORMAL
IMM GRANULOCYTES # BLD AUTO: 0.08 K/UL (ref 0–0.04)
IMM GRANULOCYTES NFR BLD AUTO: 0.7 % (ref 0–0.5)
IRON SATN MFR SERPL: 44 % (ref 20–50)
IRON SERPL-MCNC: 234 UG/DL (ref 30–160)
LYMPHOCYTES # BLD AUTO: 2.4 K/UL (ref 1–4.8)
MCH RBC QN AUTO: 20.2 PG (ref 27–31)
MCHC RBC AUTO-ENTMCNC: 27.4 G/DL (ref 32–36)
MCV RBC AUTO: 74 FL (ref 82–98)
NUCLEATED RBC (/100WBC) (OHS): 0 /100 WBC
OVALOCYTES BLD QL SMEAR: NORMAL
PLATELET # BLD AUTO: 336 K/UL (ref 150–450)
PLATELET BLD QL SMEAR: NORMAL
PMV BLD AUTO: 10.2 FL (ref 9.2–12.9)
POIKILOCYTOSIS BLD QL SMEAR: SLIGHT
POLYCHROMASIA BLD QL SMEAR: NORMAL
RBC # BLD AUTO: 5.05 M/UL (ref 4–5.4)
RELATIVE EOSINOPHIL (OHS): 2.5 %
RELATIVE LYMPHOCYTE (OHS): 20.4 % (ref 18–48)
RELATIVE MONOCYTE (OHS): 8.7 % (ref 4–15)
RELATIVE NEUTROPHIL (OHS): 66.9 % (ref 38–73)
THYROPEROXIDASE IGG SERPL-ACNC: <6 IU/ML
TIBC SERPL-MCNC: 528 UG/DL (ref 250–450)
TRANSFERRIN SERPL-MCNC: 357 MG/DL (ref 200–375)
TSH SERPL-ACNC: 3.66 UIU/ML (ref 0.4–4)
WBC # BLD AUTO: 11.78 K/UL (ref 3.9–12.7)

## 2025-08-13 PROCEDURE — 82728 ASSAY OF FERRITIN: CPT | Mod: PN

## 2025-08-13 PROCEDURE — 84466 ASSAY OF TRANSFERRIN: CPT | Mod: PN

## 2025-08-13 PROCEDURE — 84443 ASSAY THYROID STIM HORMONE: CPT | Mod: PN

## 2025-08-13 PROCEDURE — 86376 MICROSOMAL ANTIBODY EACH: CPT | Mod: PN

## 2025-08-13 PROCEDURE — 36415 COLL VENOUS BLD VENIPUNCTURE: CPT | Mod: PN

## 2025-08-13 PROCEDURE — 85025 COMPLETE CBC W/AUTO DIFF WBC: CPT | Mod: PN

## 2025-08-16 ENCOUNTER — RESULTS FOLLOW-UP (OUTPATIENT)
Dept: FAMILY MEDICINE | Facility: CLINIC | Age: 85
End: 2025-08-16
Payer: MEDICARE

## 2025-08-18 ENCOUNTER — OFFICE VISIT (OUTPATIENT)
Dept: FAMILY MEDICINE | Facility: CLINIC | Age: 85
End: 2025-08-18
Payer: MEDICARE

## 2025-08-18 VITALS
DIASTOLIC BLOOD PRESSURE: 78 MMHG | HEART RATE: 69 BPM | OXYGEN SATURATION: 92 % | TEMPERATURE: 97 F | SYSTOLIC BLOOD PRESSURE: 116 MMHG | HEIGHT: 62 IN | WEIGHT: 180.31 LBS | BODY MASS INDEX: 33.18 KG/M2

## 2025-08-18 DIAGNOSIS — E78.2 MIXED HYPERLIPIDEMIA: ICD-10-CM

## 2025-08-18 DIAGNOSIS — J44.9 CHRONIC OBSTRUCTIVE PULMONARY DISEASE, UNSPECIFIED COPD TYPE: ICD-10-CM

## 2025-08-18 DIAGNOSIS — G25.81 RESTLESS LEG SYNDROME: ICD-10-CM

## 2025-08-18 DIAGNOSIS — E61.1 IRON DEFICIENCY: Primary | ICD-10-CM

## 2025-08-18 DIAGNOSIS — R73.9 HYPERGLYCEMIA: ICD-10-CM

## 2025-08-18 DIAGNOSIS — Z86.39 HISTORY OF HYPERLIPIDEMIA: ICD-10-CM

## 2025-08-18 PROCEDURE — 1126F AMNT PAIN NOTED NONE PRSNT: CPT | Mod: CPTII,GW,S$GLB, | Performed by: STUDENT IN AN ORGANIZED HEALTH CARE EDUCATION/TRAINING PROGRAM

## 2025-08-18 PROCEDURE — 1159F MED LIST DOCD IN RCRD: CPT | Mod: CPTII,GW,S$GLB, | Performed by: STUDENT IN AN ORGANIZED HEALTH CARE EDUCATION/TRAINING PROGRAM

## 2025-08-18 PROCEDURE — 3078F DIAST BP <80 MM HG: CPT | Mod: CPTII,GW,S$GLB, | Performed by: STUDENT IN AN ORGANIZED HEALTH CARE EDUCATION/TRAINING PROGRAM

## 2025-08-18 PROCEDURE — 1101F PT FALLS ASSESS-DOCD LE1/YR: CPT | Mod: CPTII,GW,S$GLB, | Performed by: STUDENT IN AN ORGANIZED HEALTH CARE EDUCATION/TRAINING PROGRAM

## 2025-08-18 PROCEDURE — 1160F RVW MEDS BY RX/DR IN RCRD: CPT | Mod: CPTII,GW,S$GLB, | Performed by: STUDENT IN AN ORGANIZED HEALTH CARE EDUCATION/TRAINING PROGRAM

## 2025-08-18 PROCEDURE — 99213 OFFICE O/P EST LOW 20 MIN: CPT | Mod: GW,S$GLB,, | Performed by: STUDENT IN AN ORGANIZED HEALTH CARE EDUCATION/TRAINING PROGRAM

## 2025-08-18 PROCEDURE — 3074F SYST BP LT 130 MM HG: CPT | Mod: CPTII,GW,S$GLB, | Performed by: STUDENT IN AN ORGANIZED HEALTH CARE EDUCATION/TRAINING PROGRAM

## 2025-08-18 PROCEDURE — 3288F FALL RISK ASSESSMENT DOCD: CPT | Mod: CPTII,GW,S$GLB, | Performed by: STUDENT IN AN ORGANIZED HEALTH CARE EDUCATION/TRAINING PROGRAM

## 2025-08-18 RX ORDER — FLUTICASONE FUROATE, UMECLIDINIUM BROMIDE AND VILANTEROL TRIFENATATE 100; 62.5; 25 UG/1; UG/1; UG/1
1 POWDER RESPIRATORY (INHALATION) DAILY
Qty: 180 EACH | Refills: 3 | Status: SHIPPED | OUTPATIENT
Start: 2025-08-18